# Patient Record
Sex: MALE | Race: WHITE | HISPANIC OR LATINO | Employment: FULL TIME | URBAN - METROPOLITAN AREA
[De-identification: names, ages, dates, MRNs, and addresses within clinical notes are randomized per-mention and may not be internally consistent; named-entity substitution may affect disease eponyms.]

---

## 2019-09-25 ENCOUNTER — TELEPHONE (OUTPATIENT)
Dept: OTHER | Facility: OTHER | Age: 39
End: 2019-09-25

## 2019-09-25 NOTE — TELEPHONE ENCOUNTER
Date/Time Called in:  09/25/19 @ 1837  Type of Consult: Routine  Facility: [facility]  Unit:   Room:   Phone: [phone]  Referring Physician: Mandy Ascencio: [facility]) Nephrology  Patient: Rita Sosa Record Number: [id number]  Admitting Diagnosis:   Reason for Consult: JULIENNE, Solitary Kidney, Received Gentamicin  Which Physician Notified:   Date/ Time Physician Notified:

## 2019-09-30 ENCOUNTER — HOSPITAL ENCOUNTER (INPATIENT)
Facility: HOSPITAL | Age: 39
LOS: 15 days | Discharge: HOME HEALTH | DRG: 162 | End: 2019-10-15
Attending: INTERNAL MEDICINE | Admitting: HOSPITALIST
Payer: COMMERCIAL

## 2019-09-30 DIAGNOSIS — Z95.2 S/P MVR (MITRAL VALVE REPLACEMENT): Primary | ICD-10-CM

## 2019-09-30 DIAGNOSIS — I38 ENDOCARDITIS: ICD-10-CM

## 2019-09-30 DIAGNOSIS — R78.81 BACTEREMIA: ICD-10-CM

## 2019-09-30 DIAGNOSIS — B19.20 HEPATITIS C VIRUS INFECTION WITHOUT HEPATIC COMA, UNSPECIFIED CHRONICITY: ICD-10-CM

## 2019-09-30 DIAGNOSIS — I34.0 NONRHEUMATIC MITRAL VALVE REGURGITATION: ICD-10-CM

## 2019-09-30 DIAGNOSIS — I76 SEPTIC EMBOLISM (HCC): ICD-10-CM

## 2019-10-01 ENCOUNTER — APPOINTMENT (INPATIENT)
Dept: RADIOLOGY | Facility: HOSPITAL | Age: 39
DRG: 162 | End: 2019-10-01
Payer: COMMERCIAL

## 2019-10-01 ENCOUNTER — APPOINTMENT (INPATIENT)
Dept: NON INVASIVE DIAGNOSTICS | Facility: HOSPITAL | Age: 39
DRG: 162 | End: 2019-10-01
Payer: COMMERCIAL

## 2019-10-01 PROBLEM — N17.9 ACUTE KIDNEY INJURY (HCC): Status: ACTIVE | Noted: 2019-10-01

## 2019-10-01 PROBLEM — G47.00 INSOMNIA: Status: ACTIVE | Noted: 2019-10-01

## 2019-10-01 PROBLEM — I38 ENDOCARDITIS: Status: ACTIVE | Noted: 2019-10-01

## 2019-10-01 PROBLEM — I34.0 NONRHEUMATIC MITRAL VALVE REGURGITATION: Status: ACTIVE | Noted: 2019-10-01

## 2019-10-01 PROBLEM — R78.81 BACTEREMIA: Status: ACTIVE | Noted: 2019-10-01

## 2019-10-01 PROBLEM — F19.11 HISTORY OF INTRAVENOUS DRUG ABUSE (HCC): Status: ACTIVE | Noted: 2019-10-01

## 2019-10-01 PROBLEM — R00.0 TACHYCARDIA: Status: ACTIVE | Noted: 2019-10-01

## 2019-10-01 PROBLEM — F41.9 ANXIETY: Status: ACTIVE | Noted: 2019-10-01

## 2019-10-01 PROBLEM — IMO0002 SOLITARY LEFT KIDNEY: Status: ACTIVE | Noted: 2019-10-01

## 2019-10-01 PROBLEM — Z87.898 HISTORY OF INTRAVENOUS DRUG ABUSE: Status: ACTIVE | Noted: 2019-10-01

## 2019-10-01 LAB
ALBUMIN SERPL BCP-MCNC: 2.8 G/DL (ref 3.5–5)
ALP SERPL-CCNC: 107 U/L (ref 46–116)
ALT SERPL W P-5'-P-CCNC: 22 U/L (ref 12–78)
ANION GAP SERPL CALCULATED.3IONS-SCNC: 11 MMOL/L (ref 4–13)
ANION GAP SERPL CALCULATED.3IONS-SCNC: 8 MMOL/L (ref 4–13)
AST SERPL W P-5'-P-CCNC: 11 U/L (ref 5–45)
BASOPHILS # BLD AUTO: 0.05 THOUSANDS/ΜL (ref 0–0.1)
BASOPHILS NFR BLD AUTO: 0 % (ref 0–1)
BILIRUB SERPL-MCNC: 0.72 MG/DL (ref 0.2–1)
BUN SERPL-MCNC: 37 MG/DL (ref 5–25)
BUN SERPL-MCNC: 39 MG/DL (ref 5–25)
CALCIUM SERPL-MCNC: 8.8 MG/DL (ref 8.3–10.1)
CALCIUM SERPL-MCNC: 9.1 MG/DL (ref 8.3–10.1)
CHLORIDE SERPL-SCNC: 105 MMOL/L (ref 100–108)
CHLORIDE SERPL-SCNC: 106 MMOL/L (ref 100–108)
CO2 SERPL-SCNC: 24 MMOL/L (ref 21–32)
CO2 SERPL-SCNC: 25 MMOL/L (ref 21–32)
CREAT SERPL-MCNC: 1.76 MG/DL (ref 0.6–1.3)
CREAT SERPL-MCNC: 1.86 MG/DL (ref 0.6–1.3)
EOSINOPHIL # BLD AUTO: 0.19 THOUSAND/ΜL (ref 0–0.61)
EOSINOPHIL NFR BLD AUTO: 1 % (ref 0–6)
ERYTHROCYTE [DISTWIDTH] IN BLOOD BY AUTOMATED COUNT: 17.3 % (ref 11.6–15.1)
GFR SERPL CREATININE-BSD FRML MDRD: 45 ML/MIN/1.73SQ M
GFR SERPL CREATININE-BSD FRML MDRD: 48 ML/MIN/1.73SQ M
GLUCOSE SERPL-MCNC: 107 MG/DL (ref 65–140)
GLUCOSE SERPL-MCNC: 108 MG/DL (ref 65–140)
HCT VFR BLD AUTO: 24.9 % (ref 36.5–49.3)
HGB BLD-MCNC: 8 G/DL (ref 12–17)
IMM GRANULOCYTES # BLD AUTO: 0.12 THOUSAND/UL (ref 0–0.2)
IMM GRANULOCYTES NFR BLD AUTO: 1 % (ref 0–2)
INR PPP: 1.2 (ref 0.84–1.19)
LYMPHOCYTES # BLD AUTO: 1.61 THOUSANDS/ΜL (ref 0.6–4.47)
LYMPHOCYTES NFR BLD AUTO: 11 % (ref 14–44)
MCH RBC QN AUTO: 27.4 PG (ref 26.8–34.3)
MCHC RBC AUTO-ENTMCNC: 32.1 G/DL (ref 31.4–37.4)
MCV RBC AUTO: 85 FL (ref 82–98)
MONOCYTES # BLD AUTO: 0.67 THOUSAND/ΜL (ref 0.17–1.22)
MONOCYTES NFR BLD AUTO: 4 % (ref 4–12)
NEUTROPHILS # BLD AUTO: 12.47 THOUSANDS/ΜL (ref 1.85–7.62)
NEUTS SEG NFR BLD AUTO: 83 % (ref 43–75)
NRBC BLD AUTO-RTO: 0 /100 WBCS
PLATELET # BLD AUTO: 312 THOUSANDS/UL (ref 149–390)
PMV BLD AUTO: 9.7 FL (ref 8.9–12.7)
POTASSIUM SERPL-SCNC: 3.8 MMOL/L (ref 3.5–5.3)
POTASSIUM SERPL-SCNC: 4.1 MMOL/L (ref 3.5–5.3)
PROT SERPL-MCNC: 7.5 G/DL (ref 6.4–8.2)
PROTHROMBIN TIME: 14.8 SECONDS (ref 11.6–14.5)
RBC # BLD AUTO: 2.92 MILLION/UL (ref 3.88–5.62)
SODIUM SERPL-SCNC: 138 MMOL/L (ref 136–145)
SODIUM SERPL-SCNC: 141 MMOL/L (ref 136–145)
WBC # BLD AUTO: 15.11 THOUSAND/UL (ref 4.31–10.16)

## 2019-10-01 PROCEDURE — 87040 BLOOD CULTURE FOR BACTERIA: CPT | Performed by: INTERNAL MEDICINE

## 2019-10-01 PROCEDURE — 86704 HEP B CORE ANTIBODY TOTAL: CPT | Performed by: STUDENT IN AN ORGANIZED HEALTH CARE EDUCATION/TRAINING PROGRAM

## 2019-10-01 PROCEDURE — 93880 EXTRACRANIAL BILAT STUDY: CPT | Performed by: SURGERY

## 2019-10-01 PROCEDURE — 99254 IP/OBS CNSLTJ NEW/EST MOD 60: CPT | Performed by: HOSPITALIST

## 2019-10-01 PROCEDURE — 99232 SBSQ HOSP IP/OBS MODERATE 35: CPT | Performed by: INTERNAL MEDICINE

## 2019-10-01 PROCEDURE — 99254 IP/OBS CNSLTJ NEW/EST MOD 60: CPT | Performed by: INTERNAL MEDICINE

## 2019-10-01 PROCEDURE — 93880 EXTRACRANIAL BILAT STUDY: CPT

## 2019-10-01 PROCEDURE — 70450 CT HEAD/BRAIN W/O DYE: CPT

## 2019-10-01 PROCEDURE — NC001 PR NO CHARGE: Performed by: INTERNAL MEDICINE

## 2019-10-01 PROCEDURE — 85610 PROTHROMBIN TIME: CPT | Performed by: STUDENT IN AN ORGANIZED HEALTH CARE EDUCATION/TRAINING PROGRAM

## 2019-10-01 PROCEDURE — NC001 PR NO CHARGE: Performed by: HOSPITALIST

## 2019-10-01 PROCEDURE — 71250 CT THORAX DX C-: CPT

## 2019-10-01 PROCEDURE — 80048 BASIC METABOLIC PNL TOTAL CA: CPT | Performed by: INTERNAL MEDICINE

## 2019-10-01 PROCEDURE — 71045 X-RAY EXAM CHEST 1 VIEW: CPT

## 2019-10-01 PROCEDURE — 80053 COMPREHEN METABOLIC PANEL: CPT | Performed by: STUDENT IN AN ORGANIZED HEALTH CARE EDUCATION/TRAINING PROGRAM

## 2019-10-01 PROCEDURE — 70355 PANORAMIC X-RAY OF JAWS: CPT

## 2019-10-01 PROCEDURE — 93005 ELECTROCARDIOGRAM TRACING: CPT

## 2019-10-01 PROCEDURE — 86705 HEP B CORE ANTIBODY IGM: CPT | Performed by: STUDENT IN AN ORGANIZED HEALTH CARE EDUCATION/TRAINING PROGRAM

## 2019-10-01 PROCEDURE — 85025 COMPLETE CBC W/AUTO DIFF WBC: CPT | Performed by: INTERNAL MEDICINE

## 2019-10-01 PROCEDURE — 86803 HEPATITIS C AB TEST: CPT | Performed by: STUDENT IN AN ORGANIZED HEALTH CARE EDUCATION/TRAINING PROGRAM

## 2019-10-01 PROCEDURE — 87340 HEPATITIS B SURFACE AG IA: CPT | Performed by: STUDENT IN AN ORGANIZED HEALTH CARE EDUCATION/TRAINING PROGRAM

## 2019-10-01 PROCEDURE — 99255 IP/OBS CONSLTJ NEW/EST HI 80: CPT | Performed by: THORACIC SURGERY (CARDIOTHORACIC VASCULAR SURGERY)

## 2019-10-01 PROCEDURE — 87389 HIV-1 AG W/HIV-1&-2 AB AG IA: CPT | Performed by: INTERNAL MEDICINE

## 2019-10-01 RX ORDER — ZOLPIDEM TARTRATE 5 MG/1
5 TABLET ORAL
Status: DISCONTINUED | OUTPATIENT
Start: 2019-10-01 | End: 2019-10-09 | Stop reason: HOSPADM

## 2019-10-01 RX ORDER — LORAZEPAM 0.5 MG/1
0.5 TABLET ORAL ONCE
Status: COMPLETED | OUTPATIENT
Start: 2019-10-01 | End: 2019-10-01

## 2019-10-01 RX ORDER — SODIUM CHLORIDE 9 MG/ML
40 INJECTION, SOLUTION INTRAVENOUS CONTINUOUS
Status: DISCONTINUED | OUTPATIENT
Start: 2019-10-02 | End: 2019-10-02

## 2019-10-01 RX ORDER — METOPROLOL TARTRATE 50 MG/1
50 TABLET, FILM COATED ORAL EVERY 12 HOURS SCHEDULED
Status: DISCONTINUED | OUTPATIENT
Start: 2019-10-01 | End: 2019-10-05

## 2019-10-01 RX ORDER — TRAZODONE HYDROCHLORIDE 50 MG/1
50 TABLET ORAL
Status: DISCONTINUED | OUTPATIENT
Start: 2019-10-01 | End: 2019-10-01

## 2019-10-01 RX ORDER — LANOLIN ALCOHOL/MO/W.PET/CERES
3 CREAM (GRAM) TOPICAL
Status: DISCONTINUED | OUTPATIENT
Start: 2019-10-01 | End: 2019-10-01

## 2019-10-01 RX ORDER — LEVOFLOXACIN 500 MG/1
500 TABLET, FILM COATED ORAL EVERY 24 HOURS
Status: DISCONTINUED | OUTPATIENT
Start: 2019-10-01 | End: 2019-10-01

## 2019-10-01 RX ORDER — ESCITALOPRAM OXALATE 10 MG/1
10 TABLET ORAL DAILY
Status: DISCONTINUED | OUTPATIENT
Start: 2019-10-01 | End: 2019-10-15 | Stop reason: HOSPADM

## 2019-10-01 RX ORDER — TRAZODONE HYDROCHLORIDE 50 MG/1
50 TABLET ORAL ONCE
Status: COMPLETED | OUTPATIENT
Start: 2019-10-01 | End: 2019-10-01

## 2019-10-01 RX ORDER — LANOLIN ALCOHOL/MO/W.PET/CERES
3 CREAM (GRAM) TOPICAL
Status: DISCONTINUED | OUTPATIENT
Start: 2019-10-01 | End: 2019-10-09 | Stop reason: HOSPADM

## 2019-10-01 RX ORDER — LEVOFLOXACIN 500 MG/1
500 TABLET, FILM COATED ORAL EVERY 24 HOURS
Status: DISCONTINUED | OUTPATIENT
Start: 2019-10-02 | End: 2019-10-03

## 2019-10-01 RX ORDER — HEPARIN SODIUM 5000 [USP'U]/ML
5000 INJECTION, SOLUTION INTRAVENOUS; SUBCUTANEOUS EVERY 8 HOURS SCHEDULED
Status: DISCONTINUED | OUTPATIENT
Start: 2019-10-01 | End: 2019-10-03

## 2019-10-01 RX ORDER — ONDANSETRON 2 MG/ML
4 INJECTION INTRAMUSCULAR; INTRAVENOUS EVERY 8 HOURS PRN
Status: DISCONTINUED | OUTPATIENT
Start: 2019-10-01 | End: 2019-10-09 | Stop reason: HOSPADM

## 2019-10-01 RX ORDER — TRAZODONE HYDROCHLORIDE 100 MG/1
100 TABLET ORAL
Status: DISCONTINUED | OUTPATIENT
Start: 2019-10-02 | End: 2019-10-09 | Stop reason: HOSPADM

## 2019-10-01 RX ORDER — LEVOFLOXACIN 750 MG/1
750 TABLET ORAL
Status: DISCONTINUED | OUTPATIENT
Start: 2019-10-01 | End: 2019-10-01

## 2019-10-01 RX ORDER — HYDROXYZINE HYDROCHLORIDE 25 MG/1
25 TABLET, FILM COATED ORAL EVERY 8 HOURS PRN
Status: DISCONTINUED | OUTPATIENT
Start: 2019-10-01 | End: 2019-10-15 | Stop reason: HOSPADM

## 2019-10-01 RX ADMIN — LORAZEPAM 0.5 MG: 0.5 TABLET ORAL at 01:36

## 2019-10-01 RX ADMIN — HYDROXYZINE HYDROCHLORIDE 25 MG: 25 TABLET ORAL at 10:23

## 2019-10-01 RX ADMIN — METOPROLOL TARTRATE 50 MG: 50 TABLET, FILM COATED ORAL at 08:19

## 2019-10-01 RX ADMIN — MELATONIN 3 MG: 3 TAB ORAL at 21:31

## 2019-10-01 RX ADMIN — CEFTAZIDIME 1000 MG: 1 INJECTION, POWDER, FOR SOLUTION INTRAMUSCULAR; INTRAVENOUS at 03:19

## 2019-10-01 RX ADMIN — ESCITALOPRAM OXALATE 10 MG: 10 TABLET ORAL at 08:19

## 2019-10-01 RX ADMIN — HYDROXYZINE HYDROCHLORIDE 25 MG: 25 TABLET ORAL at 18:34

## 2019-10-01 RX ADMIN — ONDANSETRON 4 MG: 2 INJECTION INTRAMUSCULAR; INTRAVENOUS at 18:28

## 2019-10-01 RX ADMIN — CEFTAZIDIME 2000 MG: 1 INJECTION, POWDER, FOR SOLUTION INTRAMUSCULAR; INTRAVENOUS at 16:25

## 2019-10-01 RX ADMIN — TRAZODONE HYDROCHLORIDE 50 MG: 50 TABLET ORAL at 21:53

## 2019-10-01 RX ADMIN — TRAZODONE HYDROCHLORIDE 50 MG: 50 TABLET ORAL at 21:31

## 2019-10-01 RX ADMIN — METOPROLOL TARTRATE 50 MG: 50 TABLET, FILM COATED ORAL at 21:31

## 2019-10-01 RX ADMIN — LEVOFLOXACIN 750 MG: 750 TABLET, FILM COATED ORAL at 03:19

## 2019-10-01 NOTE — H&P (VIEW-ONLY)
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  he was in his usual state of health until several weeks ago  In review, Treated at [facility] with IV Abx x 6 weeks  F/U Echo on 6/20 with MV posterior leaflet echodensity, severe MR and mild TR  Patient again using heroine  Presented to [facility] on 9/6 with several days of insomnia and malaise  + Bld Cx for Pseudomonas, started on double coverage ceftazidime/levofloxacin  Transferred to [facility] for surgical evaluation  Patient states he was admitted to [facility] several weeks ago  He states the he was told they were having trouble keeping "water off his lungs"  He admits to SOB, ANNE and orthopnea  He also has an essential tremor that is new since this admission  As I sit with the patient now, he relates this above story to me   he currently denies and syncope, presyncope, CP, palpitations      Past Medical History:  Past Medical History:   Diagnosis Date    Anxiety 10/1/2019    Bacteremia 10/1/2019    Endocarditis 10/1/2019    History of intravenous drug abuse ([facility] [address] ) 10/1/2019    Nonrheumatic mitral valve regurgitation 10/1/2019         Past Surgical History:   Past Surgical History:   Procedure Laterality Date    CHEST WALL TUMOR EXCISION  2013    Anterior chest wall cyst removed         Family History:  Family History   Problem Relation Age of Onset    Heart disease Maternal Grandmother          Social History:  Social History     Substance and Sexual Activity   Alcohol Use Never    Frequency: Never    Binge frequency: Never     Social History     Substance and Sexual Activity   Drug Use Yes     Social History     Tobacco Use   Smoking Status Never Smoker   Smokeless Tobacco Never Used     Marital Status: Single  Lives with mother  Works odd jobs, mostly construction  Has not seen dentist in some time  Home Medications:   Prior to Admission medications    Not on File       Inpatient Medications:  Scheduled Meds:     Current Facility-Administered Medications:  cefTAZidime 1,000 mg Intravenous Q12H Max Charlotte, DO Last Rate: 1,000 mg (10/01/19 0319)   escitalopram 10 mg Oral Daily Max Charlotte, DO    heparin (porcine) 5,000 Units Subcutaneous Q8H [address] Max Ancil [place], DO    hydrOXYzine HCL 25 mg Oral Q8H PRN Max Charlotte, DO    levofloxacin 750 mg Oral Q48H Max Charlotte, DO    melatonin 3 mg Oral HS Max Ancil [place], DO    metoprolol tartrate 50 mg Oral Q12H [address] Max Ancil [place], DO    traZODone 50 mg Oral HS Max Widanabellaki, DO    zolpidem 5 mg Oral HS PRN Max Charlotte, DO      Continuous Infusions:    PRN Meds:    hydrOXYzine HCL 25 mg Q8H PRN   zolpidem 5 mg HS PRN       Allergies:  No Known Allergies    Review of Systems:  Review of Systems   Constitutional: Positive for fatigue  Negative for activity change and fever  HENT: Negative for congestion, dental problem and sore throat  Respiratory: Positive for shortness of breath  Negative for choking, chest tightness and wheezing  Cardiovascular: Negative for chest pain, palpitations and leg swelling  Gastrointestinal: Positive for nausea and vomiting  Negative for abdominal distention, constipation and diarrhea  Genitourinary: Negative for difficulty urinating, dysuria, enuresis and hematuria  Musculoskeletal: Negative for arthralgias, gait problem, joint swelling and myalgias  Skin: Negative for color change, pallor, rash and wound  Neurological: Positive for tremors  Negative for dizziness, syncope and light-headedness  Psychiatric/Behavioral: Negative for agitation and behavioral problems  The patient is nervous/anxious  Vital Signs:     Vitals:    09/30/19 2329 10/01/19 0317 10/01/19 0816   BP: 113/66 110/72 120/65   BP Location: Left arm  Right arm   Pulse: (!) 120  (!) 125   Resp: 18  18   SpO2: 95%  97%   Weight: 72 9 kg (160 lb 12 8 oz)     Height: 5' 3" (1 6 m)       Invasive Devices     Peripheral Intravenous Line            Long-Dwell Peripheral IV (Midline) Left Brachial -- days                Physical Exam:  Physical Exam   Constitutional: He is oriented to person, place, and time  He appears well-developed and well-nourished  No distress  HENT:   Head: Normocephalic and atraumatic  Mouth/Throat: Oropharynx is clear and moist    Eyes: Pupils are equal, round, and reactive to light  Conjunctivae are normal  No scleral icterus  Neck: Normal range of motion  Neck supple  No JVD present  Cardiovascular: Regular rhythm  Tachycardia present  Murmur heard  Systolic murmur is present with a grade of 4/6  Pulmonary/Chest: Effort normal and breath sounds normal  No respiratory distress  He has no wheezes  He has no rales  Abdominal: Soft  Bowel sounds are normal  He exhibits no distension and no mass  Musculoskeletal: Normal range of motion  He exhibits no edema or tenderness  Neurological: He is alert and oriented to person, place, and time  No cranial nerve deficit  Skin: Skin is warm and dry  He is not diaphoretic  Psychiatric: He has a normal mood and affect  His behavior is normal  Judgment and thought content normal    Nursing note and vitals reviewed        Lab Results:     Results from last 7 days   Lab Units 10/01/19  1023   WBC Thousand/uL 15 11*   HEMOGLOBIN g/dL 8 0*   HEMATOCRIT % 24 9*   PLATELETS Thousands/uL 312     Results from last 7 days   Lab Units 10/01/19  1023   POTASSIUM mmol/L 4 1   CHLORIDE mmol/L 105   CO2 mmol/L 25   BUN mg/dL 39*   CREATININE mg/dL 1 76*   CALCIUM mg/dL 8 8         No results found for: HGBA1C  No results found for: CKTOTAL, CKMB, CKMBINDEX, TROPONINI    Imaging Studies:     Cardiac Catheterization: n/a    Echocardiogram: performed at [facility], disc/film/report currently unavailable  Per chart; Patient had BILLY done on 06/20/2019 which showed echodensity on posterior atrial aspect of mitral valve + severe mitral and mild tricuspid regurgitation        Assessment:  Principal Diagnosis:   Mitral Valve endocarditis; Ongoing MVR workup    Hospital Problems:  Present on Admission:   Endocarditis   History of intravenous drug abuse ([address] )   Nonrheumatic mitral valve regurgitation   Bacteremia      Plan:  Risks and benefits of mitral valve replacement were discussed in detail today with the patient  They understand and wish to proceed with further workup and ultimately surgical intervention  We have ordered routine preoperative laboratory and vascular studies  Given patients IVDA history and in-ability to stay clean despite similar diagnosis earlier this year, would favor delayed surgical replacement until able to demonstrate compliance  Will try to obtain imagining from [facility], will likely need to repeat at least TTE  Will need active case management involvement to arrange for care       SIGNATURE: Tia Matthews PA-C  DATE: October 1, 2019  TIME: 11:37 AM

## 2019-10-01 NOTE — CONSULTS
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  he was in his usual state of health until several weeks ago  In review, Treated at [facility] with IV Abx x 6 weeks  F/U Echo on 6/20 with MV posterior leaflet echodensity, severe MR and mild TR  Patient again using heroine  Presented to [facility] on 9/6 with several days of insomnia and malaise  + Bld Cx for Pseudomonas, started on double coverage ceftazidime/levofloxacin  Transferred to [facility] for surgical evaluation  Patient states he was admitted to [facility] several weeks ago  He states the he was told they were having trouble keeping "water off his lungs"  He admits to SOB, ANNE and orthopnea  He also has an essential tremor that is new since this admission  As I sit with the patient now, he relates this above story to me   he currently denies and syncope, presyncope, CP, palpitations      Past Medical History:  Past Medical History:   Diagnosis Date    Anxiety 10/1/2019    Bacteremia 10/1/2019    Endocarditis 10/1/2019    History of intravenous drug abuse ([facility] [address] ) 10/1/2019    Nonrheumatic mitral valve regurgitation 10/1/2019         Past Surgical History:   Past Surgical History:   Procedure Laterality Date    CHEST WALL TUMOR EXCISION  2013    Anterior chest wall cyst removed         Family History:  Family History   Problem Relation Age of Onset    Heart disease Maternal Grandmother          Social History:  Social History     Substance and Sexual Activity   Alcohol Use Never    Frequency: Never    Binge frequency: Never     Social History     Substance and Sexual Activity   Drug Use Yes     Social History     Tobacco Use   Smoking Status Never Smoker   Smokeless Tobacco Never Used     Marital Status: Single  Lives with mother  Works odd jobs, mostly construction  Has not seen dentist in some time  Home Medications:   Prior to Admission medications    Not on File       Inpatient Medications:  Scheduled Meds:     Current Facility-Administered Medications:  cefTAZidime 1,000 mg Intravenous Q12H Chase Porter DO Last Rate: 1,000 mg (10/01/19 0319)   escitalopram 10 mg Oral Daily Chase Porter DO    heparin (porcine) 5,000 Units Subcutaneous Q8H [facility] DO Robyn    hydrOXYzine HCL 25 mg Oral Q8H PRN Chase Porter DO    levofloxacin 750 mg Oral Q48H Chase Porter DO    melatonin 3 mg Oral HS Max Yessica DO Robyn    metoprolol tartrate 50 mg Oral Q12H [facility] DO Robyn    traZODone 50 mg Oral HS Chase Porter, DO    zolpidem 5 mg Oral HS PRN Chase Porter DO      Continuous Infusions:    PRN Meds:    hydrOXYzine HCL 25 mg Q8H PRN   zolpidem 5 mg HS PRN       Allergies:  No Known Allergies    Review of Systems:  Review of Systems   Constitutional: Positive for fatigue  Negative for activity change and fever  HENT: Negative for congestion, dental problem and sore throat  Respiratory: Positive for shortness of breath  Negative for choking, chest tightness and wheezing  Cardiovascular: Negative for chest pain, palpitations and leg swelling  Gastrointestinal: Positive for nausea and vomiting  Negative for abdominal distention, constipation and diarrhea  Genitourinary: Negative for difficulty urinating, dysuria, enuresis and hematuria  Musculoskeletal: Negative for arthralgias, gait problem, joint swelling and myalgias  Skin: Negative for color change, pallor, rash and wound  Neurological: Positive for tremors  Negative for dizziness, syncope and light-headedness  Psychiatric/Behavioral: Negative for agitation and behavioral problems  The patient is nervous/anxious  Vital Signs:     Vitals:    09/30/19 2329 10/01/19 0317 10/01/19 0816   BP: 113/66 110/72 120/65   BP Location: Left arm  Right arm   Pulse: (!) 120  (!) 125   Resp: 18  18   SpO2: 95%  97%   Weight: 72 9 kg (160 lb 12 8 oz)     Height: 5' 3" (1 6 m)       Invasive Devices     Peripheral Intravenous Line            Long-Dwell Peripheral IV (Midline) Left Brachial -- days                Physical Exam:  Physical Exam   Constitutional: He is oriented to person, place, and time  He appears well-developed and well-nourished  No distress  HENT:   Head: Normocephalic and atraumatic  Mouth/Throat: Oropharynx is clear and moist    Eyes: Pupils are equal, round, and reactive to light  Conjunctivae are normal  No scleral icterus  Neck: Normal range of motion  Neck supple  No JVD present  Cardiovascular: Regular rhythm  Tachycardia present  Murmur heard  Systolic murmur is present with a grade of 4/6  Pulmonary/Chest: Effort normal and breath sounds normal  No respiratory distress  He has no wheezes  He has no rales  Abdominal: Soft  Bowel sounds are normal  He exhibits no distension and no mass  Musculoskeletal: Normal range of motion  He exhibits no edema or tenderness  Neurological: He is alert and oriented to person, place, and time  No cranial nerve deficit  Skin: Skin is warm and dry  He is not diaphoretic  Psychiatric: He has a normal mood and affect  His behavior is normal  Judgment and thought content normal    Nursing note and vitals reviewed        Lab Results:     Results from last 7 days   Lab Units 10/01/19  1023   WBC Thousand/uL 15 11*   HEMOGLOBIN g/dL 8 0*   HEMATOCRIT % 24 9*   PLATELETS Thousands/uL 312     Results from last 7 days   Lab Units 10/01/19  1023   POTASSIUM mmol/L 4 1   CHLORIDE mmol/L 105   CO2 mmol/L 25   BUN mg/dL 39*   CREATININE mg/dL 1 76*   CALCIUM mg/dL 8 8         No results found for: HGBA1C  No results found for: CKTOTAL, CKMB, CKMBINDEX, TROPONINI    Imaging Studies:     Cardiac Catheterization: n/a    Echocardiogram: performed at [facility], disc/film/report currently unavailable  Per chart; Patient had BILLY done on 06/20/2019 which showed echodensity on posterior atrial aspect of mitral valve + severe mitral and mild tricuspid regurgitation        Assessment:  Principal Diagnosis:   Mitral Valve endocarditis; Ongoing MVR workup    Hospital Problems:  Present on Admission:   Endocarditis   History of intravenous drug abuse ([address] )   Nonrheumatic mitral valve regurgitation   Bacteremia      Plan:  Risks and benefits of mitral valve replacement were discussed in detail today with the patient  They understand and wish to proceed with further workup and ultimately surgical intervention  We have ordered routine preoperative laboratory and vascular studies  Given patients IVDA history and in-ability to stay clean despite similar diagnosis earlier this year, would favor delayed surgical replacement until able to demonstrate compliance  Will try to obtain imagining from [facility], will likely need to repeat at least TTE  Will need active case management involvement to arrange for care       SIGNATURE: Kate Sterling PA-C  DATE: October 1, 2019  TIME: 11:37 AM

## 2019-10-01 NOTE — PROGRESS NOTES
Pt still refusing to wear telemetry or Masimo monitoring system  Allowed us to obtain blood work this morning after being explained the importance by MD  Pt states the only thing he wants is a "ticket out of here"  1830: Pt now wearing telemetry after being coerced by family members

## 2019-10-01 NOTE — PROGRESS NOTES
SOD is unsure as to when patient's midline was placed at [facility], per family it has been in place for a few weeks     SOD-A will place order to discontinue midline and place peripheral IV site

## 2019-10-01 NOTE — PROGRESS NOTES
[facility] Senior Admission Note   Unit/Bed # @DBLINK (EBN,08642)@ Encounter: [id number]  SOD Team A    Conception Dorothy 44 y o  male [id number]      Patient seen and examined  Reviewed H&P per Dr Teri Tran  Agree with the assessment and plan except:     Assessment/Plan: Principal Problem:    Endocarditis  Active Problems:    History of intravenous drug abuse ([facility] [address] )    Acute kidney injury ([facility] [address] )    Insomnia    Tachycardia    Nonrheumatic mitral valve regurgitation    Bacteremia    Anxiety    Patient is a 43-year-old male with a past medical history of IV drug abuse with heroin (intermittent use since 13years of age) who presents as a transfer from [facility] for CT surgery evaluation for severe MR in the setting of endocarditis with pseudomonas bacteremia  Patient had actively been using heroin and several weeks ago developed gradual onset fevers with diaphoresis, malaise  States that he could not sleep and felt like shit  Patient presented to [facility] and was diagnosed with infective endocarditis with evidence of mitral valve vegetation seen on BILLY with accompanying Pseudomonas bacteremia  Patient currently on IV ceftazidime and levofloxacin for double anti-pseudomonal coverage per last ID note  Echocardiogram has revealed severe MR and patient is being transferred for CT surgery eval for possible mitral valve replacement  On arrival, patient afebrile  Tachycardia with heart rate 120 noted, blood pressure 113/66  Patient currently denies any chest pain or shortness of breath  Patient previously noted to have diarrhea which was thought to be secondary to cefepime, which is now improved  Continues to endorse some ongoing nausea        Please note that the following information is adapted from Infectious Disease, Cardiology, and Nephrology progress notes from [facility] present in the patient's chart written on 9/30/19:    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  -patient has a history of IV drug use and was previously treated for MSSA mitral valve endocarditis and bacteremia in March of 2019  Completed 6 week course of IV cefazolin on 3/21/19  -patient had 2D echo on September 6, 2019 which showed echodensity on the posterior mitral leaflet, possibly representing residual vegetation from prior treated endocarditis  Follow-up BILLY suggestive of increasing size of mitral valve vegetation compared to prior study in June of 2019 and also evidence of new vegetation on the anterior leaflet  -BILLY September 29, 2019 - bulbous echodensity on the anterior mitral leaflet measuring 8 x 13 mm  Filament to read echo density in the posterior mitral leaflet measuring 11 mm  Compared to echo done on September 23rd, appears to be no significant change    -Blood cultures on 09/06, 9/8, 9/10, 9/11 demonstrate Pseudomonas aeruginosa  -repeat cultures from 09/13 and 9/14 negative  -patient appears to have been on IV ceftazidime, levofloxacin 750 mg every 48 hours renally dose adjusted - for dual anti pseudomonal coverage  -patient apparently had previously been on cefepime, switched to ceftazidime due to side effects of vomiting diarrhea  -patient had previously been on gentamicin which was discontinued on 09/22/2019, noted to likely be contributing to acute kidney injury  -patient may need mitral valve replacement when infection is resolved  Will consult CT surgery  -ID consult    Severe mitral regurgitation  -most recent TTE from 09/30/2019 shows EF 70%, severe mitral regurgitation posteriorly directed, mitral valve vegetation as above  -CT surgery consult as above    JULIENNE on CKD  -baseline creatinine unknown  Admission creatinine 1 49, peaked at 2 20    Current creatinine of 1 7 on 09/30 per last nephrology note  -possibly secondary to ATN from aminoglycoside with prerenal component per last nephrology progress note  -CT at [facility] shows right kidney congenitally absent with left kidney showing compensatory hypertrophy and 1 small likely infarct in the lower cortex unchanged from previous study, not associated with perinephric fluid or abscess or pyelonephritis  -nephrology recommending monitoring off IV fluids for now  -continue to avoid nephrotoxins  -consider restarting Ace inhibitor once renal function stabilizes    Hypertension, sinus tachycardia  -"patient on metoprolol 50 mg b i d   With SBP between 100-110 on average"  -will continue metoprolol 50 mg twice daily    Anxiety, insomnia  -psychiatry progress note from 09/27 reviewed  -continue hydroxyzine 25 mg every 8 hours as needed for anxiety  -continue Lexapro, trazodone  -scheduled melatonin and Ambien p r n  nightly      Disposition:  INPATIENT     Expected LOS: >2 Midnights      Chase Wisdom DO

## 2019-10-01 NOTE — H&P
INTERNAL MEDICINE HISTORY AND PHYSICAL  - SOD Team B     NAME: Ronelle Mcardle  AGE: 44 y o  SEX: male  : 1980   MRN: [id number]  ENCOUNTER: [id number]    DATE: 10/1/2019  TIME: 2:01 AM    Primary Care Physician: No primary care provider on file  Admitting Provider: Marilee Caballero MD    Chief complaint:     History of Present illness  Ronelle Mcardle is a 44 y o  male with a past medical history significant for IV drug use mitral valve endocarditis methicillin sensitive Staph aureus septicemia, positive hep C antibody with undetectable viral load  Patient is here for CT surgery evaluation of aortic regurgitation  He was discharged on 2019 with a diagnosis of mitral valve endocarditis treated with cefazoline for 6 weeks  Patient had BILLY done on 2019 which showed echodensity on posterior atrial aspect of mitral valve + severe mitral and mild tricuspid regurgitation  Patient is following cardiologist   Patient was admitted on 2019 to [facility] requiring ICU care  he was actively using heroin when he developed n/v/malaise  Blood cultures on , , 9/10,  demonstrated Pseudomonas aeruginosa  repeat cultures from  and  were negative  he is currently on ceftazidime and levofloxacin for double anti pseudomonal coverage per id notes  He was seen and examined at bedside  His a admits to anxiety and difficulty sleeping was given 0,5 mg /day  He refused this tele  Patient was not in any acute distress  Patient denies headache, dizziness, chest pain, shortness of breath, nausea vomiting, numbness tingling sensation his extremities  BP in the ED was 113/66, RR 18,   Patient is asymptomatic    Review of Systems        Review of Systems   Constitutional: Negative  Eyes: Negative  Respiratory: Negative for chest tightness  Cardiovascular: Negative for chest pain, palpitations and leg swelling     Gastrointestinal: Negative for abdominal distention, abdominal pain and anal bleeding  Psychiatric/Behavioral: Negative for agitation and behavioral problems  Past Medical History     History reviewed  No pertinent past medical history  Past Surgical History     History reviewed  No pertinent surgical history  Social History     Social History     Substance and Sexual Activity   Alcohol Use Never    Frequency: Never    Binge frequency: Never     Social History     Substance and Sexual Activity   Drug Use Yes     Social History     Tobacco Use   Smoking Status Never Smoker   Smokeless Tobacco Never Used       Family History     History reviewed  No pertinent family history  Medications Prior to Admission     Prior to Admission medications    Not on File       Allergies     No Known Allergies    Objective     Vitals:    09/30/19 2329   BP: 113/66   BP Location: Left arm   Pulse: (!) 120   Resp: 18   SpO2: 95%   Weight: 72 9 kg (160 lb 12 8 oz)   Height: 5' 3" (1 6 m)     Body mass index is 28 48 kg/m²  No intake or output data in the 24 hours ending 10/01/19 0201  Invasive Devices     None                 Physical Exam  GENERAL: Appears well-developed and well-nourished  Appears in no acute distress   HEENT: Normocephalic and atraumatic  No scleral icterus  EOMI B/L  No oropharyngeal edema  MM moist    NECK: Neck supple with no lymphadenopathy  Trachea midline  No JVD  CARDIOVASCULAR: S1 and S2 are present  Regular rate and rhythm  No murmurs, rubs, or gallops  RESPIRATORY: CTA B/L, no rales, rhonci or wheezes  Normal respiratory expansion  ABDOMINAL: Abdomen soft, non tender, non distended   EXTREMITIES: ROM intact  No edema noted  MUSCULOSKELETAL: No joint tenderness, deformity or swelling, full range of motion without pain  NEUROLOGIC: Patient is alert and oriented to person, place, and time  No sensory or motor deficits  CN 2-12 intact  Speech fluent  SKIN: Skin is warm and dry  No rashes noted on exposed skin  PSYCHIATRIC: Normal mood and affect     Lab Results:       Invalid input(s):  EOSPCT       Invalid input(s): LABALBU              No results found for: PHART, SRK5SXP, PO2ART, DZW6MHQ, K0BROSDY, BEART, SOURCE  No components found for: HIV1X2  No results found for: HAV, HEPAIGM, HEPBIGM, HEPBCAB, HBEAG, HEPCAB  No results found for: SPEP, UPEP No results found for: HGBA1C  No results found for: CHOL No results found for: HDL No results found for: LDLCALC No results found for: TRIG  No results found for: [id number], T3FREE, T9FUCYV, FREET4    Imaging:   No orders to display       Microbiology:      Urinalysis:      No results found for: AMPHETUR, BDZUR, COCAINEUR, OPIATEUR, PCPUR, THCUR, ETOH, ACTMNPHEN, SALICYLATE    Urine Micro:        EKG, Pathology, and Other Studies: I have personally reviewed pertinent reports  Medications Given in Emergency Department     Medication Administration - last 24 hours from 09/30/2019 0201 to 10/01/2019 0201       Date/Time Order Dose Route Action Action by     10/01/2019 0136 LORazepam (ATIVAN) tablet 0 5 mg 0 5 mg Oral Given Teddy Parker RN          Assessment and Plan       1)  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 positive history of IV drug use was previously treated MSSA valve endocarditis and bacteremia echo on September 6, 2019 which showed echodensity on the posterior mitral leaflet, possibly representing residual vegetation from prior treated endocarditis  Follow-up BILLY suggestive of increasing size of mitral valve vegetation compared to prior study in June of 2019 and also evidence of new vegetation on the anterior leaflet  -BILLY September 29, 2019 - bulbous echodensity on the anterior mitral leaflet measuring 8 x 13 mm  Filament to read echo density in the posterior mitral leaflet measuring 11 mm    Compared to echo done on September 23rd, appears to be no significant change    -Blood cultures on 09/06, 9/8, 9/10, 9/11 demonstrate Pseudomonas aeruginosa  -repeat cultures from 09/13 and 9/14 negative  Patient is on IV Ceftazidime  levo QA 48 hours renally adjusted dose  Plan  1)patient may need mitral valve replacement when infection is resolved  Will consult CT surgery  -ID consult    2)  Hypertension, sinus tachycardia  -"patient on metoprolol 50 mg b i d  With SBP between 100-110 on average"  -will continue metoprolol 50 mg twice daily     3)  JULIENNE on CKD  -baseline creatinine unknown  Admission creatinine 1 49, peaked at 2 20  Current creatinine of 1 7 on 09/30 per last nephrology note  -possibly secondary to ATN from aminoglycoside with prerenal component per last nephrology progress note  CT shows right kidney congenitally absent with left kidney showing compensatory hypertrophy and 1 small likely infarct in the lower cortex unchanged from previous study, not associated with perinephric fluid or abscess or pyelonephritis  PLAN  -nephrologY ecommendS monitoring off IV fluids for now  -continue to avoid nephrotoxins  -consider restarting Ace inhibitor once renal function stabilizes     4)  ANXIETY    Continue IV 1 5 mg    PLEASE SEE DR Kaz Brooks NOTE       ICode Status: Level 1 - Full Code  VTE Pharmacologic Prophylaxis: Sequential compression device (Venodyne)    VTE Mechanical Prophylaxis: sequential compression device  Admission Status: INPATIENT   Admission Time  I spent 45 minutes admitting the patient  This involved direct patient contact where I performed a full history and physical, reviewing previous records, and reviewing laboratory and other diagnostic studies  PLEASE NOTE:  This encounter was completed utilizing the [employer] Direct Speech Voice Recognition Software  Grammatical errors, random word insertions, pronoun errors and incomplete sentences are occasional consequences of the system due to software limitations, ambient noise and hardware issues  These may be missed by proof reading prior to affixing electronic signature  Any questions or concerns about the content, text or information contained within the body of this dictation should be directly addressed to the physician for clarification  Please do not hesitate to call me directly if you have any any questions or concerns    Venessa Dhillon, DO

## 2019-10-01 NOTE — CONSULTS
Cardiology Consult H&P  Fabricio Chavira 44 y o  male MRN: [id number]  Unit/Bed#: -01 Encounter: [id number]      Physician Requesting Consult: Cintia Arreaga MD  Reason for Consult: endocarditis    HPI  Fabricio Chavira is a 44 y o  male with PMH of HTN, IVDA with heoine, solitary kidney, history of MSSA bacteremia (03/2019) with residual vegetation of posterior mitral valve leaflet who initially presented to [facility] found to be septic transferred to [facility] for evaluation by CT surgery  As per documentation was diagnosed with endocarditis due to Pseudomonas bacteremia and subsequently started on ceftazidime and levaquin per ID rec for double pseudomonas coverage  Patient has significant history of MSSA MV endocarditis and bacteremia in 03/2019 s/p 6 weeks appropriate cefazolin  Follow up TTE on 9/6/19 showed echodensity on posterior mitral valve leaflet representing residual veg  Follow up BILLY showing increased size of said veg of posterior mitral valve leaflet  Bcx from 9/13 and 9/14 NGTD  Previously on methadone clean for 5 months  Relapsed due to lack of insurance and cannot afford medication  Lives with mother and that is location for where he will live upon discharge  During last hospitalization was nauseous and cefepime was discontinued with nausea persisted  Reglan seemed to help at times  Plan for BILLY today  Prior Cardiac Imaging  - TTE (9/6/19, [facility]): EF 65%  Documentation as above    Physical exam  Gen:  Pleasant demeanor sitting comfortably, tachypnic  Psych: AO x3  Skin: intact  Cardiac: S1, S2,  regular rate, 3/6 holosystolic murmur loudest at midclavicular 5th intercostal space  no S3 or S4 appreciated  +1 radial, thready pulse  no peripheral edema No carotid bruits  No JVD  Resp:  Decreased BS BL  MSK: 5/5 strength throughout muscle groups  Neuro: CN grossly intact   Sensory to light touch, pain, proprioception intact BL LE, UE  LN: no cervical LAD  Rheum: no joint deformities in UE or LE    A&P  44 y o  male with PMH of HTN, IVDA with heoine, solitary kidney, history of MSSA bacteremia (03/2019) with residual vegetation of posterior mitral valve leaflet  Cardiology consulted for P  aeruginosa endocarditis    1  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  - c/w abx as per ID  - f/u BILLY  - would highly consider CM, SW, and psych consultation for expert evaluation for inpatient rehab  - cardiac meds: metop tartrate 50mg q12H  - would avoid sedating meds with zolpidem and trazodone  Would DC zolpidem  PDMP reviewed and I confirmed he has not received this medication outpatient  - tele  - strict I/O, fluid restrict, low sodium diet  - CXR looks wet but euvolemic on exam  Hold off on diuresis for now    2  Sinus tachycardia  - likely due to infection and regurgitant MV  F/u BILLY today  - metop tratrate 50mg q12H  Please await attending physician final attestation  Wendy Pierce MD  - PGY-4 Cardiology Fellow  - Tiger text enabled    ----              Review of Systems  ROS as noted above, otherwise 12 point review of systems was performed and is negative       Historical Information   Past Medical History:   Diagnosis Date    Anxiety 10/1/2019    Bacteremia 10/1/2019    Endocarditis 10/1/2019    History of intravenous drug abuse ([facility] [address] ) 10/1/2019    Nonrheumatic mitral valve regurgitation 10/1/2019     Past Surgical History:   Procedure Laterality Date    CHEST WALL TUMOR EXCISION  2013    Anterior chest wall cyst removed     Social History     Substance and Sexual Activity   Alcohol Use Never    Frequency: Never    Binge frequency: Never     Social History     Substance and Sexual Activity   Drug Use Yes     Social History     Tobacco Use   Smoking Status Never Smoker   Smokeless Tobacco Never Used     Family History:   Family History   Problem Relation Age of Onset    Heart disease Maternal Grandmother        Meds/Allergies   Hospital Medications:   Current Facility-Administered Medications   Medication Dose Route Frequency    cefTAZidime (FORTAZ) 2,000 mg in sodium chloride 0 9 % 50 mL IVPB  2,000 mg Intravenous Q12H    escitalopram (LEXAPRO) tablet 10 mg  10 mg Oral Daily    heparin (porcine) subcutaneous injection 5,000 Units  5,000 Units Subcutaneous Q8H [address]    hydrOXYzine HCL (ATARAX) tablet 25 mg  25 mg Oral Q8H PRN    levofloxacin (LEVAQUIN) tablet 500 mg  500 mg Oral Q24H    melatonin tablet 3 mg  3 mg Oral HS    metoprolol tartrate (LOPRESSOR) tablet 50 mg  50 mg Oral Q12H ZULEIKA    traZODone (DESYREL) tablet 50 mg  50 mg Oral HS    zolpidem (AMBIEN) tablet 5 mg  5 mg Oral HS PRN     Home Medications:   No medications prior to admission  No Known Allergies    Objective   Vitals: Blood pressure 120/65, pulse (!) 125, resp  rate 18, height 5' 3" (1 6 m), weight 72 9 kg (160 lb 12 8 oz), SpO2 97 %  Orthostatic Blood Pressures      Most Recent Value   Blood Pressure  120/65 filed at 10/01/2019 5757   Patient Position - Orthostatic VS  Sitting filed at 10/01/2019 0816            Intake/Output Summary (Last 24 hours) at 10/1/2019 1359  Last data filed at 10/1/2019 0646  Gross per 24 hour   Intake 0 ml   Output 0 ml   Net 0 ml       Invasive Devices     Peripheral Intravenous Line            Long-Dwell Peripheral IV (Midline) Left Brachial -- days                Physical Exam    Lab Results: I have personally reviewed pertinent lab results      Results from last 7 days   Lab Units 10/01/19  1023   WBC Thousand/uL 15 11*   HEMOGLOBIN g/dL 8 0*   HEMATOCRIT % 24 9*   PLATELETS Thousands/uL 312     Results from last 7 days   Lab Units 10/01/19  1023   POTASSIUM mmol/L 4 1   CHLORIDE mmol/L 105   CO2 mmol/L 25   BUN mg/dL 39*   CREATININE mg/dL 1 76*   CALCIUM mg/dL 8 8

## 2019-10-01 NOTE — QUICK NOTE
I was paged by Radiology at 5:30 a m  To discuss CT head findings, which showed septic embolus versus abscess  Dr Mare Griggs recommendations to get a head MRI w/wo contrast    CT chest showed pulmonary edema, patchy opacities in the left upper lobe  Represent edema versus pneumonia, moderate pleural effusions, splenic infarct

## 2019-10-01 NOTE — PROGRESS NOTES
NEPHROLOGY PROGRESS NOTE   Neeta Hameed 44 y o  male MRN: [id number]  Unit/Bed#: -01 Encounter: [id number]  Reason for Consult: JULIENNE    ASSESSMENT AND PLAN:  Patient is 66-year-old male with hypertension for 10 years as per patient, prior history of hep C, IV drug abuse with heroin, history of mitral valve endocarditis with MSSA bacteremia treated earlier this year, was admitted to [facility] when found to have residual mitral valve leaflet echodensity and Pseudomonas endocarditis of mitral valve with severe MR  Patient is transferred to [facility] for CT surgery well and for mitral valve repair/replacement  We are consulted for JULIENNE management    Reviewed all nephrology records from [facility]     JULIENNE, unknown prior baseline creatinine  -admission creatinine 1 4 at [facility] initially improved to 1 0 and then eventually peaked at 2 2 and then improved to 1 5 on 9/28/19 and since then has slightly increased to 1 7 and stable since yesterday  -JULIENNE suspected to be secondary to prerenal/ATN/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 all in the setting of solitary kidney  -also was found to have small renal infarct from prior endocarditis  -now remains off IV fluid  -had detailed discussion with patient regarding risk of worsening JULIENNE with contrast exposure and small chance of dialysis if renal failure continue to get worse   -recent workup includes:  -urinalysis showed 0 to 5 RBCs, no proteinuria, 0 to 5 WBCs this month  -CT scan showed congenitally absent right kidney with left kidney showing compensatory hypertrophy, one small likely infarct in the lower pole of left kidney unchanged from the previous study and not associated with any abscess for pyelonephritis  -complements normal, CK normal, February 2019: positive hep C antibody with hep C RNA PCR less than 15  -patient will need IV fluid preparation 12 hours prior to contrast exposure with cardiac catheterization and at least 6 hours post contrast exposure  Please notify Nephrology regarding timing for cardiac catheterization if planned   -avoid nephrotoxins or NSAIDs  -check urine eosinophils, no peripheral eosinophilia    Congenital solitary kidney    Hypertension  -blood pressure overall acceptable  -currently on metoprolol, continue with holding parameter    Anemia  -closely monitor hemoglobin, transfuse p r n  For hemoglobin less than seven  -low iron stores recently although avoiding IV Venofer due to active infection issues  -recent FOBT was negative at [address] mitral valve endocarditis/bacteremia  -antibiotic as per primary team and ID  -patient is active IV drug user and had prior history of MSSA bacteremia with endocarditis in March 2019  -BILLY in September 2019 showed echodensity on the anterior mitral leaflet  Pseudomonas bacteremia, blood culture from 9/28/19 showed Pseudomonas    Discussed above plan in detail with primary team and cardiology team     SUBJECTIVE:  Patient seen and examined at bedside  No chest pain, shortness of breath, nausea, vomiting, abdominal pain or diarrhea  No urinary complaints       OBJECTIVE:  Current Weight: Weight - Scale: 72 9 kg (160 lb 12 8 oz)  Vitals:    10/01/19 1519   BP: 110/72   Pulse:    Resp: 16   SpO2:        Intake/Output Summary (Last 24 hours) at 10/1/2019 1533  Last data filed at 10/1/2019 0646  Gross per 24 hour   Intake 0 ml   Output 0 ml   Net 0 ml     Wt Readings from Last 3 Encounters:   09/30/19 72 9 kg (160 lb 12 8 oz)     Temp Readings from Last 3 Encounters:   No data found for Temp     BP Readings from Last 3 Encounters:   10/01/19 110/72     Pulse Readings from Last 3 Encounters:   10/01/19 (!) 125        Physical Examination:  General:  Sitting in bed, no acute distress   Eyes:  Mild conjunctival pallor present  ENT:  External examination of ears and nose unremarkable  Neck:  Supple  Respiratory: Bilateral air entry present  CVS:  S1, S2 present  GI:  Soft, nontender, nondistended  CNS:  Active alert oriented x3  Extremities:  No significant edema in legs  Skin:  No new rash in legs    Medications:    Current Facility-Administered Medications:     cefTAZidime (FORTAZ) 2,000 mg in sodium chloride 0 9 % 50 mL IVPB, 2,000 mg, Intravenous, Q12H, Zayda Mason MD    escitalopram (LEXAPRO) tablet 10 mg, 10 mg, Oral, Daily, Max Talita Hilding, DO, 10 mg at 10/01/19 0819    heparin (porcine) subcutaneous injection 5,000 Units, 5,000 Units, Subcutaneous, Q8H [facility] **AND** [CANCELED] Platelet count, , , Once, Max Marianneki, DO    hydrOXYzine HCL (ATARAX) tablet 25 mg, 25 mg, Oral, Q8H PRN, Max Talita Hilding, DO, 25 mg at 10/01/19 1023    [START ON 10/2/2019] levofloxacin (LEVAQUIN) tablet 500 mg, 500 mg, Oral, Q24H, Zayda Mason MD    melatonin tablet 3 mg, 3 mg, Oral, HS, Max Widawski, DO    metoprolol tartrate (LOPRESSOR) tablet 50 mg, 50 mg, Oral, Q12H [facility], Max Talita Hilding, DO, 50 mg at 10/01/19 0819    traZODone (DESYREL) tablet 50 mg, 50 mg, Oral, HS, Max Widawski, DO    zolpidem (AMBIEN) tablet 5 mg, 5 mg, Oral, HS PRN, Max Talita Hilding, DO    Laboratory Results:  Results from last 7 days   Lab Units 10/01/19  1023   WBC Thousand/uL 15 11*   HEMOGLOBIN g/dL 8 0*   HEMATOCRIT % 24 9*   PLATELETS Thousands/uL 312   SODIUM mmol/L 138   POTASSIUM mmol/L 4 1   CHLORIDE mmol/L 105   CO2 mmol/L 25   BUN mg/dL 39*   CREATININE mg/dL 1 76*   CALCIUM mg/dL 8 8       XR chest portable   Final Result by Carrol Patel MD (10/01 1324)      New airspace opacity suggests pulmonary edema or diffuse bronchopneumonia  Immediate report was noted on the Epic system        Workstation performed: [id number]         CT chest wo contrast    (Results Pending)   CT head wo contrast    (Results Pending)   XR mandible panorex ([place] only)    (Results Pending)   VAS carotid complete study    (Results Pending)   CTA abdomen w wo contrast (Results Pending)       Portions of the record may have been created with voice recognition software  Occasional wrong word or "sound a like" substitutions may have occurred due to the inherent limitations of voice recognition software  Read the chart carefully and recognize, using context, where substitutions have occurred

## 2019-10-01 NOTE — PLAN OF CARE
Problem: PAIN - ADULT  Goal: Verbalizes/displays adequate comfort level or baseline comfort level  Description  Interventions:  - Encourage patient to monitor pain and request assistance  - Assess pain using appropriate pain scale  - Administer analgesics based on type and severity of pain and evaluate response  - Implement non-pharmacological measures as appropriate and evaluate response  - Consider cultural and social influences on pain and pain management  - Notify physician/advanced practitioner if interventions unsuccessful or patient reports new pain  Outcome: Progressing     Problem: INFECTION - ADULT  Goal: Absence or prevention of progression during hospitalization  Description  INTERVENTIONS:  - Assess and monitor for signs and symptoms of infection  - Monitor lab/diagnostic results  - Monitor all insertion sites, i e  indwelling lines, tubes, and drains  - Monitor endotracheal if appropriate and nasal secretions for changes in amount and color  - [place] appropriate cooling/warming therapies per order  - Administer medications as ordered  - Instruct and encourage patient and family to use good hand hygiene technique  - Identify and instruct in appropriate isolation precautions for identified infection/condition  Outcome: Progressing     Problem: SAFETY ADULT  Goal: Patient will remain free of falls  Description  INTERVENTIONS:  - Assess patient frequently for physical needs  -  Identify cognitive and physical deficits and behaviors that affect risk of falls    -  [place] fall precautions as indicated by assessment   - Educate patient/family on patient safety including physical limitations  - Instruct patient to call for assistance with activity based on assessment  - Modify environment to reduce risk of injury  - Consider OT/PT consult to assist with strengthening/mobility  Outcome: Progressing  Goal: Maintain or return to baseline ADL function  Description  INTERVENTIONS:  -  Assess patient's ability to carry out ADLs; assess patient's baseline for ADL function and identify physical deficits which impact ability to perform ADLs (bathing, care of mouth/teeth, toileting, grooming, dressing, etc )  - Assess/evaluate cause of self-care deficits   - Assess range of motion  - Assess patient's mobility; develop plan if impaired  - Assess patient's need for assistive devices and provide as appropriate  - Encourage maximum independence but intervene and supervise when necessary  - Involve family in performance of ADLs  - Assess for home care needs following discharge   - Consider OT consult to assist with ADL evaluation and planning for discharge  - Provide patient education as appropriate  Outcome: Progressing  Goal: Maintain or return mobility status to optimal level  Description  INTERVENTIONS:  - Assess patient's baseline mobility status (ambulation, transfers, stairs, etc )    - Identify cognitive and physical deficits and behaviors that affect mobility  - Identify mobility aids required to assist with transfers and/or ambulation (gait belt, sit-to-stand, lift, walker, cane, etc )  - [place] fall precautions as indicated by assessment  - Record patient progress and toleration of activity level on Mobility SBAR; progress patient to next Phase/Stage  - Instruct patient to call for assistance with activity based on assessment  - Consider rehabilitation consult to assist with strengthening/weightbearing, etc   Outcome: Progressing     Problem: DISCHARGE PLANNING  Goal: Discharge to home or other facility with appropriate resources  Description  INTERVENTIONS:  - Identify barriers to discharge w/patient and caregiver  - Arrange for needed discharge resources and transportation as appropriate  - Identify discharge learning needs (meds, wound care, etc )  - Arrange for interpretive services to assist at discharge as needed  - Refer to Case Management Department for coordinating discharge planning if the patient needs post-hospital services based on physician/advanced practitioner order or complex needs related to functional status, cognitive ability, or social support system  Outcome: Progressing     Problem: Knowledge Deficit  Goal: Patient/family/caregiver demonstrates understanding of disease process, treatment plan, medications, and discharge instructions  Description  Complete learning assessment and assess knowledge base    Interventions:  - Provide teaching at level of understanding  - Provide teaching via preferred learning methods  Outcome: Progressing

## 2019-10-01 NOTE — CONSULTS
This is continuation of care, please see detailed progress note from today    Patient was seen for initial consultation at [facility]

## 2019-10-01 NOTE — CONSULTS
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 severe mitral regurgitation and bacteremia due to Pseudomonas, currently on ceftazidime and levofloxacin     Bacteremia  · Pseudomonas bacteremia likely secondary to injection drug use   · Blood cultures were positive from 09/06- 09/11  · Repeat blood cultures from 09/13 and 09/14 were negative  · Blood cultures on 09/28 grew pseudomonas again   · Patient was previously on cefepime but had diarrhea  · Was also on gentamicin but given solitary kidney he developed JULIENNE and was stopped  · Currently on ceftazidime and levofloxacin  · Monitor temperature and WBC count     Infective endocarditis  · Pseudomonas endocarditis with severe mitral regurgitation  · Patient has h/o MSSA endocarditis that was treated with 6 weeks of iv cefazolin in March of 2019  · Patient had 2D echo on September 6, 2019 which showed echodensity on the posterior mitral leaflet, possibly representing residual vegetation from prior treated endocarditis  · BILLY showed increasing size of mitral valve vegetation compared to prior study in June 2019 and also new vegetation on anterior leaflet  · Currently on ceftazidime and levofloxacin for dual antipseudomonal coverage  · Transferred for CT surgery evaluation for valve replacement    Vomiting and diarrhea  · Secondary to cefepime  · C   Diff was negative at [facility]  · Cefepime stopped, diarrhea improved but patient is still nauseous     I v drug use  · Will need drug rehab after hospitalization to prevent recurrent endocarditis    JULIENNE   · Solitary kidney on the left  · Renal dosage of levofloxacin  · Avoid aminoglycosides and other nephrotoxic agents     Antibiotics  · On ceftazidime and levofloxacin  · Unsure what antibiotic day as patient transferred from [address] [place] [address]:  Reason for Consult: Infective endocarditis   HPI: Juan Watt is a 44y o  year old male with a PMH of ivdu, solitary kidney on the left presented as a transfer from [facility] for Infective endocarditis with pseudomonas bacteremia  He has a h/o endocarditis with MSSA bacteremia and completed a course of iv cefazolin in March, 2019, he subsequently started injecting again  This time, about 3 weeks ago patient started having fevers and feeling weak and went to the hospital and a BILLY showed the presence of mitral valve vegetation with accompanying pseudomonas bacteremia  Patient was at [facility] receiving iv antibiotics for about 3 weeks and was transferred for CT surgery evaluation  Currently patient does not complain of a fever but endorses shortness of breath and a productive cough  He says he feels nauseous and anxious all the time  Denies fevers, chills, palpitations, diarrhea, chest pain  REVIEW OF SYSTEMS:  A complete 12 point system-based review of systems is negative other than that noted in the HPI  PAST MEDICAL HISTORY:  History reviewed  No pertinent past medical history  History reviewed  No pertinent surgical history  FAMILY HISTORY:  Non-contributory    SOCIAL HISTORY:  Social History   Social History     Substance and Sexual Activity   Alcohol Use Never    Frequency: Never    Binge frequency: Never     Social History     Substance and Sexual Activity   Drug Use Yes     Social History     Tobacco Use   Smoking Status Never Smoker   Smokeless Tobacco Never Used       ALLERGIES:  No Known Allergies    MEDICATIONS:  All current active medications have been reviewed        PHYSICAL EXAM:  HR:  [120-125] 125  Resp:  [18] 18  BP: (110-120)/(65-72) 120/65  SpO2:  [95 %-97 %] 97 %  No data recorded  Current:      Intake/Output Summary (Last 24 hours) at 10/1/2019 0957  Last data filed at 10/1/2019 0646  Gross per 24 hour   Intake 0 ml   Output 0 ml   Net 0 ml       General Appearance:  Appearing well, nontoxic, and in no distress   Head:  Normocephalic, without obvious abnormality, atraumatic   Eyes:  Conjunctiva pink and sclera anicteric, both eyes   Nose: Nares normal, mucosa normal, no drainage   Throat: Oropharynx moist without lesions   Neck: Supple, symmetrical, no adenopathy, no tenderness/mass/nodules   Back:   Symmetric, no curvature, ROM normal, no CVA tenderness   Lungs:   Clear to auscultation bilaterally, respirations unlabored   Chest Wall:  No tenderness or deformity   Heart:  Tachycardic, holosystolic murmur heard at the apex and left sternal border radiating to the axilla    Abdomen:   Soft, non-tender, non-distended, positive bowel sounds    Extremities: No cyanosis, clubbing or edema   Skin: No rashes or lesions  No draining wounds noted  Lymph nodes: Cervical, supraclavicular nodes normal   Neurologic: Alert and oriented times 3, extremity strength 5/5 and symmetric       LABS, IMAGING, & OTHER STUDIES:  Lab Results:  I have personally reviewed pertinent labs  Invalid input(s): ALBUMIN            Imaging Studies:   I have personally reviewed pertinent imaging study reports and images in PACS  Other Studies:   I have personally reviewed pertinent reports

## 2019-10-01 NOTE — SOCIAL WORK
CM discussed pt at care coordination rounds  CM met w/ pt & mother Caroline Cedillo  Both  verbalized understanding of CM role  PTA Pt lives w/ mother n 2 story home w/ no steps to enter the residence Home has 1st floor bed & bath set up  Pt is independent w/ ADLS & Ambulation  Pt does not drive  Pt uses [facility] on [address] in [facility] [place]  for pharmacy needs  PCP Dr Vanessa Sam  No POA  Employed     Pt has never had DME, VNA, or had short term rehab, psych or D & A Admission history  CM reviewed d/c planning process including the following: identifying help at home, patient preference for d/c planning needs, Discharge Lounge, [facility] Meds to Bed program, availability of treatment team to discuss questions or concerns patient and/or family may have regarding understanding medications and recognizing signs and symptoms once discharged  CM also encouraged patient to follow up with all recommended appointments after discharge  Patient advised of importance for patient and family to participate in managing patients medical well being

## 2019-10-01 NOTE — UTILIZATION REVIEW
Initial Clinical Review  Inpatient Admission Once     Transfer Service: General Medicine       Question Answer Comment   Admitting Physician PRETTY DEE    Level of Care Med Surg telemetry   Estimated length of stay More than 2 Midnights    Certification I certify that inpatient services are medically necessary for this patient for a duration of greater than two midnights  See H&P and MD Progress Notes for additional information about the patient's course of treatment  10/01/19 0948     OBS 10-01-19 @ 0026 CONVERTED TO INPATIENT ADMISSION   10-01-19 @ 0294AZT ENDOCARDITIS AND CONTINUATION OF CARE     Admission: Date/Time/Statement:   10-01-19  Orders Placed This Encounter   Procedures    Place in Observation     Standing Status:   Standing     Number of Occurrences:   1     Order Specific Question:   Admitting Physician     Answer:   Tyler Reyes     Order Specific Question:   Level of Care     Answer:   Med Surg [16]          No chief complaint on file  ENDOCARDITIS    Assessment/Plan:  [address] [place] [address] AS OBSERVATION STATUS FOR CT SURGERY EVALUATION  WITH ENDOCARDITIS  PATIENT WAS ACTIVITY USING HEROIN  LAST ECHO   MR ? MITRAL VALVE REPLACEMENT  PLAN  IV ANTIBX CONSULT CT & ID  SUPPORTIVE CARE  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  -patient has a history of IV drug use and was previously treated for MSSA mitral valve endocarditis and bacteremia in March of 2019  Completed 6 week course of IV cefazolin on 3/21/19  -patient had 2D echo on September 6, 2019 which showed echodensity on the posterior mitral leaflet, possibly representing residual vegetation from prior treated endocarditis    Follow-up BILLY suggestive of increasing size of mitral valve vegetation compared to prior study in June of 2019 and also evidence of new vegetation on the anterior leaflet  -BILLY September 29, 2019 - bulbous echodensity on the anterior mitral leaflet measuring 8 x 13 mm  Filament to read echo density in the posterior mitral leaflet measuring 11 mm  Compared to echo done on September 23rd, appears to be no significant change    -Blood cultures on 09/06, 9/8, 9/10, 9/11 demonstrate Pseudomonas aeruginosa  -repeat cultures from 09/13 and 9/14 negative  -patient appears to have been on IV ceftazidime, levofloxacin 750 mg every 48 hours renally dose adjusted - for dual anti pseudomonal coverage  -patient apparently had previously been on cefepime, switched to ceftazidime due to side effects of vomiting diarrhea  -patient had previously been on gentamicin which was discontinued on 09/22/2019, noted to likely be contributing to acute kidney injury  -patient may need mitral valve replacement when infection is resolved  Will consult CT surgery  -ID consult   Vitals   Temp Pulse Respirations Blood Pressure SpO2   -- 09/30/19 2329 09/30/19 2329 09/30/19 2329 09/30/19 2329    (!) 120 18 113/66 95 %      Temp src Heart Rate Source Patient Position - Orthostatic VS BP Location FiO2 (%)   -- -- 10/01/19 0816 09/30/19 2329 --     Sitting Left arm       Pain Score       10/01/19 0015       No Pain        Wt Readings from Last 1 Encounters:   09/30/19 72 9 kg (160 lb 12 8 oz)     Additional Vital Signs:   Pertinent Labs/Diagnostic Test Results:     History reviewed  No pertinent past medical history    Present on Admission:  **None**      Admitting Diagnosis: Endocarditis [I38]  Age/Sex: 44 y o  male  Admission Orders:    Current Facility-Administered Medications:  cefTAZidime 1,000 mg Intravenous Q12H Last Rate: 1,000 mg (10/01/19 0319)   escitalopram 10 mg Oral Daily    heparin (porcine) 5,000 Units Subcutaneous Q8H [facility]    hydrOXYzine HCL 25 mg Oral Q8H PRN    levofloxacin 750 mg Oral Q48H    melatonin 3 mg Oral HS    metoprolol tartrate 50 mg Oral Q12H ZULEIKA    traZODone 50 mg Oral HS    zolpidem 5 mg Oral HS PRN      TELEMETRY  PATIENT REFUSES TO WEAR  SCD    IP CONSULT TO INFECTIOUS DISEASES  IP CONSULT TO CARDIOTHORACIC SURGERY  IP CONSULT TO CASE MANAGEMENT    Network Utilization Review Department  Phone: [phone]; Fax [phone]  [email or web address]  ATTENTION: Please call with any questions or concerns to [phone]  and carefully listen to the prompts so that you are directed to the right person  Send all requests for admission clinical reviews, approved or denied determinations and any other requests to fax [phone]   All voicemails are confidential

## 2019-10-01 NOTE — PROGRESS NOTES
Patient arrived on unit very agitated and anxious  He is refusing to wear the Masimo and telemetry  SOD is aware

## 2019-10-01 NOTE — PLAN OF CARE
Problem: PAIN - ADULT  Goal: Verbalizes/displays adequate comfort level or baseline comfort level  Description  Interventions:  - Encourage patient to monitor pain and request assistance  - Assess pain using appropriate pain scale  - Administer analgesics based on type and severity of pain and evaluate response  - Implement non-pharmacological measures as appropriate and evaluate response  - Consider cultural and social influences on pain and pain management  - Notify physician/advanced practitioner if interventions unsuccessful or patient reports new pain  Outcome: Progressing     Problem: INFECTION - ADULT  Goal: Absence or prevention of progression during hospitalization  Description  INTERVENTIONS:  - Assess and monitor for signs and symptoms of infection  - Monitor lab/diagnostic results  - Monitor all insertion sites, i e  indwelling lines, tubes, and drains  - Monitor endotracheal if appropriate and nasal secretions for changes in amount and color  - [place] appropriate cooling/warming therapies per order  - Administer medications as ordered  - Instruct and encourage patient and family to use good hand hygiene technique  - Identify and instruct in appropriate isolation precautions for identified infection/condition  Outcome: Progressing     Problem: SAFETY ADULT  Goal: Patient will remain free of falls  Description  INTERVENTIONS:  - Assess patient frequently for physical needs  -  Identify cognitive and physical deficits and behaviors that affect risk of falls    -  [place] fall precautions as indicated by assessment   - Educate patient/family on patient safety including physical limitations  - Instruct patient to call for assistance with activity based on assessment  - Modify environment to reduce risk of injury  - Consider OT/PT consult to assist with strengthening/mobility  Outcome: Progressing  Goal: Maintain or return to baseline ADL function  Description  INTERVENTIONS:  -  Assess patient's ability to carry out ADLs; assess patient's baseline for ADL function and identify physical deficits which impact ability to perform ADLs (bathing, care of mouth/teeth, toileting, grooming, dressing, etc )  - Assess/evaluate cause of self-care deficits   - Assess range of motion  - Assess patient's mobility; develop plan if impaired  - Assess patient's need for assistive devices and provide as appropriate  - Encourage maximum independence but intervene and supervise when necessary  - Involve family in performance of ADLs  - Assess for home care needs following discharge   - Consider OT consult to assist with ADL evaluation and planning for discharge  - Provide patient education as appropriate  Outcome: Progressing  Goal: Maintain or return mobility status to optimal level  Description  INTERVENTIONS:  - Assess patient's baseline mobility status (ambulation, transfers, stairs, etc )    - Identify cognitive and physical deficits and behaviors that affect mobility  - Identify mobility aids required to assist with transfers and/or ambulation (gait belt, sit-to-stand, lift, walker, cane, etc )  - [place] fall precautions as indicated by assessment  - Record patient progress and toleration of activity level on Mobility SBAR; progress patient to next Phase/Stage  - Instruct patient to call for assistance with activity based on assessment  - Consider rehabilitation consult to assist with strengthening/weightbearing, etc   Outcome: Progressing     Problem: DISCHARGE PLANNING  Goal: Discharge to home or other facility with appropriate resources  Description  INTERVENTIONS:  - Identify barriers to discharge w/patient and caregiver  - Arrange for needed discharge resources and transportation as appropriate  - Identify discharge learning needs (meds, wound care, etc )  - Arrange for interpretive services to assist at discharge as needed  - Refer to Case Management Department for coordinating discharge planning if the patient needs post-hospital services based on physician/advanced practitioner order or complex needs related to functional status, cognitive ability, or social support system  Outcome: Progressing     Problem: Knowledge Deficit  Goal: Patient/family/caregiver demonstrates understanding of disease process, treatment plan, medications, and discharge instructions  Description  Complete learning assessment and assess knowledge base  Interventions:  - Provide teaching at level of understanding  - Provide teaching via preferred learning methods  Outcome: Progressing     Problem: Potential for Falls  Goal: Patient will remain free of falls  Description  INTERVENTIONS:  - Assess patient frequently for physical needs  -  Identify cognitive and physical deficits and behaviors that affect risk of falls    -  [place] fall precautions as indicated by assessment   - Educate patient/family on patient safety including physical limitations  - Instruct patient to call for assistance with activity based on assessment  - Modify environment to reduce risk of injury  - Consider OT/PT consult to assist with strengthening/mobility  Outcome: Progressing

## 2019-10-01 NOTE — CONSULTS
Consultation -  [facility]  Neeta Hameed 44 y o  male MRN: [id number]  Unit/Bed#: -01 Encounter: [id number]              Inpatient consult to gastroenterology     Performed by  David Leung DO     Authorized by Yuli Gunter PA-C              Reason for Consult / Principal Problem: Endocarditis    ASSESSMENT: 43 y/o M with past med hx of HTN,  IV heroin drug abuse, hepatitis C, solitary kidney, MSSA bacteremia with residual vegetation of posterior mitral valve leaflet who is admitted for CT surgery evaluation for possible mitral valve replacement or repair  GI consulted for preoperative clearance and evaluation on the status of the his history of hepatitis  PLAN:    Hepatitis C status:  Patient denies ever being diagnosed with hepatitis C in the past   States that he tested negative for HIV multiple times in the past during rehabilitation for his drug abuse  Given patients extensive IV drug abuse, it is likely that he was exposed  It was documented in the H and P for this admission that patient had a positive Hep C Ab but undetectable viral load  Chronic hepatitis panel ordered  Right upper quadrant ultrasound ordered  Will evaluate patient based on the results of above  Cirrhosis? Patient denies ever being diagnosed with cirrhosis  Unable to find diagnosis in physical paper chart  His LFT on 9/8/19 were within normal limits  Alk phos remained elevated but unable to find any values for GGT  Platelets currently 261  CMP ordered  PT/INR ordered  Right upper quadrant ultrasound ordered  Will evaluate next steps based on results of above  HPI:   43 y/o M with past med hx of HTN,  IV heroin drug abuse, hepatitis C, solitary kidney, MSSA bacteremia with residual vegetation of posterior mitral valve leaflet who is admitted for CT surgery evaluation for possible mitral valve replacement or repair   GI consulted for preoperative clearance and evaluation on the status of the his history of hepatitis  Patient was originally admitted to [facility] for fever/chills for approximately 2 weeks  Also accompanied by some shortness of breath, but no chest pain  He denies any GI symptoms including N/V/D/C, hematemesis, coffee ground emesis, hematochezia, melena  Patient has been abusing IV heroin since he was 13years old, injects mainly in his bilateral upper extremities  Denies any other IV drug use, alcohol use, tobacco history  Patient denies ever having been diagnosed with hepatitis C or cirrhosis, though he has poor follow-up  He denies ever having an EGD or colonoscopy done  Endorses no family history of cancers, but has extensive CAD in his family  He does not follow regularly with the gastroenterologist and has never been treated for hepatitis-C  He has been to rehabilitation 7 times  Review of Systems   Constitutional: Positive for chills and fever  Negative for unexpected weight change  HENT: Negative for sore throat and trouble swallowing  Eyes: Negative for photophobia and visual disturbance  Respiratory: Positive for shortness of breath  Negative for apnea, cough, choking, wheezing and stridor  Cardiovascular: Negative for chest pain, palpitations and leg swelling  Gastrointestinal: Negative for abdominal distention, abdominal pain, anal bleeding, blood in stool, constipation, diarrhea, nausea and vomiting  Genitourinary: Negative for dysuria and hematuria  Musculoskeletal: Negative for arthralgias and back pain  Skin: Negative for rash  Neurological: Negative for light-headedness and numbness        All other systems reviewed and were negative    Historical Information   Past Medical History:   Diagnosis Date    Anxiety 10/1/2019    Bacteremia 10/1/2019    Endocarditis 10/1/2019    History of intravenous drug abuse ([facility] [address] ) 10/1/2019    Nonrheumatic mitral valve regurgitation 10/1/2019     Past Surgical History:   Procedure Laterality Date  CHEST WALL TUMOR EXCISION  2013    Anterior chest wall cyst removed     Social History   Social History     Substance and Sexual Activity   Alcohol Use Never    Frequency: Never    Binge frequency: Never     Social History     Substance and Sexual Activity   Drug Use Yes     Social History     Tobacco Use   Smoking Status Never Smoker   Smokeless Tobacco Never Used     Family History   Problem Relation Age of Onset    Heart disease Maternal Grandmother        Meds/Allergies     No medications prior to admission  Current Facility-Administered Medications   Medication Dose Route Frequency    cefTAZidime (FORTAZ) 2,000 mg in sodium chloride 0 9 % 50 mL IVPB  2,000 mg Intravenous Q12H    escitalopram (LEXAPRO) tablet 10 mg  10 mg Oral Daily    heparin (porcine) subcutaneous injection 5,000 Units  5,000 Units Subcutaneous Q8H [facility]    hydrOXYzine HCL (ATARAX) tablet 25 mg  25 mg Oral Q8H PRN    [START ON 10/2/2019] levofloxacin (LEVAQUIN) tablet 500 mg  500 mg Oral Q24H    melatonin tablet 3 mg  3 mg Oral HS    metoprolol tartrate (LOPRESSOR) tablet 50 mg  50 mg Oral Q12H [facility]    [START ON 10/2/2019] sodium chloride 0 9 % infusion  70 mL/hr Intravenous Continuous    traZODone (DESYREL) tablet 50 mg  50 mg Oral HS    zolpidem (AMBIEN) tablet 5 mg  5 mg Oral HS PRN       No Known Allergies    Objective     Blood pressure 110/72, pulse (!) 125, resp  rate 16, height 5' 3" (1 6 m), weight 72 9 kg (160 lb 12 8 oz), SpO2 97 %  Intake/Output Summary (Last 24 hours) at 10/1/2019 1714  Last data filed at 10/1/2019 0646  Gross per 24 hour   Intake 0 ml   Output 0 ml   Net 0 ml       PHYSICAL EXAM  Physical Exam:   GENERAL: NAD, appears stated age  [de-identified]:  NC/AT,  EOMI, MMM, no scleral icterus noted,  CARDIAC:  RRR, +S1/S2, Postive for systolic murmur in the mitral region  PULMONARY:  CTA B/L  ABDOMEN:  Soft, NT/ND, +BS, no rebound/guarding/rigidity  Extremities:  2+ Pulses in DP/PT   No edema, cyanosis, Clubbing of nails noted  NEUROLOGIC:  Alert/oriented x3  SKIN:  No rashes or erythema, no nailbed hemorrhages seen  Multiples areas of dark small rashes noted around elbow and wrist         Lab Results:   Admission on 09/30/2019   Component Date Value    Sodium 10/01/2019 138     Potassium 10/01/2019 4 1     Chloride 10/01/2019 105     CO2 10/01/2019 25     ANION GAP 10/01/2019 8     BUN 10/01/2019 39*    Creatinine 10/01/2019 1 76*    Glucose 10/01/2019 107     Calcium 10/01/2019 8 8     eGFR 10/01/2019 48     WBC 10/01/2019 15 11*    RBC 10/01/2019 2 92*    Hemoglobin 10/01/2019 8 0*    Hematocrit 10/01/2019 24 9*    MCV 10/01/2019 85     MCH 10/01/2019 27 4     MCHC 10/01/2019 32 1     RDW 10/01/2019 17 3*    MPV 10/01/2019 9 7     Platelets 72/85/7659 312     nRBC 10/01/2019 0     Neutrophils Relative 10/01/2019 83*    Immat GRANS % 10/01/2019 1     Lymphocytes Relative 10/01/2019 11*    Monocytes Relative 10/01/2019 4     Eosinophils Relative 10/01/2019 1     Basophils Relative 10/01/2019 0     Neutrophils Absolute 10/01/2019 12 47*    Immature Grans Absolute 10/01/2019 0 12     Lymphocytes Absolute 10/01/2019 1 61     Monocytes Absolute 10/01/2019 0 67     Eosinophils Absolute 10/01/2019 0 19     Basophils Absolute 10/01/2019 0 05      Imaging Studies: I have personally reviewed pertinent reports  Counseling / Coordination of Care  Total time spent today  30 minutes  Greater than 50% of total time was spent with the patient and / or family counseling and / or coordination of care

## 2019-10-02 ENCOUNTER — APPOINTMENT (INPATIENT)
Dept: RADIOLOGY | Facility: HOSPITAL | Age: 39
DRG: 162 | End: 2019-10-02
Payer: COMMERCIAL

## 2019-10-02 ENCOUNTER — ANESTHESIA (INPATIENT)
Dept: NON INVASIVE DIAGNOSTICS | Facility: HOSPITAL | Age: 39
DRG: 162 | End: 2019-10-02
Payer: COMMERCIAL

## 2019-10-02 LAB
ATRIAL RATE: 128 BPM
BACTERIA UR QL AUTO: ABNORMAL /HPF
BASOPHILS # BLD AUTO: 0.05 THOUSANDS/ΜL (ref 0–0.1)
BASOPHILS NFR BLD AUTO: 1 % (ref 0–1)
BILIRUB UR QL STRIP: NEGATIVE
CLARITY UR: CLEAR
COLOR UR: YELLOW
EOSINOPHIL # BLD AUTO: 0.17 THOUSAND/ΜL (ref 0–0.61)
EOSINOPHIL NFR BLD AUTO: 2 % (ref 0–6)
ERYTHROCYTE [DISTWIDTH] IN BLOOD BY AUTOMATED COUNT: 18.2 % (ref 11.6–15.1)
GLUCOSE UR STRIP-MCNC: NEGATIVE MG/DL
HBV CORE AB SER QL: ABNORMAL
HBV CORE IGM SER QL: ABNORMAL
HBV SURFACE AG SER QL: ABNORMAL
HCT VFR BLD AUTO: 25 % (ref 36.5–49.3)
HCV AB SER QL: ABNORMAL
HGB BLD-MCNC: 7.8 G/DL (ref 12–17)
HGB UR QL STRIP.AUTO: NEGATIVE
HIV 1+2 AB+HIV1 P24 AG SERPL QL IA: NORMAL
HYALINE CASTS #/AREA URNS LPF: ABNORMAL /LPF
IMM GRANULOCYTES # BLD AUTO: 0.07 THOUSAND/UL (ref 0–0.2)
IMM GRANULOCYTES NFR BLD AUTO: 1 % (ref 0–2)
KETONES UR STRIP-MCNC: NEGATIVE MG/DL
LEUKOCYTE ESTERASE UR QL STRIP: NEGATIVE
LYMPHOCYTES # BLD AUTO: 1.04 THOUSANDS/ΜL (ref 0.6–4.47)
LYMPHOCYTES NFR BLD AUTO: 11 % (ref 14–44)
LYMPHOCYTES NFR BLD AUTO: 5 %
LYMPHOCYTES NFR BLD AUTO: 9 %
MCH RBC QN AUTO: 27.4 PG (ref 26.8–34.3)
MCHC RBC AUTO-ENTMCNC: 31.2 G/DL (ref 31.4–37.4)
MCV RBC AUTO: 88 FL (ref 82–98)
MONO+MESO NFR FLD MANUAL: 28 %
MONO+MESO NFR FLD MANUAL: 30 %
MONOCYTES # BLD AUTO: 0.5 THOUSAND/ΜL (ref 0.17–1.22)
MONOCYTES NFR BLD AUTO: 10 %
MONOCYTES NFR BLD AUTO: 13 %
MONOCYTES NFR BLD AUTO: 5 % (ref 4–12)
NEUTROPHILS # BLD AUTO: 8.06 THOUSANDS/ΜL (ref 1.85–7.62)
NEUTS BAND NFR FLD MANUAL: 0 %
NEUTS SEG NFR BLD AUTO: 52 %
NEUTS SEG NFR BLD AUTO: 53 %
NEUTS SEG NFR BLD AUTO: 80 % (ref 43–75)
NITRITE UR QL STRIP: NEGATIVE
NON-SQ EPI CELLS URNS QL MICRO: ABNORMAL /HPF
NRBC BLD AUTO-RTO: 0 /100 WBCS
P AXIS: 69 DEGREES
PH UR STRIP.AUTO: 5 [PH]
PLATELET # BLD AUTO: 261 THOUSANDS/UL (ref 149–390)
PMV BLD AUTO: 10 FL (ref 8.9–12.7)
PR INTERVAL: 118 MS
PROT UR STRIP-MCNC: ABNORMAL MG/DL
QRS AXIS: 89 DEGREES
QRSD INTERVAL: 80 MS
QT INTERVAL: 314 MS
QTC INTERVAL: 458 MS
RBC # BLD AUTO: 2.85 MILLION/UL (ref 3.88–5.62)
RBC #/AREA URNS AUTO: ABNORMAL /HPF
SP GR UR STRIP.AUTO: 1.04 (ref 1–1.03)
T WAVE AXIS: 50 DEGREES
TOTAL CELLS COUNTED SPEC: 100
TOTAL CELLS COUNTED SPEC: 100
UROBILINOGEN UR QL STRIP.AUTO: 0.2 E.U./DL
VENTRICULAR RATE: 128 BPM
WBC # BLD AUTO: 9.89 THOUSAND/UL (ref 4.31–10.16)
WBC # FLD MANUAL: 155 /UL
WBC # FLD MANUAL: 252 /UL
WBC #/AREA URNS AUTO: ABNORMAL /HPF

## 2019-10-02 PROCEDURE — 99233 SBSQ HOSP IP/OBS HIGH 50: CPT | Performed by: THORACIC SURGERY (CARDIOTHORACIC VASCULAR SURGERY)

## 2019-10-02 PROCEDURE — 99232 SBSQ HOSP IP/OBS MODERATE 35: CPT | Performed by: INTERNAL MEDICINE

## 2019-10-02 PROCEDURE — 93320 DOPPLER ECHO COMPLETE: CPT | Performed by: INTERNAL MEDICINE

## 2019-10-02 PROCEDURE — C1769 GUIDE WIRE: HCPCS | Performed by: INTERNAL MEDICINE

## 2019-10-02 PROCEDURE — 32555 ASPIRATE PLEURA W/ IMAGING: CPT | Performed by: RADIOLOGY

## 2019-10-02 PROCEDURE — 93454 CORONARY ARTERY ANGIO S&I: CPT | Performed by: INTERNAL MEDICINE

## 2019-10-02 PROCEDURE — 81001 URINALYSIS AUTO W/SCOPE: CPT | Performed by: PHYSICIAN ASSISTANT

## 2019-10-02 PROCEDURE — 70547 MR ANGIOGRAPHY NECK W/O DYE: CPT

## 2019-10-02 PROCEDURE — 87522 HEPATITIS C REVRS TRNSCRPJ: CPT | Performed by: STUDENT IN AN ORGANIZED HEALTH CARE EDUCATION/TRAINING PROGRAM

## 2019-10-02 PROCEDURE — 0W9B3ZZ DRAINAGE OF LEFT PLEURAL CAVITY, PERCUTANEOUS APPROACH: ICD-10-PCS | Performed by: RADIOLOGY

## 2019-10-02 PROCEDURE — 99153 MOD SED SAME PHYS/QHP EA: CPT | Performed by: INTERNAL MEDICINE

## 2019-10-02 PROCEDURE — 32555 ASPIRATE PLEURA W/ IMAGING: CPT

## 2019-10-02 PROCEDURE — 76377 3D RENDER W/INTRP POSTPROCES: CPT

## 2019-10-02 PROCEDURE — 87205 SMEAR GRAM STAIN: CPT | Performed by: PHYSICIAN ASSISTANT

## 2019-10-02 PROCEDURE — 93325 DOPPLER ECHO COLOR FLOW MAPG: CPT | Performed by: INTERNAL MEDICINE

## 2019-10-02 PROCEDURE — 99233 SBSQ HOSP IP/OBS HIGH 50: CPT | Performed by: INTERNAL MEDICINE

## 2019-10-02 PROCEDURE — 93312 ECHO TRANSESOPHAGEAL: CPT | Performed by: INTERNAL MEDICINE

## 2019-10-02 PROCEDURE — 93312 ECHO TRANSESOPHAGEAL: CPT

## 2019-10-02 PROCEDURE — 0W993ZZ DRAINAGE OF RIGHT PLEURAL CAVITY, PERCUTANEOUS APPROACH: ICD-10-PCS | Performed by: RADIOLOGY

## 2019-10-02 PROCEDURE — 93010 ELECTROCARDIOGRAM REPORT: CPT | Performed by: INTERNAL MEDICINE

## 2019-10-02 PROCEDURE — B2111ZZ FLUOROSCOPY OF MULTIPLE CORONARY ARTERIES USING LOW OSMOLAR CONTRAST: ICD-10-PCS | Performed by: INTERNAL MEDICINE

## 2019-10-02 PROCEDURE — 99255 IP/OBS CONSLTJ NEW/EST HI 80: CPT | Performed by: PSYCHIATRY & NEUROLOGY

## 2019-10-02 PROCEDURE — 87205 SMEAR GRAM STAIN: CPT | Performed by: INTERNAL MEDICINE

## 2019-10-02 PROCEDURE — 99152 MOD SED SAME PHYS/QHP 5/>YRS: CPT | Performed by: INTERNAL MEDICINE

## 2019-10-02 PROCEDURE — 87081 CULTURE SCREEN ONLY: CPT | Performed by: PHYSICIAN ASSISTANT

## 2019-10-02 PROCEDURE — 99233 SBSQ HOSP IP/OBS HIGH 50: CPT | Performed by: HOSPITALIST

## 2019-10-02 PROCEDURE — 85025 COMPLETE CBC W/AUTO DIFF WBC: CPT | Performed by: INTERNAL MEDICINE

## 2019-10-02 PROCEDURE — 87070 CULTURE OTHR SPECIMN AEROBIC: CPT | Performed by: PHYSICIAN ASSISTANT

## 2019-10-02 PROCEDURE — C1894 INTRO/SHEATH, NON-LASER: HCPCS | Performed by: INTERNAL MEDICINE

## 2019-10-02 PROCEDURE — 89051 BODY FLUID CELL COUNT: CPT | Performed by: PHYSICIAN ASSISTANT

## 2019-10-02 PROCEDURE — 70551 MRI BRAIN STEM W/O DYE: CPT

## 2019-10-02 PROCEDURE — 70544 MR ANGIOGRAPHY HEAD W/O DYE: CPT

## 2019-10-02 RX ORDER — ONDANSETRON 2 MG/ML
INJECTION INTRAMUSCULAR; INTRAVENOUS AS NEEDED
Status: DISCONTINUED | OUTPATIENT
Start: 2019-10-02 | End: 2019-10-02 | Stop reason: SURG

## 2019-10-02 RX ORDER — CHLORHEXIDINE GLUCONATE 0.12 MG/ML
15 RINSE ORAL EVERY 12 HOURS SCHEDULED
Status: DISCONTINUED | OUTPATIENT
Start: 2019-10-02 | End: 2019-10-09 | Stop reason: HOSPADM

## 2019-10-02 RX ORDER — PROPOFOL 10 MG/ML
INJECTION, EMULSION INTRAVENOUS CONTINUOUS PRN
Status: DISCONTINUED | OUTPATIENT
Start: 2019-10-02 | End: 2019-10-02 | Stop reason: SURG

## 2019-10-02 RX ORDER — VERAPAMIL HYDROCHLORIDE 2.5 MG/ML
INJECTION, SOLUTION INTRAVENOUS CODE/TRAUMA/SEDATION MEDICATION
Status: COMPLETED | OUTPATIENT
Start: 2019-10-02 | End: 2019-10-02

## 2019-10-02 RX ORDER — ACETAMINOPHEN 325 MG/1
650 TABLET ORAL EVERY 6 HOURS PRN
Status: DISCONTINUED | OUTPATIENT
Start: 2019-10-02 | End: 2019-10-09 | Stop reason: HOSPADM

## 2019-10-02 RX ORDER — NITROGLYCERIN 20 MG/100ML
INJECTION INTRAVENOUS CODE/TRAUMA/SEDATION MEDICATION
Status: COMPLETED | OUTPATIENT
Start: 2019-10-02 | End: 2019-10-02

## 2019-10-02 RX ORDER — SODIUM CHLORIDE 9 MG/ML
75 INJECTION, SOLUTION INTRAVENOUS CONTINUOUS
Status: DISPENSED | OUTPATIENT
Start: 2019-10-02 | End: 2019-10-02

## 2019-10-02 RX ORDER — FENTANYL CITRATE 50 UG/ML
INJECTION, SOLUTION INTRAMUSCULAR; INTRAVENOUS CODE/TRAUMA/SEDATION MEDICATION
Status: COMPLETED | OUTPATIENT
Start: 2019-10-02 | End: 2019-10-02

## 2019-10-02 RX ORDER — MIDAZOLAM HYDROCHLORIDE 1 MG/ML
INJECTION INTRAMUSCULAR; INTRAVENOUS CODE/TRAUMA/SEDATION MEDICATION
Status: COMPLETED | OUTPATIENT
Start: 2019-10-02 | End: 2019-10-02

## 2019-10-02 RX ORDER — LIDOCAINE WITH 8.4% SOD BICARB 0.9%(10ML)
SYRINGE (ML) INJECTION CODE/TRAUMA/SEDATION MEDICATION
Status: COMPLETED | OUTPATIENT
Start: 2019-10-02 | End: 2019-10-02

## 2019-10-02 RX ORDER — HEPARIN SODIUM 1000 [USP'U]/ML
INJECTION, SOLUTION INTRAVENOUS; SUBCUTANEOUS CODE/TRAUMA/SEDATION MEDICATION
Status: COMPLETED | OUTPATIENT
Start: 2019-10-02 | End: 2019-10-02

## 2019-10-02 RX ORDER — LIDOCAINE HYDROCHLORIDE 10 MG/ML
INJECTION, SOLUTION INFILTRATION; PERINEURAL CODE/TRAUMA/SEDATION MEDICATION
Status: COMPLETED | OUTPATIENT
Start: 2019-10-02 | End: 2019-10-02

## 2019-10-02 RX ORDER — LIDOCAINE HYDROCHLORIDE 10 MG/ML
INJECTION, SOLUTION EPIDURAL; INFILTRATION; INTRACAUDAL; PERINEURAL AS NEEDED
Status: DISCONTINUED | OUTPATIENT
Start: 2019-10-02 | End: 2019-10-02 | Stop reason: SURG

## 2019-10-02 RX ORDER — SODIUM CHLORIDE 9 MG/ML
INJECTION, SOLUTION INTRAVENOUS CONTINUOUS PRN
Status: DISCONTINUED | OUTPATIENT
Start: 2019-10-02 | End: 2019-10-02 | Stop reason: SURG

## 2019-10-02 RX ORDER — PROPOFOL 10 MG/ML
INJECTION, EMULSION INTRAVENOUS AS NEEDED
Status: DISCONTINUED | OUTPATIENT
Start: 2019-10-02 | End: 2019-10-02 | Stop reason: SURG

## 2019-10-02 RX ADMIN — SODIUM CHLORIDE: 0.9 INJECTION, SOLUTION INTRAVENOUS at 10:41

## 2019-10-02 RX ADMIN — LIDOCAINE HYDROCHLORIDE 10 ML: 10 INJECTION, SOLUTION INFILTRATION; PERINEURAL at 09:28

## 2019-10-02 RX ADMIN — CEFTAZIDIME 2000 MG: 1 INJECTION, POWDER, FOR SOLUTION INTRAMUSCULAR; INTRAVENOUS at 14:23

## 2019-10-02 RX ADMIN — NITROGLYCERIN 100 MCG: 20 INJECTION INTRAVENOUS at 12:56

## 2019-10-02 RX ADMIN — FENTANYL CITRATE 50 MCG: 50 INJECTION, SOLUTION INTRAMUSCULAR; INTRAVENOUS at 12:36

## 2019-10-02 RX ADMIN — PHENYLEPHRINE HYDROCHLORIDE 100 MCG: 10 INJECTION INTRAVENOUS at 11:05

## 2019-10-02 RX ADMIN — FENTANYL CITRATE 25 MCG: 50 INJECTION, SOLUTION INTRAMUSCULAR; INTRAVENOUS at 12:38

## 2019-10-02 RX ADMIN — LIDOCAINE HYDROCHLORIDE 60 MG: 10 INJECTION, SOLUTION EPIDURAL; INFILTRATION; INTRACAUDAL; PERINEURAL at 10:59

## 2019-10-02 RX ADMIN — PROPOFOL 100 MCG/KG/MIN: 10 INJECTION, EMULSION INTRAVENOUS at 10:59

## 2019-10-02 RX ADMIN — TOPICAL ANESTHETIC 1 SPRAY: 200 SPRAY DENTAL; PERIODONTAL at 10:55

## 2019-10-02 RX ADMIN — HEPARIN SODIUM 4000 UNITS: 1000 INJECTION INTRAVENOUS; SUBCUTANEOUS at 12:43

## 2019-10-02 RX ADMIN — PROPOFOL 100 MG: 10 INJECTION, EMULSION INTRAVENOUS at 10:59

## 2019-10-02 RX ADMIN — PHENYLEPHRINE HYDROCHLORIDE 200 MCG: 10 INJECTION INTRAVENOUS at 11:08

## 2019-10-02 RX ADMIN — LIDOCAINE HYDROCHLORIDE 10 ML: 10 INJECTION, SOLUTION INFILTRATION; PERINEURAL at 09:25

## 2019-10-02 RX ADMIN — SODIUM CHLORIDE 75 ML/HR: 0.9 INJECTION, SOLUTION INTRAVENOUS at 13:27

## 2019-10-02 RX ADMIN — MIDAZOLAM 2 MG: 1 INJECTION INTRAMUSCULAR; INTRAVENOUS at 12:36

## 2019-10-02 RX ADMIN — PROPOFOL 20 MG: 10 INJECTION, EMULSION INTRAVENOUS at 10:57

## 2019-10-02 RX ADMIN — MIDAZOLAM 1 MG: 1 INJECTION INTRAMUSCULAR; INTRAVENOUS at 12:38

## 2019-10-02 RX ADMIN — PHENYLEPHRINE HYDROCHLORIDE 200 MCG: 10 INJECTION INTRAVENOUS at 11:16

## 2019-10-02 RX ADMIN — MELATONIN 3 MG: 3 TAB ORAL at 22:43

## 2019-10-02 RX ADMIN — LIDOCAINE HYDROCHLORIDE 1 ML: 10 INJECTION, SOLUTION INFILTRATION; PERINEURAL at 12:37

## 2019-10-02 RX ADMIN — ONDANSETRON 4 MG: 2 INJECTION INTRAMUSCULAR; INTRAVENOUS at 10:57

## 2019-10-02 RX ADMIN — LEVOFLOXACIN 500 MG: 500 TABLET, FILM COATED ORAL at 05:03

## 2019-10-02 RX ADMIN — Medication 1 APPLICATION: at 22:00

## 2019-10-02 RX ADMIN — CHLORHEXIDINE GLUCONATE 0.12% ORAL RINSE 15 ML: 1.2 LIQUID ORAL at 21:59

## 2019-10-02 RX ADMIN — SODIUM CHLORIDE 70 ML/HR: 0.9 INJECTION, SOLUTION INTRAVENOUS at 00:15

## 2019-10-02 RX ADMIN — NITROGLYCERIN 200 MCG: 20 INJECTION INTRAVENOUS at 12:42

## 2019-10-02 RX ADMIN — PHENYLEPHRINE HYDROCHLORIDE 200 MCG: 10 INJECTION INTRAVENOUS at 11:12

## 2019-10-02 RX ADMIN — VERAPAMIL HYDROCHLORIDE 2.5 MG: 2.5 INJECTION INTRAVENOUS at 12:43

## 2019-10-02 RX ADMIN — METOPROLOL TARTRATE 50 MG: 50 TABLET, FILM COATED ORAL at 22:25

## 2019-10-02 RX ADMIN — TRAZODONE HYDROCHLORIDE 100 MG: 100 TABLET ORAL at 22:43

## 2019-10-02 RX ADMIN — IOHEXOL 45 ML: 350 INJECTION, SOLUTION INTRAVENOUS at 12:55

## 2019-10-02 RX ADMIN — ESCITALOPRAM OXALATE 10 MG: 10 TABLET ORAL at 08:10

## 2019-10-02 RX ADMIN — CEFTAZIDIME 2000 MG: 1 INJECTION, POWDER, FOR SOLUTION INTRAMUSCULAR; INTRAVENOUS at 03:02

## 2019-10-02 RX ADMIN — ACETAMINOPHEN 650 MG: 325 TABLET ORAL at 19:23

## 2019-10-02 RX ADMIN — PHENYLEPHRINE HYDROCHLORIDE 100 MCG: 10 INJECTION INTRAVENOUS at 11:01

## 2019-10-02 RX ADMIN — METOPROLOL TARTRATE 50 MG: 50 TABLET, FILM COATED ORAL at 08:10

## 2019-10-02 NOTE — ANESTHESIA POSTPROCEDURE EVALUATION
Post-Op Assessment Note    CV Status:  Stable    Pain management: adequate     Mental Status:  Sleepy and lethargic   Hydration Status:  Euvolemic and stable   PONV Controlled:  None   Airway Patency:  Patent   Post Op Vitals Reviewed: Yes      Staff: CRNA           BP   90/55   Temp      Pulse 101   Resp      SpO2   99

## 2019-10-02 NOTE — SEDATION DOCUMENTATION
Bilateral thoracentesis done by Dr Briseno Blend  Drained 1000 mL vidya fluid from right side and 700 mL red tinged vidya fluid  VSS  Report given to RN and patient to return to room

## 2019-10-02 NOTE — PHYSICIAN ADVISOR
Current patient class: Inpatient  The patient is currently on Hospital Day: 3 at [address] [place] [address]      The patient was admitted to the hospital at 2326 on 9/30/19 for the following diagnosis:  Endocarditis [I38]       There is documentation in the medical record of an expected length of stay of at least 2 midnights  The patient is therefore expected to satisfy the 2 midnight benchmark and given the 2 midnight presumption is appropriate for INPATIENT ADMISSION  Given this expectation of a satisfying stay, CMS instructs us that the patient is most often appropriate for inpatient admission under part A provided medical necessity is documented in the chart  After review of the relevant documentation, labs, vital signs and test results, the patient is appropriate for INPATIENT ADMISSION  Admission to the hospital as an inpatient is a complex decision making process which requires the practitioner to consider the patients presenting complaint, history and physical examination and all relevant testing  With this in mind, in this case, the patient was deemed appropriate for INPATIENT ADMISSION  After review of the documentation and testing available at the time of the admission I concur with this clinical determination of medical necessity  Rationale is as follows: The patient is a 44 yrs old Male who presented to the ED at 9/30/2019 11:26 PM with a chief complaint of No chief complaint on file  Patient admitted with a report of recent 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  A recent BILLY revealed a bulbous echodensity on the anterior mitral leaflet  He was already on IV antibiotics  He was seen by ID and they recommended continueation of his antibiotics but to increase the doses  He was evaluated by Cardiac Surgery and they recommend him have repair/relacement of his mitral valve to which the patient agreed     A CT of the head revealed either a septic emboli or abscess  Further imaging is ordered for this patient  With the ongoing acute care work-up and the need for continued IV antibiotics, a two night admission class to the hospital would be considered appropriate for this patient          The patients vitals on arrival were ED Triage Vitals   Temperature Pulse Respirations Blood Pressure SpO2   10/01/19 2324 09/30/19 2329 09/30/19 2329 09/30/19 2329 09/30/19 2329   99 °F (37 2 °C) (!) 120 18 113/66 95 %      Temp src Heart Rate Source Patient Position - Orthostatic VS BP Location FiO2 (%)   -- -- 10/01/19 0816 09/30/19 2329 --     Sitting Left arm       Pain Score       10/01/19 0015       No Pain           Past Medical History:   Diagnosis Date    Anxiety 10/1/2019    Bacteremia 10/1/2019    Endocarditis 10/1/2019    History of intravenous drug abuse ([facility] [address] ) 10/1/2019    Nonrheumatic mitral valve regurgitation 10/1/2019     Past Surgical History:   Procedure Laterality Date    CHEST WALL TUMOR EXCISION  2013    Anterior chest wall cyst removed    IR THORACENTESIS  10/2/2019           Consults have been placed to:   IP CONSULT TO INFECTIOUS DISEASES  IP CONSULT TO CARDIOTHORACIC SURGERY  IP CONSULT TO CASE MANAGEMENT  IP CONSULT TO CARDIOLOGY  IP CONSULT TO GASTROENTEROLOGY  IP CONSULT TO NEPHROLOGY  IP CONSULT TO NEUROLOGY  IP CONSULT TO PLASTIC SURGERY    Vitals:    10/02/19 1500 10/02/19 1530 10/02/19 1600 10/02/19 1630   BP:       Pulse: (!) 119 (!) 120 (!) 123 (!) 121   Resp:       Temp:       SpO2:       Weight:       Height:           Most recent labs:    Recent Labs     10/01/19  1800 10/02/19  0629   WBC  --  9 89   HGB  --  7 8*   HCT  --  25 0*   PLT  --  261   K 3 8  --    CALCIUM 9 1  --    BUN 37*  --    CREATININE 1 86*  --    INR 1 20*  --    AST 11  --    ALT 22  --    ALKPHOS 107  --        Scheduled Meds:  Current Facility-Administered Medications:  acetaminophen 650 mg Oral Q6H PRN Alexx Cagle MD cefTAZidime 2,000 mg Intravenous Q12H Jona Nj MD Last Rate: 2,000 mg (10/02/19 1423)   chlorhexidine 15 mL Swish & Spit Q12H [address] Mario Weber PA-C    escitalopram 10 mg Oral Daily Max Charlotte, DO    heparin (porcine) 5,000 Units Subcutaneous Q8H [address] Max Duane Musca, DO    hydrOXYzine HCL 25 mg Oral Q8H PRN Max Charlotte, DO    levofloxacin 500 mg Oral Q24H Jona Nj MD    melatonin 3 mg Oral HS Max Charlotte, DO    metoprolol tartrate 50 mg Oral Q12H [address] Max Duane Musca, DO    mupirocin  Nasal Q12H [address] Mario Weber PA-C    ondansetron 4 mg Intravenous Q8H PRN Olivia Martínez MD    traZODone 100 mg Oral HS Peggs Zackary, DO    zolpidem 5 mg Oral HS PRN Chase Porter DO      Continuous Infusions:   PRN Meds:   acetaminophen    hydrOXYzine HCL    ondansetron    zolpidem    Surgical procedures (if appropriate):  Procedure(s):  Mitral valve repair/ replacement  TRANSESOPHAGEAL ECHOCARDIOGRAM (BILLY)

## 2019-10-02 NOTE — PROGRESS NOTES
Cardiology   Isaak Sepulveda 44 y o  male MRN: [id number]  Unit/Bed#: -01 Encounter: [id number]        Assessment/Plan:    Endocarditis 2/2 Pseudomonas from IVDU  10/2 BILLY: ED 60%, MV vegetations w/ anterior leaflet perforations x2, severe MR  10/2 cath: nl  10/2 thoracentesis: 1L removed from R, 700mL from left  10/2 EKG: sinus tach, otherwise nl  Remains euvolemic on exam, lungs CTA b/l, SOB resolved but mildly tachypneic  F/u MRI brain for septic emboli and CTAP for splenic infarct, BCx x2  Daily I/Os, weights, fluid restriction, cardiac diet  Continue metoprolol tartrate 50mg bid, tele    Sinus tachycardia  -120s  Likely 2/2 to above and severe MR  Continue metoprolol    Subjective:  No acute events overnight, slept well  Feels much better after today's thoracentesis   Denies chest pain, shortness of breath, palpitations, orthopnea, PND, pedal edema, syncope, presyncope, diaphoresis, nausea/vomiting     12-system ROS otherwise negative    Telemetry: n/a    Net fluid balance: -1 5L    Weight:   Weight (last 2 days)     Date/Time   Weight    09/30/19 2329   72 9 (160 8)            EKG: sinus tach    Labs: WBC 9 9 from 15, BCx pending    Historical Information   Past Medical History:   Diagnosis Date    Anxiety 10/1/2019    Bacteremia 10/1/2019    Endocarditis 10/1/2019    History of intravenous drug abuse ([facility] [address] ) 10/1/2019    Nonrheumatic mitral valve regurgitation 10/1/2019     Past Surgical History:   Procedure Laterality Date    CHEST WALL TUMOR EXCISION  2013    Anterior chest wall cyst removed     Social History   Social History     Substance and Sexual Activity   Alcohol Use Never    Frequency: Never    Binge frequency: Never     Social History     Substance and Sexual Activity   Drug Use Yes     Social History     Tobacco Use   Smoking Status Never Smoker   Smokeless Tobacco Never Used     Family History:   Family History   Problem Relation Age of Onset    Heart disease Maternal Grandmother      Scheduled Meds:  Current Facility-Administered Medications:  cefTAZidime 2,000 mg Intravenous Q12H Amish Huerta MD Last Rate: 2,000 mg (10/02/19 0302)   chlorhexidine 15 mL Swish & Spit Q12H [address] Virginia Wu PA-C    escitalopram 10 mg Oral Daily Max Marianneki, DO    heparin (porcine) 5,000 Units Subcutaneous Q8H [address] Max Ancil East, DO    hydrOXYzine HCL 25 mg Oral Q8H PRN Max Widanabellaki, DO    levofloxacin 500 mg Oral Q24H Amish Huerta MD    melatonin 3 mg Oral HS Max Widmoses, DO    metoprolol tartrate 50 mg Oral Q12H [address] Max Ancil [place], DO    mupirocin  Nasal Q12H [address] Virginia Wu PA-C    ondansetron 4 mg Intravenous Q8H PRN Asim Becerra MD    sodium chloride 75 mL/hr Intravenous Continuous Deirdre Sol, DO Last Rate: 75 mL/hr (10/02/19 1327)   traZODone 100 mg Oral HS [place] Zackary, DO    zolpidem 5 mg Oral HS PRN Max Ancil [place], DO      Continuous Infusions:  sodium chloride 75 mL/hr Last Rate: 75 mL/hr (10/02/19 1327)     PRN Meds: hydrOXYzine HCL    ondansetron    zolpidem  all current active meds have been reviewed    No Known Allergies    Objective   Vitals: Blood pressure 101/63, pulse (!) 112, temperature 98 6 °F (37 °C), resp  rate 16, height 5' 3" (1 6 m), weight 72 9 kg (160 lb 12 8 oz), SpO2 92 %  , Body mass index is 28 48 kg/m² , Orthostatic Blood Pressures      Most Recent Value   Blood Pressure  101/63 filed at 10/02/2019 1347   Patient Position - Orthostatic VS  Sitting filed at 10/01/2019 0816          Intake/Output Summary (Last 24 hours) at 10/2/2019 1405  Last data filed at 10/2/2019 1118  Gross per 24 hour   Intake 400 ml   Output 1925 ml   Net -1525 ml       Invasive Devices     Peripheral Intravenous Line            Peripheral IV 10/01/19 Right Forearm less than 1 day              Physical Exam:  General:  AO x3, very mild respiratory distress  Cardiac:  S1-S2 normal, no rubs or gallops, 3/6 holosystolic murmur best heard at apex  Lungs:  Clear to auscultation bilaterally, no wheezing or crackles    Abdomen:  Soft nontender nondistended, positive bowel sounds  Extremities:  Warm, well perfused, pulses palpable, no ulcers or rashes  Neuro: Grossly nonfocal  Psych:  Normal affect    Lab Results:   Recent Results (from the past 24 hour(s))   HIV 1/2 AG-AB combo    Collection Time: 10/01/19  6:00 PM   Result Value Ref Range    HIV-1/HIV-2 Ab Non-Reactive Non-Reactive   Chronic Hepatitis Panel    Collection Time: 10/01/19  6:00 PM   Result Value Ref Range    Hepatitis B Surface Ag Non-reactive Non-reactive, NonReactive - Confirmed    Hepatitis C Ab High Reactive (A) Non-reactive    Hep B C IgM Non-reactive Non-reactive    Hep B Core Total Ab Non-reactive Non-reactive   Comprehensive metabolic panel    Collection Time: 10/01/19  6:00 PM   Result Value Ref Range    Sodium 141 136 - 145 mmol/L    Potassium 3 8 3 5 - 5 3 mmol/L    Chloride 106 100 - 108 mmol/L    CO2 24 21 - 32 mmol/L    ANION GAP 11 4 - 13 mmol/L    BUN 37 (H) 5 - 25 mg/dL    Creatinine 1 86 (H) 0 60 - 1 30 mg/dL    Glucose 108 65 - 140 mg/dL    Calcium 9 1 8 3 - 10 1 mg/dL    AST 11 5 - 45 U/L    ALT 22 12 - 78 U/L    Alkaline Phosphatase 107 46 - 116 U/L    Total Protein 7 5 6 4 - 8 2 g/dL    Albumin 2 8 (L) 3 5 - 5 0 g/dL    Total Bilirubin 0 72 0 20 - 1 00 mg/dL    eGFR 45 ml/min/1 73sq m   Protime-INR    Collection Time: 10/01/19  6:00 PM   Result Value Ref Range    Protime 14 8 (H) 11 6 - 14 5 seconds    INR 1 20 (H) 0 84 - 1 19   ECG 12 lead    Collection Time: 10/01/19  9:49 PM   Result Value Ref Range    Ventricular Rate 128 BPM    Atrial Rate 128 BPM    IA Interval 118 ms    QRSD Interval 80 ms    QT Interval 314 ms    QTC Interval 458 ms    P [place] 69 degrees    QRS Axis 89 degrees    T Wave Axis 50 degrees   CBC and differential    Collection Time: 10/02/19  6:29 AM   Result Value Ref Range    WBC 9 89 4 31 - 10 16 Thousand/uL    RBC 2 85 (L) 3 88 - 5 62 Million/uL    Hemoglobin 7 8 (L) 12 0 - 17 0 g/dL Hematocrit 25 0 (L) 36 5 - 49 3 %    MCV 88 82 - 98 fL    MCH 27 4 26 8 - 34 3 pg    MCHC 31 2 (L) 31 4 - 37 4 g/dL    RDW 18 2 (H) 11 6 - 15 1 %    MPV 10 0 8 9 - 12 7 fL    Platelets 775 310 - 785 Thousands/uL    nRBC 0 /100 WBCs    Neutrophils Relative 80 (H) 43 - 75 %    Immat GRANS % 1 0 - 2 %    Lymphocytes Relative 11 (L) 14 - 44 %    Monocytes Relative 5 4 - 12 %    Eosinophils Relative 2 0 - 6 %    Basophils Relative 1 0 - 1 %    Neutrophils Absolute 8 06 (H) 1 85 - 7 62 Thousands/µL    Immature Grans Absolute 0 07 0 00 - 0 20 Thousand/uL    Lymphocytes Absolute 1 04 0 60 - 4 47 Thousands/µL    Monocytes Absolute 0 50 0 17 - 1 22 Thousand/µL    Eosinophils Absolute 0 17 0 00 - 0 61 Thousand/µL    Basophils Absolute 0 05 0 00 - 0 10 Thousands/µL   Body fluid white cell count with differential    Collection Time: 10/02/19 10:00 AM   Result Value Ref Range    Site      WBC, Fluid 155 /ul   Body fluid cell count with differential    Collection Time: 10/02/19 10:00 AM   Result Value Ref Range    Site      WBC, Fluid 252 /ul   Body Fluid Diff    Collection Time: 10/02/19 10:00 AM   Result Value Ref Range    Total Counted 100     Neutrophils % (Fluid) 52 %    Lymphs % (Fluid) 5 %    Bands % (Fluid) 0 %    Mesothelial % (Fluid) 30 %    Monocytes % (Fluid) 13 %   Body Fluid Diff    Collection Time: 10/02/19 10:00 AM   Result Value Ref Range    Total Counted 100     Neutrophils % (Fluid) 53 %    Lymphs % (Fluid) 9 %    Mesothelial % (Fluid) 28 %    Monocytes % (Fluid) 10 %     Imaging: I have personally reviewed pertinent reports     and I have personally reviewed pertinent films in PACS

## 2019-10-02 NOTE — PROGRESS NOTES
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 and bacteremia due to Pseudomonas, complicated by emboli to spleen and brain, currently on ceftazidime and levofloxacin      Bacteremia  · Pseudomonas bacteremia likely secondary to injection drug use   · Blood cultures were positive from 09/06- 09/11  · Repeat blood cultures from 09/13 and 09/14 were negative  · Blood cultures on 09/28 grew pseudomonas again   · Repeat blood cultures from 10/01 pending   · Patient was previously on cefepime but had diarrhea  · Was also on gentamicin but given solitary kidney he developed JULIENNE and was stopped  · Currently on ceftazidime and levofloxacin, will need total 6 weeks of iv, if operated will need 6 weeks iv post op and not a candidate for home iv due to h/o ivdu  · Monitor temperature and WBC count      Infective endocarditis  · Pseudomonas endocarditis with severe mitral regurgitation  · Patient has h/o MSSA endocarditis that was treated with 6 weeks of iv cefazolin in March of 2019  · Patient had 2D echo on September 6, 2019 which showed echodensity on the posterior mitral leaflet, possibly representing residual vegetation from prior treated endocarditis     · BILLY showed increasing size of mitral valve vegetation compared to prior study in June 2019 and also new vegetation on anterior leaflet  · Repeat BILLY 10/02  · Currently on ceftazidime and levofloxacin for dual antipseudomonal coverage  · CT shows splenic infarct and right parieto-occipital lesion representing septic embolism most likely  · Transferred for CT surgery evaluation for valve replacement, getting pre-op evaluation with GI and pending panorex    Vomiting and diarrhea  · Secondary to cefepime  · C  Diff was negative at [facility]  · Cefepime stopped, diarrhea improved but patient is still nauseous      I v drug use  · Will need drug rehab after hospitalization to prevent recurrent endocarditis     JULIENNE   · Solitary kidney on the left  · Renal dosage of levofloxacin  · Avoid aminoglycosides and other nephrotoxic agents      Antibiotics  · On ceftazidime and levofloxacin  · Unsure what antibiotic day as patient transferred from [facility]  · Abx sensitivity obtained from [facility] showed sensitive to:     TOYIN  -Zosyn   <4  -Tobramycin  <1  -Meropenem  <0 25  -Levofloxacin  <0 25  -Gentamicin  <1  -Ceftazidine  <4  -Cefepime  <2    Subjective:  Patient has no fever, chills, sweats; no nausea, vomiting, diarrhea; no cough, shortness of breath; no pain  No new symptoms  Objective:  Vitals:  Temp:  [99 °F (37 2 °C)] 99 °F (37 2 °C)  HR:  [] 91  Resp:  [16-32] 18  BP: (107-117)/(59-73) 107/59  SpO2:  [92 %-98 %] 98 %  Temp (24hrs), Av °F (37 2 °C), Min:99 °F (37 2 °C), Max:99 °F (37 2 °C)  Current: Temperature: 99 °F (37 2 °C)    Physical Exam:   General Appearance:  Alert, interactive, nontoxic, no acute distress  Throat: Oropharynx moist without lesions  Lungs:   Clear to auscultation bilaterally; no wheezes, rhonchi or rales; respirations unlabored   Heart:  Holosystolic murmur 4/6 radiating to axilla    Abdomen:   Soft, non-tender, non-distended, positive bowel sounds  Extremities: No clubbing, cyanosis or edema   Skin: No new rashes or lesions  No draining wounds noted         Labs, Imaging, & Other studies:   All pertinent labs and imaging studies were personally reviewed  Results from last 7 days   Lab Units 10/02/19  0629 10/01/19  1023   WBC Thousand/uL 9 89 15 11*   HEMOGLOBIN g/dL 7 8* 8 0*   PLATELETS Thousands/uL 261 312     Results from last 7 days   Lab Units 10/01/19  1800 10/01/19  1023   SODIUM mmol/L 141 138   POTASSIUM mmol/L 3 8 4 1   CHLORIDE mmol/L 106 105   CO2 mmol/L 24 25   BUN mg/dL 37* 39*   CREATININE mg/dL 1 86* 1 76*   EGFR ml/min/1 73sq m 45 48   CALCIUM mg/dL 9 1 8 8   AST U/L 11  --    ALT U/L 22  --    ALK PHOS U/L 107  --

## 2019-10-02 NOTE — PROGRESS NOTES
NEPHROLOGY PROGRESS NOTE   Neeta Hameed 44 y o  male MRN: [id number]  Unit/Bed#: -01 Encounter: [id number]  Reason for Consult: JULIENNE    ASSESSMENT AND PLAN:  Patient is 70-year-old male with hypertension for 10 years as per patient, prior history of hep C, IV drug abuse with heroin, history of mitral valve endocarditis with MSSA bacteremia treated earlier this year, was admitted to [facility] when found to have residual mitral valve leaflet echodensity and Pseudomonas endocarditis of mitral valve with severe MR  Patient is transferred to [facility] for CT surgery well and for mitral valve repair/replacement  We are consulted for JULIENNE management      JULIENNE, unknown prior baseline creatinine  -admission creatinine 1 4 at [facility] initially improved to 1 0 and then eventually peaked at 2 2 and then improved to 1 5 on 9/28/19 and since then has slightly increased to 1 8  -BMP results not available today  Check BMP today and again in a m   -JULIENNE suspected to be secondary to prerenal/ATN/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 all in the setting of solitary kidney  -also was found to have small renal infarct from prior endocarditis  -patient was started on IV normal saline last night in anticipation of cardiac catheterization today but will discontinue    May not be able to give further IV fluid given pulmonary edema, patient feeling subjectively short of breath with significant pleural effusion     -had detailed discussion with patient regarding risk of worsening JULIENNE with contrast exposure and small chance of dialysis if renal failure continue to get worse   -recent workup includes:  -urinalysis showed 0 to 5 RBCs, no proteinuria, 0 to 5 WBCs this month  -CT scan showed congenitally absent right kidney with left kidney showing compensatory hypertrophy, one small likely infarct in the lower pole of left kidney unchanged from the previous study and not associated with any abscess for pyelonephritis  - February 2019: positive hep C antibody with hep C RNA PCR less than 15  -complements normal, CK normal,  -avoid nephrotoxins or NSAIDs  -check urine eosinophils, no peripheral eosinophilia     Congenital solitary kidney     Hypertension  -blood pressure overall acceptable  -currently on metoprolol, continue with holding parameter     Anemia  -closely monitor hemoglobin, transfuse p r n  For hemoglobin less than seven  -low iron stores recently although avoiding IV Venofer due to active infection issues  -recent FOBT was negative at [address] mitral valve endocarditis/bacteremia  -antibiotic as per primary team and ID  -patient is active IV drug user and had prior history of MSSA bacteremia with endocarditis in March 2019  -BILLY in September 2019 showed echodensity on the anterior mitral leaflet      Pseudomonas bacteremia, blood culture from 9/28/19 showed Pseudomonas  Repeat culture results to follow    Bilateral pleural effusion, status post thoracentesis on 10/2/19  -currently remains on room air but has been feeling subjectively short of breath  CT scan suggestive of pulmonary congestion versus suspicion for pneumonia, pleural effusions, splenic infarct  Septic embolus/abscess on CT scan of brain     Discussed above plan in detail with primary team and Cardiology  SUBJECTIVE:  Patient seen and examined at bedside  Patient has been having off and on subjective shortness of breath  Currently remains on room air  Denies chest pain, nausea, vomiting      OBJECTIVE:  Current Weight: Weight - Scale: 72 9 kg (160 lb 12 8 oz)  Vitals:    10/02/19 0843   BP: 111/66   Pulse: (!) 123   Resp:    Temp:    SpO2: 92%       Intake/Output Summary (Last 24 hours) at 10/2/2019 0941  Last data filed at 10/2/2019 0505  Gross per 24 hour   Intake    Output 225 ml   Net -225 ml     Wt Readings from Last 3 Encounters:   09/30/19 72 9 kg (160 lb 12 8 oz) Temp Readings from Last 3 Encounters:   10/02/19 99 °F (37 2 °C)     BP Readings from Last 3 Encounters:   10/02/19 111/66     Pulse Readings from Last 3 Encounters:   10/02/19 (!) 123      Physical Examination:  General:  Lying in bed, no acute distress   Eyes:  Mild conjunctival pallor present  ENT:  External examination of ears and nose unremarkable  Neck:  Supple  Respiratory:  Bilateral decreased breath sound at base, occasional coarse breath sound present  CVS:  S1, S2 present  GI:  Soft, nontender, nondistended  CNS:  Active alert oriented x3  Extremities:  No significant edema in legs  Skin:  No new rash in legs    Medications:    Current Facility-Administered Medications:     cefTAZidime (FORTAZ) 2,000 mg in sodium chloride 0 9 % 50 mL IVPB, 2,000 mg, Intravenous, Q12H, Vianey Cali MD, Last Rate: 100 mL/hr at 10/02/19 0302, 2,000 mg at 10/02/19 0302    escitalopram (LEXAPRO) tablet 10 mg, 10 mg, Oral, Daily, Max Rocky Rotashleya, DO, 10 mg at 10/02/19 0810    heparin (porcine) subcutaneous injection 5,000 Units, 5,000 Units, Subcutaneous, Q8H [address] **AND** [CANCELED] Platelet count, , , Once, Max DO [place]    hydrOXYzine HCL (ATARAX) tablet 25 mg, 25 mg, Oral, Q8H PRN, Max Rocky Rotunda, DO, 25 mg at 10/01/19 1834    levofloxacin (LEVAQUIN) tablet 500 mg, 500 mg, Oral, Q24H, Vianey Cali MD, 500 mg at 10/02/19 0503    lidocaine 1% buffered, , , Code/Trauma/Sedation Med, Nuno Furlong, DO, 10 mL at 10/02/19 0928    melatonin tablet 3 mg, 3 mg, Oral, HS, Max DO [place], 3 mg at 10/01/19 2131    metoprolol tartrate (LOPRESSOR) tablet 50 mg, 50 mg, Oral, Q12H [address], Max DO [place], 50 mg at 10/02/19 0810    ondansetron (ZOFRAN) injection 4 mg, 4 mg, Intravenous, Q8H PRN, Venessa Siegel MD, 4 mg at 10/01/19 1828    sodium chloride 0 9 % infusion, 70 mL/hr, Intravenous, Continuous, Doron Pedraza MD, Last Rate: 70 mL/hr at 10/02/19 0015, 70 mL/hr at 10/02/19 0015    traZODone (DESYREL) tablet 100 mg, 100 mg, Oral, HS, [place] Zackary, DO    zolpidem (AMBIEN) tablet 5 mg, 5 mg, Oral, HS PRN, Chase Wiggins, DO    Laboratory Results:  Results from last 7 days   Lab Units 10/02/19  0629 10/01/19  1800 10/01/19  1023   WBC Thousand/uL 9 89  --  15 11*   HEMOGLOBIN g/dL 7 8*  --  8 0*   HEMATOCRIT % 25 0*  --  24 9*   PLATELETS Thousands/uL 261  --  312   SODIUM mmol/L  --  141 138   POTASSIUM mmol/L  --  3 8 4 1   CHLORIDE mmol/L  --  106 105   CO2 mmol/L  --  24 25   BUN mg/dL  --  37* 39*   CREATININE mg/dL  --  1 86* 1 76*   CALCIUM mg/dL  --  9 1 8 8       XR mandible panorex ([place] only)   Final Result by Tamir Steve MD (10/02 1492)      No periapical lucencies identified  Workstation performed: [id number]         CT chest wo contrast   Final Result by Lashanda Conway MD (10/01 1703)      1  Diffuse groundglass opacities consistent with pulmonary edema   2  More focal patchy opacities in left upper lobe, while this may  still represent edema, pneumonia would have a similar appearance   3  Moderate pleural effusions   4  Subcarinal lymphadenopathy   5  Wedge-shaped hypodensity within the spleen, likely infarct      The study was marked in EPIC for immediate notification  Workstation performed: [id number]         CT head wo contrast   Final Result by  (10/02 0941)   Addendum 1 of 1 by Lashanda Conway MD (10/01 1951)   ADDENDUM:      This addendum is for the purpose of clarification:   Abnormality described represents either a septic embolus, or abscess  MRI    is recommended for differentiation  I personally discussed this addendum with Ranjit Joseph on 10/1/2019 at    7:50 PM       Final      VAS carotid complete study   Final Result by Serina Sanchez MD (10/01 4941)      XR chest portable   Final Result by Bruno Santizo MD (10/01 1324)      New airspace opacity suggests pulmonary edema or diffuse bronchopneumonia  Immediate report was noted on the Epic system  Workstation performed: [id number]         US right upper quadrant with liver dopplers    (Results Pending)   IR thoracentesis    (Results Pending)       Portions of the record may have been created with voice recognition software  Occasional wrong word or "sound a like" substitutions may have occurred due to the inherent limitations of voice recognition software  Read the chart carefully and recognize, using context, where substitutions have occurred

## 2019-10-02 NOTE — CONSULTS
Consultation - Neurology   Kelly Sebastian 44 y o  male MRN: [id number]  Unit/Bed#: -01 Encounter: [id number]      Assessment/Plan   1)  Parietal occipital lesion, likely secondary to septic emboli, suspicious for acute infarction verses abscess in setting of patient's known Pseudomonas endocarditis as well as splenic thrombus   -MRI brain with without contrast pending   -MRA head and neck pending    -CT head without contrast demonstrates right parietal occipital hypodensity, as well as some areas of hyperdensity, suspicious for abscess    -BILLY completed today, EF is 60%, no wall motion abnormalities  The patient does have multiple large and highly mobile vegetations on the atrial aspect of both leaflets of the mitral valve  There are 2 large perforations at the base of the anterior leaflet in loss of coaptation of the leaflets at the middle section due to tissue destruction  There is vast mitral regurg  Additional tricuspid regurg    -mitral valve repair versus replacement tentatively scheduled for 10/09/2019 with Dr William Alex of cardiothoracic surgery   -discussed with cardiothoracic surgery that patient is at high risk of intracranial bleed with initiation of heparin  This was also discussed with the patient, who voiced understanding of the risks  Ultimately, the decision to heparinize will be based on risk/benefit analysis by cardiothoracic surgery in regards to the necessity of patient's valve repair versus replacement  -supportive care and medication management per primary team   -appreciate Infectious Disease, current antibiotic regimen of:    -Levaquin 500 mg daily    -Caeftazidime 2000mg IV BID    -will continue to follow, please monitor exam and notify with changes    History of Present Illness     Reason for Consult / Principal Problem: 1  Endocarditis 2   Septic embolism   Hx and PE limited by: None   HPI: Kelly Sebastian is a 44 y o   male  with IV drug abuse including heroin, solitary kidney and hep C who presented to [facility] as a transfer from [facility] for CT surgery evaluation for severe mitral regurg in setting of endocarditis with Pseudomonas bacteremia  Patient with evidence of mitral valve vegetation seen on BILLY  Of note, the patient was discharged on 03/21/2019 with a diagnosis of mitral valve endocarditis with MSSA, and treated with cefazolin for 6 weeks  Follow-up echo done on 06/20/2019 showed echodensity on posterior atrial aspect of mitral valve with severe mitral and tricuspid regurgitation  The patient was then admitted on 09/06/2019 to [facility] requiring ICU care  He was actively using heroin at that time  Blood cultures on 09/06, 9/8, 9/10, and 9/11/19 were negative  Repeat cultures from 09/28 redemonstrated Pseudomonas  2D echo on September 6 demonstrated possible old vegetation, repeat echo on 09/10/2019 demonstrated increasing size of mitral valve posterior leaf agitation and evidence of new vegetation on the anterior leaflet  Repeat BILLY done and 09/29/2019 completed  Neurology is asked to see this patient in consultation due to abnormal finding on CT head, suspicious for septic emboli versus abscess  On examination today, the patient is resting in bed in no acute distress  A 12 point review systems was completed and is negative  Patient demonstrates a nonfocal neurological exam, including no visual field defect, as detailed below      Inpatient consult to Neurology  Consult performed by: Aakash Wood PA-C  Consult ordered by: Violtea Cid PA-C          Review of Systems   See HPI     Historical Information   Past Medical History:   Diagnosis Date    Anxiety 10/1/2019    Bacteremia 10/1/2019    Endocarditis 10/1/2019    History of intravenous drug abuse ([facility] [address] ) 10/1/2019    Nonrheumatic mitral valve regurgitation 10/1/2019     Past Surgical History:   Procedure Laterality Date    CHEST WALL TUMOR EXCISION  2013 Anterior chest wall cyst removed     Social History   Social History     Substance and Sexual Activity   Alcohol Use Never    Frequency: Never    Binge frequency: Never     Social History     Substance and Sexual Activity   Drug Use Yes     Social History     Tobacco Use   Smoking Status Never Smoker   Smokeless Tobacco Never Used     Family History: non-contributory    Review of previous medical records was completed  Meds/Allergies   Scheduled Meds:  Current Facility-Administered Medications:  cefTAZidime 2,000 mg Intravenous Q12H Ashley Griffiths MD Last Rate: 2,000 mg (10/02/19 0302)   chlorhexidine 15 mL Swish & Spit Q12H [facility] Mitali Julien PA-C    escitalopram 10 mg Oral Daily Max DO Charlotte    heparin (porcine) 5,000 Units Subcutaneous Q8H [facility] Chase Carlson, DO    hydrOXYzine HCL 25 mg Oral Q8H PRN Max [place], DO    levofloxacin 500 mg Oral Q24H Ashley Griffiths MD    melatonin 3 mg Oral HS Max [place], DO    metoprolol tartrate 50 mg Oral Q12H [facility] Chase Carlson, DO    mupirocin  Nasal Q12H [facility] Mitali Julien PA-C    ondansetron 4 mg Intravenous Q8H PRN Everardo Ramon MD    sodium chloride 75 mL/hr Intravenous Continuous Ysabel Every, DO Last Rate: 75 mL/hr (10/02/19 1327)   traZODone 100 mg Oral HS Potrero Zackary, DO    zolpidem 5 mg Oral HS PRN Max DO [place]      Continuous Infusions:  sodium chloride 75 mL/hr Last Rate: 75 mL/hr (10/02/19 1327)     PRN Meds: hydrOXYzine HCL    ondansetron    zolpidem      No Known Allergies    Objective   Vitals:Blood pressure 111/66, pulse (!) 123, temperature 99 °F (37 2 °C), resp  rate 18, height 5' 3" (1 6 m), weight 72 9 kg (160 lb 12 8 oz), SpO2 92 %  ,Body mass index is 28 48 kg/m²  Intake/Output Summary (Last 24 hours) at 10/2/2019 0924  Last data filed at 10/2/2019 0505  Gross per 24 hour   Intake    Output 225 ml   Net -225 ml       Invasive Devices:    Invasive Devices     Peripheral Intravenous Line            Peripheral IV 10/01/19 Right Forearm less than 1 day                Physical Exam   Constitutional: He is oriented to person, place, and time  He appears well-developed and well-nourished  No distress  HENT:   Head: Normocephalic  Right Ear: External ear normal    Left Ear: External ear normal    Mouth/Throat: Oropharynx is clear and moist  No oropharyngeal exudate  Eyes: Conjunctivae are normal  Right eye exhibits no discharge  Left eye exhibits no discharge  Neck: Normal range of motion  Cardiovascular:   Murmur heard  Pulmonary/Chest: Effort normal  No respiratory distress  Musculoskeletal: Normal range of motion  He exhibits no edema, tenderness or deformity  Neurological: He is oriented to person, place, and time  He has normal strength  He has a normal Finger-Nose-Finger Test and a normal Heel to [employer]    Reflex Scores:       Tricep reflexes are 2+ on the right side and 2+ on the left side  Bicep reflexes are 2+ on the right side and 2+ on the left side  Brachioradialis reflexes are 2+ on the right side and 2+ on the left side  Patellar reflexes are 2+ on the right side and 2+ on the left side  Achilles reflexes are 2+ on the right side and 2+ on the left side  Skin: Skin is warm and dry  He is not diaphoretic  Jaundiced      Psychiatric: He has a normal mood and affect  His speech is normal and behavior is normal    Patient polite, cooperative and interactive   Nursing note and vitals reviewed  Neurologic Exam     Mental Status   Oriented to person, place, and time  Follows 3 step commands  Attention: normal  Concentration: normal    Speech: speech is normal   Level of consciousness: alert  Knowledge: good  Normal comprehension  Cranial Nerves   Cranial nerves II through XII intact  Motor Exam   Muscle bulk: normal  Overall muscle tone: normal    Strength   Strength 5/5 throughout       Sensory Exam   Light touch normal      Gait, Coordination, and Reflexes     Gait  Gait: (Deferred for safety)    Coordination   Finger to nose coordination: normal  Heel to shin coordination: normal    Tremor   Resting tremor: absent    Reflexes   Right brachioradialis: 2+  Left brachioradialis: 2+  Right biceps: 2+  Left biceps: 2+  Right triceps: 2+  Left triceps: 2+  Right patellar: 2+  Left patellar: 2+  Right achilles: 2+  Left achilles: 2+  Right plantar: normal  Left plantar: normal  Right ankle clonus: absent  Left ankle clonus: absent      Lab Results: I have personally reviewed pertinent reports       Recent Results (from the past 24 hour(s))   HIV 1/2 AG-AB combo    Collection Time: 10/01/19  6:00 PM   Result Value Ref Range    HIV-1/HIV-2 Ab Non-Reactive Non-Reactive   Chronic Hepatitis Panel    Collection Time: 10/01/19  6:00 PM   Result Value Ref Range    Hepatitis B Surface Ag Non-reactive Non-reactive, NonReactive - Confirmed    Hepatitis C Ab High Reactive (A) Non-reactive    Hep B C IgM Non-reactive Non-reactive    Hep B Core Total Ab Non-reactive Non-reactive   Comprehensive metabolic panel    Collection Time: 10/01/19  6:00 PM   Result Value Ref Range    Sodium 141 136 - 145 mmol/L    Potassium 3 8 3 5 - 5 3 mmol/L    Chloride 106 100 - 108 mmol/L    CO2 24 21 - 32 mmol/L    ANION GAP 11 4 - 13 mmol/L    BUN 37 (H) 5 - 25 mg/dL    Creatinine 1 86 (H) 0 60 - 1 30 mg/dL    Glucose 108 65 - 140 mg/dL    Calcium 9 1 8 3 - 10 1 mg/dL    AST 11 5 - 45 U/L    ALT 22 12 - 78 U/L    Alkaline Phosphatase 107 46 - 116 U/L    Total Protein 7 5 6 4 - 8 2 g/dL    Albumin 2 8 (L) 3 5 - 5 0 g/dL    Total Bilirubin 0 72 0 20 - 1 00 mg/dL    eGFR 45 ml/min/1 73sq m   Protime-INR    Collection Time: 10/01/19  6:00 PM   Result Value Ref Range    Protime 14 8 (H) 11 6 - 14 5 seconds    INR 1 20 (H) 0 84 - 1 19   ECG 12 lead    Collection Time: 10/01/19  9:49 PM   Result Value Ref Range    Ventricular Rate 128 BPM    Atrial Rate 128 BPM    NH Interval 118 ms    QRSD Interval 80 ms    QT Interval 314 ms QTC Interval 458 ms    P [place] 69 degrees    QRS Axis 89 degrees    T Wave Axis 50 degrees   CBC and differential    Collection Time: 10/02/19  6:29 AM   Result Value Ref Range    WBC 9 89 4 31 - 10 16 Thousand/uL    RBC 2 85 (L) 3 88 - 5 62 Million/uL    Hemoglobin 7 8 (L) 12 0 - 17 0 g/dL    Hematocrit 25 0 (L) 36 5 - 49 3 %    MCV 88 82 - 98 fL    MCH 27 4 26 8 - 34 3 pg    MCHC 31 2 (L) 31 4 - 37 4 g/dL    RDW 18 2 (H) 11 6 - 15 1 %    MPV 10 0 8 9 - 12 7 fL    Platelets 402 953 - 413 Thousands/uL    nRBC 0 /100 WBCs    Neutrophils Relative 80 (H) 43 - 75 %    Immat GRANS % 1 0 - 2 %    Lymphocytes Relative 11 (L) 14 - 44 %    Monocytes Relative 5 4 - 12 %    Eosinophils Relative 2 0 - 6 %    Basophils Relative 1 0 - 1 %    Neutrophils Absolute 8 06 (H) 1 85 - 7 62 Thousands/µL    Immature Grans Absolute 0 07 0 00 - 0 20 Thousand/uL    Lymphocytes Absolute 1 04 0 60 - 4 47 Thousands/µL    Monocytes Absolute 0 50 0 17 - 1 22 Thousand/µL    Eosinophils Absolute 0 17 0 00 - 0 61 Thousand/µL    Basophils Absolute 0 05 0 00 - 0 10 Thousands/µL   Body fluid white cell count with differential    Collection Time: 10/02/19 10:00 AM   Result Value Ref Range    Site      WBC, Fluid 155 /ul   Body fluid cell count with differential    Collection Time: 10/02/19 10:00 AM   Result Value Ref Range    Site      WBC, Fluid 252 /ul   Body Fluid Diff    Collection Time: 10/02/19 10:00 AM   Result Value Ref Range    Total Counted 100     Neutrophils % (Fluid) 52 %    Lymphs % (Fluid) 5 %    Bands % (Fluid) 0 %    Mesothelial % (Fluid) 30 %    Monocytes % (Fluid) 13 %   Body Fluid Diff    Collection Time: 10/02/19 10:00 AM   Result Value Ref Range    Total Counted 100     Neutrophils % (Fluid) 53 %    Lymphs % (Fluid) 9 %    Mesothelial % (Fluid) 28 %    Monocytes % (Fluid) 10 %   ]      Imaging Studies: I have personally reviewed pertinent reports     and I have personally reviewed pertinent films in PACS  EKG, Pathology, and Other Studies: I have personally reviewed pertinent reports      VTE Prophylaxis: Sequential compression device (Venodyne)  and Heparin SQ    Code Status: Level 1 - Full Code  Advance Directive and Living Will:      Power of :    POLST:

## 2019-10-02 NOTE — PROGRESS NOTES
Progress Note - Cardiothoracic Surgery   Unknown Michael 44 y o  male MRN: [id number]  Unit/Bed#: -01 Encounter: [id number]    24 Hour Events: Seen by GI, U/S ordered, likely not cirrhosis per prelim notes  NPO for BILLY/cardiac cath today  CTH w/ emboli vs sepsis  Afebrile QHS, blood cx pending  Panorex complete w/ report pending  No other events  Still agreeable to sx, all questions addressed during encounter      Blood cx x2 10/1: pending    Medications:   Scheduled Meds:  Current Facility-Administered Medications:  cefTAZidime 2,000 mg Intravenous Q12H Charanjit Forbes MD Last Rate: 2,000 mg (10/02/19 0302)   escitalopram 10 mg Oral Daily Chase Porter DO    heparin (porcine) 5,000 Units Subcutaneous Q8H [facility] Chase Jordan DO    hydrOXYzine HCL 25 mg Oral Q8H PRN Chase Porter DO    levofloxacin 500 mg Oral Q24H Charanjit Forbes MD    melatonin 3 mg Oral HS Max DO [place]    metoprolol tartrate 50 mg Oral Q12H [facility] Chase Jordan DO    ondansetron 4 mg Intravenous Q8H PRN Forrest Yao MD    sodium chloride 70 mL/hr Intravenous Continuous Vianca Bernal MD Last Rate: 70 mL/hr (10/02/19 0015)   traZODone 100 mg Oral HS Houston Zackary, DO    zolpidem 5 mg Oral HS PRN Chase Jordan DO      Continuous Infusions:  sodium chloride 70 mL/hr Last Rate: 70 mL/hr (10/02/19 0015)       Results:   Results from last 7 days   Lab Units 10/02/19  0629 10/01/19  1023   WBC Thousand/uL 9 89 15 11*   HEMOGLOBIN g/dL 7 8* 8 0*   HEMATOCRIT % 25 0* 24 9*   PLATELETS Thousands/uL 261 312     Results from last 7 days   Lab Units 10/01/19  1800 10/01/19  1023   POTASSIUM mmol/L 3 8 4 1   CHLORIDE mmol/L 106 105   CO2 mmol/L 24 25   BUN mg/dL 37* 39*   CREATININE mg/dL 1 86* 1 76*   CALCIUM mg/dL 9 1 8 8     Results from last 7 days   Lab Units 10/01/19  1800   INR  1 20*       Studies:     Echo: done at [facility], no images available in PACs    Carotid US 10/1: no ICA stenosis b/l, antegrade flow b/l, no subcalvain disease b/l    CXR 10/1: new airspace opacity suggesting pulm edema or bronchopneumonia    EKG 10/2: done     CT chest w/o 10/1: diffuse groundglass opacitites c/w pulm edema, more focal patchy opacities in KELL(edema vs PNA), mod pleural effusions, subcarinal LAD, wedge-shaped hypodensity w/i spleen (likely infarct)    CT head w/o 10/1: ight parieto-occipital lesion, likely emboli/abscess    Vitals:   Vitals:    10/01/19 2130 10/01/19 2324 10/02/19 0019 10/02/19 0609   BP: 117/73 108/67  110/66   BP Location:       Pulse:  (!) 114     Resp:   (!) 32 18   Temp:  99 °F (37 2 °C)     SpO2:  96%     Weight:       Height:           Physical Exam:    HEENT/NECK:  PERRLA  No jugular venous distention  Cardiac: Tachycardic and III/VI  diastolicmurmur at apex  Pulmonary:  Breath sounds clear bilaterally and No rales/rhonchi/wheezes  Abdomen:  Non-tender, Non-distended and Normal bowel sounds  Extremities: Extremities warm/dry, DP pulses 2 bilaterally and Trace edema B/L  Neuro: Alert and oriented X 3, Sensation is grossly intact and No focal deficits  Skin: Warm/Dry, without rashes or lesions  Assessment:  Patient Active Problem List   Diagnosis    Endocarditis    History of intravenous drug abuse ([address] )    Acute kidney injury ([address] )    Insomnia    Tachycardia    Nonrheumatic mitral valve regurgitation    Bacteremia    Anxiety    Solitary left kidney     Mitral Valve endocarditis;  Ongoing MVR/R workup    Plan:  Patient agreeable to proceed with surgery;  Pre-op work-up underway   BILLY/cardiac cath for today   Panorex pending, may need OMFS clearance   ID following w/ pending blood cx but no leukocytosis & afebrile   GI folliwng, RUQ U/S pending, needs clearance   CTH findings reviewed, stable neuro exam, will need neuro consult for full heparin clearance   Pre-op lab work ordered   Splenic infarct on CT chest -- d/w attending need for gen sx excision which is unlikely  Mupirocin 2% nasal ointment q 12 hrs & chlorhexidine mouth rise to start 5 days prior to sx  mitral valve repair vs replacement tenatively scheduled for 10/9 with KEVIN Gallegos Mike: Mario Weber PA-C  DATE: October 2, 2019  TIME: 7:00 AM

## 2019-10-02 NOTE — PROGRESS NOTES
Pt  Advice to remain bedridden due to the recent cath  Pt  Observed up and ambulating in the room despite nursing education

## 2019-10-02 NOTE — BRIEF OP NOTE (RAD/CATH)
Bilateral thoracentesis procedure note    PATIENT NAME: Gregorio Yee  : 1980  MRN: [id number]     Pre-op Diagnosis:   1  Endocarditis    2  Bacteremia    3  Nonrheumatic mitral valve regurgitation    4  Hepatitis C virus infection without hepatic coma, unspecified chronicity    5  Septic embolism (HCC)      Post-op Diagnosis:   1  Endocarditis    2  Bacteremia    3  Nonrheumatic mitral valve regurgitation    4  Hepatitis C virus infection without hepatic coma, unspecified chronicity    5  Septic embolism [facility]        Surgeon:   Lala Silverman DO  Assistants:     No qualified resident was available  Estimated Blood Loss:  None  Findings: Moderate right and small left pleural effusions  Specimens:  1 L removed from the right and 700 mL removed from the left  Complications:  None      Anesthesia: Local    Lala Silverman DO     Date: 10/2/2019  Time: 9:39 AM

## 2019-10-02 NOTE — ANESTHESIA PREPROCEDURE EVALUATION
Review of Systems/Medical History  Patient summary reviewed  Chart reviewed      Cardiovascular  Valvular heart disease (Mitral valve vegetation  ) , mitral regurgitation,   Comment: Mitral valve endocarditis, Pseudomonas bacteremia     ,  Pulmonary  Shortness of breath,        GI/Hepatic       Negative  ROS        Endo/Other  Negative endo/other ROS      GYN       Hematology  Anemia ,     Musculoskeletal  Negative musculoskeletal ROS        Neurology      Comment: IVDU    Psychology         · BILLY showed increasing size of mitral valve vegetation compared to prior study in June 2019 and also new vegetation on anterior leaflet  · CT shows splenic infarct and right parieto-occipital lesion representing septic embolism most likely         Anesthesia Plan  ASA Score- 4     Anesthesia Type- IV sedation with anesthesia with ASA Monitors  Additional Monitors:   Airway Plan:         Plan Factors-    Induction- intravenous  Postoperative Plan-     Informed Consent- Anesthetic plan and risks discussed with patient  I personally reviewed this patient with the CRNA  Discussed and agreed on the Anesthesia Plan with the CRNA  Reba Skinner

## 2019-10-03 ENCOUNTER — APPOINTMENT (INPATIENT)
Dept: RADIOLOGY | Facility: HOSPITAL | Age: 39
DRG: 162 | End: 2019-10-03
Payer: COMMERCIAL

## 2019-10-03 LAB
ANION GAP SERPL CALCULATED.3IONS-SCNC: 6 MMOL/L (ref 4–13)
APTT PPP: 27 SECONDS (ref 23–37)
BUN SERPL-MCNC: 32 MG/DL (ref 5–25)
CALCIUM SERPL-MCNC: 8.5 MG/DL (ref 8.3–10.1)
CHLORIDE SERPL-SCNC: 110 MMOL/L (ref 100–108)
CHOLEST SERPL-MCNC: 123 MG/DL (ref 50–200)
CO2 SERPL-SCNC: 25 MMOL/L (ref 21–32)
CREAT SERPL-MCNC: 1.57 MG/DL (ref 0.6–1.3)
EOSINOPHIL NFR URNS MANUAL: 0 %
ERYTHROCYTE [DISTWIDTH] IN BLOOD BY AUTOMATED COUNT: 19 % (ref 11.6–15.1)
GFR SERPL CREATININE-BSD FRML MDRD: 55 ML/MIN/1.73SQ M
GLUCOSE SERPL-MCNC: 101 MG/DL (ref 65–140)
HCT VFR BLD AUTO: 25 % (ref 36.5–49.3)
HDLC SERPL-MCNC: 27 MG/DL (ref 40–60)
HGB BLD-MCNC: 7.8 G/DL (ref 12–17)
LDLC SERPL CALC-MCNC: 76 MG/DL (ref 0–100)
MCH RBC QN AUTO: 27.8 PG (ref 26.8–34.3)
MCHC RBC AUTO-ENTMCNC: 31.2 G/DL (ref 31.4–37.4)
MCV RBC AUTO: 89 FL (ref 82–98)
MRSA NOSE QL CULT: NORMAL
NONHDLC SERPL-MCNC: 96 MG/DL
PLATELET # BLD AUTO: 267 THOUSANDS/UL (ref 149–390)
PMV BLD AUTO: 9.7 FL (ref 8.9–12.7)
POTASSIUM SERPL-SCNC: 4.2 MMOL/L (ref 3.5–5.3)
RBC # BLD AUTO: 2.81 MILLION/UL (ref 3.88–5.62)
SODIUM SERPL-SCNC: 141 MMOL/L (ref 136–145)
TRIGL SERPL-MCNC: 101 MG/DL
WBC # BLD AUTO: 11.52 THOUSAND/UL (ref 4.31–10.16)

## 2019-10-03 PROCEDURE — 80048 BASIC METABOLIC PNL TOTAL CA: CPT | Performed by: STUDENT IN AN ORGANIZED HEALTH CARE EDUCATION/TRAINING PROGRAM

## 2019-10-03 PROCEDURE — 99232 SBSQ HOSP IP/OBS MODERATE 35: CPT | Performed by: INTERNAL MEDICINE

## 2019-10-03 PROCEDURE — 85027 COMPLETE CBC AUTOMATED: CPT | Performed by: STUDENT IN AN ORGANIZED HEALTH CARE EDUCATION/TRAINING PROGRAM

## 2019-10-03 PROCEDURE — 80061 LIPID PANEL: CPT | Performed by: PHYSICIAN ASSISTANT

## 2019-10-03 PROCEDURE — 87040 BLOOD CULTURE FOR BACTERIA: CPT | Performed by: INTERNAL MEDICINE

## 2019-10-03 PROCEDURE — 99233 SBSQ HOSP IP/OBS HIGH 50: CPT | Performed by: THORACIC SURGERY (CARDIOTHORACIC VASCULAR SURGERY)

## 2019-10-03 PROCEDURE — 76705 ECHO EXAM OF ABDOMEN: CPT

## 2019-10-03 PROCEDURE — 99233 SBSQ HOSP IP/OBS HIGH 50: CPT | Performed by: INTERNAL MEDICINE

## 2019-10-03 PROCEDURE — 74176 CT ABD & PELVIS W/O CONTRAST: CPT

## 2019-10-03 PROCEDURE — 99233 SBSQ HOSP IP/OBS HIGH 50: CPT | Performed by: HOSPITALIST

## 2019-10-03 PROCEDURE — 85730 THROMBOPLASTIN TIME PARTIAL: CPT | Performed by: PHYSICIAN ASSISTANT

## 2019-10-03 PROCEDURE — 71045 X-RAY EXAM CHEST 1 VIEW: CPT

## 2019-10-03 RX ORDER — LEVOFLOXACIN 750 MG/1
750 TABLET ORAL EVERY 24 HOURS
Status: DISCONTINUED | OUTPATIENT
Start: 2019-10-04 | End: 2019-10-10

## 2019-10-03 RX ADMIN — ESCITALOPRAM OXALATE 10 MG: 10 TABLET ORAL at 09:06

## 2019-10-03 RX ADMIN — METOPROLOL TARTRATE 50 MG: 50 TABLET, FILM COATED ORAL at 23:27

## 2019-10-03 RX ADMIN — Medication 1 APPLICATION: at 09:06

## 2019-10-03 RX ADMIN — CEFTAZIDIME 2000 MG: 1 INJECTION, POWDER, FOR SOLUTION INTRAMUSCULAR; INTRAVENOUS at 03:19

## 2019-10-03 RX ADMIN — Medication 1 APPLICATION: at 23:28

## 2019-10-03 RX ADMIN — CHLORHEXIDINE GLUCONATE 0.12% ORAL RINSE 15 ML: 1.2 LIQUID ORAL at 23:27

## 2019-10-03 RX ADMIN — CEFTAZIDIME 2000 MG: 1 INJECTION, POWDER, FOR SOLUTION INTRAMUSCULAR; INTRAVENOUS at 11:15

## 2019-10-03 RX ADMIN — CEFTAZIDIME 2000 MG: 1 INJECTION, POWDER, FOR SOLUTION INTRAMUSCULAR; INTRAVENOUS at 20:05

## 2019-10-03 RX ADMIN — CHLORHEXIDINE GLUCONATE 0.12% ORAL RINSE 15 ML: 1.2 LIQUID ORAL at 09:06

## 2019-10-03 RX ADMIN — TRAZODONE HYDROCHLORIDE 100 MG: 100 TABLET ORAL at 23:27

## 2019-10-03 RX ADMIN — LEVOFLOXACIN 500 MG: 500 TABLET, FILM COATED ORAL at 05:06

## 2019-10-03 RX ADMIN — MELATONIN 3 MG: 3 TAB ORAL at 23:28

## 2019-10-03 NOTE — PROGRESS NOTES
INTERNAL MEDICINE RESIDENCY PROGRESS NOTE     PATIENT INFORMATION     Name: Elsa Santiago   Age & Sex: 44 y o  male   MRN: [id number]    Unit/Bed#: [facility]   Encounter: [id number]  Team: SOD Team A    ASSESSMENT/PLAN     Hospital Stay Days: 3    Principal Problem:    Endocarditis  Active Problems:    History of intravenous drug abuse ([facility] [address] )    Acute kidney injury (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  Pt presents as a transfer from [address] [place] [address] on 9/30 for evaluation by cardiothoracic surgery for mitral valve replacement  As per chart review patient Presented to [address] [place] [address] on 09/06 with complaints of fever, nausea and vomiting x1 week  Last reported IVD use 5 days before arrival to [address] [place] [address]  patient has a history of IV drug use and was previously treated for MSSA mitral valve endocarditis and bacteremia in 3/2019    Discharged from [address] [place] [address] on 03/21/19 after completed 6 week course of IV cefazolin   - BILLY on 6/2019 showed echodensity on posterior age for aspect of mitral valve measuring 18 mm to 20 mm with mitral valve regurgitation and mild tricuspid regurgitation  - 2D echo on 9/6/2019 showed echodensity on the posterior mitral leaflet, possibly representing residual vegetation from prior treated endocarditis   Follow-up BILLY suggestive of increasing size of mitral valve vegetation compared to prior study in June of 2019 and also evidence of new vegetation on the anterior leaflet  -BILLY 9/29/2019 - bulbous echodensity on the anterior mitral leaflet measuring 8 x 13 mm   Filament to read echo density in the posterior mitral leaflet measuring 11 mm   Compared to echo done on 9/23, appears to be no significant change    -Blood cultures on 09/06, 9/8, 9/10, 9/11 demonstrate Pseudomonas aeruginosa  -repeat cultures from 09/13 and 9/14 negative  - blood cultures 9/28 reported to be positive again for Pseudomonas  -patient appears to have been on IV ceftazidime, levofloxacin 750 mg every 48 hours renally dose adjusted - for dual anti pseudomonal coverage  -patient apparently had previously been on cefepime, switched to ceftazidime due to side effects of vomiting diarrhea  -patient had previously been on gentamicin which was discontinued on 09/22/2019, noted to likely be contributing to acute kidney injury  - culture repeated on 10/01 upon admission-  no growth at 24hrs Documented on 10/2  - CT surgery consulted on 10/1 for MV repair/replacement  Work up for surgery initiated and tentatively scheduled for next week  - ID consulted on 10/01  Recommended to continue dual anti pseudomonal coverage with IV ceftazidime 2g IV q12 and levofloxacin 500mg PO 24hrs renally dose for 6 weeks  - CT abdomen/Pelvis with PO contrast ordered to evaluate for possible abdominal abscess given recurrence of Pseudomonas bacteremia   - cardiac diet with strict fluids and low-sodium in place  - will continue to monitor on telemetry     Severe mitral regurgitation  -most recent TTE from 09/30/2019 shows EF 70%, severe mitral regurgitation posteriorly directed, mitral valve vegetation as above  -CT surgery consult as above  - CXR on 10/01 revealed pulmonary edema likely related to severe MV regurgitation  Will hold diuresis for now since he appears clinically euvolemic  - BILLY 10/2 reveal ejection fraction at 60% for no wall motion abnormalities, mildly to moderately reduced systolic function of the right ventricle  Left atrium mildly dilated  Mitral valve with multiple large and mobile vegetations and the trigger aspect of both leaflets with large perforations at the base of the anterior leaflets and wide-open mitral regurgitation    See report for full details        Bilateral pleural effusions  - likely secondary to severe mitral valve regurg  - patient presents with complaints of shortness of breath and mild tachypnea however states that is better since thoracentesis on 10/02  - thoracentesis on 10/02 by IR with 1000 mL removed from the right hemithorax and 700 mL from the left hemithorax  - chest x-ray order for reassessment  Pending results  JULIENNE on CKD and congenital solitary left kidney   -baseline creatinine unknown   Admission creatinine 1 49 at [facility], peaked at 2 20      -possibly secondary to ATN from aminoglycoside with prerenal component per last nephrology progress note at [facility] shows right kidney congenitally absent with left kidney showing compensatory hypertrophy and 1 small likely infarct in the lower cortex unchanged from previous study, not associated with perinephric fluid or abscess or pyelonephritis  - Cr continues to improve today 1 57 from 1 86 on 10/02  -continue to avoid nephrotoxins  - Nephrology consulted on on board  Recommendations appreciated       Right Parietal Occipital Lesion  Noted on CT head 10/1: 1 8 cm x 0 9 x 0 8 lesion in the right parieto-occipital region with surrounding edema  Likely septic embolus versus abscess given history of IVDU and pseudomonal MV endocarditis   - MRI on 10/02: "Subacute 1 8 cm hemorrhage in the right posterior mesial temporal occipital region with surrounding vasogenic edema, stable  Few punctate foci of chronic microhemorrhage in the right frontoparietal region and in the cerebellar hemispheres  Findings may   be secondary to subacute and chronic septic emboli and/or mycotic aneurysms  No evidence of abscess  - MRA on 10/02 reveal NO evidence of mycotic aneurysms  - Neurology consulted on 10/2: "discussed with cardiothoracic surgery that patient is at high risk of intracranial bleed with initiation of heparin  This was also discussed with the patient, who voiced understanding of the risks    Ultimately, the decision to heparinize will be based on risk/benefit analysis by cardiothoracic surgery in regards to the necessity of patient's valve repair versus replacement"  - DVT PPX with heparin discontinued on 10/03> SCD in place for DVT PPX         Hypertension, sinus tachycardia  - blood pressure within acceptable parameters in the last 24 hrs  - EKG on 10/02 reveals sinus tachycardia otherwise normal   Heart rate 100-120  Likely secondary to infection versus mitral regurg  -  will continue metoprolol 50 mg q 12h      Anxiety, insomnia  -psychiatry progress note from  reviewed  -continue hydroxyzine 25 mg every 8 hours as needed for anxiety  -continue Lexapro, trazodone  -scheduled melatonin and Ambien p r n  nightly       VTE Mechanical Prophylaxis: sequential compression device     Disposition:  Continues to require inpatient care  Patient has been evaluated by cardiac surgery for mitral valve replacement/repair  Currently on dual antibiotics for Pseudomonas MV endocarditis  SUBJECTIVE     Patient seen and examined this morning  No acute events overnight  Patient states that shortness of breath has improved since thoracentesis was performed yesterday  States that nausea is still present after he wakes up but improves throughout the day  Denies h/a, light-headness, diaphoresis, chills, CP, palpitations, abdominal pain, diarrhea, constipation  OBJECTIVE     Vitals:    10/03/19 0312 10/03/19 0314 10/03/19 0323 10/03/19 0743   BP: (!) 89/51 99/54  106/65   BP Location:  Right arm     Pulse:       Resp: 19   16   Temp:       SpO2:  (!) 88% 96%    Weight:       Height:          Temperature:   Temp (24hrs), Av 9 °F (37 2 °C), Min:98 6 °F (37 °C), Max:99 5 °F (37 5 °C)    Temperature: 99 °F (37 2 °C)  Intake & Output:  I/O       10/01 0701 - 10/02 0700 10/02 0701 - 10/03 0700 10/03 0701 - 10/04 07    P  O        I V  (mL/kg)  400 (5 5)     Total Intake(mL/kg)  400 (5 5)     Urine (mL/kg/hr) 225 (0 1) 275 (0 2)     Emesis/NG output  0     Other  1700     Total Output 225 1975     Net -225 -0158            Unmeasured Emesis Occurrence  1 x         Weights:   IBW: 56 9 kg    Body mass index is 28 48 kg/m²  Weight (last 2 days)     None        Physical Exam  GENERAL: Alert/oriented x3, NAD, Well developed, Well-nourished   HEENT:  NC/AT, PERRL, EOMI, no scleral icterus, MMM, Neck supple, No JVD  CARDIAC:  RRR,  normal Z4/Z1,  3/6 systolic murmur  No rubs appreciated  PULMONARY:  Mild tachypnea noted  Respiratory Wheezing appreciated in bilateral bases  No crackles  ABDOMEN:  Soft, NT/ND, +BS, no rebound/guarding/rigidity  Extremities:  2+ Pulses in DP/PT  No edema, cyanosis, or clubbing  NEUROLOGIC:  Alert/oriented x3  No motor or sensory deficits  SKIN:  No rashes or erythema    LABORATORY DATA     Labs: I have personally reviewed pertinent reports  Results from last 7 days   Lab Units 10/03/19  0634 10/02/19  0629 10/01/19  1023   WBC Thousand/uL 11 52* 9 89 15 11*   HEMOGLOBIN g/dL 7 8* 7 8* 8 0*   HEMATOCRIT % 25 0* 25 0* 24 9*   PLATELETS Thousands/uL 267 261 312   NEUTROS PCT %  --  80* 83*   MONOS PCT %  --  5 4      Results from last 7 days   Lab Units 10/03/19  0634 10/01/19  1800 10/01/19  1023   POTASSIUM mmol/L 4 2 3 8 4 1   CHLORIDE mmol/L 110* 106 105   CO2 mmol/L 25 24 25   BUN mg/dL 32* 37* 39*   CREATININE mg/dL 1 57* 1 86* 1 76*   CALCIUM mg/dL 8 5 9 1 8 8   ALK PHOS U/L  --  107  --    ALT U/L  --  22  --    AST U/L  --  11  --               Results from last 7 days   Lab Units 10/03/19  0634 10/01/19  1800   INR   --  1 20*   PTT seconds 27  --                IMAGING & DIAGNOSTIC TESTING     Radiology Results: I have personally reviewed pertinent reports  Xr Mandible Pan[employer]x ([place] Only)    Result Date: 10/2/2019  Impression: No periapical lucencies identified  Workstation performed: [id number]     Xr Chest Portable    Result Date: 10/1/2019  Impression: New airspace opacity suggests pulmonary edema or diffuse bronchopneumonia   Immediate report was noted on the Epic system  Workstation performed: [id number]     Ct Head Wo Contrast    Addendum Date: 10/1/2019    ADDENDUM: This addendum is for the purpose of clarification: Abnormality described represents either a septic embolus, or abscess  MRI is recommended for differentiation  I personally discussed this addendum with Jaron Doing on 10/1/2019 at 7:50 PM     Result Date: 10/1/2019  Impression: Right parieto-occipital lesion, given history this likely represents a septic embolus/abscess  I personally discussed this study  with Jaron Doing on 10/1/2019 at 5:24 PM   Workstation performed: [id number]     Ct Chest Wo Contrast    Result Date: 10/1/2019  Impression: 1  Diffuse groundglass opacities consistent with pulmonary edema 2  More focal patchy opacities in left upper lobe, while this may  still represent edema, pneumonia would have a similar appearance 3  Moderate pleural effusions 4  Subcarinal lymphadenopathy 5  Wedge-shaped hypodensity within the spleen, likely infarct The study was marked in EPIC for immediate notification  Workstation performed: [id number]     Mra Head Wo Contrast    Result Date: 10/2/2019  Impression: 1  No evidence of mycotic aneurysm; however, exam sensitivity is limited by resolution and incomplete imaging of the distal branches  Catheter angiography may be helpful if there is persistent concern for mycotic aneurysm  2   No stenosis or occlusion of the major vessels of the [place]  Workstation performed: [id number]     Mra Carotids Wo Contrast    Result Date: 10/3/2019  Impression: Unremarkable MR angiogram of the cervical vasculature  Workstation performed: [id number]     Mri Brain Wo Contrast    Result Date: 10/2/2019  Impression: Subacute 1 8 cm hemorrhage in the right posterior mesial temporal occipital region with surrounding vasogenic edema, stable    Few punctate foci of chronic microhemorrhage in the right frontoparietal region and in the cerebellar hemispheres  Findings may  be secondary to subacute and chronic septic emboli and/or mycotic aneurysms  No evidence of abscess  Workstation performed: [id number]     Ir Thoracentesis    Result Date: 10/2/2019  Impression: Successful bilateral thoracentesis  _________________________________________________________________ COMPARISON: None PROCEDURE DETAILS: Operators: Dr Elda Christie, [address] [place] [address] attending, performed the procedure  Anesthesia: 1% lidocaine was injected in the skin and subcutaneous tissues overlying the access site  Medications: 1% lidocaine Contrast: None Fluoroscopy time: None COMMENTS: Following the discussion of the risks, benefits and alternatives to the procedure, written informed consent was obtained from the patient  The patient was placed in a sitting position  A preprocedure timeout was performed per [facility] protocol  The right back was prepped and draped in the usual sterile fashion  After local anesthesia was administered, a 5-[employer] catheter was advanced under continuous ultrasound guidance into the right pleural space  1000 mL of vidya fluid was obtained  After the procedure, the catheter was removed, the skin was cleansed and sterile dressings were applied  Aforementioned procedure was then performed for left-sided thoracentesis  The patient tolerated the procedure well and there were no immediate postprocedure complications  Workstation performed: [id number]     Other Diagnostic Testing: I have personally reviewed pertinent reports        ACTIVE MEDICATIONS     Current Facility-Administered Medications   Medication Dose Route Frequency    acetaminophen (TYLENOL) tablet 650 mg  650 mg Oral Q6H PRN    cefTAZidime (FORTAZ) 2,000 mg in sodium chloride 0 9 % 50 mL IVPB  2,000 mg Intravenous Q12H    chlorhexidine (PERIDEX) 0 12 % oral rinse 15 mL  15 mL Swish & Spit Q12H [address]    escitalopram (LEXAPRO) tablet 10 mg  10 mg Oral Daily    heparin (porcine) subcutaneous injection 5,000 Units  5,000 Units Subcutaneous Q8H [facility]    hydrOXYzine HCL (ATARAX) tablet 25 mg  25 mg Oral Q8H PRN    levofloxacin (LEVAQUIN) tablet 500 mg  500 mg Oral Q24H    melatonin tablet 3 mg  3 mg Oral HS    metoprolol tartrate (LOPRESSOR) tablet 50 mg  50 mg Oral Q12H [facility]    mupirocin (BACTROBAN) 2 % nasal ointment   Nasal Q12H [facility]    ondansetron (ZOFRAN) injection 4 mg  4 mg Intravenous Q8H PRN    traZODone (DESYREL) tablet 100 mg  100 mg Oral HS    zolpidem (AMBIEN) tablet 5 mg  5 mg Oral HS PRN     ==  Luciana Brewster MD  Internal Medicine Residency, PGY-1  [address]

## 2019-10-03 NOTE — PROGRESS NOTES
Cardiology   Yessenia Orr 44 y o  male MRN: [id number]  Unit/Bed#: -01 Encounter: [id number]        Assessment/Plan:    Endocarditis due to Pseudomonas bacteremia  · Per CT surgery, MV replacement w/ a tissue valve  · Remains euvolemic on exam, lungs CTA b/l, SOB  · Daily I/Os, weights, fluid restriction, cardiac diet  · Continue metoprolol tartrate 50mg bid, tele  ·     SOB  · Likely 2/2 cardiogenic pulmonary edema from above  · 10/3 CXR pending final read, but shows vascular congestion, no effusions  · Continue to monitor    Subjective:   No acute events overnight   Feels well, although reports some SOB and orthopnea, otherwise denies chest pain, palpitations, PND, pedal edema, syncope, presyncope, diaphoresis, nausea/vomiting     Remainder of ROS done and negative    Telemetry: sinus tachy    Net fluid balance:  -1 7L    Weight: 72 9kg    EKG: sinus tachy    Labs: BCx [email or web address], Cr improving to 1 57, WBC up to 11 5    Historical Information   Past Medical History:   Diagnosis Date    Anxiety 10/1/2019    Bacteremia 10/1/2019    Endocarditis 10/1/2019    History of intravenous drug abuse ([facility] [address] ) 10/1/2019    Nonrheumatic mitral valve regurgitation 10/1/2019     Past Surgical History:   Procedure Laterality Date    CHEST WALL TUMOR EXCISION  2013    Anterior chest wall cyst removed    IR THORACENTESIS  10/2/2019     Social History   Social History     Substance and Sexual Activity   Alcohol Use Never    Frequency: Never    Binge frequency: Never     Social History     Substance and Sexual Activity   Drug Use Yes     Social History     Tobacco Use   Smoking Status Never Smoker   Smokeless Tobacco Never Used     Family History:   Family History   Problem Relation Age of Onset    Heart disease Maternal Grandmother            Scheduled Meds:  Current Facility-Administered Medications:  acetaminophen 650 mg Oral Q6H PRN Jason Rahman MD    cefTAZidime 2,000 mg Intravenous Johnnie Garcia MD Last Rate: Stopped (10/03/19 1145)   chlorhexidine 15 mL Swish & Spit Q12H [address] Mitali Julien PA-C    escitalopram 10 mg Oral Daily Max Widawski, DO    hydrOXYzine HCL 25 mg Oral Q8H PRN Max Cherylside, DO    [START ON 10/4/2019] levofloxacin 750 mg Oral Q24H Ashley Griffiths MD    melatonin 3 mg Oral HS Max Widawski, DO    metoprolol tartrate 50 mg Oral Q12H [address] Max Cherylside, DO    mupirocin  Nasal Q12H [address] Mitali Julien PA-C    ondansetron 4 mg Intravenous Q8H PRN Everardo Ramon MD    traZODone 100 mg Oral HS Tingley Zackary, DO    zolpidem 5 mg Oral HS PRN Max Marianneki, DO      Continuous Infusions:   PRN Meds:   acetaminophen    hydrOXYzine HCL    ondansetron    zolpidem  all current active meds have been reviewed    No Known Allergies    Objective   Vitals: Blood pressure 108/66, pulse (!) 118, temperature 98 9 °F (37 2 °C), resp  rate 18, height 5' 3" (1 6 m), weight 72 9 kg (160 lb 12 8 oz), SpO2 92 %  , Body mass index is 28 48 kg/m² , Orthostatic Blood Pressures      Most Recent Value   Blood Pressure  108/66 filed at 10/03/2019 1105   Patient Position - Orthostatic VS  Lying filed at 10/03/2019 0314            Intake/Output Summary (Last 24 hours) at 10/3/2019 1433  Last data filed at 10/3/2019 0743  Gross per 24 hour   Intake 100 ml   Output 275 ml   Net -175 ml       Invasive Devices     Peripheral Intravenous Line            Peripheral IV 10/01/19 Right Forearm 1 day                Physical Exam:  General:  AO x3, no acute distress  Cardiac:  S1-S2 normal  No murmurs, rubs or gallops  Tachycardic   Lungs:  Clear to auscultation bilaterally, no wheezing or crackles    Abdomen:  Soft nontender nondistended, positive bowel sounds  Extremities:  Warm, well perfused, pulses palpable, no ulcers or rashes  Neuro: Grossly nonfocal  Psych:  Normal affect      Lab Results:   Recent Results (from the past 24 hour(s))   UA (URINE) with reflex to Microscopic    Collection Time: 10/02/19  3:22 PM   Result Value Ref Range    Color, UA Yellow     Clarity, UA Clear     Specific Gravity, UA 1 037 (H) 1 003 - 1 030    pH, UA 5 0 4 5, 5 0, 5 5, 6 0, 6 5, 7 0, 7 5, 8 0    Leukocytes, UA Negative Negative    Nitrite, UA Negative Negative    Protein, UA 30 (1+) (A) Negative mg/dl    Glucose, UA Negative Negative mg/dl    Ketones, UA Negative Negative mg/dl    Urobilinogen, UA 0 2 0 2, 1 0 E U /dl E U /dl    Bilirubin, UA Negative Negative    Blood, UA Negative Negative   Urine Eosinophils    Collection Time: 10/02/19  3:22 PM   Result Value Ref Range    Eosinophil, Urine 0 %   Urine Microscopic    Collection Time: 10/02/19  3:22 PM   Result Value Ref Range    RBC, UA 4-10 (A) None Seen, 0-5 /hpf    WBC, UA 4-10 (A) None Seen, 0-5, 5-55, 5-65 /hpf    Epithelial Cells Occasional None Seen, Occasional /hpf    Bacteria, UA None Seen None Seen, Occasional /hpf    Hyaline Casts, UA 10-25 (A) None Seen /lpf   APTT    Collection Time: 10/03/19  6:34 AM   Result Value Ref Range    PTT 27 23 - 37 seconds   Lipid panel    Collection Time: 10/03/19  6:34 AM   Result Value Ref Range    Cholesterol 123 50 - 200 mg/dL    Triglycerides 101 <=150 mg/dL    HDL, Direct 27 (L) 40 - 60 mg/dL    LDL Calculated 76 0 - 100 mg/dL    Non-HDL-Chol (CHOL-HDL) 96 mg/dl   CBC    Collection Time: 10/03/19  6:34 AM   Result Value Ref Range    WBC 11 52 (H) 4 31 - 10 16 Thousand/uL    RBC 2 81 (L) 3 88 - 5 62 Million/uL    Hemoglobin 7 8 (L) 12 0 - 17 0 g/dL    Hematocrit 25 0 (L) 36 5 - 49 3 %    MCV 89 82 - 98 fL    MCH 27 8 26 8 - 34 3 pg    MCHC 31 2 (L) 31 4 - 37 4 g/dL    RDW 19 0 (H) 11 6 - 15 1 %    Platelets 042 788 - 459 Thousands/uL    MPV 9 7 8 9 - 12 7 fL   Basic metabolic panel    Collection Time: 10/03/19  6:34 AM   Result Value Ref Range    Sodium 141 136 - 145 mmol/L    Potassium 4 2 3 5 - 5 3 mmol/L    Chloride 110 (H) 100 - 108 mmol/L    CO2 25 21 - 32 mmol/L    ANION GAP 6 4 - 13 mmol/L    BUN 32 (H) 5 - 25 mg/dL    Creatinine 1 57 (H) 0 60 - 1 30 mg/dL    Glucose 101 65 - 140 mg/dL    Calcium 8 5 8 3 - 10 1 mg/dL    eGFR 55 ml/min/1 73sq m   ]    Imaging: I have personally reviewed pertinent reports     and I have personally reviewed pertinent films in PACS

## 2019-10-03 NOTE — PROGRESS NOTES
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   Patient is transferred to [facility] for CT surgery well and for mitral valve repair/replacement   We are consulted for JULIENNE management      JULIENNE, unknown prior baseline creatinine  -admission creatinine 1 4 at [facility] initially improved to 1 0 and then eventually peaked at 2 2, and seems to be somewhat fluctuating with creatinine improved to 1 5 today  -BMP in a m   -JULIENNE suspected to be secondary to prerenal/ATN/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 all in the setting of solitary kidney  -status post cardiac catheterization with contrast exposure on 10/2/19, closely monitor for HELEN  -also was found to have small renal infarct from prior endocarditis  -now remains off IV fluid  Currently remains on room air at the time my encounter and monitor off diuretics for now       -recent workup includes:  -urinalysis this admission shows 4 to 10 RBCs, 4 to 10 WBCs, 1+ proteinuria, very concentrated sample with 10 to 25 hyaline cast   -CT scan showed congenitally absent right kidney with left kidney showing compensatory hypertrophy, one small likely infarct in the lower pole of left kidney unchanged from the previous study and not associated with any abscess for pyelonephritis  - February 2019: positive hep C antibody with hep C RNA PCR less than 15  -complements normal, CK normal,  -avoid nephrotoxins or NSAIDs  -urine eosinophils 0%, no peripheral eosinophilia     Congenital solitary kidney     Hypertension  -blood pressure on lower side  -currently on metoprolol due to tachycardia, continue with holding parameter     Anemia  -closely monitor hemoglobin, transfuse p r n  For hemoglobin less than seven  -low iron stores recently although avoiding IV Venofer due to active infection issues  -recent FOBT was negative at [address] mitral valve endocarditis/bacteremia  -antibiotic as per primary team and ID  -patient is active IV drug user and had prior history of MSSA bacteremia with endocarditis in March 2019  -BILLY in September 2019 showed echodensity on the anterior mitral leaflet      Pseudomonas bacteremia, blood culture from 9/28/19 showed Pseudomonas  Repeat culture results to follow     Bilateral pleural effusion, status post thoracentesis on 10/2/19  -currently remains on room air, overall feels better      CT scan suggestive of pulmonary congestion versus suspicion for pneumonia, pleural effusions, splenic infarct      Septic embolus/abscess on CT scan of brain     Discussed above plan in detail with primary team    SUBJECTIVE:  Patient seen and examined at bedside  Patient feels slight short of breath lying down last night although seems to be feeling better at the time of my encounter  Denies nausea, vomiting, abdominal pain or diarrhea  No urinary complaints       OBJECTIVE:  Current Weight: Weight - Scale: 72 9 kg (160 lb 12 8 oz)  Vitals:    10/03/19 0743   BP: 106/65   Pulse:    Resp: 16   Temp:    SpO2:        Intake/Output Summary (Last 24 hours) at 10/3/2019 0900  Last data filed at 10/3/2019 0743  Gross per 24 hour   Intake 500 ml   Output 1975 ml   Net -1475 ml     Wt Readings from Last 3 Encounters:   09/30/19 72 9 kg (160 lb 12 8 oz)   10/02/19 72 6 kg (160 lb)     Temp Readings from Last 3 Encounters:   10/02/19 99 °F (37 2 °C)     BP Readings from Last 3 Encounters:   10/03/19 106/65     Pulse Readings from Last 3 Encounters:   10/02/19 (!) 121        Physical Examination:  General:  Sitting in chair, no acute distress   Eyes:  Mild conjunctival pallor present  ENT:  External examination of ears and nose unremarkable  Neck:  Supple  Respiratory:  Bilateral air entry present  CVS:  S1, S2 present  GI:  Soft, nontender, nondistended  CNS:  Active alert oriented x3  Extremities:  No significant edema in legs  Skin:  No new rash in legs    Medications:    Current Facility-Administered Medications:     acetaminophen (TYLENOL) tablet 650 mg, 650 mg, Oral, Q6H PRN, Neo Aguilar MD, 650 mg at 10/02/19 1923    cefTAZidime (FORTAZ) 2,000 mg in sodium chloride 0 9 % 50 mL IVPB, 2,000 mg, Intravenous, Q8H, Veronica Mclean MD    chlorhexidine (PERIDEX) 0 12 % oral rinse 15 mL, 15 mL, Swish & Spit, Q12H [address], Cedric Grey PA-C, 15 mL at 10/02/19 2159    escitalopram (LEXAPRO) tablet 10 mg, 10 mg, Oral, Daily, Max Vargas Sick, DO, 10 mg at 10/02/19 0810    heparin (porcine) subcutaneous injection 5,000 Units, 5,000 Units, Subcutaneous, Q8H [address] **AND** [CANCELED] Platelet count, , , Once, Max Kehindeawski, DO    hydrOXYzine HCL (ATARAX) tablet 25 mg, 25 mg, Oral, Q8H PRN, Max Vargas Sick, DO, 25 mg at 10/01/19 1834    [START ON 10/4/2019] levofloxacin (LEVAQUIN) tablet 750 mg, 750 mg, Oral, Q24H, Veronica Mclean MD    melatonin tablet 3 mg, 3 mg, Oral, HS, Max Vargas Sick, DO, 3 mg at 10/02/19 2243    metoprolol tartrate (LOPRESSOR) tablet 50 mg, 50 mg, Oral, Q12H [address], Max Vargas Sick, DO, 50 mg at 10/02/19 2225    mupirocin (BACTROBAN) 2 % nasal ointment, , Nasal, Q12H [address], Cedric Grey PA-C, 1 application at 55/71/94 2200    ondansetron (ZOFRAN) injection 4 mg, 4 mg, Intravenous, Q8H PRN, Jag Garcia MD, 4 mg at 10/01/19 1828    traZODone (DESYREL) tablet 100 mg, 100 mg, Oral, HS, [place] Zackary, , 100 mg at 10/02/19 2243    zolpidem (AMBIEN) tablet 5 mg, 5 mg, Oral, HS PRN, Chase Vargas Sick, DO    Laboratory Results:  Results from last 7 days   Lab Units 10/03/19  0634 10/02/19  0629 10/01/19  1800 10/01/19  1023   WBC Thousand/uL 11 52* 9 89  --  15 11*   HEMOGLOBIN g/dL 7 8* 7 8*  --  8 0*   HEMATOCRIT % 25 0* 25 0*  --  24 9*   PLATELETS Thousands/uL 267 261  --  312   SODIUM mmol/L 141  --  141 138   POTASSIUM mmol/L 4 2  --  3 8 4 1   CHLORIDE mmol/L 110*  --  106 105   CO2 mmol/L 25  --  24 25   BUN mg/dL 32*  --  37* 39*   CREATININE mg/dL 1 57*  --  1 86* 1 76*   CALCIUM mg/dL 8 5  --  9 1 8 8       MRA carotids wo contrast   Final Result by Verónica De La Garza DO (10/03 0176)      Unremarkable MR angiogram of the cervical vasculature  Workstation performed: [id number]         MRA head wo contrast   Final Result by Leslee Parikh MD (10/02 2138)         1  No evidence of mycotic aneurysm; however, exam sensitivity is limited by resolution and incomplete imaging of the distal branches  Catheter angiography may be helpful if there is persistent concern for mycotic aneurysm  2   No stenosis or occlusion of the major vessels of the [place]  Workstation performed: [id number]         MRI brain wo contrast   Final Result by Leslee Parikh MD (10/02 2133)      Subacute 1 8 cm hemorrhage in the right posterior mesial temporal occipital region with surrounding vasogenic edema, stable  Few punctate foci of chronic microhemorrhage in the right frontoparietal region and in the cerebellar hemispheres  Findings may    be secondary to subacute and chronic septic emboli and/or mycotic aneurysms  No evidence of abscess  Workstation performed: [id number]         IR thoracentesis   Final Result by Miky Kohli DO (10/02 1547)   Successful bilateral thoracentesis  _________________________________________________________________   COMPARISON: None      PROCEDURE DETAILS:    Operators: Dr Marva Slaughter, [address] [facility] attending, performed the procedure     Anesthesia: 1% lidocaine was injected in the skin and subcutaneous tissues overlying the access site  Medications: 1% lidocaine   Contrast: None   Fluoroscopy time: None      COMMENTS:   Following the discussion of the risks, benefits and alternatives to the procedure, written informed consent was obtained from the patient  The patient was placed in a sitting position  A preprocedure timeout was performed per [facility] protocol  The    right back was prepped and draped in the usual sterile fashion  After local anesthesia was administered, a [employer] catheter was advanced under continuous ultrasound guidance into the right pleural space  1000 mL of vidya fluid was obtained  After the procedure, the catheter was removed, the skin was cleansed    and sterile dressings were applied  Aforementioned procedure was then performed for left-sided thoracentesis  The patient tolerated the procedure well and there were no immediate postprocedure complications  Workstation performed: [id number]         XR mandible [email or web address] [facility])   Final Result by Lio Pozo MD (10/02 6012)      No periapical lucencies identified  Workstation performed: [id number]         CT chest wo contrast   Final Result by Casey Vigil MD (10/01 1703)      1  Diffuse groundglass opacities consistent with pulmonary edema   2  More focal patchy opacities in left upper lobe, while this may  still represent edema, pneumonia would have a similar appearance   3  Moderate pleural effusions   4  Subcarinal lymphadenopathy   5  Wedge-shaped hypodensity within the spleen, likely infarct      The study was marked in EPIC for immediate notification  Workstation performed: [id number]         CT head wo contrast   Final Result by  (10/03 0900)   Addendum 1 of 1 by Casey Vigil MD (10/01 1951)   ADDENDUM:      This addendum is for the purpose of clarification:   Abnormality described represents either a septic embolus, or abscess  MRI    is recommended for differentiation  I personally discussed this addendum with Kelly Allen on 10/1/2019 at    7:50 PM       Final      VAS carotid complete study   Final Result by Gulshan Fuentes MD (10/01 5211)      XR chest portable   Final Result by Ariana Kaur MD (10/01 0164)      New airspace opacity suggests pulmonary edema or diffuse bronchopneumonia  Immediate report was noted on the Epic system  Workstation performed: [id number]         US right upper quadrant with liver dopplers    (Results Pending)   CT abdomen pelvis wo contrast    (Results Pending)       Portions of the record may have been created with voice recognition software  Occasional wrong word or "sound a like" substitutions may have occurred due to the inherent limitations of voice recognition software  Read the chart carefully and recognize, using context, where substitutions have occurred

## 2019-10-03 NOTE — PROGRESS NOTES
Progress Note - Infectious Disease   Conception Dorothy 44 y o  male MRN: [id number]  Unit/Bed#: -01 Encounter: [id number]      Impression/Plan:  44year old male with a PMH of ivdu, congential solitary kidney and MSSA endocarditis presents as a transfer from [facility] with Infective endocarditis of the mitral valve with severe mitral regurgitation and bacteremia due to Pseudomonas, complicated by emboli to spleen and brain, currently on ceftazidime and levofloxacin      Bacteremia  · Pseudomonas bacteremia likely secondary to injection drug use   · Blood cultures were positive from 09/06- 09/11  · Repeat blood cultures from 09/13 and 09/14 were negative  · Blood cultures on 09/28 grew pseudomonas again   · Repeat blood cultures from 10/01 negative preliminary  · Repeat Blood cultures today  · Patient was previously on cefepime but had diarrhea  · Was also on gentamicin but given solitary kidney he developed JULIENNE and was stopped  · Currently on ceftazidime and levofloxacin, will need total 6 weeks of iv, if operated will need 6 weeks iv post op and not a candidate for home iv due to h/o ivdu  · Monitor temperature and WBC count      Infective endocarditis  · Pseudomonas endocarditis with severe mitral regurgitation  · Patient has h/o MSSA endocarditis that was treated with 6 weeks of iv cefazolin in March of 2019  · Patient had 2D echo on September 6, 2019 which showed echodensity on the posterior mitral leaflet, possibly representing residual vegetation from prior treated endocarditis  · BILLY showed increasing size of mitral valve vegetation compared to prior study in June 2019 and also new vegetation on anterior leaflet  · Repeat BILLY 10/02 showed multiple large and highly mobile vegetations were present at the atrial aspect of both leaflets   There were at least two large perforations at the base of the anterior leaflet, and severe mitral regurgitation  · Currently on ceftazidime and levofloxacin for dual antipseudomonal coverage  · CT shows splenic infarct and right parieto-occipital lesion representing septic embolism; MRI confirms  · Transferred for CT surgery evaluation for valve replacement, getting pre-op evaluation with GI and panorex was normal  · Will be scheduled for mitral valve repair/ replacement next week      Hep C antibody positive  · 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  · Hep C RNA levels pending   · Getting RUQ USG to r/o cirrhosis     Vomiting and diarrhea  · Secondary to cefepime, now improved  · C  Diff was negative at [facility]     I v drug use  · Will need drug rehab after hospitalization to prevent recurrent endocarditis     JULIENNE   · Solitary kidney on the left  · Renal dosage of levofloxacin  · Avoid aminoglycosides and other nephrotoxic agents     Antibiotics:  · On ceftazidime and levofloxacin  · Unsure what antibiotic day as patient transferred from [facility]  · Abx sensitivity obtained from [facility] showed sensitive to:                                      TOYIN  -Zosyn                         <4  -Tobramycin                <1  -Meropenem                <0 25  -Levofloxacin               <0 25  -Gentamicin                 <1  -Ceftazidine                 <4  -Cefepime                   <2    Subjective:  Patient has no fever, chills, sweats; no nausea, vomiting, diarrhea; no cough, shortness of breath currently; no pain  No new symptoms  Patient was feeling short of breath last night     Objective:  Vitals:  Temp:  [98 6 °F (37 °C)-99 5 °F (37 5 °C)] 99 °F (37 2 °C)  HR:  [] 121  Resp:  [16-30] 16  BP: ()/(49-72) 106/65  SpO2:  [82 %-100 %] 96 %  Temp (24hrs), Av 9 °F (37 2 °C), Min:98 6 °F (37 °C), Max:99 5 °F (37 5 °C)  Current: Temperature: 99 °F (37 2 °C)    Physical Exam:   General Appearance:  Alert, interactive, nontoxic, no acute distress  Throat: Oropharynx moist without lesions      Lungs:   Clear to auscultation bilaterally; no wheezes, rhonchi or rales; respirations unlabored   Heart:  Tachycardic, 3/6 holosystolic murmur heard    Abdomen:   Soft, non-tender, non-distended, positive bowel sounds  Extremities: No clubbing, cyanosis or edema   Skin: No new rashes or lesions  No draining wounds noted  Labs, Imaging, & Other studies:   All pertinent labs and imaging studies were personally reviewed  Results from last 7 days   Lab Units 10/03/19  0634 10/02/19  0629 10/01/19  1023   WBC Thousand/uL 11 52* 9 89 15 11*   HEMOGLOBIN g/dL 7 8* 7 8* 8 0*   PLATELETS Thousands/uL 267 261 312     Results from last 7 days   Lab Units 10/03/19  0634 10/01/19  1800 10/01/19  1023   SODIUM mmol/L 141 141 138   POTASSIUM mmol/L 4 2 3 8 4 1   CHLORIDE mmol/L 110* 106 105   CO2 mmol/L 25 24 25   BUN mg/dL 32* 37* 39*   CREATININE mg/dL 1 57* 1 86* 1 76*   EGFR ml/min/1 73sq m 55 45 48   CALCIUM mg/dL 8 5 9 1 8 8   AST U/L  --  11  --    ALT U/L  --  22  --    ALK PHOS U/L  --  107  --      Results from last 7 days   Lab Units 10/02/19  0955 10/02/19  0951 10/01/19  1800   BLOOD CULTURE   --   --  No Growth at 24 hrs  No Growth at 24 hrs     GRAM STAIN RESULT  1+ Polys  No bacteria seen No Polys or Bacteria seen  --

## 2019-10-03 NOTE — PROGRESS NOTES
Progress Note - Cardiothoracic Surgery   Vernon Welsh 44 y o  male MRN: [id number]  Unit/Bed#: -01 Encounter: [id number]    24 Hour Events: No events overnight  No complaints this morning  BILLY yesterday and cardiac catheterization with normal coronaries     Left thoracenteis 700 ml removed - vidya colored   Right thoracentesis 1000 ml removed - vidya colored     Medications:   Scheduled Meds:  Current Facility-Administered Medications:  acetaminophen 650 mg Oral Q6H PRN Cleopatra Raphael MD   cefTAZidime 2,000 mg Intravenous Q8H Stacey Griffiths MD   chlorhexidine 15 mL Swish & Spit Q12H [address] Violeta Cid PA-C   escitalopram 10 mg Oral Daily Max Kinnie Blazer, DO   heparin (porcine) 5,000 Units Subcutaneous Q8H [address] Max Kinnie Blazer, DO   hydrOXYzine HCL 25 mg Oral Q8H PRN Max Kinnie Blazer, DO   [START ON 10/4/2019] levofloxacin 750 mg Oral Q24H Stacey Griffiths MD   melatonin 3 mg Oral HS Max Kinnie Blazer, DO   metoprolol tartrate 50 mg Oral Q12H [address] Max Kinnie Blazer, DO   mupirocin  Nasal Q12H [address] Violeta Cid PA-C   ondansetron 4 mg Intravenous Q8H PRN Mary Renteria MD   traZODone 100 mg Oral HS [place] Zackary, DO   zolpidem 5 mg Oral HS PRN Max Edisonniizzy Wellington, DO     Continuous Infusions:     Results:   Results from last 7 days   Lab Units 10/03/19  0634 10/02/19  0629 10/01/19  1023   WBC Thousand/uL 11 52* 9 89 15 11*   HEMOGLOBIN g/dL 7 8* 7 8* 8 0*   HEMATOCRIT % 25 0* 25 0* 24 9*   PLATELETS Thousands/uL 267 261 312     Results from last 7 days   Lab Units 10/03/19  0634 10/01/19  1800 10/01/19  1023   POTASSIUM mmol/L 4 2 3 8 4 1   CHLORIDE mmol/L 110* 106 105   CO2 mmol/L 25 24 25   BUN mg/dL 32* 37* 39*   CREATININE mg/dL 1 57* 1 86* 1 76*   CALCIUM mg/dL 8 5 9 1 8 8     Results from last 7 days   Lab Units 10/03/19  0634 10/01/19  1800   INR   --  1 20*   PTT seconds 27  --        Studies:   Cardiac Catheterization:   CORONARY CIRCULATION:  Left main: Normal   LAD: Normal   Circumflex: Normal   RCA: Normal     Echocardiogram:   SUMMARY     LEFT VENTRICLE:  The ventricle was mildly dilated  Systolic function was normal  Ejection fraction was estimated to be 60 %  There were no regional wall motion abnormalities  The changes were consistent with eccentric hypertrophy      RIGHT VENTRICLE:  The size was at the upper limits of normal   Systolic function was mildly to moderately reduced  Wall thickness was increased      LEFT ATRIUM:  The atrium was mildly to moderately dilated      ATRIAL SEPTUM:  No defect or patent foramen ovale was identified      MITRAL VALVE:  Multiple large and highly mobile vegetations were present at the atrial aspect of both leaflets  There were at least two large perforations at the base of the anterior leaflet and there was loss of coaptation of the leaflets at its middle  section due to tissue destruction  There was wide open mitral regurgitation      TRICUSPID VALVE:  There was mild to moderate regurgitation  Carotid artery duplex:  CONCLUSION:  Impression  RIGHT:  There is no evidence of disease throughout the extracranial carotid arteries  Vertebral artery flow is antegrade  There is no significant subclavian artery disease  LEFT:  There is no evidence of disease throughout the extracranial carotid arteries  Vertebral artery flow is antegrade  There is no significant subclavian artery disease  Vitals:   Vitals:    10/03/19 0314 10/03/19 0323 10/03/19 0743 10/03/19 0910   BP: 99/54  106/65    BP Location: Right arm      Pulse:       Resp:   16    Temp:       SpO2: (!) 88% 96%  96%   Weight:       Height:           Physical Exam:    HEENT/NECK:  PERRLA  No jugular venous distention      Cardiac: tachycardia regular rate of 221, II/VI diastolic murmur   Pulmonary:  Breath sounds clear bilaterally  Abdomen:  Non-tender and Non-distended  Extremities: Extremities warm/dry  Neuro: Alert and oriented X 3  Skin: sternum: prior large cyst removal with keloid and minimal subq tissue present     Assessment:  Patient Active Problem List   Diagnosis    Endocarditis    History of intravenous drug abuse ([facility] [address] )    Acute kidney injury ([facility] [address] )    Insomnia    Tachycardia    Nonrheumatic mitral valve regurgitation    Bacteremia    Anxiety    Solitary left kidney   Mitral Valve Regurgitation and endocarditis    Plan:  Patient agreeable to proceed with surgery    Neurology to evaluate for risk of heparinization with imaging evidence of subacute hemorrhage  GI following RUQ imaging and CT A/P pending  ID states clearance of bacteremia without intervention is low   Awaiting plastic surgery evaluation for closure following surgery  Scheduled for MVR tentatively for next week pending above     SIGNATURE: Clearance RALPH Louise  DATE: October 3, 2019  TIME: 9:54 AM

## 2019-10-03 NOTE — PROGRESS NOTES
Pt refusing to wear masimo  Pulse ox spot check was ranging from 85-88%  Pt reluctant to wear oxygen nasal cannula  With nurse education, pt agreeable  Placed pt on 2lpm via nasal cannula  Will continue to monitor

## 2019-10-03 NOTE — SOCIAL WORK
CM met with pt  Pt stated that he will have surgery and then IV abx  CM explained that it would likely be 6 weeks of IV abx  CM discussed benefits of [facility] and [place] program for IV abx therapy  Pt adamantly declined [facility] rehab  Pt stated that he last used heroin 2 months ago  Pt reported that he's been doing this since he's been 13years old and he's finally not on parole and not going to any rehab  Pt stated that he's going to stay right here to receive his IV abx  Pt reported that he lives with his mother and can call his sisters or his mother if he has the urge to use  Pt would like to stay healthy as he's been to in rehab at least 7 times prior  Pt stated that he already knows all of the information from a [facility] rehab

## 2019-10-04 LAB
ALBUMIN SERPL BCP-MCNC: 2 G/DL (ref 3.5–5)
ALP SERPL-CCNC: 89 U/L (ref 46–116)
ALT SERPL W P-5'-P-CCNC: 12 U/L (ref 12–78)
ANION GAP SERPL CALCULATED.3IONS-SCNC: 8 MMOL/L (ref 4–13)
AST SERPL W P-5'-P-CCNC: 8 U/L (ref 5–45)
BASOPHILS # BLD AUTO: 0.03 THOUSANDS/ΜL (ref 0–0.1)
BASOPHILS NFR BLD AUTO: 0 % (ref 0–1)
BILIRUB SERPL-MCNC: 0.51 MG/DL (ref 0.2–1)
BUN SERPL-MCNC: 29 MG/DL (ref 5–25)
CALCIUM SERPL-MCNC: 8.4 MG/DL (ref 8.3–10.1)
CHLORIDE SERPL-SCNC: 108 MMOL/L (ref 100–108)
CO2 SERPL-SCNC: 25 MMOL/L (ref 21–32)
CREAT SERPL-MCNC: 1.51 MG/DL (ref 0.6–1.3)
EOSINOPHIL # BLD AUTO: 0.12 THOUSAND/ΜL (ref 0–0.61)
EOSINOPHIL NFR BLD AUTO: 1 % (ref 0–6)
ERYTHROCYTE [DISTWIDTH] IN BLOOD BY AUTOMATED COUNT: 19.2 % (ref 11.6–15.1)
GFR SERPL CREATININE-BSD FRML MDRD: 57 ML/MIN/1.73SQ M
GLUCOSE SERPL-MCNC: 108 MG/DL (ref 65–140)
HCT VFR BLD AUTO: 22.8 % (ref 36.5–49.3)
HCV RNA SERPL NAA+PROBE-ACNC: NORMAL IU/ML
HGB BLD-MCNC: 7.1 G/DL (ref 12–17)
IMM GRANULOCYTES # BLD AUTO: 0.08 THOUSAND/UL (ref 0–0.2)
IMM GRANULOCYTES NFR BLD AUTO: 1 % (ref 0–2)
LYMPHOCYTES # BLD AUTO: 0.9 THOUSANDS/ΜL (ref 0.6–4.47)
LYMPHOCYTES NFR BLD AUTO: 9 % (ref 14–44)
MCH RBC QN AUTO: 28.1 PG (ref 26.8–34.3)
MCHC RBC AUTO-ENTMCNC: 31.1 G/DL (ref 31.4–37.4)
MCV RBC AUTO: 90 FL (ref 82–98)
MONOCYTES # BLD AUTO: 0.41 THOUSAND/ΜL (ref 0.17–1.22)
MONOCYTES NFR BLD AUTO: 4 % (ref 4–12)
NEUTROPHILS # BLD AUTO: 8.34 THOUSANDS/ΜL (ref 1.85–7.62)
NEUTS SEG NFR BLD AUTO: 85 % (ref 43–75)
NRBC BLD AUTO-RTO: 0 /100 WBCS
PLATELET # BLD AUTO: 226 THOUSANDS/UL (ref 149–390)
PMV BLD AUTO: 9.8 FL (ref 8.9–12.7)
POTASSIUM SERPL-SCNC: 3.6 MMOL/L (ref 3.5–5.3)
PROT SERPL-MCNC: 5.8 G/DL (ref 6.4–8.2)
RBC # BLD AUTO: 2.53 MILLION/UL (ref 3.88–5.62)
SODIUM SERPL-SCNC: 141 MMOL/L (ref 136–145)
TEST INFORMATION: NORMAL
WBC # BLD AUTO: 9.88 THOUSAND/UL (ref 4.31–10.16)

## 2019-10-04 PROCEDURE — 80053 COMPREHEN METABOLIC PANEL: CPT | Performed by: INTERNAL MEDICINE

## 2019-10-04 PROCEDURE — 85025 COMPLETE CBC W/AUTO DIFF WBC: CPT | Performed by: INTERNAL MEDICINE

## 2019-10-04 PROCEDURE — 99232 SBSQ HOSP IP/OBS MODERATE 35: CPT | Performed by: INTERNAL MEDICINE

## 2019-10-04 PROCEDURE — 99233 SBSQ HOSP IP/OBS HIGH 50: CPT | Performed by: INTERNAL MEDICINE

## 2019-10-04 PROCEDURE — 99233 SBSQ HOSP IP/OBS HIGH 50: CPT | Performed by: HOSPITALIST

## 2019-10-04 PROCEDURE — 99232 SBSQ HOSP IP/OBS MODERATE 35: CPT | Performed by: THORACIC SURGERY (CARDIOTHORACIC VASCULAR SURGERY)

## 2019-10-04 PROCEDURE — 99252 IP/OBS CONSLTJ NEW/EST SF 35: CPT | Performed by: PLASTIC SURGERY

## 2019-10-04 RX ORDER — FUROSEMIDE 10 MG/ML
40 INJECTION INTRAMUSCULAR; INTRAVENOUS ONCE
Status: COMPLETED | OUTPATIENT
Start: 2019-10-04 | End: 2019-10-04

## 2019-10-04 RX ORDER — ALBUMIN (HUMAN) 12.5 G/50ML
12.5 SOLUTION INTRAVENOUS ONCE
Status: COMPLETED | OUTPATIENT
Start: 2019-10-04 | End: 2019-10-04

## 2019-10-04 RX ADMIN — ESCITALOPRAM OXALATE 10 MG: 10 TABLET ORAL at 08:17

## 2019-10-04 RX ADMIN — Medication: at 08:21

## 2019-10-04 RX ADMIN — ALBUMIN (HUMAN) 12.5 G: 12.5 SOLUTION INTRAVENOUS at 12:32

## 2019-10-04 RX ADMIN — CHLORHEXIDINE GLUCONATE 0.12% ORAL RINSE 15 ML: 1.2 LIQUID ORAL at 08:17

## 2019-10-04 RX ADMIN — ONDANSETRON 4 MG: 2 INJECTION INTRAMUSCULAR; INTRAVENOUS at 17:34

## 2019-10-04 RX ADMIN — CEFTAZIDIME 2000 MG: 1 INJECTION, POWDER, FOR SOLUTION INTRAMUSCULAR; INTRAVENOUS at 11:11

## 2019-10-04 RX ADMIN — CHLORHEXIDINE GLUCONATE 0.12% ORAL RINSE 15 ML: 1.2 LIQUID ORAL at 21:16

## 2019-10-04 RX ADMIN — METOPROLOL TARTRATE 50 MG: 50 TABLET, FILM COATED ORAL at 21:17

## 2019-10-04 RX ADMIN — TRAZODONE HYDROCHLORIDE 100 MG: 100 TABLET ORAL at 21:16

## 2019-10-04 RX ADMIN — MELATONIN 3 MG: 3 TAB ORAL at 21:16

## 2019-10-04 RX ADMIN — ACETAMINOPHEN 650 MG: 325 TABLET ORAL at 08:25

## 2019-10-04 RX ADMIN — LEVOFLOXACIN 750 MG: 750 TABLET, FILM COATED ORAL at 06:57

## 2019-10-04 RX ADMIN — ACETAMINOPHEN 650 MG: 325 TABLET ORAL at 17:37

## 2019-10-04 RX ADMIN — CEFTAZIDIME 2000 MG: 1 INJECTION, POWDER, FOR SOLUTION INTRAMUSCULAR; INTRAVENOUS at 05:09

## 2019-10-04 RX ADMIN — CEFTAZIDIME 2000 MG: 1 INJECTION, POWDER, FOR SOLUTION INTRAMUSCULAR; INTRAVENOUS at 20:00

## 2019-10-04 RX ADMIN — Medication 1 APPLICATION: at 21:17

## 2019-10-04 RX ADMIN — FUROSEMIDE 40 MG: 10 INJECTION, SOLUTION INTRAMUSCULAR; INTRAVENOUS at 12:45

## 2019-10-04 NOTE — SOCIAL WORK
Patient is a potential [facility] patient, however he has declined all substance abuse treatment options per conversation with SHANELL Keith on 10/3/19  Patient was transferred to , [address] [facility] sent an email update to [facility]'s Christelle Ceron and BLAYNE Moran as well as Mario Hunter CM Manager

## 2019-10-04 NOTE — CONSULTS
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  Plan:    I discussed with Mr Summer Ku my role as a plastic surgeon during his care  I explained that given his prior chest wall surgery anticipated secondary defect will be larger, with possible dead space creation and will require  Adjacent tissue rearrangement vs flap coverage     I discussed risks including but not limited to: bleeding, infection, hematoma, seroma, wound breakdown, scar, need for further surgery, deformity, pain, numbness, weakness, asymmetry, injury to structures, and medical complications  I discussed that the scar involved would be larger than the maximal dimension of the lesion itself  The patient manifested understanding on this and will discuss further once a surgery date is coordinated  A total of 40 minutes of face-to-face time was spent in this encounter, of which >50% was spent in counseling  Dania Andrade MD   [facility]construct[facility] Surgery   [address]   [address] [place] [address]   Office: [phone]            History of Present Illness   Physician Requesting Consult: Caroline Lemus MD  Reason for Consult / Principal Problem: endocarditis  Hx and PE limited by:   HPI: Ramsey Lopez is a 44y o  year old male who presents with IV drug induces endocarditis and mitral valve regurgitation in need for mitral valve replacement   I was asked to assist with closure on anticipated secondary defect given the patients history of prior surgery on his chest  As per the patient he manifest that in 2013 he was treated for what he describes started as a "perez" that burst and require surgery debridement and some bone removal at [facility]  He explained that had reinfection and had repeat surgery and delayed healing treated with VAC therapy  He recovered well from this  Consults    Review of Systems   Constitutional: Negative  HENT: Negative  Eyes: Negative  Respiratory: Negative  Endocrine: Negative  Genitourinary: Negative  Musculoskeletal: Negative  Skin: Negative  Neurological: Negative  Hematological: Negative  Psychiatric/Behavioral: Negative  Historical Information   Past Medical History:   Diagnosis Date    Anxiety 10/1/2019    Bacteremia 10/1/2019    Endocarditis 10/1/2019    History of intravenous drug abuse ([facility] [address] ) 10/1/2019    Nonrheumatic mitral valve regurgitation 10/1/2019     Past Surgical History:   Procedure Laterality Date    CHEST WALL TUMOR EXCISION  2013    Anterior chest wall cyst removed    IR THORACENTESIS  10/2/2019     Social History   Social History     Substance and Sexual Activity   Alcohol Use Never    Frequency: Never    Binge frequency: Never     Social History     Substance and Sexual Activity   Drug Use Yes     Social History     Tobacco Use   Smoking Status Never Smoker   Smokeless Tobacco Never Used     Family History: non-contributory    Meds/Allergies   all current active meds have been reviewed    No Known Allergies    Objective     Intake/Output Summary (Last 24 hours) at 10/4/2019 0901  Last data filed at 10/3/2019 2327  Gross per 24 hour   Intake 300 ml   Output 150 ml   Net 150 ml       Invasive Devices     Peripheral Intravenous Line            Peripheral IV 10/01/19 Right Forearm 2 days                Physical Exam   Constitutional: He is oriented to person, place, and time  He appears well-developed and well-nourished  HENT:   Head: Normocephalic  Cardiovascular: Normal rate  Pulmonary/Chest: Effort normal    Abdominal: Soft  Musculoskeletal: Normal range of motion  Neurological: He is alert and oriented to person, place, and time     Skin: Skin is warm             Lab Results:   Lab Results   Component Value Date    WBC 9 88 10/04/2019    HGB 7 1 (L) 10/04/2019    HCT 22 8 (L) 10/04/2019    MCV 90 10/04/2019     10/04/2019      No results found for: TISSUECULT, WOUNDCULT  Imaging Studies:     CT CHEST:      Diffuse groundglass opacities consistent with pulmonary edema  2  More focal patchy opacities in left upper lobe, while this may  still represent edema, pneumonia would have a similar appearance  3  Moderate pleural effusions  4  Subcarinal lymphadenopathy  5  Wedge-shaped hypodensity within the spleen, likely infarct     EKG, Pathology, and Other Studies:   No results found for: FINALDX  VTE Prophylaxis: Sequential compression device (Venodyne)     Code Status: Level 1 - Full Code  Advance Directive and Living Will:      Power of :    POLST:      Counseling / Coordination of Care  Total floor / unit time spent today 40 minutes  Greater than 50% of total time was spent with the patient and / or family counseling and / or coordination of care  A description of the counseling / coordination of care: Surgery coordination

## 2019-10-04 NOTE — PROGRESS NOTES
Progress Note - Cardiothoracic Surgery   Latanya Thao 44 y o  male MRN: [id number]  Unit/Bed#: [facility] 421-01 Encounter: [id number]      24 Hour Events: No events overnight  Denies CP/SOB  RUQ U/S completed and acceptable       Medications:   Scheduled Meds:  Current Facility-Administered Medications:  acetaminophen 650 mg Oral Q6H PRN Charles Rincon MD    barium sulfate 450 mL Oral 90 min pre-procedure Cadence Cornelius MD    cefTAZidime 2,000 mg Intravenous Q8H Reid Hernandez MD Last Rate: 2,000 mg (10/04/19 0509)   chlorhexidine 15 mL Swish & Spit Q12H [facility] RALPH Gonzáles    escitalopram 10 mg Oral Daily Max Daniel Wiggins, DO    hydrOXYzine HCL 25 mg Oral Q8H PRN Max Daniel Wiggins, DO    levofloxacin 750 mg Oral Q24H Reid Hernandez MD    melatonin 3 mg Oral HS Max [facility], DO    metoprolol tartrate 50 mg Oral Q12H [facility]    mupirocin  Nasal Q12H [facility] Qi Gonzáles PA-C    ondansetron 4 mg Intravenous Q8H PRN Raquel Jimenez MD    traZODone 100 mg Oral HS [place] Boo Solorio, DO    zolpidem 5 mg Oral HS PRN Chase Wiggins, DO      Continuous Infusions:     Results:   Results from last 7 days   Lab Units 10/04/19  0507 10/03/19  0634 10/02/19  0629   WBC Thousand/uL 9 88 11 52* 9 89   HEMOGLOBIN g/dL 7 1* 7 8* 7 8*   HEMATOCRIT % 22 8* 25 0* 25 0*   PLATELETS Thousands/uL 226 267 261     Results from last 7 days   Lab Units 10/04/19  0507 10/03/19  0634 10/01/19  1800   POTASSIUM mmol/L 3 6 4 2 3 8   CHLORIDE mmol/L 108 110* 106   CO2 mmol/L 25 25 24   BUN mg/dL 29* 32* 37*   CREATININE mg/dL 1 51* 1 57* 1 86*   CALCIUM mg/dL 8 4 8 5 9 1     Results from last 7 days   Lab Units 10/03/19  0634 10/01/19  1800   INR   --  1 20*   PTT seconds 27  --        Studies:   Cardiac Catheterization: No CAD    Echocardiogram: EF 60%, no RWMA, eccentric LVH, RV size ULN, LA mild to mod dilated, large/highly mobile vegetations at atrial aspect of both MV leaflets w/ at least 2 perforations at base of anterior leaflet & loss of coaptation of leaflets at middle section due to tissue destruction, wide-open MR, mild to mod TR    Carotid artery duplex: no ICA stenosis b/l, antegrade flow b/l, no subcalvain disease b/l     RUQ U/S: Normal sonographic appearance of the liver  Absent right kidney  Mild splenomegaly  Vitals:   Vitals:    10/03/19 2327 10/04/19 0300 10/04/19 0748 10/04/19 0817   BP: 111/72 91/53  101/62   BP Location:       Pulse: (!) 127 (!) 110 (!) 117 (!) 114   Resp: 18 20 18    Temp: 98 6 °F (37 °C) 98 °F (36 7 °C) 98 4 °F (36 9 °C)    TempSrc: Oral Oral Oral    SpO2: 96% 96%     Weight:       Height:         Pleural fluid gram stain and culture negative  Physical Exam:    HEENT/NECK:  PERRLA  No jugular venous distention  Cardiac: Regular rate and rhythm and II/VI  systolicmurmur  Pulmonary:  Breath sounds diminished in the bases bilaterally   Abdomen:  Non-tender, Non-distended and Normal bowel sounds  Extremities: Extremities warm/dry and Radial pulses 2+ bilaterally  Neuro: Alert and oriented X 3 and Sensation is grossly intact  Skin: Warm/Dry, without rashes or lesions  Assessment:  Patient Active Problem List   Diagnosis    Endocarditis    [facility] of [facility] abuse ([facility] [address] )    Acute kidney injury ([address] )    Insomnia    Tachycardia    Nonrheumatic mitral valve regurgitation    Bacteremia    Anxiety    Solitary left kidney     Mitral Valve endocarditis; Ongoing MVR workup    Plan:  Patient agreeable to proceed with surgery;  Informed consent signed  RUQ U/S completed and acceptable  Plastic surgery consult completed, and noted  Blood type and cross match ordered  Continue Mupirocin 2% nasal ointment q 12 hrs  Continue topical chlorhexidine bath and mouth rise  Preoperative oxygen, beta blocker, insulin, and antibiotics ordered mitral valve repair vs replacement scheduled for wednesday with KEVIN Rosado Crystal: Maru Chang PA-C  DATE: October 4, 2019  TIME: 10:50 AM

## 2019-10-04 NOTE — PROGRESS NOTES
Cardiology Progress Note - Juan Watt 44 y o  male MRN: [id number]    Unit/Bed#: [facility] 421-01 Encounter: [id number]    Assessment  42yo man with PMH of IVDA with heroine, solitary kidney, HTN, history of MSSA bactermia with residual veg who is being evaluated for pre-op MV repair/replacement due to active endocarditis    Plan  1  P  Aeruginosa endocarditis  - plan for MV replacement with bioprosthetic valve  - c/w metop tartrate 50mg BID  - K >4, Mg >2  - Hb 7 1 today  Transfuse <7    Please await final attending attestation    Heidi Jesus MD  - PGY-4 Cardiology Fellow  - Tiger text enabled    ---    Prior Cardiac Studies  - [facility] (10/2/19): no CAD  - [facility] (10/2/19): EF 60%, no RWMA, eccentric LVH, RV size ULN, LA mild to mod dilated, large/highly mobile vegetations at atrial aspect of both MV leaflets w/ at least 2 perforations at base of anterior leaflet & loss of coaptation of leaflets at middle section due to tissue destruction, wide-open MR, mild to mod TR  - carotid artery duplex: no ICA stenosis b/l, antegrade flow b/l, no subcalvain disease BL    Subjective: Today symptoms of SOB stable  Can't lay flat    Objective  GEN: NAD, alert and oriented x 3, pleasant and cooperative   Cardiac: S1, S2, appreciated  No S3 or S4  Regular rhythm  No JVD  +2 radial pulses, +2 DP, PT  murmurs  Pulm: crackles inferior lung fields      ----            VS: Blood pressure 103/55, pulse (!) 114, temperature 98 4 °F (36 9 °C), temperature source Oral, resp  rate 17, height 5' 3" (1 6 m), weight 72 1 kg (158 lb 15 2 oz), SpO2 94 %  , Body mass index is 28 16 kg/m² ,   Orthostatic Blood Pressures      Most Recent Value   Blood Pressure  103/55 filed at 10/04/2019 1151   Patient Position - Orthostatic VS  Sitting filed at 10/04/2019 1151          Active Meds    Current Facility-Administered Medications:     acetaminophen (TYLENOL) tablet 650 mg, 650 mg, Oral, Q6H PRN, Beronica Clinton MD, 650 mg at 10/04/19 0825    barium sulfate 2 1 % suspension 450 mL, 450 mL, Oral, 90 min pre-procedure, Fabby Griggs MD, 450 mL at 10/03/19 1530    cefTAZidime (FORTAZ) 2,000 mg in sodium chloride 0 9 % 50 mL IVPB, 2,000 mg, Intravenous, Q8H, Raza Velazquez MD, Last Rate: 100 mL/hr at 10/04/19 1111, 2,000 mg at 10/04/19 1111    chlorhexidine (PERIDEX) 0 12 % oral rinse 15 mL, 15 mL, Swish & Spit, Q12H [address], LawandaELVER CroweC, 15 mL at 10/04/19 0817    escitalopram (LEXAPRO) tablet 10 mg, 10 mg, Oral, Daily, Max Tina Zaidi DO, 10 mg at 10/04/19 0817    hydrOXYzine HCL (ATARAX) tablet 25 mg, 25 mg, Oral, Q8H PRN, Max Tina Zaidi DO, 25 mg at 10/01/19 1834    levofloxacin (LEVAQUIN) tablet 750 mg, 750 mg, Oral, Q24H, Raza Velazquez MD, 750 mg at 10/04/19 0657    melatonin tablet 3 mg, 3 mg, Oral, HS, Max Widanabellaki DO, 3 mg at 10/03/19 2328    metoprolol tartrate (LOPRESSOR) tablet 50 mg, 50 mg, Oral, Q12H [address], Max Widawski, DO, 50 mg at 10/03/19 2327    mupirocin (BACTROBAN) 2 % nasal ointment, , Nasal, Q12H [address], REY Ruiz-C    ondansetron (ZOFRAN) injection 4 mg, 4 mg, Intravenous, Q8H PRN, Magdalena Newton MD, 4 mg at 10/01/19 1828    traZODone (DESYREL) tablet 100 mg, 100 mg, Oral, HS, [place] Zackary, DO, 100 mg at 10/03/19 2327    zolpidem (AMBIEN) tablet 5 mg, 5 mg, Oral, HS PRN, Max Tina Zaidi DO    Labs & Results  No results found for: CKTOTAL, CKMB, CKMBINDEX, TROPONINI  Lab Results   Component Value Date    CALCIUM 8 4 10/04/2019    K 3 6 10/04/2019    CO2 25 10/04/2019     10/04/2019    BUN 29 (H) 10/04/2019    CREATININE 1 51 (H) 10/04/2019     Lab Results   Component Value Date    WBC 9 88 10/04/2019    HGB 7 1 (L) 10/04/2019    HCT 22 8 (L) 10/04/2019    MCV 90 10/04/2019     10/04/2019     Results from last 7 days   Lab Units 10/01/19  1800   INR  1 20*     No results found for: CHOL  Lab Results   Component Value Date    HDL 27 (L) 10/03/2019     Lab Results   Component Value Date    LDLCALC 76 10/03/2019 Lab Results   Component Value Date    TRIG 101 10/03/2019     Lab Results   Component Value Date    ALT 12 10/04/2019    AST 8 10/04/2019

## 2019-10-04 NOTE — PROGRESS NOTES
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   Patient is transferred to [facility] for CT surgery well and for mitral valve repair/replacement   We are consulted for JULIENNE management      JULIENNE, unknown prior baseline creatinine  -admission creatinine 1 4 at [facility] initially improved to 1 0 and then peaked at 2 2, and now improved to 1 5 today and plateaued  -BMP in a m   -JULIENNE suspected to be secondary to prerenal/ATN/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 all in the setting of solitary kidney  -status post cardiac catheterization with contrast exposure on 10/2/19, closely monitor for HELEN    -also was found to have small renal infarct from prior endocarditis  -recent workup includes:  -urinalysis this admission shows 4 to 10 RBCs, 4 to 10 WBCs, 1+ proteinuria, very concentrated sample with 10 to 25 hyaline cast   -CT scan showed congenitally absent right kidney with left kidney showing compensatory hypertrophy, one small likely infarct in the lower pole of left kidney unchanged from the previous study and not associated with any abscess for pyelonephritis  - February 2019: positive hep C antibody with hep C RNA PCR less than 15  -complements normal, CK normal,  -avoid nephrotoxins or NSAIDs  -urine eosinophils 0%, no peripheral eosinophilia     Congenital solitary kidney     Hypertension  -blood pressure on lower side  -currently on metoprolol due to tachycardia, continue with holding parameter     Anemia  -closely monitor hemoglobin, transfuse p r n  For hemoglobin less than seven  -low iron stores recently although avoiding IV Venofer due to active infection issues  -recent FOBT was negative at [address] mitral valve endocarditis/bacteremia  -antibiotic as per primary team and ID  -patient is active IV drug user and had prior history of MSSA bacteremia with endocarditis in March 2019  -BILLY in September 2019 showed echodensity on the anterior mitral leaflet      Pseudomonas bacteremia, blood culture from 9/28/19 showed Pseudomonas  Repeat culture negative so far     Bilateral pleural effusion, status post thoracentesis on 10/2/19  -currently remains on room air     CT scan suggestive of pulmonary congestion versus suspicion for pneumonia, pleural effusions, splenic infarct  Septic embolus/abscess on CT scan of brain     Discussed above plan in detail with primary team    SUBJECTIVE:  Patient seen and examined at bedside  He does feel subjectively off and on short of breath  Saturating well on room air  No chest pain, nausea, vomiting, abdominal pain or diarrhea  No urinary complaints       OBJECTIVE:  Current Weight: Weight - Scale: 72 9 kg (160 lb 12 8 oz)  Vitals:    10/04/19 0817   BP: 101/62   Pulse: (!) 114   Resp:    Temp:    SpO2:        Intake/Output Summary (Last 24 hours) at 10/4/2019 1127  Last data filed at 10/4/2019 0900  Gross per 24 hour   Intake 420 ml   Output 375 ml   Net 45 ml     Wt Readings from Last 3 Encounters:   09/30/19 72 9 kg (160 lb 12 8 oz)   10/02/19 72 6 kg (160 lb)     Temp Readings from Last 3 Encounters:   10/04/19 98 4 °F (36 9 °C) (Oral)     BP Readings from Last 3 Encounters:   10/04/19 101/62     Pulse Readings from Last 3 Encounters:   10/04/19 (!) 114        Physical Examination:  General:  Sitting in bed, no acute distress   Eyes:  Mild conjunctival pallor present  ENT:  External examination of ears and nose unremarkable  Neck: Supple  Respiratory:  Bilateral air entry present, occasional coarse breath sound  CVS:  S1, S2 present  GI:  Soft, nontender, nondistended  CNS:  Active alert oriented x3  Extremities:  No significant edema in legs  Skin:  No new rash in legs    Medications:    Current Facility-Administered Medications:     acetaminophen (TYLENOL) tablet 650 mg, 650 mg, Oral, Q6H PRN, Prbianca De Santiago MD, 650 mg at 10/04/19 0825    barium sulfate 2 1 % suspension 450 mL, 450 mL, Oral, 90 min pre-procedure, Mayda Payton MD, 450 mL at 10/03/19 1530    cefTAZidime (FORTAZ) 2,000 mg in sodium chloride 0 9 % 50 mL IVPB, 2,000 mg, Intravenous, Q8H, Lise Arango MD, Last Rate: 100 mL/hr at 10/04/19 1111, 2,000 mg at 10/04/19 1111    chlorhexidine (PERIDEX) 0 12 % oral rinse 15 mL, 15 mL, Swish & Spit, Q12H [facility], Angie Oconnor PA-C, 15 mL at 10/04/19 0817    escitalopram (LEXAPRO) tablet 10 mg, 10 mg, Oral, Daily, Max Rexanita Gonsalez, DO, 10 mg at 10/04/19 0817    hydrOXYzine HCL (ATARAX) tablet 25 mg, 25 mg, Oral, Q8H PRN, Max Rexdhiraje Gonsalez, DO, 25 mg at 10/01/19 1834    levofloxacin (LEVAQUIN) tablet 750 mg, 750 mg, Oral, Q24H, Lise Arango MD, 750 mg at 10/04/19 0657    melatonin tablet 3 mg, 3 mg, Oral, HS, Max Marianneki, DO, 3 mg at 10/03/19 2328    metoprolol tartrate (LOPRESSOR) tablet 50 mg, 50 mg, Oral, Q12H [facility], Chase Porter DO, 50 mg at 10/03/19 2327    mupirocin (BACTROBAN) 2 % nasal ointment, , Nasal, Q12H [facility], Angie Oconnor PA-C    ondansetron (ZOFRAN) injection 4 mg, 4 mg, Intravenous, Q8H PRN, Lake Patel MD, 4 mg at 10/01/19 1828    traZODone (DESYREL) tablet 100 mg, 100 mg, Oral, HS, [place] DO Zackary, 100 mg at 10/03/19 2327    zolpidem (AMBIEN) tablet 5 mg, 5 mg, Oral, HS PRN, Max Janae Gonsalez DO    Laboratory Results:  Results from last 7 days   Lab Units 10/04/19  0507 10/03/19  0634 10/02/19  0629 10/01/19  1800 10/01/19  1023   WBC Thousand/uL 9 88 11 52* 9 89  --  15 11*   HEMOGLOBIN g/dL 7 1* 7 8* 7 8* --  8 0*   HEMATOCRIT % 22 8* 25 0* 25 0*  --  24 9*   PLATELETS Thousands/uL 226 267 261  --  312   SODIUM mmol/L 141 141  --  141 138   POTASSIUM mmol/L 3 6 4 2  --  3 8 4 1   CHLORIDE mmol/L 108 110*  --  106 105   CO2 mmol/L 25 25  --  24 25   BUN mg/dL 29* 32*  --  37* 39*   CREATININE mg/dL 1 51* 1 57*  --  1 86* 1 76*   CALCIUM mg/dL 8 4 8 5  --  9 1 8 8       CT abdomen pelvis wo contrast   Final Result by Dariel Almanza MD (10/03 1935)      Mild to moderate pleural effusions are seen at the bases  There is a unilateral left kidney without hydronephrosis  No ascites or obvious intra-abdominal collection      Immediate finding was noted on the Epic system  Workstation performed: [id number]         XR chest portable   Final Result by Figueroa Espinoza DO (10/04 0756)      Stable chest with moderate pulmonary vascular congestion  Workstation performed: [id number]         US right upper quadrant with liver dopplers   Final Result by Ezio Platt DO (10/04 9699)   1  Normal sonographic appearance of the liver  2   Absent right kidney  3   Mild splenomegaly  4   Incompletely visualized bilateral pleural effusions  Workstation performed: [id number]         MRA carotids wo contrast   Final Result by Figueroa Espinoza DO (10/03 2811)      Unremarkable MR angiogram of the cervical vasculature  Workstation performed: [id number]         MRA head wo contrast   Final Result by Elvira Wright MD (10/02 2138)         1  No evidence of mycotic aneurysm; however, exam sensitivity is limited by resolution and incomplete imaging of the distal branches  Catheter angiography may be helpful if there is persistent concern for mycotic aneurysm  2   No stenosis or occlusion of the major vessels of the [place]                 Workstation performed: [id number]         MRI brain wo contrast   Final Result by Elvira Wright MD (10/02 2133) Subacute 1 8 cm hemorrhage in the right posterior mesial temporal occipital region with surrounding vasogenic edema, stable  Few punctate foci of chronic microhemorrhage in the right frontoparietal region and in the cerebellar hemispheres  Findings may    be secondary to subacute and chronic septic emboli and/or mycotic aneurysms  No evidence of abscess  Workstation performed: [id number]         IR thoracentesis   Final Result by Mallory Velasco DO (10/02 1547)   Successful bilateral thoracentesis  _________________________________________________________________   COMPARISON: None      PROCEDURE DETAILS:    Operators: Dr Elise Patrick, [address] [place] [address] attending, performed the procedure  Anesthesia: 1% lidocaine was injected in the skin and subcutaneous tissues overlying the access site  Medications: 1% lidocaine   Contrast: None   Fluoroscopy time: None      COMMENTS:   Following the discussion of the risks, benefits and alternatives to the procedure, written informed consent was obtained from the patient  The patient was placed in a sitting position  A preprocedure timeout was performed per [facility] protocol  The    right back was prepped and draped in the usual sterile fashion  After local anesthesia was administered, a 5-Japanese eVendor Checkeh catheter was advanced under continuous ultrasound guidance into the right pleural space  1000 mL of vidya fluid was obtained  After the procedure, the catheter was removed, the skin was cleansed    and sterile dressings were applied  Aforementioned procedure was then performed for left-sided thoracentesis  The patient tolerated the procedure well and there were no immediate postprocedure complications  Workstation performed: [id number]         XR mandible panorex [facility] only)   Final Result by Elise Bowers MD (10/02 6195)      No periapical lucencies identified           Workstation performed: [id number]         CT chest wo contrast Final Result by Aashish Rhodes MD (10/01 1703)      1  Diffuse groundglass opacities consistent with pulmonary edema   2  More focal patchy opacities in left upper lobe, while this may  still represent edema, pneumonia would have a similar appearance   3  Moderate pleural effusions   4  Subcarinal lymphadenopathy   5  Wedge-shaped hypodensity within the spleen, likely infarct      The study was marked in EPIC for immediate notification  Workstation performed: [id number]         CT head wo contrast   Final Result by  (10/04 1127)   Addendum 1 of 1 by Aashish Rhodes MD (10/01 1951)   ADDENDUM:      This addendum is for the purpose of clarification:   Abnormality described represents either a septic embolus, or abscess  MRI    is recommended for differentiation  I personally discussed this addendum with Dariela Potmio on 10/1/2019 at    7:50 PM       Final      VAS carotid complete study   Final Result by Grecia Grove MD (10/01 1801)      XR chest portable   Final Result by Alfredo Soriano MD (10/01 1324)      New airspace opacity suggests pulmonary edema or diffuse bronchopneumonia  Immediate report was noted on the Epic system  Workstation performed: [id number]             Portions of the record may have been created with voice recognition software  Occasional wrong word or "sound a like" substitutions may have occurred due to the inherent limitations of voice recognition software  Read the chart carefully and recognize, using context, where substitutions have occurred

## 2019-10-04 NOTE — PROGRESS NOTES
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  Pt presents as a transfer from [facility] on 9/30 for evaluation by cardiothoracic surgery for mitral valve replacement   As per chart review patient Presented to [facility] on 09/06 with complaints of fever, nausea and vomiting x1 week   Last reported IVD use 5 days before arrival to [facility]  patient has a history of IV drug use and was previously treated for MSSA mitral valve endocarditis and bacteremia in 3/2019   Discharged from [facility] on 03/21/19 after completed 6 week course of IV cefazolin   - BILLY on 6/2019 showed echodensity on posterior age for aspect of mitral valve measuring 18 mm to 20 mm with mitral valve regurgitation and mild tricuspid regurgitation  - 2D echo on 9/6/2019 showed echodensity on the posterior mitral leaflet, possibly representing residual vegetation from prior treated endocarditis   Follow-up BILLY suggestive of increasing size of mitral valve vegetation compared to prior study in June of 2019 and also evidence of new vegetation on the anterior leaflet  -BILLY 9/29/2019 - bulbous echodensity on the anterior mitral leaflet measuring 8 x 13 mm   Filament to read echo density in the posterior mitral leaflet measuring 11 mm   Compared to echo done on 9/23, appears to be no significant change    -Blood cultures on 09/06, 9/8, 9/10, 9/11 demonstrate Pseudomonas aeruginosa  -repeat cultures from 09/13 and 9/14 negative  - blood cultures 9/28 reported to be positive again for Pseudomonas  -patient appears to have been on IV ceftazidime, levofloxacin 750 mg every 48 hours renally dose adjusted - for dual anti pseudomonal coverage  -patient apparently had previously been on cefepime, switched to ceftazidime due to side effects of vomiting diarrhea  -patient had previously been on gentamicin which was discontinued on 09/22/2019, noted to likely be contributing to acute kidney injury  - culture repeated on 10/01 upon admission-  no growth at 48 hours documented on 10/03  - culture repeated on 10/03 - no growth at 12:00 p m  Documented on 10/04  - CT surgery consulted on 10/1 for MV repair/replacement  Work up for surgery initiated and tentatively scheduled for next week    - ID consulted on 10/01  Recommended to continue dual anti pseudomonal coverage with IV ceftazidime 2g IV q12 and levofloxacin 500mg PO 24hrs renally dose for 6 weeks  - CT abdomen/Pelvis on 10/3 did not reveal evidence of abscess  - right upper quadrant ultrasound on 10/03 revealed normal appearance of the liver with mild splenomegaly  - cardiac diet with strict fluids and low-sodium in place  - will continue to monitor on telemetry     Severe mitral regurgitation  -most recent TTE from 09/30/2019 shows EF 70%, severe mitral regurgitation posteriorly directed, mitral valve vegetation as above  - CXR on 10/01 revealed pulmonary edema likely related to severe MV regurgitation  Will hold diuresis for now since he appears clinically euvolemic  - BILLY 10/2 reveal ejection fraction at 60% for no wall motion abnormalities, mildly to moderately reduced systolic function of the right ventricle  Left atrium mildly dilated  Mitral valve with multiple large and mobile vegetations and the trigger aspect of both leaflets with large perforations at the base of the anterior leaflets and wide-open mitral regurgitation  See report for full details    - CT surgery will perform mitral valve replacement tentatively scheduled for next week        Bilateral pleural effusions  Resolved after thoracentesis on 10/02  However appear to be recurring likely secondary to severe mitral valve regurg  - patient presents with complaints of shortness of breath and mild tachypnea however states that is better since thoracentesis on 10/02  - thoracentesis on 10/02 by IR with 1000 mL removed from the right hemithorax and 700 mL from the left hemithorax  - patient noted to be to tachypnic today exam  - chest x-ray on 10/3 demonstrated pulmonary congestion   - will diurese with IV Lasix is 40 ones will reassess volume status later in the afternoon        JULIENNE on CKD and congenital solitary left kidney   Improving  Likely secondary to ATN from aminoglycoside with prerenal component per last nephrology progress note at [facility]  -baseline creatinine unknown  [address] creatinine 1 49 at [facility], peaked at 2 20      -CT at [facility] shows right kidney congenitally absent with left kidney showing compensatory hypertrophy and 1 small likely infarct in the lower cortex unchanged from previous study, not associated with perinephric fluid or abscess or pyelonephritis  -Cr continues to improve today 1 51   - continue to avoid nephrotoxins  - Nephrology consulted on on board       Right Parietal Occipital Lesion  Stable   Likely septic embolus versus abscess given history of IVDU and pseudomonal MV endocarditis   Noted on CT head 10/1: 1 8 cm x 0 9 x 0 8 lesion in the right parieto-occipital region with surrounding edema  - MRI on 10/02: "Subacute 1 8 cm hemorrhage in the right posterior mesial temporal occipital region with surrounding vasogenic edema, stable   Few punctate foci of chronic microhemorrhage in the right frontoparietal region and in the cerebellar hemispheres   Findings may   be secondary to subacute and chronic septic emboli and/or mycotic aneurysms   No evidence of abscess  - MRA on 10/02 reveal NO evidence of mycotic aneurysms  - Neurology consulted on 10/2: "discussed with cardiothoracic surgery that patient is at high risk of intracranial bleed with initiation of heparin   This was also discussed with the patient, who voiced understanding of the risks   Ultimately, the decision to heparinize will be based on risk/benefit analysis by cardiothoracic surgery in regards to the necessity of patient's valve repair versus replacement"  - DVT PPX with heparin discontinued on 10/03  SCD in place for DVT PPX         Hypertension, sinus tachycardia  Stable condition  - blood pressure within acceptable parameters however in the last 24 hrs  - EKG on 10/02 reveals sinus tachycardia otherwise normal   Heart rate 100-120  Likely secondary to infection versus mitral regurg  -  will continue metoprolol 50 mg q 12h      Anxiety, insomnia  Stable condition  -psychiatry progress note from 09/27 reviewed  -continue hydroxyzine 25 mg every 8 hours as needed for anxiety  -continue Lexapro, trazodone  -scheduled melatonin and Ambien p r n  nightly        VTE Mechanical Prophylaxis: sequential compression device     Disposition:  Continues to require inpatient care   Evaluation by Cardiac surgery continues  Mitral valve replacement scheduled tentatively for next week  SUBJECTIVE     Patient seen and examined  No acute events overnight  Patient reports increased shortness of breath from yesterday however states that it is the same as before thoracentesis was performed  He reports having increased was sputum in the morning after 2 weeks of however it does not have her rest today    Denies h/a, light-headness, diaphoresis, chills, SOB, chest tightness, cough, CP, palpitations, abdominal pain, diarrhea, constipation    OBJECTIVE     Vitals:    10/03/19 2039 10/03/19 2327 10/04/19 0300 10/04/19 0748   BP: 117/68 111/72 91/53    BP Location: Right arm      Pulse: (!) 124 (!) 127 (!) 110 (!) 117 Resp: 18 18 20 18   Temp: 98 4 °F (36 9 °C) 98 6 °F (37 °C) 98 °F (36 7 °C) 98 4 °F (36 9 °C)   TempSrc: Oral Oral Oral Oral   SpO2: 94% 96% 96%    Weight:       Height:          Temperature:   Temp (24hrs), Av 6 °F (37 °C), Min:98 °F (36 7 °C), Max:99 5 °F (37 5 °C)    Temperature: 98 4 °F (36 9 °C)  Intake & Output:  I/O       10/02 0701 - 10/03 0700 10/03 0701 - 10/04 0700 10/04 0701 - 10/05 0700    P  O   400     I V  (mL/kg) 400 (5 5)      Total Intake(mL/kg) 400 (5 5) 400 (5 5)     Urine (mL/kg/hr) 275 (0 2) 150 (0 1)     Emesis/NG output 0      Other 1700      Total Output 1975 150     Net -1575 +250            Unmeasured Urine Occurrence  1 x     Unmeasured Emesis Occurrence 1 x          Weights:   IBW: 56 9 kg    Body mass index is 28 48 kg/m²  Weight (last 2 days)     None        Physical Exam  GENERAL: Alert/oriented x3, NAD, Well developed, Well-nourished   HEENT:  NC/AT, PERRL, EOMI, no scleral icterus, MMM, Neck supple, No JVD  CARDIAC:  RRR,  normal S1/S2,  no m/r/g appreciated  PULMONARY:  non-labored breathing, good air movement all fields B/L, no wheezing/rales/rhonci  ABDOMEN:  Soft, NT/ND, +BS, no rebound/guarding/rigidity  Extremities:  2+ Pulses in DP/PT  No edema, cyanosis, or clubbing  NEUROLOGIC:  Alert/oriented x3  No motor or sensory deficits  SKIN:  No rashes or erythema    LABORATORY DATA     Labs: I have personally reviewed pertinent reports      Results from last 7 days   Lab Units 10/04/19  0507 10/03/19  0634 10/02/19  0629 10/01/19  1023   WBC Thousand/uL 9 88 11 52* 9 89 15 11*   HEMOGLOBIN g/dL 7 1* 7 8* 7 8* 8 0*   HEMATOCRIT % 22 8* 25 0* 25 0* 24 9*   PLATELETS Thousands/uL 226 267 261 312   NEUTROS PCT % 85*  --  80* 83*   MONOS PCT % 4  --  5 4      Results from last 7 days   Lab Units 10/04/19  0507 10/03/19  0634 10/01/19  1800   POTASSIUM mmol/L 3 6 4 2 3 8   CHLORIDE mmol/L 108 110* 106   CO2 mmol/L 25 25 24   BUN mg/dL 29* 32* 37*   CREATININE mg/dL 1 51* 1 57* 1 86*   CALCIUM mg/dL 8 4 8 5 9 1   ALK PHOS U/L 89  --  107   ALT U/L 12  --  22   AST U/L 8  --  11              Results from last 7 days   Lab Units 10/03/19  0634 10/01/19  1800   INR   --  1 20*   PTT seconds 27  --                IMAGING & DIAGNOSTIC TESTING     Radiology Results: I have personally reviewed pertinent reports  Ct Abdomen Pelvis Wo Contrast    Result Date: 10/3/2019  Impression: Mild to moderate pleural effusions are seen at the bases  There is a unilateral left kidney without hydronephrosis  No ascites or obvious intra-abdominal collection Immediate finding was noted on the Epic system  Workstation performed: [id number]     Xr Mandible Panorex ([place] Only)    Result Date: 10/2/2019  Impression: No periapical lucencies identified  Workstation performed: [id number]     Xr Chest Portable    Result Date: 10/4/2019  Impression: Stable chest with moderate pulmonary vascular congestion  Workstation performed: [id number]     Xr Chest Portable    Result Date: 10/1/2019  Impression: New airspace opacity suggests pulmonary edema or diffuse bronchopneumonia  Immediate report was noted on the Epic system  Workstation performed: [id number]     Ct Head Wo Contrast    Addendum Date: 10/1/2019    ADDENDUM: This addendum is for the purpose of clarification: Abnormality described represents either a septic embolus, or abscess  MRI is recommended for differentiation  I personally discussed this addendum with Napoleon Carmona on 10/1/2019 at 7:50 PM     Result Date: 10/1/2019  Impression: Right parieto-occipital lesion, given history this likely represents a septic embolus/abscess  I personally discussed this study  with Napoleon Carmona on 10/1/2019 at 5:24 PM   Workstation performed: [id number]     Ct Chest Wo Contrast    Result Date: 10/1/2019  Impression: 1  Diffuse groundglass opacities consistent with pulmonary edema 2    More focal patchy opacities in left upper lobe, while this may  still represent edema, pneumonia would have a similar appearance 3  Moderate pleural effusions 4  Subcarinal lymphadenopathy 5  Wedge-shaped hypodensity within the spleen, likely infarct The study was marked in EPIC for immediate notification  Workstation performed: [id number]     Mra Head Wo Contrast    Result Date: 10/2/2019  Impression: 1  No evidence of mycotic aneurysm; however, exam sensitivity is limited by resolution and incomplete imaging of the distal branches  Catheter angiography may be helpful if there is persistent concern for mycotic aneurysm  2   No stenosis or occlusion of the major vessels of the [place]  Workstation performed: [id number]     Mra Carotids Wo Contrast    Result Date: 10/3/2019  Impression: Unremarkable MR angiogram of the cervical vasculature  Workstation performed: [id number]     Mri Brain Wo Contrast    Result Date: 10/2/2019  Impression: Subacute 1 8 cm hemorrhage in the right posterior mesial temporal occipital region with surrounding vasogenic edema, stable  Few punctate foci of chronic microhemorrhage in the right frontoparietal region and in the cerebellar hemispheres  Findings may  be secondary to subacute and chronic septic emboli and/or mycotic aneurysms  No evidence of abscess  Workstation performed: [id number]     Ir Thoracentesis    Result Date: 10/2/2019  Impression: Successful bilateral thoracentesis  _________________________________________________________________ COMPARISON: None PROCEDURE DETAILS: Operators: Dr Buzz Mondragon, [address] [place] [address] attending, performed the procedure  Anesthesia: 1% lidocaine was injected in the skin and subcutaneous tissues overlying the access site  Medications: 1% lidocaine Contrast: None Fluoroscopy time: None COMMENTS: Following the discussion of the risks, benefits and alternatives to the procedure, written informed consent was obtained from the patient  The patient was placed in a sitting position    A preprocedure timeout was performed per [facility] protocol  The right back was prepped and draped in the usual sterile fashion  After local anesthesia was administered, a 5-[place] catheter was advanced under continuous ultrasound guidance into the right pleural space  1000 mL of vidya fluid was obtained  After the procedure, the catheter was removed, the skin was cleansed and sterile dressings were applied  Aforementioned procedure was then performed for left-sided thoracentesis  The patient tolerated the procedure well and there were no immediate postprocedure complications  Workstation performed: [id number]     Us Right Upper Quadrant With Liver Dopplers    Result Date: 10/4/2019  Impression: 1  Normal sonographic appearance of the liver  2   Absent right kidney  3   Mild splenomegaly  4   Incompletely visualized bilateral pleural effusions  Workstation performed: [id number]     Other Diagnostic Testing: I have personally reviewed pertinent reports        ACTIVE MEDICATIONS     Current Facility-Administered Medications   Medication Dose Route Frequency    acetaminophen (TYLENOL) tablet 650 mg  650 mg Oral Q6H PRN    barium sulfate 2 1 % suspension 450 mL  450 mL Oral 90 min pre-procedure    cefTAZidime (FORTAZ) 2,000 mg in sodium chloride 0 9 % 50 mL IVPB  2,000 mg Intravenous Q8H    chlorhexidine (PERIDEX) 0 12 % oral rinse 15 mL  15 mL Swish & Spit Q12H [facility]    escitalopram (LEXAPRO) tablet 10 mg  10 mg Oral Daily    hydrOXYzine HCL (ATARAX) tablet 25 mg  25 mg Oral Q8H PRN    levofloxacin (LEVAQUIN) tablet 750 mg  750 mg Oral Q24H    melatonin tablet 3 mg  3 mg Oral HS    metoprolol tartrate (LOPRESSOR) tablet 50 mg  50 mg Oral Q12H [facility]    mupirocin (BACTROBAN) 2 % nasal ointment   Nasal Q12H [facility]    ondansetron (ZOFRAN) injection 4 mg  4 mg Intravenous Q8H PRN    traZODone (DESYREL) tablet 100 mg  100 mg Oral HS    zolpidem (AMBIEN) tablet 5 mg  5 mg Oral HS PRN     ==  Edison Hi MD  Internal Medicine Residency, PGY-1  [facility]

## 2019-10-04 NOTE — PROGRESS NOTES
Progress Note - Infectious Disease   Cresenciano Jaron 44 y o  male MRN: [id number]  Unit/Bed#: [facility] 421-01 Encounter: [id number]      Impression/Plan:  44year old male with a PMH of ivdu, congential solitary kidney and MSSA endocarditis presents as a transfer from [address] [place] [address] with Infective endocarditis of the mitral valve with severe mitral regurgitation and bacteremia due to Pseudomonas, complicated by emboli to spleen and brain, currently on ceftazidime and levofloxacin      Bacteremia  · Pseudomonas bacteremia likely secondary to injection drug use   · Blood cultures were positive from 09/06- 09/11  · Repeat blood cultures from 09/13 and 09/14 were negative  · Blood cultures on 09/28 grew pseudomonas again   · Repeat blood cultures from 10/01 negative preliminary  · Repeat Blood cultures today 10/03 negative preliminary   · Patient was previously on cefepime but had diarrhea  · Was also on gentamicin but given solitary kidney he developed JULIENNE and was stopped  · Currently on ceftazidime and levofloxacin, will need total 6 weeks of iv post-op and not a candidate for home iv due to h/o ivdu  · Monitor temperature and WBC count      Infective endocarditis  · Pseudomonas endocarditis with severe mitral regurgitation  · Patient has h/o MSSA endocarditis that was treated with 6 weeks of iv cefazolin in March of 2019  · Patient had 2D echo on September 6, 2019 which showed echodensity on the posterior mitral leaflet, possibly representing residual vegetation from prior treated endocarditis  · BILLY showed increasing size of mitral valve vegetation compared to prior study in June 2019 and also new vegetation on anterior leaflet  · Repeat BILLY 10/02 showed multiple large and highly mobile vegetations were present at the atrial aspect of both leaflets   There were at least two large perforations at the base of the anterior leaflet, and severe mitral regurgitation  · Currently on ceftazidime and levofloxacin for dual antipseudomonal coverage  · CT shows splenic infarct and right parieto-occipital lesion representing septic embolism; MRI confirms  · Transferred for CT surgery evaluation for valve replacement, getting pre-op evaluation with GI and panorex was normal  · Will be scheduled for mitral valve replacement next week      Hep C antibody positive  · 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  · Hep C RNA levels pending   · RUQ USG normal, no cirrhosis, cleared by GI for surgery   · HIV negative     Vomiting and diarrhea  · Secondary to cefepime, now improved  · C  Diff was negative at [facility]     I v drug use  · Will need drug rehab after hospitalization to prevent recurrent endocarditis     JULIENNE   · Solitary kidney on the left  · Renal dosage of levofloxacin  · Avoid aminoglycosides and other nephrotoxic agents   · Creatine improving      Antibiotics:  · On ceftazidime and levofloxacin  · Unsure what antibiotic day as patient transferred from [facility]  · Abx sensitivity obtained from [facility] showed sensitive to:                                      TOYIN  -Zosyn                         <4  -Tobramycin                <1  -Meropenem                <0 25  -Levofloxacin               <0 25  -Gentamicin                 <1  -Ceftazidine                 <4  -Cefepime                   <2    Subjective:  Patient has no fever, chills, sweats; no nausea, vomiting, diarrhea; no cough, shortness of breath; no pain  No new symptoms  Objective:  Vitals:  Temp:  [98 °F (36 7 °C)-99 5 °F (37 5 °C)] 98 4 °F (36 9 °C)  HR:  [110-129] 114  Resp:  [18-20] 18  BP: ()/(53-72) 101/62  SpO2:  [92 %-96 %] 96 %  Temp (24hrs), Av 6 °F (37 °C), Min:98 °F (36 7 °C), Max:99 5 °F (37 5 °C)  Current: Temperature: 98 4 °F (36 9 °C)    Physical Exam:   General Appearance:  Alert, interactive, nontoxic, no acute distress  Throat: Oropharynx moist without lesions      Lungs:   Clear to auscultation bilaterally; no wheezes, rhonchi or rales; respirations unlabored   Heart:  4/6 holosystolic murmur at the apex radiating to axilla    Abdomen:   Soft, non-tender, non-distended, positive bowel sounds  Extremities: No clubbing, cyanosis or edema   Skin: No new rashes or lesions  No draining wounds noted  Labs, Imaging, & Other studies:   All pertinent labs and imaging studies were personally reviewed  Results from last 7 days   Lab Units 10/04/19  0507 10/03/19  0634 10/02/19  0629   WBC Thousand/uL 9 88 11 52* 9 89   HEMOGLOBIN g/dL 7 1* 7 8* 7 8*   PLATELETS Thousands/uL 226 267 261     Results from last 7 days   Lab Units 10/04/19  0507 10/03/19  0634 10/01/19  1800   SODIUM mmol/L 141 141 141   POTASSIUM mmol/L 3 6 4 2 3 8   CHLORIDE mmol/L 108 110* 106   CO2 mmol/L 25 25 24   BUN mg/dL 29* 32* 37*   CREATININE mg/dL 1 51* 1 57* 1 86*   EGFR ml/min/1 73sq m 57 55 45   CALCIUM mg/dL 8 4 8 5 9 1   AST U/L 8  --  11   ALT U/L 12  --  22   ALK PHOS U/L 89  --  107     Results from last 7 days   Lab Units 10/03/19  0634 10/02/19  1345 10/02/19  0955 10/02/19  0951 10/01/19  1800   BLOOD CULTURE  No Growth at 24 hrs  No Growth at 24 hrs   --   --   --  No Growth at 48 hrs  No Growth at 48 hrs     GRAM STAIN RESULT   --   --  1+ Polys  No bacteria seen No Polys or Bacteria seen  --    BODY FLUID CULTURE, STERILE   --   --  No growth No growth  --    MRSA CULTURE ONLY   --  No Methicillin Resistant Staphlyococcus aureus (MRSA) isolated  --   --   --

## 2019-10-04 NOTE — PROGRESS NOTES
Gastroenterology Progress Note   Unit/Bed#: [facility] 421-01 Encounter: [id number]        Savanna Sanderson 44 y o  male [id number]    Hospital Stay Days: 4      Assessment/Plan:    Principal Problem:    Endocarditis  Active Problems:    History of intravenous drug abuse ([facility] [address] )    Acute kidney injury ([facility] [address] )    Insomnia    Tachycardia    Nonrheumatic mitral valve regurgitation    Bacteremia    Anxiety    Solitary left kidney    ASSESSMENT: 45 y/o M with past med hx of HTN,  IV heroin drug abuse, hepatitis C, solitary kidney, MSSA bacteremia with residual vegetation of posterior mitral valve leaflet who is admitted for CT surgery evaluation for possible mitral valve replacement or repair  GI consulted for preoperative clearance and evaluation on the status of the his history of hepatitis      Hepatitis C status:  Hepatitis-C antibody highly reactive, HCV RNA viral load still pending  Given patients extensive IV drug abuse, it is likely that he was exposed  It was documented in the H and P for this admission that patient had a positive Hep C Ab but undetectable viral load  LFTs have been within normal limits, acute hepatitis at this point is unlikely  If patient's viral load comes back positive, he can be treated after surgery for his endocarditis  He is stable from a gastroenterology perspective  He can follow up as an outpatient with GI    Questionable Cirrhosis:  Patient denies ever being diagnosed with cirrhosis  Unable to find diagnosis in physical paper chart  LFTs have been within normal limits, normal alkaline phosphatase, normal T bili, INR 1 2, platelets normal  Hypoalbuminemia may be a result of his endocarditis  Right upper quadrant ultrasound showed normal-appearing liver, absent right kidney, mild splenomegaly  Portal vein is patent with normal flow  In light of the above findings and the patient's FIB-4 index is 0 29  patient unlikely has clinically significant cirrhosis at this point    He is stable from a gastroenterology perspective        Subjective:   Patient seen examined sitting in chair in no acute distress  No acute overnight events  Patient has surgery scheduled for 10/9 for possible mitral repair versus replacement  His only symptoms currently are some mild shortness of breath and a productive cough  He denies any signs of overt GI bleeding including hematemesis, coffee-ground emesis, hematochezia, melena  He denies any other acute complaints including chest pain, headache, nausea, vomiting, diarrhea, constipation, dysuria, hematuria, lower extremity pain  Vitals: Temp (24hrs), Av 6 °F (37 °C), Min:98 °F (36 7 °C), Max:99 5 °F (37 5 °C)  Current: Temperature: 98 4 °F (36 9 °C)  Vitals:    10/03/19 2039 10/03/19 2327 10/04/19 0300 10/04/19 0748   BP: 117/68 111/72 91/53    BP Location: Right arm      Pulse: (!) 124 (!) 127 (!) 110 (!) 117   Resp: 18 18 20 18   Temp: 98 4 °F (36 9 °C) 98 6 °F (37 °C) 98 °F (36 7 °C) 98 4 °F (36 9 °C)   TempSrc: Oral Oral Oral Oral   SpO2: 94% 96% 96%    Weight:       Height:        Body mass index is 28 48 kg/m²  I/O last 24 hours: In: 400 [P O :400]  Out: 150 [Urine:150]      Physical Exam:   Physical Exam   Constitutional: He is oriented to person, place, and time  He appears well-developed and well-nourished  No distress  HENT:   Head: Normocephalic and atraumatic  Eyes: Conjunctivae are normal  No scleral icterus  Neck: Neck supple  Cardiovascular: Regular rhythm, S1 normal and S2 normal  Tachycardia present  Exam reveals no S3 and no S4  Murmur heard  Systolic murmur is present with a grade of 3/6  Pulmonary/Chest: Effort normal and breath sounds normal  He has no wheezes  He has no rales  Abdominal: Soft  Bowel sounds are normal  He exhibits no distension and no mass  There is no tenderness  There is no rebound and no guarding  No hernia  Musculoskeletal: He exhibits no edema or deformity     Neurological: He is alert and oriented to person, place, and time  Skin: Skin is warm and dry  Surgical scar noted on the chest wall, multiple areas of ecchymosis noted on bilateral forearms,    Psychiatric: He has a normal mood and affect           Invasive Devices     Peripheral Intravenous Line            Peripheral IV 10/01/19 Right Forearm 2 days                          Labs:   Recent Results (from the past 24 hour(s))   CBC and differential    Collection Time: 10/04/19  5:07 AM   Result Value Ref Range    WBC 9 88 4 31 - 10 16 Thousand/uL    RBC 2 53 (L) 3 88 - 5 62 Million/uL    Hemoglobin 7 1 (L) 12 0 - 17 0 g/dL    Hematocrit 22 8 (L) 36 5 - 49 3 %    MCV 90 82 - 98 fL    MCH 28 1 26 8 - 34 3 pg    MCHC 31 1 (L) 31 4 - 37 4 g/dL    RDW 19 2 (H) 11 6 - 15 1 %    MPV 9 8 8 9 - 12 7 fL    Platelets 540 140 - 895 Thousands/uL    nRBC 0 /100 WBCs    Neutrophils Relative 85 (H) 43 - 75 %    Immat GRANS % 1 0 - 2 %    Lymphocytes Relative 9 (L) 14 - 44 %    Monocytes Relative 4 4 - 12 %    Eosinophils Relative 1 0 - 6 %    Basophils Relative 0 0 - 1 %    Neutrophils Absolute 8 34 (H) 1 85 - 7 62 Thousands/µL    Immature Grans Absolute 0 08 0 00 - 0 20 Thousand/uL    Lymphocytes Absolute 0 90 0 60 - 4 47 Thousands/µL    Monocytes Absolute 0 41 0 17 - 1 22 Thousand/µL    Eosinophils Absolute 0 12 0 00 - 0 61 Thousand/µL    Basophils Absolute 0 03 0 00 - 0 10 Thousands/µL   Comprehensive metabolic panel    Collection Time: 10/04/19  5:07 AM   Result Value Ref Range    Sodium 141 136 - 145 mmol/L    Potassium 3 6 3 5 - 5 3 mmol/L    Chloride 108 100 - 108 mmol/L    CO2 25 21 - 32 mmol/L    ANION GAP 8 4 - 13 mmol/L    BUN 29 (H) 5 - 25 mg/dL    Creatinine 1 51 (H) 0 60 - 1 30 mg/dL    Glucose 108 65 - 140 mg/dL    Calcium 8 4 8 3 - 10 1 mg/dL    AST 8 5 - 45 U/L    ALT 12 12 - 78 U/L    Alkaline Phosphatase 89 46 - 116 U/L    Total Protein 5 8 (L) 6 4 - 8 2 g/dL    Albumin 2 0 (L) 3 5 - 5 0 g/dL    Total Bilirubin 0 51 0 20 - 1 00 mg/dL eGFR 57 ml/min/1 73sq m       Radiology Results: I have personally reviewed pertinent reports  Other Diagnostic Testing:   I have personally reviewed pertinent reports          Active Meds:   Current Facility-Administered Medications   Medication Dose Route Frequency    acetaminophen (TYLENOL) tablet 650 mg  650 mg Oral Q6H PRN    barium sulfate 2 1 % suspension 450 mL  450 mL Oral 90 min pre-procedure    cefTAZidime (FORTAZ) 2,000 mg in sodium chloride 0 9 % 50 mL IVPB  2,000 mg Intravenous Q8H    chlorhexidine (PERIDEX) 0 12 % oral rinse 15 mL  15 mL Swish & Spit Q12H [address]    escitalopram (LEXAPRO) tablet 10 mg  10 mg Oral Daily    hydrOXYzine HCL (ATARAX) tablet 25 mg  25 mg Oral Q8H PRN    levofloxacin (LEVAQUIN) tablet 750 mg  750 mg Oral Q24H    melatonin tablet 3 mg  3 mg Oral HS    metoprolol tartrate (LOPRESSOR) tablet 50 mg  50 mg Oral Q12H [address]    mupirocin (BACTROBAN) 2 % nasal ointment   Nasal Q12H [address]    ondansetron (ZOFRAN) injection 4 mg  4 mg Intravenous Q8H PRN    traZODone (DESYREL) tablet 100 mg  100 mg Oral HS    zolpidem (AMBIEN) tablet 5 mg  5 mg Oral HS PRN         VTE Pharmacologic Prophylaxis: Enoxaparin (Lovenox)  VTE Mechanical Prophylaxis: sequential compression device    Clinton Kowalski DO

## 2019-10-05 LAB
ANION GAP SERPL CALCULATED.3IONS-SCNC: 9 MMOL/L (ref 4–13)
BACTERIA SPEC BFLD CULT: NO GROWTH
BACTERIA SPEC BFLD CULT: NO GROWTH
BUN SERPL-MCNC: 31 MG/DL (ref 5–25)
CALCIUM SERPL-MCNC: 8.6 MG/DL (ref 8.3–10.1)
CHLORIDE SERPL-SCNC: 105 MMOL/L (ref 100–108)
CO2 SERPL-SCNC: 27 MMOL/L (ref 21–32)
CREAT SERPL-MCNC: 1.71 MG/DL (ref 0.6–1.3)
ERYTHROCYTE [DISTWIDTH] IN BLOOD BY AUTOMATED COUNT: 20 % (ref 11.6–15.1)
GFR SERPL CREATININE-BSD FRML MDRD: 49 ML/MIN/1.73SQ M
GLUCOSE SERPL-MCNC: 180 MG/DL (ref 65–140)
GRAM STN SPEC: NORMAL
HCT VFR BLD AUTO: 25.5 % (ref 36.5–49.3)
HGB BLD-MCNC: 7.9 G/DL (ref 12–17)
MCH RBC QN AUTO: 28.3 PG (ref 26.8–34.3)
MCHC RBC AUTO-ENTMCNC: 31 G/DL (ref 31.4–37.4)
MCV RBC AUTO: 91 FL (ref 82–98)
PLATELET # BLD AUTO: 225 THOUSANDS/UL (ref 149–390)
PMV BLD AUTO: 9.9 FL (ref 8.9–12.7)
POTASSIUM SERPL-SCNC: 3.8 MMOL/L (ref 3.5–5.3)
RBC # BLD AUTO: 2.79 MILLION/UL (ref 3.88–5.62)
SODIUM SERPL-SCNC: 141 MMOL/L (ref 136–145)
WBC # BLD AUTO: 8.97 THOUSAND/UL (ref 4.31–10.16)

## 2019-10-05 PROCEDURE — 99233 SBSQ HOSP IP/OBS HIGH 50: CPT | Performed by: HOSPITALIST

## 2019-10-05 PROCEDURE — 80048 BASIC METABOLIC PNL TOTAL CA: CPT | Performed by: INTERNAL MEDICINE

## 2019-10-05 PROCEDURE — 99232 SBSQ HOSP IP/OBS MODERATE 35: CPT | Performed by: INTERNAL MEDICINE

## 2019-10-05 PROCEDURE — 85027 COMPLETE CBC AUTOMATED: CPT | Performed by: STUDENT IN AN ORGANIZED HEALTH CARE EDUCATION/TRAINING PROGRAM

## 2019-10-05 RX ADMIN — ACETAMINOPHEN 650 MG: 325 TABLET ORAL at 16:05

## 2019-10-05 RX ADMIN — CEFTAZIDIME 2000 MG: 1 INJECTION, POWDER, FOR SOLUTION INTRAMUSCULAR; INTRAVENOUS at 02:33

## 2019-10-05 RX ADMIN — ACETAMINOPHEN 650 MG: 325 TABLET ORAL at 21:52

## 2019-10-05 RX ADMIN — CHLORHEXIDINE GLUCONATE 0.12% ORAL RINSE 15 ML: 1.2 LIQUID ORAL at 21:46

## 2019-10-05 RX ADMIN — Medication 1 APPLICATION: at 08:12

## 2019-10-05 RX ADMIN — ACETAMINOPHEN 650 MG: 325 TABLET ORAL at 08:12

## 2019-10-05 RX ADMIN — ZOLPIDEM TARTRATE 5 MG: 5 TABLET, FILM COATED ORAL at 21:53

## 2019-10-05 RX ADMIN — ESCITALOPRAM OXALATE 10 MG: 10 TABLET ORAL at 08:12

## 2019-10-05 RX ADMIN — Medication 1 APPLICATION: at 21:46

## 2019-10-05 RX ADMIN — MELATONIN 3 MG: 3 TAB ORAL at 21:47

## 2019-10-05 RX ADMIN — TRAZODONE HYDROCHLORIDE 100 MG: 100 TABLET ORAL at 21:47

## 2019-10-05 RX ADMIN — LEVOFLOXACIN 750 MG: 750 TABLET, FILM COATED ORAL at 05:13

## 2019-10-05 RX ADMIN — CEFTAZIDIME 2000 MG: 1 INJECTION, POWDER, FOR SOLUTION INTRAMUSCULAR; INTRAVENOUS at 19:33

## 2019-10-05 RX ADMIN — CHLORHEXIDINE GLUCONATE 0.12% ORAL RINSE 15 ML: 1.2 LIQUID ORAL at 08:12

## 2019-10-05 RX ADMIN — CEFTAZIDIME 2000 MG: 1 INJECTION, POWDER, FOR SOLUTION INTRAMUSCULAR; INTRAVENOUS at 11:08

## 2019-10-05 NOTE — PROGRESS NOTES
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   Patient is transferred to [facility] for CT surgery well and for mitral valve repair/replacement   We are consulted for JULIENNE management      JULIENNE, unknown prior baseline creatinine  -admission creatinine 1 4 at [facility] initially improved to 1 0 and then peaked at 2 2    Creatinine seems to be fluctuating at 1 5 to 1 7 with slight increased to 1 7 today after diuresis yesterday + status post contrast exposure with cardiac catheterization on 10/2/19   -BMP in a m   -JULIENNE suspected to be secondary to prerenal/ATN/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 all in the setting of solitary kidney  -also was found to have small renal infarct from prior endocarditis  -recent workup includes:  -urinalysis this admission shows 4 to 10 RBCs, 4 to 10 WBCs, 1+ proteinuria, very concentrated sample with 10 to 25 hyaline cast   -CT scan showed congenitally absent right kidney with left kidney showing compensatory hypertrophy, one small likely infarct in the lower pole of left kidney unchanged from the previous study and not associated with any abscess for pyelonephritis  - February 2019: positive hep C antibody with hep C RNA PCR less than 15  -complements normal, CK normal,  -avoid nephrotoxins or NSAIDs  -urine eosinophils 0%, no peripheral eosinophilia     Congenital solitary kidney     Hypertension  -blood pressure on lower side  -currently on metoprolol due to tachycardia, continue with holding parameter     Anemia  -closely monitor hemoglobin, transfuse p r n  For hemoglobin less than seven  -low iron stores recently although avoiding IV Venofer due to active infection issues  -recent FOBT was negative at [address] mitral valve endocarditis/bacteremia  -antibiotic as per primary team and ID  -patient is active IV drug user and had prior history of MSSA bacteremia with endocarditis in March 2019  -BILLY in September 2019 showed echodensity on the anterior mitral leaflet      Pseudomonas bacteremia, blood culture from 9/28/19 showed Pseudomonas  Repeat culture negative so far     Bilateral pleural effusion, status post thoracentesis on 10/2/19  -currently remains on room air  -status post IV Lasix 40 mg yesterday  Can give p r n  If has worsening respiratory status      CT scan suggestive of pulmonary congestion versus suspicion for pneumonia, pleural effusions, splenic infarct  Septic embolus/abscess on CT scan of brain     Discussed above plan in detail with primary team    SUBJECTIVE:  Patient seen and examined at bedside  Shortness of breath is better, no chest pain, nausea, vomiting, abdominal pain or diarrhea  No urinary complaints       OBJECTIVE:  Current Weight: Weight - Scale: 72 5 kg (159 lb 13 3 oz)  Vitals:    10/05/19 0745   BP: 99/58   Pulse: (!) 119   Resp: 18   Temp: 98 6 °F (37 °C)   SpO2: 95%       Intake/Output Summary (Last 24 hours) at 10/5/2019 0929  Last data filed at 10/5/2019 0857  Gross per 24 hour   Intake 680 ml   Output 1175 ml   Net -495 ml     Wt Readings from Last 3 Encounters:   10/05/19 72 5 kg (159 lb 13 3 oz)   10/02/19 72 6 kg (160 lb)     Temp Readings from Last 3 Encounters:   10/05/19 98 6 °F (37 °C) (Oral)     BP Readings from Last 3 Encounters:   10/05/19 99/58     Pulse Readings from Last 3 Encounters:   10/05/19 (!) 119        Physical Examination:  General:  Sitting in chair, no acute distress   Eyes:  Mild conjunctival pallor present  ENT:  External examination of ears and nose unremarkable  Neck:  Supple  Respiratory:  Bilateral air entry present, very occasional inspiratory basilar crackles present  CVS:  S1, S2 present  GI:  Soft, nontender, nondistended  CNS:  Active alert oriented x3  Extremities:  No significant edema in legs  Skin:  No new rash in legs    Medications:    Current Facility-Administered Medications:     acetaminophen (TYLENOL) tablet 650 mg, 650 mg, Oral, Q6H PRN, Abhilash Tenorio MD, 650 mg at 10/05/19 0768    barium sulfate 2 1 % suspension 450 mL, 450 mL, Oral, 90 min pre-procedure, Linda Ricks MD, 450 mL at 10/03/19 1530    cefTAZidime (FORTAZ) 2,000 mg in sodium chloride 0 9 % 50 mL IVPB, 2,000 mg, Intravenous, Q8H, Kathy Eden MD, Last Rate: 100 mL/hr at 10/05/19 0233, 2,000 mg at 10/05/19 0233    chlorhexidine (PERIDEX) 0 12 % oral rinse 15 mL, 15 mL, Swish & Spit, Q12H [facility], Estelita Travis PA-C, 15 mL at 10/05/19 0812    escitalopram (LEXAPRO) tablet 10 mg, 10 mg, Oral, Daily, Max Monika Lambert DO, 10 mg at 10/05/19 0812    hydrOXYzine HCL (ATARAX) tablet 25 mg, 25 mg, Oral, Q8H PRN, Chase Lambert DO, 25 mg at 10/01/19 1834    levofloxacin (LEVAQUIN) tablet 750 mg, 750 mg, Oral, Q24H, Kathy Eden MD, 750 mg at 10/05/19 0513    melatonin tablet 3 mg, 3 mg, Oral, HS, Max DO [place], 3 mg at 10/04/19 2116    metoprolol tartrate (LOPRESSOR) tablet 50 mg, 50 mg, Oral, Q12H [facility], Chase Porter DO, 50 mg at 10/04/19 2117    mupirocin (BACTROBAN) 2 % nasal ointment, , Nasal, Q12H [facility], Estelita Travis PA-C, 1 application at 48/85/09 0812    ondansetron (ZOFRAN) injection 4 mg, 4 mg, Intravenous, Q8H PRN, Erlinda Evans MD, 4 mg at 10/04/19 1734    traZODone (DESYREL) tablet 100 mg, 100 mg, Oral, HS, Nadine Raymundo DO, 100 mg at 10/04/19 2116    zolpidem (AMBIEN) tablet 5 mg, 5 mg, Oral, HS PRN, Chase Lambert DO    Laboratory Results:  Results from last 7 days   Lab Units 10/05/19  0513 10/04/19  0507 10/03/19  0634 10/02/19  0629 10/01/19  1800 10/01/19  1023   WBC Thousand/uL 8 97 9 88 11 52* 9 89  --  15 11*   HEMOGLOBIN g/dL 7 9* 7 1* 7 8* 7 8*  --  8 0*   HEMATOCRIT % 25 5* 22 8* 25 0* 25 0*  --  24 9*   PLATELETS Thousands/uL 225 226 267 261  --  312   SODIUM mmol/L 141 141 141  --  141 138   POTASSIUM mmol/L 3 8 3 6 4 2  --  3 8 4 1   CHLORIDE mmol/L 105 108 110*  --  106 105   CO2 mmol/L 27 25 25  --  24 25   BUN mg/dL 31* 29* 32*  --  37* 39*   CREATININE mg/dL 1 71* 1 51* 1 57*  --  1 86* 1 76*   CALCIUM mg/dL 8 6 8 4 8 5  --  9 1 8 8       CT abdomen pelvis wo contrast   Final Result by Citlali Lyon MD (10/03 1935)      Mild to moderate pleural effusions are seen at the bases  There is a unilateral left kidney without hydronephrosis  No ascites or obvious intra-abdominal collection      Immediate finding was noted on the Epic system  Workstation performed: [id number]         XR chest portable   Final Result by Fernando De La Garza DO (10/04 0926)      Stable chest with moderate pulmonary vascular congestion  Workstation performed: [id number]         US right upper quadrant with liver dopplers   Final Result by Rachael Collins DO (10/04 9482)   1  Normal sonographic appearance of the liver  2   Absent right kidney  3   Mild splenomegaly  4   Incompletely visualized bilateral pleural effusions  Workstation performed: [id number]         MRA carotids wo contrast   Final Result by eFrnando De La Garza DO (10/03 0968)      Unremarkable MR angiogram of the cervical vasculature  Workstation performed: [id number]         MRA head wo contrast   Final Result by Guilherme Gardiner MD (10/02 2138)         1  No evidence of mycotic aneurysm; however, exam sensitivity is limited by resolution and incomplete imaging of the distal branches    Catheter angiography may be helpful if there is persistent concern for mycotic aneurysm  2   No stenosis or occlusion of the major vessels of the [place]  Workstation performed: [id number]         MRI brain wo contrast   Final Result by Raymundo Dillon MD (10/02 2133)      Subacute 1 8 cm hemorrhage in the right posterior mesial temporal occipital region with surrounding vasogenic edema, stable  Few punctate foci of chronic microhemorrhage in the right frontoparietal region and in the cerebellar hemispheres  Findings may    be secondary to subacute and chronic septic emboli and/or mycotic aneurysms  No evidence of abscess  Workstation performed: [id number]         IR thoracentesis   Final Result by Armando Rg DO (10/02 1547)   Successful bilateral thoracentesis  _________________________________________________________________   COMPARISON: None      PROCEDURE DETAILS:    Operators: Dr Helena Fisher, [address] [place] [address] attending, performed the procedure  Anesthesia: 1% lidocaine was injected in the skin and subcutaneous tissues overlying the access site  Medications: 1% lidocaine   Contrast: None   Fluoroscopy time: None      COMMENTS:   Following the discussion of the risks, benefits and alternatives to the procedure, written informed consent was obtained from the patient  The patient was placed in a sitting position  A preprocedure timeout was performed per [facility] protocol  The    right back was prepped and draped in the usual sterile fashion  After local anesthesia was administered, a 5-[employer] catheter was advanced under continuous ultrasound guidance into the right pleural space  1000 mL of vidya fluid was obtained  After the procedure, the catheter was removed, the skin was cleansed    and sterile dressings were applied  Aforementioned procedure was then performed for left-sided thoracentesis  The patient tolerated the procedure well and there were no immediate postprocedure complications  Workstation performed: [id number]         XR mandible panorex [facility]'S only)   Final Result by Augusta Cornelius MD (10/02 3276)      No periapical lucencies identified  Workstation performed: [id number]         CT chest wo contrast   Final Result by Franklin Santiago MD (10/01 1703)      1  Diffuse groundglass opacities consistent with pulmonary edema   2  More focal patchy opacities in left upper lobe, while this may  still represent edema, pneumonia would have a similar appearance   3  Moderate pleural effusions   4  Subcarinal lymphadenopathy   5  Wedge-shaped hypodensity within the spleen, likely infarct      The study was marked in EPIC for immediate notification  Workstation performed: [id number]         CT head wo contrast   Final Result by  (10/05 1296)   Addendum 1 of 1 by Franklin Santiago MD (10/01 1951)   ADDENDUM:      This addendum is for the purpose of clarification:   Abnormality described represents either a septic embolus, or abscess  MRI    is recommended for differentiation  I personally discussed this addendum with Doug Chambers on 10/1/2019 at    7:50 PM       Final      VAS carotid complete study   Final Result by Roldan Bynum MD (10/01 1801)      XR chest portable   Final Result by Jose Caraballo MD (10/01 1324)      New airspace opacity suggests pulmonary edema or diffuse bronchopneumonia  Immediate report was noted on the Epic system  Workstation performed: [id number]             Portions of the record may have been created with voice recognition software  Occasional wrong word or "sound a like" substitutions may have occurred due to the inherent limitations of voice recognition software  Read the chart carefully and recognize, using context, where substitutions have occurred

## 2019-10-05 NOTE — PROGRESS NOTES
Cardiology Progress Note - Sugey Bergman 44 y o  male MRN: [id number]    Unit/Bed#: [facility] 421-01 Encounter: [id number]      Assessment:  Principal Problem:    Endocarditis  Active Problems:    History of intravenous drug abuse ([facility] [address] )    Acute kidney injury ([facility] [address] )    Insomnia    Tachycardia    Nonrheumatic mitral valve regurgitation    Bacteremia    Anxiety    Solitary left kidney      Plan:  Patient is comfortable this morning  He has no chest pain or significant dyspnea  He is in sinus rhythm on telemetry  He awaits cardiac surgery  Plastic surgery note appreciated  BMP today with potassium of 3 8 and creatinine of 1 7  Hemoglobin is 7 9  Nephrology notes are appreciated  Subjective:   Patient seen and examined  No significant events overnight   negative  Objective:     Vitals: Blood pressure 99/58, pulse (!) 119, temperature 98 6 °F (37 °C), temperature source Oral, resp  rate 18, height 5' 3" (1 6 m), weight 72 5 kg (159 lb 13 3 oz), SpO2 95 %  , Body mass index is 28 31 kg/m² ,   Orthostatic Blood Pressures      Most Recent Value   Blood Pressure  99/58 filed at 10/05/2019 0745   Patient Position - Orthostatic VS  Sitting filed at 10/05/2019 0745      ,      Intake/Output Summary (Last 24 hours) at 10/5/2019 0905  Last data filed at 10/5/2019 0857  Gross per 24 hour   Intake 680 ml   Output 1175 ml   Net -495 ml       No significant arrhythmias seen on telemetry review    Sinus tachycardia      Physical Exam:    GEN: Sugey Bergman appears well, alert and oriented x 3, pleasant and cooperative   NECK: supple, no carotid bruits, no JVD or HJR  HEART: normal rate, regular rhythm, normal S1 and S2, systolic murmur  LUNGS: clear to auscultation bilaterally; no wheezes, rales, or rhonchi   ABDOMEN: normal bowel sounds, soft, no tenderness, no distention  EXTREMITIES: peripheral pulses normal; no clubbing, cyanosis, or edema    Labs & Results:    No results displayed because visit has over 200 results  Ct Abdomen Pelvis Wo Contrast    Result Date: 10/3/2019  Narrative: CT ABDOMEN AND PELVIS WITHOUT IV CONTRAST INDICATION:   r/o abscess  Pseudomonas bacteremia COMPARISON:  Ultrasound from today TECHNIQUE:  CT examination of the abdomen and pelvis was performed without intravenous contrast   Axial, sagittal, and coronal 2D reformatted images were created from the source data and submitted for interpretation  Some respiratory artifacts limit the  examination  Patient declined oral contrast  Radiation dose length product (DLP) for this visit:  710 mGy-cm   This examination, like all CT scans performed in the [facility], was performed utilizing techniques to minimize radiation dose exposure, including the use of iterative reconstruction and automated exposure control  Limited enteric contrast was administered  FINDINGS: ABDOMEN LOWER CHEST:  Mild to moderate effusions are seen at the bases with minimal adjacent atelectasis  LIVER/BILIARY TREE:  Unremarkable  GALLBLADDER:  No calcified gallstones  No pericholecystic inflammatory change  SPLEEN:  Unremarkable  PANCREAS:  Unremarkable  ADRENAL GLANDS:  Unremarkable  KIDNEYS/URETERS:  The right kidney appears absent  No surgical sutures are seen  The left kidney is prominent probably related to compensatory hypertrophy  No hydronephrosis  STOMACH AND BOWEL:  Slight increased density is seen with debris within the gastric lumen which is distended and may be related to a small amount of oral contrast   Some oral contrast appears to be within a few loops of small bowel in the pelvis as well as the cecum  No small bowel dilatation is seen  A few sigmoid diverticula are noted  APPENDIX:  No findings to suggest appendicitis  ABDOMINOPELVIC CAVITY:  No ascites or free intraperitoneal air  No lymphadenopathy  No obvious intra-abdominal collection can be seen on this unenhanced examination   VESSELS:  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 which merges with a right-sided IVC which is continuous with the right iliac  PELVIS REPRODUCTIVE ORGANS:  Unremarkable for patient's age  URINARY BLADDER:  Unremarkable  ABDOMINAL WALL/INGUINAL REGIONS:  Unremarkable  OSSEOUS STRUCTURES:  No acute fracture or destructive osseous lesion  The vertebral endplates appear unremarkable  Impression: Mild to moderate pleural effusions are seen at the bases  There is a unilateral left kidney without hydronephrosis  No ascites or obvious intra-abdominal collection Immediate finding was noted on the Epic system  Workstation performed: [id number]     Xr Mandible Panorex ([place] Only)    Result Date: 10/2/2019  Narrative: MANDIBLE - PANOREX INDICATION:   pre op MV replacement  COMPARISON:  None VIEWS:  XR MANDIBLE PANOREX ([place] ONLY) FINDINGS: Tooth 16 is impacted  Tooth 29 is missing  No periapical lucencies identified  There is no fracture or pathologic bone lesion  The temporomandibular joints appear grossly intact  Impression: No periapical lucencies identified  Workstation performed: [id number]     Xr Chest Portable    Result Date: 10/4/2019  Narrative: CHEST INDICATION:   SOB  COMPARISON:  October 1, 2019 EXAM PERFORMED/VIEWS:  XR CHEST PORTABLE FINDINGS: Cardiomediastinal silhouette appears unremarkable  Moderate pulmonary vascular congestion  Osseous structures appear within normal limits for patient age  Impression: Stable chest with moderate pulmonary vascular congestion  Workstation performed: [id number]     Xr Chest Portable    Result Date: 10/1/2019  Narrative: CHEST INDICATION:   Shortness of breath  COMPARISON:  6/25/2013 EXAM PERFORMED/VIEWS:  XR CHEST PORTABLE  11:23 AM FINDINGS: Cardiomediastinal silhouette appears unremarkable  The lungs are clear  No pneumothorax or pleural effusion  There is no pneumothorax  The costophrenic angles are clear    There is hypoventilation present  Osseous structures appear within normal limits for patient age  Impression: New airspace opacity suggests pulmonary edema or diffuse bronchopneumonia  Immediate report was noted on the Epic system  Workstation performed: [id number]     Ct Head Wo Contrast    Addendum Date: 10/1/2019 Addendum:   ADDENDUM: This addendum is for the purpose of clarification: Abnormality described represents either a septic embolus, or abscess  MRI is recommended for differentiation  I personally discussed this addendum with Lilia Chris on 10/1/2019 at 7:50 PM     Result Date: 10/1/2019  Narrative: CT BRAIN - WITHOUT CONTRAST INDICATION:   mv endocarditis  COMPARISON:  None  TECHNIQUE:  CT examination of the brain was performed  In addition to axial images, coronal 2D reformatted images were created and submitted for interpretation  Radiation dose length product (DLP) for this visit:  1028 mGy-cm   This examination, like all CT scans performed in the [facility], was performed utilizing techniques to minimize radiation dose exposure, including the use of iterative reconstruction and automated exposure control  IMAGE QUALITY:  Diagnostic  FINDINGS: PARENCHYMA:  There is a 1 8 cm x 0 9 x 0 8 lesion in the right parieto-occipital region with surrounding edema as seen on series 2/18 Otherwise gray-white differentiation is maintained VENTRICLES AND EXTRA-AXIAL SPACES:  Posterior fossa midline cystic structure VISUALIZED ORBITS AND PARANASAL SINUSES:  Unremarkable  CALVARIUM AND EXTRACRANIAL SOFT TISSUES:  Normal      Impression: Right parieto-occipital lesion, given history this likely represents a septic embolus/abscess  I personally discussed this study  with Lilia Chris on 10/1/2019 at 5:24 PM   Workstation performed: [id number]     Ct Chest Wo Contrast    Result Date: 10/1/2019  Narrative: CT CHEST WITHOUT IV CONTRAST INDICATION:   pre op cardiac surgery    Mitral valve endocarditis COMPARISON:  X-ray from today TECHNIQUE: CT examination of the chest was performed without intravenous contrast   Axial, sagittal, and coronal 2D reformatted images were created from the source data and submitted for interpretation  Radiation dose length product (DLP) for this visit:  343 mGy-cm   This examination, like all CT scans performed in the [facility], was performed utilizing techniques to minimize radiation dose exposure, including the use of iterative reconstruction and automated exposure control  FINDINGS: LUNGS:  There are diffuse groundglass opacities seen throughout, however predominantly involving the perihilar regions  There are more dense and confluent opacities seen in the left upper lobe as seen on series 3/37 PLEURA:  Moderate bilateral pleural effusions HEART/GREAT VESSELS:  Density difference between the intracardiac blood and intraventricular septum  MEDIASTINUM AND MARCOS:  1 3 cm subcarinal lymph node CHEST WALL AND LOWER NECK:   Unremarkable  VISUALIZED STRUCTURES IN THE UPPER ABDOMEN:  Wedge-shaped ill-defined hypodensity noted in the spleen on series 2/56 OSSEOUS STRUCTURES:  No acute fracture or destructive osseous lesion  Impression: 1  Diffuse groundglass opacities consistent with pulmonary edema 2  More focal patchy opacities in left upper lobe, while this may  still represent edema, pneumonia would have a similar appearance 3  Moderate pleural effusions 4  Subcarinal lymphadenopathy 5  Wedge-shaped hypodensity within the spleen, likely infarct The study was marked in EPIC for immediate notification  Workstation performed: [id number]     Mra Head Wo Contrast    Result Date: 10/2/2019  Narrative: MRA BRAIN WITHOUT CONTRAST INDICATION:  eval for mycotic aneurysm 's COMPARISON:  None  TECHNIQUE:  Axial 3-D time-of-flight imaging with 3-D reconstructions performed without contrast    IV Contrast:  Not administered  FINDINGS: IMAGE QUALITY:  Diagnostic   ANATOMY INTERNAL CAROTID ARTERIES:  Normal flow related enhancement of the distal cervical, petrous and cavernous segments of the internal carotid arteries  Normal ICA terminus  ANTERIOR CIRCULATION:  Right A1 segment is likely severely hypoplastic or absent  Anterior communicating artery identified  No stenosis in the proximal intracerebral arteries  MIDDLE CEREBRAL ARTERY CIRCULATION:  The M1 segment and middle cerebral artery branches demonstrate normal flow-related enhancement  DISTAL VERTEBRAL ARTERIES:  No stenosis in the intradural segments of the vertebral arteries  Limited visualization of the posterior inferior cerebral arteries  BASILAR ARTERY:  Normal  POSTERIOR CEREBRAL ARTERIES: No stenosis in the posterior cerebral arteries  Impression: 1  No evidence of mycotic aneurysm; however, exam sensitivity is limited by resolution and incomplete imaging of the distal branches  Catheter angiography may be helpful if there is persistent concern for mycotic aneurysm  2   No stenosis or occlusion of the major vessels of the [place]  Workstation performed: [id number]     Mra Carotids Wo Contrast    Result Date: 10/3/2019  Narrative: MRA NECK WITHOUT CONTRAST INDICATION: eval for mycotic aneurysm COMPARISON:  None  TECHNIQUE:  2D and 3D Time of Flight angiography with 3D reconstructions  IMAGE QUALITY:  Diagnostic  FINDINGS: AORTIC ARCH:  Normal  VISUALIZED SUBCLAVIAN ARTERIES:  The subclavian arteries demonstrate normal flow-related enhancement with no stenosis  VERTEBRAL ARTERIES:  Normal vertebral artery origins  The vertebral arteries are bilaterally symmetric with normal enhancement and no evidence of stenosis  RIGHT CAROTID ARTERY:  Normal common carotid artery, cervical internal carotid artery and external carotid artery  No stenosis at the bifurcation  LEFT CAROTID ARTERY:  Normal common carotid artery, cervical internal carotid artery and external carotid artery  No stenosis at the bifurcation   VISUALIZED INTRACRANIAL VASCULATURE:  Limited visualization of the intracranial vasculature is grossly unremarkable  NASCET criteria was used to determine the degree of internal carotid artery diameter stenosis  Impression: Unremarkable MR angiogram of the cervical vasculature  Workstation performed: [id number]     Mri Brain Wo Contrast    Result Date: 10/2/2019  Narrative: MRI BRAIN WITHOUT CONTRAST INDICATION: Right posterior temporal occipital hemorrhage  Mitral valve endocarditis and sepsis  COMPARISON:   10/1/2019  TECHNIQUE:  Sagittal T1, axial T2, axial FLAIR, axial T1, axial [place] and axial diffusion imaging  IMAGE QUALITY:  Diagnostic  FINDINGS: BRAIN PARENCHYMA: Susceptibility artifact consistent with hemorrhage in the right posterior mesial temporal occipital region measures 1 8 x 1 6 x 0 7 cm  Minimal T1 hyperintensity along the margins of the hemorrhage suggesting a subacute blood products  Mild surrounding vasogenic edema  Findings are stable when compared to the prior CT  Punctate foci of susceptibility artifact in the right frontoparietal subcortical white matter without other signal abnormality, consistent with chronic microhemorrhage  Similar lesions in the cerebellar hemispheres  Punctate focus of mildly restricted diffusion in the right posterior frontoparietal region  VENTRICLES:  No hydrocephalus or extra-axial collection  SELLA AND PITUITARY GLAND:  Normal  ORBITS:  No retro-orbital mass or inflammation  PARANASAL SINUSES:  Normal  VASCULATURE:  Evaluation of the major intracranial vasculature demonstrates appropriate flow voids  CALVARIUM AND SKULL BASE:  No osseous lesion  EXTRACRANIAL SOFT TISSUES:  No soft tissue mass  Impression: Subacute 1 8 cm hemorrhage in the right posterior mesial temporal occipital region with surrounding vasogenic edema, stable  Few punctate foci of chronic microhemorrhage in the right frontoparietal region and in the cerebellar hemispheres    Findings may  be secondary to subacute and chronic septic emboli and/or mycotic aneurysms  No evidence of abscess  Workstation performed: [id number]     Ir Thoracentesis    Result Date: 10/2/2019  Narrative: INDICATION: Bilateral pleural effusions, shortness of breath  PROCEDURE: 1  Ultrasound-guided bilateral thoracentesis FINDINGS: 1   1000 mL fluid removed from the right pleural space  2   700 mL fluid removed from the left pleural space  Impression: Successful bilateral thoracentesis  _________________________________________________________________ COMPARISON: None PROCEDURE DETAILS: Operators: Dr Didier Velasco, [address] [place] [address] attending, performed the procedure  Anesthesia: 1% lidocaine was injected in the skin and subcutaneous tissues overlying the access site  Medications: 1% lidocaine Contrast: None Fluoroscopy time: None COMMENTS: Following the discussion of the risks, benefits and alternatives to the procedure, written informed consent was obtained from the patient  The patient was placed in a sitting position  A preprocedure timeout was performed per [facility] protocol  The right back was prepped and draped in the usual sterile fashion  After local anesthesia was administered, a 5-[place] [employer] catheter was advanced under continuous ultrasound guidance into the right pleural space  1000 mL of vidya fluid was obtained  After the procedure, the catheter was removed, the skin was cleansed and sterile dressings were applied  Aforementioned procedure was then performed for left-sided thoracentesis  The patient tolerated the procedure well and there were no immediate postprocedure complications  Workstation performed: [id number]     Vas Carotid Complete Study    Result Date: 10/1/2019  Narrative:  [facility] REPORT CLINICAL: Indications: Patient presents for a general health evaluation secondary to future open heart surgery  Patient is asymptomatic at this time   Operative History: 2013-01-01 Chest Wall Tumor excision Risk Factors The patient has history of HTN and Non-rheumatic Mitral valve regurgitation  Clinical Right Pressure:  120/70 mm Hg  FINDINGS:  Right        Impression  PSV  EDV (cm/s)  Direction of Flow  Ratio  Dist  ICA                 55          26                      0 51  Mid  ICA                  62          19                      0 56  Prox  ICA    Normal       69          23                      0 63  Dist CCA                  91          26                            Mid CCA                  109          27                      1 20  Prox CCA                  91          14                            Ext Carotid               57           9                      0 52  Prox Vert                 53          26  Antegrade                 Subclavian               126           0                             Left         Impression  PSV  EDV (cm/s)  Direction of Flow  Ratio  Dist  ICA                 71          39                      0 76  Mid  ICA                  59          41                      0 63  Prox  ICA    Normal       63          34                      0 68  Dist CCA                  86          24                            Mid CCA                   93          33                      0 91  Prox CCA                 103          37                            Ext Carotid               61          18                      0 66  Prox Vert                 72          28  Antegrade                 Subclavian               161          16                               CONCLUSION: Impression RIGHT: There is no evidence of disease throughout the extracranial carotid arteries  Vertebral artery flow is antegrade  There is no significant subclavian artery disease  LEFT: There is no evidence of disease throughout the extracranial carotid arteries  Vertebral artery flow is antegrade  There is no significant subclavian artery disease    SIGNATURE: Electronically Signed by: Reji Gallegos on 2019-10-01 06:01:19 PM    Us Right Upper Quadrant With Liver Dopplers    Result Date: 10/4/2019  Narrative: RIGHT UPPER QUADRANT ULTRASOUND WITH LIVER DOPPLER INDICATION:     Evaluation of Liver Function  COMPARISON:  None  TECHNIQUE:   Real-time ultrasound of the right upper quadrant was performed with a curvilinear transducer with both volumetric sweeps and still imaging techniques  FINDINGS: PANCREAS:  Visualized portions of the pancreas are within normal limits  AORTA AND IVC:  Visualized portions are normal for patient age  LIVER: Size:  Within normal range  The liver measures 14 2 cm in the midclavicular line  Contour:  Surface contour is smooth  Parenchyma:  Echogenicity and echotexture are within normal limits  No evidence of suspicious mass  LIVER DOPPLER: The main portal vein and primary branch segments are patent and hepatopetal with normal spectral waveform  Hepatic veins are patent  Spectral waveforms within normal limits  Main hepatic artery appears normal size, patent with normal spectral waveform  BILIARY: The gallbladder is normal in caliber  No wall thickening or pericholecystic fluid  No stones or sludge identified  No sonographic Will's sign  No intrahepatic biliary dilatation  CBD measures 3 mm  No choledocholithiasis  KIDNEYS: RIGHT KIDNEY: Absent  LEFT KIDNEY: 13 8 x 6 8 cm  Within normal limits  SPLEEN: 13 5 x 13 9 x 5 7 cm Mildly enlarged  ASCITES:   None  Incompletely visualized bilateral pleural effusions  Impression: 1  Normal sonographic appearance of the liver  2   Absent right kidney  3   Mild splenomegaly  4   Incompletely visualized bilateral pleural effusions  Workstation performed: [id number]       EKG personally reviewed by Kimberley Kruger MD      Counseling / Coordination of Care  Total floor / unit time spent today 30 minutes  Greater than 50% of total time was spent with the patient and / or family counseling and / or coordination of care

## 2019-10-05 NOTE — PROGRESS NOTES
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  Pt presents as a transfer from [facility] on 9/30 for evaluation by cardiothoracic surgery for mitral valve replacement   As per chart review patient Presented to [facility] on 09/06 with complaints of fever, nausea and vomiting x1 week   Last reported IVD use 5 days before arrival to [facility]  patient has a history of IV drug use and was previously treated for MSSA mitral valve endocarditis and bacteremia in 3/2019   Discharged from [facility] on 03/21/19 after completed 6 week course of IV cefazolin   - BILLY on 6/2019 showed echodensity on posterior age for aspect of mitral valve measuring 18 mm to 20 mm with mitral valve regurgitation and mild tricuspid regurgitation  - 2D echo on 9/6/2019 showed echodensity on the posterior mitral leaflet, possibly representing residual vegetation from prior treated endocarditis   Follow-up BILLY suggestive of increasing size of mitral valve vegetation compared to prior study in June of 2019 and also evidence of new vegetation on the anterior leaflet  -BILLY 9/29/2019 - bulbous echodensity on the anterior mitral leaflet measuring 8 x 13 mm   Filament to read echo density in the posterior mitral leaflet measuring 11 mm   Compared to echo done on 9/23, appears to be no significant change    -Blood cultures on 09/06, 9/8, 9/10, 9/11 demonstrate Pseudomonas aeruginosa  -repeat cultures from 09/13 and 9/14 negative  - blood cultures 9/28 reported to be positive again for Pseudomonas  -patient appears to have been on IV ceftazidime, levofloxacin 750 mg every 48 hours renally dose adjusted - for dual anti pseudomonal coverage  -patient apparently had previously been on cefepime, switched to ceftazidime due to side effects of vomiting diarrhea  -patient had previously been on gentamicin which was discontinued on 09/22/2019, noted to likely be contributing to acute kidney injury  - culture repeated on 10/01 upon admission-  no growth at 48 hours documented on 10/03  - CT surgery consulted on 10/1 for MV repair/replacement  Work up for surgery initiated and tentatively scheduled for next week    - ID consulted on 10/01  Recommended to continue dual anti pseudomonal coverage with IV ceftazidime 2g IV q12 and levofloxacin 500mg PO 24hrs renally dose for 6 weeks    - CT abdomen/Pelvis on 10/3 did not reveal evidence of abscess  - right upper quadrant ultrasound on 10/03 revealed normal appearance of the liver with mild splenomegaly  - cardiac diet with strict fluids and low-sodium in place  - will continue to monitor on telemetry     Severe mitral regurgitation  -most recent TTE from 09/30/2019 shows EF 70%, severe mitral regurgitation posteriorly directed, mitral valve vegetation as above  - CXR on 10/01 revealed pulmonary edema likely related to severe MV regurgitation  Will hold diuresis for now since he appears clinically euvolemic  Diuresed yesterday with improvement of SOB    - BILLY 10/2 reveal ejection fraction at 60% for no wall motion abnormalities, mildly to moderately reduced systolic function of the right ventricle   Left atrium mildly dilated   Mitral valve with multiple large and mobile vegetations and the trigger aspect of both leaflets with large perforations at the base of the anterior leaflets and wide-open mitral regurgitation   See report for full details    - CT surgery will perform mitral valve replacement tentatively scheduled for next week        Bilateral pleural effusions  Resolved after thoracentesis on 10/02  However appear to be recurring likely secondary to severe mitral valve regurg  - patient presents with complaints of shortness of breath and mild tachypnea however states that is better since thoracentesis on 10/02  - thoracentesis on 10/02 by IR with 1000 mL removed from the right hemithorax and 700 mL from the left hemithorax  - patient noted to be to tachypnic today exam  - chest x-ray on 10/3 demonstrated pulmonary congestion   - status post IV diuresis yesterday  Patient reports improvement of shortness of breath since then         JULIENNE on CKD and congenital solitary left kidney   Improving  Likely secondary to ATN from aminoglycoside with prerenal component per last nephrology progress note at [facility]  -baseline creatinine unknown  [address] creatinine 1 49 at [facility], peaked at 2 20      -CT at [place] shows right kidney congenitally absent with left kidney showing compensatory hypertrophy and 1 small likely infarct in the lower cortex unchanged from previous study, not associated with perinephric fluid or abscess or pyelonephritis  -Cr improved overall with mild bump to 1 7 after Lasix  - continue to avoid nephrotoxins  - Nephrology consulted on on board       Right Parietal Occipital Lesion  Stable  Likely septic embolus versus abscess given history of IVDU and pseudomonal MV endocarditis   Noted on CT head 10/1: 1 8 cm x 0 9 x 0 8 lesion in the right parieto-occipital region with surrounding edema  - MRI on 10/02: "Subacute 1 8 cm hemorrhage in the right posterior mesial temporal occipital region with surrounding vasogenic edema, stable   Few punctate foci of chronic microhemorrhage in the right frontoparietal region and in the cerebellar hemispheres   Findings may   be secondary to subacute and chronic septic emboli and/or mycotic aneurysms   No evidence of abscess  - MRA on 10/02 reveal NO evidence of mycotic aneurysms    - Neurology consulted on 10/2: "discussed with cardiothoracic surgery that patient is at high risk of intracranial bleed with initiation of heparin   This was also discussed with the patient, who voiced understanding of the risks   Ultimately, the decision to heparinize will be based on risk/benefit analysis by cardiothoracic surgery in regards to the necessity of patient's valve repair versus replacement"  - DVT PPX with heparin discontinued on 10/03  SCD in place for DVT PPX         Hypertension, sinus tachycardia  Stable condition  - blood pressure within acceptable parameters however in the last 24 hrs  - EKG on 10/02 reveals sinus tachycardia otherwise normal   Heart rate 100-120  Likely secondary to infection versus mitral regurg  -  will discontinue metoprolol and monitor heart rate        Anxiety, insomnia  Stable condition  -psychiatry progress note from  reviewed  -continue hydroxyzine 25 mg every 8 hours as needed for anxiety  -continue Lexapro, trazodone  -scheduled melatonin and Ambien p r n  nightly       Disposition:  CT surgery eval continues with mitral valve replacement scheduled tentatively for next week       Subjective:   No acute events overnight per patient or per nursing patient reports improved shortness of breath after his Lasix  No other complaints at this time  Vitals: Temp (24hrs), Av 6 °F (37 °C), Min:98 °F (36 7 °C), Max:99 3 °F (37 4 °C)  Current: Temperature: 98 3 °F (36 8 °C)  Vitals:    10/05/19 0359 10/05/19 0600 10/05/19 0745 10/05/19 1037   BP: 103/54  99/58 116/65   BP Location: Right arm  Right arm Right arm   Pulse: (!) 114  (!) 119 (!) 118   Resp: 16  18 18   Temp: 99 3 °F (37 4 °C)  98 6 °F (37 °C) 98 3 °F (36 8 °C)   TempSrc: Oral  Oral Oral   SpO2: 94%  95% 93%   Weight:  72 5 kg (159 lb 13 3 oz)     Height:        Body mass index is 28 31 kg/m²  I/O last 24 hours:   In: 800 [P O :800]  Out: 1400 [Urine:1400]      Physical Exam:   Physical Exam  GENERAL: Alert/oriented x3, NAD, Well developed, Well-nourished   HEENT:  NC/AT, PERRL, EOMI, no scleral icterus, MMM, Neck supple, No JVD  CARDIAC:  RRR,  normal S1/S2,  no m/r/g appreciated  PULMONARY:  non-labored breathing, good air movement all fields B/L, no wheezing/rales/rhonci  ABDOMEN:  Soft, NT/ND, +BS, no rebound/guarding/rigidity  Extremities:  2+ Pulses in DP/PT  No edema, cyanosis, or clubbing  NEUROLOGIC:  Alert/oriented x3  No motor or sensory deficits  SKIN:  No rashes or erythema    Invasive Devices     Peripheral Intravenous Line            Peripheral IV 10/04/19 Left less than 1 day                          Labs:   Recent Results (from the past 24 hour(s))   Basic metabolic panel    Collection Time: 10/05/19  5:13 AM   Result Value Ref Range    Sodium 141 136 - 145 mmol/L    Potassium 3 8 3 5 - 5 3 mmol/L    Chloride 105 100 - 108 mmol/L    CO2 27 21 - 32 mmol/L    ANION GAP 9 4 - 13 mmol/L    BUN 31 (H) 5 - 25 mg/dL    Creatinine 1 71 (H) 0 60 - 1 30 mg/dL    Glucose 180 (H) 65 - 140 mg/dL    Calcium 8 6 8 3 - 10 1 mg/dL    eGFR 49 ml/min/1 73sq m   CBC    Collection Time: 10/05/19  5:13 AM   Result Value Ref Range    WBC 8 97 4 31 - 10 16 Thousand/uL    RBC 2 79 (L) 3 88 - 5 62 Million/uL    Hemoglobin 7 9 (L) 12 0 - 17 0 g/dL    Hematocrit 25 5 (L) 36 5 - 49 3 %    MCV 91 82 - 98 fL    MCH 28 3 26 8 - 34 3 pg    MCHC 31 0 (L) 31 4 - 37 4 g/dL    RDW 20 0 (H) 11 6 - 15 1 %    Platelets 630 473 - 764 Thousands/uL    MPV 9 9 8 9 - 12 7 fL       Radiology Results: I have personally reviewed pertinent reports  Other Diagnostic Testing:   I have personally reviewed pertinent reports          Active Meds:   Current Facility-Administered Medications   Medication Dose Route Frequency    acetaminophen (TYLENOL) tablet 650 mg  650 mg Oral Q6H PRN    barium sulfate 2 1 % suspension 450 mL  450 mL Oral 90 min pre-procedure    cefTAZidime (FORTAZ) 2,000 mg in sodium chloride 0 9 % 50 mL IVPB  2,000 mg Intravenous Q8H    chlorhexidine (PERIDEX) 0 12 % oral rinse 15 mL  15 mL Swish [employer] Spit Q12H [address]    escitalopram (LEXAPRO) tablet 10 mg  10 mg Oral Daily    hydrOXYzine HCL (ATARAX) tablet 25 mg  25 mg Oral Q8H PRN    levofloxacin (LEVAQUIN) tablet 750 mg  750 mg Oral Q24H    melatonin tablet 3 mg  3 mg Oral HS    mupirocin (BACTROBAN) 2 % nasal ointment   Nasal Q12H [address]    ondansetron (ZOFRAN) injection 4 mg  4 mg Intravenous Q8H PRN    traZODone (DESYREL) tablet 100 mg  100 mg Oral HS    zolpidem (AMBIEN) tablet 5 mg  5 mg Oral HS PRN         VTE Pharmacologic Prophylaxis: Sequential compression device (Venodyne)   VTE Mechanical Prophylaxis: sequential compression device    Felix Vaz MD

## 2019-10-06 LAB
ANION GAP SERPL CALCULATED.3IONS-SCNC: 7 MMOL/L (ref 4–13)
BACTERIA BLD CULT: NORMAL
BACTERIA BLD CULT: NORMAL
BUN SERPL-MCNC: 29 MG/DL (ref 5–25)
CALCIUM SERPL-MCNC: 8.8 MG/DL (ref 8.3–10.1)
CHLORIDE SERPL-SCNC: 108 MMOL/L (ref 100–108)
CO2 SERPL-SCNC: 24 MMOL/L (ref 21–32)
CREAT SERPL-MCNC: 1.47 MG/DL (ref 0.6–1.3)
GFR SERPL CREATININE-BSD FRML MDRD: 59 ML/MIN/1.73SQ M
GLUCOSE SERPL-MCNC: 103 MG/DL (ref 65–140)
POTASSIUM SERPL-SCNC: 3.6 MMOL/L (ref 3.5–5.3)
SODIUM SERPL-SCNC: 139 MMOL/L (ref 136–145)

## 2019-10-06 PROCEDURE — 99232 SBSQ HOSP IP/OBS MODERATE 35: CPT | Performed by: INTERNAL MEDICINE

## 2019-10-06 PROCEDURE — 80048 BASIC METABOLIC PNL TOTAL CA: CPT | Performed by: INTERNAL MEDICINE

## 2019-10-06 PROCEDURE — 87040 BLOOD CULTURE FOR BACTERIA: CPT | Performed by: INTERNAL MEDICINE

## 2019-10-06 PROCEDURE — 99233 SBSQ HOSP IP/OBS HIGH 50: CPT | Performed by: HOSPITALIST

## 2019-10-06 RX ORDER — LORAZEPAM 0.5 MG/1
0.5 TABLET ORAL ONCE
Status: DISCONTINUED | OUTPATIENT
Start: 2019-10-06 | End: 2019-10-06

## 2019-10-06 RX ORDER — LORAZEPAM 1 MG/1
1 TABLET ORAL ONCE
Status: COMPLETED | OUTPATIENT
Start: 2019-10-06 | End: 2019-10-06

## 2019-10-06 RX ORDER — LORAZEPAM 0.5 MG/1
0.5 TABLET ORAL ONCE AS NEEDED
Status: COMPLETED | OUTPATIENT
Start: 2019-10-06 | End: 2019-10-06

## 2019-10-06 RX ADMIN — ACETAMINOPHEN 650 MG: 325 TABLET ORAL at 08:19

## 2019-10-06 RX ADMIN — ZOLPIDEM TARTRATE 5 MG: 5 TABLET, FILM COATED ORAL at 21:24

## 2019-10-06 RX ADMIN — CEFTAZIDIME 2000 MG: 1 INJECTION, POWDER, FOR SOLUTION INTRAMUSCULAR; INTRAVENOUS at 20:07

## 2019-10-06 RX ADMIN — METOPROLOL TARTRATE 25 MG: 25 TABLET, FILM COATED ORAL at 10:37

## 2019-10-06 RX ADMIN — LEVOFLOXACIN 750 MG: 750 TABLET, FILM COATED ORAL at 05:54

## 2019-10-06 RX ADMIN — Medication 1 APPLICATION: at 08:19

## 2019-10-06 RX ADMIN — ACETAMINOPHEN 650 MG: 325 TABLET ORAL at 16:22

## 2019-10-06 RX ADMIN — TRAZODONE HYDROCHLORIDE 100 MG: 100 TABLET ORAL at 21:21

## 2019-10-06 RX ADMIN — HYDROXYZINE HYDROCHLORIDE 25 MG: 25 TABLET ORAL at 13:32

## 2019-10-06 RX ADMIN — MELATONIN 3 MG: 3 TAB ORAL at 21:21

## 2019-10-06 RX ADMIN — CHLORHEXIDINE GLUCONATE 0.12% ORAL RINSE 15 ML: 1.2 LIQUID ORAL at 08:19

## 2019-10-06 RX ADMIN — Medication 1 APPLICATION: at 20:07

## 2019-10-06 RX ADMIN — METOPROLOL TARTRATE 25 MG: 25 TABLET, FILM COATED ORAL at 20:07

## 2019-10-06 RX ADMIN — LORAZEPAM 1 MG: 1 TABLET ORAL at 17:10

## 2019-10-06 RX ADMIN — CEFTAZIDIME 2000 MG: 1 INJECTION, POWDER, FOR SOLUTION INTRAMUSCULAR; INTRAVENOUS at 03:09

## 2019-10-06 RX ADMIN — LORAZEPAM 0.5 MG: 0.5 TABLET ORAL at 15:07

## 2019-10-06 RX ADMIN — ESCITALOPRAM OXALATE 10 MG: 10 TABLET ORAL at 08:19

## 2019-10-06 RX ADMIN — CHLORHEXIDINE GLUCONATE 0.12% ORAL RINSE 15 ML: 1.2 LIQUID ORAL at 20:07

## 2019-10-06 RX ADMIN — CEFTAZIDIME 2000 MG: 1 INJECTION, POWDER, FOR SOLUTION INTRAMUSCULAR; INTRAVENOUS at 13:31

## 2019-10-06 NOTE — PROGRESS NOTES
INTERNAL MEDICINE RESIDENCY PROGRESS NOTE     PATIENT INFORMATION     Name: Nadir Amos   Age & Sex: 44 y o  male   MRN: [id number]    Unit/Bed#: 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  Pt presents as a transfer from [facility] on 9/30 for evaluation by cardiothoracic surgery for mitral valve replacement   As per chart review patient Presented to [facility] on 09/06 with complaints of fever, nausea and vomiting x1 week   Last reported IVD use 5 days before arrival to [facility]  patient has a history of IV drug use and was previously treated for MSSA mitral valve endocarditis and bacteremia in 3/2019   Discharged from [facility] on 03/21/19 after completed 6 week course of IV cefazolin   - BILLY on 6/2019 showed echodensity on posterior age for aspect of mitral valve measuring 18 mm to 20 mm with mitral valve regurgitation and mild tricuspid regurgitation  - 2D echo on 9/6/2019 showed echodensity on the posterior mitral leaflet, possibly representing residual vegetation from prior treated endocarditis   Follow-up BILLY suggestive of increasing size of mitral valve vegetation compared to prior study in June of 2019 and also evidence of new vegetation on the anterior leaflet  -BILLY 9/29/2019 - bulbous echodensity on the anterior mitral leaflet measuring 8 x 13 mm   Filament to read echo density in the posterior mitral leaflet measuring 11 mm   Compared to echo done on 9/23, appears to be no significant change    -Blood cultures on 09/06, 9/8, 9/10, 9/11 demonstrate Pseudomonas aeruginosa  -repeat cultures from 09/13 and 9/14 negative  - blood cultures 9/28 reported to be positive again for Pseudomonas  -patient appears to have been on IV ceftazidime, levofloxacin 750 mg every 48 hours renally dose adjusted - for dual anti pseudomonal coverage  -patient apparently had previously been on cefepime, switched to ceftazidime due to side effects of vomiting diarrhea  -patient had previously been on gentamicin which was discontinued on 09/22/2019, noted to likely be contributing to acute kidney injury  - culture repeated on 10/01 upon admission-  no growth at 48 hours documented on 10/03  - CT surgery consulted on 10/1 for MV repair/replacement  Work up for surgery initiated and tentatively scheduled for next week    - ID consulted on 10/01  Recommended to continue dual anti pseudomonal coverage with IV ceftazidime 2g IV q12 and levofloxacin 500mg PO 24hrs renally dose for 6 weeks    - CT abdomen/Pelvis on 10/3 did not reveal evidence of abscess  - right upper quadrant ultrasound on 10/03 revealed normal appearance of the liver with mild splenomegaly  - cardiac diet with strict fluids and low-sodium in place  - will continue to monitor on telemetry     Severe mitral regurgitation  -most recent TTE from 09/30/2019 shows EF 70%, severe mitral regurgitation posteriorly directed, mitral valve vegetation as above  - CXR on 10/01 revealed pulmonary edema likely related to severe MV regurgitation  Will hold diuresis for now since he appears clinically euvolemic  Diuresed yesterday with improvement of SOB    - BILLY 10/2 reveal ejection fraction at 60% for no wall motion abnormalities, mildly to moderately reduced systolic function of the right ventricle   Left atrium mildly dilated   Mitral valve with multiple large and mobile vegetations and the trigger aspect of both leaflets with large perforations at the base of the anterior leaflets and wide-open mitral regurgitation   See report for full details    - CT surgery will perform mitral valve replacement tentatively scheduled for next week        Bilateral pleural effusions  Resolved after thoracentesis on 10/02  However appear to be recurring likely secondary to severe mitral valve regurg  - patient presents with complaints of shortness of breath and mild tachypnea however states that is better since thoracentesis on 10/02  - thoracentesis on 10/02 by IR with 1000 mL removed from the right hemithorax and 700 mL from the left hemithorax  - patient noted to be to tachypnic today exam  - chest x-ray on 10/3 demonstrated pulmonary congestion   - status post IV diuresis yesterday  Patient reports improvement of shortness of breath since then         JULIENNE on CKD and congenital solitary left kidney   Improving  Likely secondary to ATN from aminoglycoside with prerenal component per last nephrology progress note at [facility]  -baseline creatinine unknown  [address] creatinine 1 49 at [facility], peaked at 2 20      -CT at [facility] shows right kidney congenitally absent with left kidney showing compensatory hypertrophy and 1 small likely infarct in the lower cortex unchanged from previous study, not associated with perinephric fluid or abscess or pyelonephritis  -Cr improved overall with mild bump to 1 7 after Lasix  Today at 1 47    - continue to avoid nephrotoxins  - Nephrology consulted on on board       Right Parietal Occipital Lesion  Stable   Likely septic embolus versus abscess given history of IVDU and pseudomonal MV endocarditis   Noted on CT head 10/1: 1 8 cm x 0 9 x 0 8 lesion in the right parieto-occipital region with surrounding edema  - MRI on 10/02: "Subacute 1 8 cm hemorrhage in the right posterior mesial temporal occipital region with surrounding vasogenic edema, stable   Few punctate foci of chronic microhemorrhage in the right frontoparietal region and in the cerebellar hemispheres   Findings may   be secondary to subacute and chronic septic emboli and/or mycotic aneurysms   No evidence of abscess  - MRA on 10/02 reveal NO evidence of mycotic aneurysms  - Neurology consulted on 10/2: "discussed with cardiothoracic surgery that patient is at high risk of intracranial bleed with initiation of heparin   This was also discussed with the patient, who voiced understanding of the risks   Ultimately, the decision to heparinize will be based on risk/benefit analysis by cardiothoracic surgery in regards to the necessity of patient's valve repair versus replacement"  - DVT PPX with heparin discontinued on 10/03  SCD in place for DVT PPX         Hypertension, sinus tachycardia  Stable condition  - blood pressure within acceptable parameters however in the last 24 hrs  - EKG on 10/02 reveals sinus tachycardia otherwise normal   Heart rate 100-120  Likely secondary to infection versus mitral regurg  -  will discontinue metoprolol and monitor heart rate        Anxiety, insomnia  Stable condition  -psychiatry progress note from 09/27 reviewed  -continue hydroxyzine 25 mg every 8 hours as needed for anxiety  -continue Lexapro, trazodone  -scheduled melatonin and Ambien p r n  nightly       VTE Mechanical Prophylaxis: sequential compression device    Disposition:  Continues to require inpatient care secondary to planned mitral valve replacement by cardiac surgery next week    SUBJECTIVE     Patient seen and examined this morning while sitting on bedside chair  No acute events overnight  Patient reported having an episode of emesis early in the morning after he woke up does different from the was sputum that he usually has at the wakes up  Continues to present complaints of unchanged shortness of breath  Denies h/a, light-headness, diaphoresis, chills, chest tightness, cough, CP, palpitations, abdominal pain, N/V, diarrhea, constipation, lower urinary symptoms     OBJECTIVE     Vitals:    10/06/19 0307 10/06/19 0704 10/06/19 1035 10/06/19 1040   BP: 119/64 122/71 107/68    BP Location: Left arm Right arm Right arm    Pulse: (!) 116 (!) 127 (!) 123    Resp: 18 18 18    Temp: 98 9 °F (37 2 °C) 98 1 °F (36 7 °C) 98 3 °F (36 8 °C)    TempSrc: Oral Oral Oral    SpO2: 100% 95%  95%   Weight: 72 6 kg (160 lb 0 9 oz)      Height:          Temperature:   Temp (24hrs), Av 6 °F (37 °C), Min:98 1 °F (36 7 °C), Max:99 1 °F (37 3 °C)    Temperature: 98 3 °F (36 8 °C)  Intake & Output:  I/O       10/04 0701 - 10/05 0700 10/05 0701 - 10/06 0700 10/06 0701 - 10/07 07    P  O  560 1440     IV Piggyback  50     Total Intake(mL/kg) 560 (7 7) 1490 (20 5)     Urine (mL/kg/hr) 1400 (0 8) 0 (0)     Total Output 1400 0     Net -840 +1490                Weights:   IBW: 56 9 kg    Body mass index is 28 35 kg/m²  Weight (last 2 days)     Date/Time   Weight    10/06/19 0307   72 6 (160 05)    10/05/19 0600   72 5 (159 83)    10/04/19 1428   72 1 (158 95)            Physical Exam  GENERAL: Alert/oriented x3, NAD, Well developed, Well-nourished   HEENT:  NC/AT, PERRL, EOMI, no scleral icterus, MMM, Neck supple, No JVD  CARDIAC:  RRR,  normal S1/S2,  no m/r/g appreciated  PULMONARY:  non-labored breathing, good air movement all fields B/L, mild wheezing noted on bilateral lower fields  ABDOMEN:  Soft, NT/ND, +BS, no rebound/guarding/rigidity  Extremities:  2+ Pulses in DP/PT  No edema, cyanosis, or clubbing  NEUROLOGIC:  Alert/oriented x3  No motor or sensory deficits  SKIN:  No rashes or erythema  LABORATORY DATA     Labs: I have personally reviewed pertinent reports      Results from last 7 days   Lab Units 10/05/19  0513 10/04/19  0507 10/03/19  0634 10/02/19  0629 10/01/19  1023   WBC Thousand/uL 8 97 9 88 11 52* 9 89 15 11*   HEMOGLOBIN g/dL 7 9* 7 1* 7 8* 7 8* 8 0*   HEMATOCRIT % 25 5* 22 8* 25 0* 25 0* 24 9*   PLATELETS Thousands/uL 225 226 267 261 312   NEUTROS PCT %  --  85*  --  80* 83*   MONOS PCT %  --  4  --  5 4      Results from last 7 days   Lab Units 10/06/19  0613 10/05/19  0513 10/04/19  0507  10/01/19  1800 POTASSIUM mmol/L 3 6 3 8 3 6   < > 3 8   CHLORIDE mmol/L 108 105 108   < > 106   CO2 mmol/L 24 27 25   < > 24   BUN mg/dL 29* 31* 29*   < > 37*   CREATININE mg/dL 1 47* 1 71* 1 51*   < > 1 86*   CALCIUM mg/dL 8 8 8 6 8 4   < > 9 1   ALK PHOS U/L  --   --  89  --  107   ALT U/L  --   --  12  --  22   AST U/L  --   --  8  --  11    < > = values in this interval not displayed  Results from last 7 days   Lab Units 10/03/19  0634 10/01/19  1800   INR   --  1 20*   PTT seconds 27  --                IMAGING & DIAGNOSTIC TESTING     Radiology Results: I have personally reviewed pertinent reports  Ct Abdomen Pelvis Wo Contrast    Result Date: 10/3/2019  Impression: Mild to moderate pleural effusions are seen at the bases  There is a unilateral left kidney without hydronephrosis  No ascites or obvious intra-abdominal collection Immediate finding was noted on the Epic system  Workstation performed: [id number]     Xr Mandible Panorex ([place] Only)    Result Date: 10/2/2019  Impression: No periapical lucencies identified  Workstation performed: [id number]     Xr Chest Portable    Result Date: 10/4/2019  Impression: Stable chest with moderate pulmonary vascular congestion  Workstation performed: [id number]     Xr Chest Portable    Result Date: 10/1/2019  Impression: New airspace opacity suggests pulmonary edema or diffuse bronchopneumonia  Immediate report was noted on the Epic system  Workstation performed: [id number]     Ct Head Wo Contrast    Addendum Date: 10/1/2019    ADDENDUM: This addendum is for the purpose of clarification: Abnormality described represents either a septic embolus, or abscess  MRI is recommended for differentiation  I personally discussed this addendum with Napoleon Carmona on 10/1/2019 at 7:50 PM     Result Date: 10/1/2019  Impression: Right parieto-occipital lesion, given history this likely represents a septic embolus/abscess   I personally discussed this study  with Mary Pendleton Vero Jimenezyareli on 10/1/2019 at 5:24 PM   Workstation performed: [id number]     Ct Chest Wo Contrast    Result Date: 10/1/2019  Impression: 1  Diffuse groundglass opacities consistent with pulmonary edema 2  More focal patchy opacities in left upper lobe, while this may  still represent edema, pneumonia would have a similar appearance 3  Moderate pleural effusions 4  Subcarinal lymphadenopathy 5  Wedge-shaped hypodensity within the spleen, likely infarct The study was marked in EPIC for immediate notification  Workstation performed: [id number]     Mra Head Wo Contrast    Result Date: 10/2/2019  Impression: 1  No evidence of mycotic aneurysm; however, exam sensitivity is limited by resolution and incomplete imaging of the distal branches  Catheter angiography may be helpful if there is persistent concern for mycotic aneurysm  2   No stenosis or occlusion of the major vessels of the [place]  Workstation performed: [id number]     Mra Carotids Wo Contrast    Result Date: 10/3/2019  Impression: Unremarkable MR angiogram of the cervical vasculature  Workstation performed: [id number]     Mri Brain Wo Contrast    Result Date: 10/2/2019  Impression: Subacute 1 8 cm hemorrhage in the right posterior mesial temporal occipital region with surrounding vasogenic edema, stable  Few punctate foci of chronic microhemorrhage in the right frontoparietal region and in the cerebellar hemispheres  Findings may  be secondary to subacute and chronic septic emboli and/or mycotic aneurysms  No evidence of abscess  Workstation performed: [id number]     Ir Thoracentesis    Result Date: 10/2/2019  Impression: Successful bilateral thoracentesis  _________________________________________________________________ COMPARISON: None PROCEDURE DETAILS: Operators: Dr Taye Harden, [address] [place] [address] attending, performed the procedure  Anesthesia: 1% lidocaine was injected in the skin and subcutaneous tissues overlying the access site   Medications: 1% lidocaine Contrast: None Fluoroscopy time: None COMMENTS: Following the discussion of the risks, benefits and alternatives to the procedure, written informed consent was obtained from the patient  The patient was placed in a sitting position  A preprocedure timeout was performed per [facility] protocol  The right back was prepped and draped in the usual sterile fashion  After local anesthesia was administered, a 5-[employer] catheter was advanced under continuous ultrasound guidance into the right pleural space  1000 mL of vidya fluid was obtained  After the procedure, the catheter was removed, the skin was cleansed and sterile dressings were applied  Aforementioned procedure was then performed for left-sided thoracentesis  The patient tolerated the procedure well and there were no immediate postprocedure complications  Workstation performed: [id number]     Us Right Upper Quadrant With Liver Dopplers    Result Date: 10/4/2019  Impression: 1  Normal sonographic appearance of the liver  2   Absent right kidney  3   Mild splenomegaly  4   Incompletely visualized bilateral pleural effusions  Workstation performed: [id number]     Other Diagnostic Testing: I have personally reviewed pertinent reports        ACTIVE MEDICATIONS     Current Facility-Administered Medications   Medication Dose Route Frequency    acetaminophen (TYLENOL) tablet 650 mg  650 mg Oral Q6H PRN    barium sulfate 2 1 % suspension 450 mL  450 mL Oral 90 min pre-procedure    cefTAZidime (FORTAZ) 2,000 mg in sodium chloride 0 9 % 50 mL IVPB  2,000 mg Intravenous Q8H    chlorhexidine (PERIDEX) 0 12 % oral rinse 15 mL  15 mL Swish & Spit Q12H [address]    escitalopram (LEXAPRO) tablet 10 mg  10 mg Oral Daily    hydrOXYzine HCL (ATARAX) tablet 25 mg  25 mg Oral Q8H PRN    levofloxacin (LEVAQUIN) tablet 750 mg  750 mg Oral Q24H    LORazepam (ATIVAN) tablet 0 5 mg  0 5 mg Oral Once PRN    melatonin tablet 3 mg  3 mg Oral HS    metoprolol tartrate (LOPRESSOR) tablet 25 mg  25 mg Oral Q12H [facility]    mupirocin (BACTROBAN) 2 % nasal ointment   Nasal Q12H [facility]    ondansetron (ZOFRAN) injection 4 mg  4 mg Intravenous Q8H PRN    traZODone (DESYREL) tablet 100 mg  100 mg Oral HS    zolpidem (AMBIEN) tablet 5 mg  5 mg Oral HS PRN     ==  Aiden Inman MD  Internal Medicine Residency, PGY-1  [address] [facility]

## 2019-10-06 NOTE — PROGRESS NOTES
NEPHROLOGY PROGRESS NOTE   Unknown Michael 44 y o  male MRN: [id number]  Unit/Bed#: [facility] 421-01 Encounter: [id number]  Reason for Consult:  JULIENNE    ASSESSMENT AND PLAN:  Patient is 59-year-old male with hypertension for 10 years as per patient, prior history of hep C, IV drug abuse with heroin, history of mitral valve endocarditis with MSSA bacteremia treated earlier this year, was admitted to 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, peaked at 2 2    -now seems to be fluctuating in mid one range, creatinine 1 4 today improved from 1 7 yesterday    -status post contrast exposure with cardiac catheterization on 10/2/19   -BMP in a m   -JULIENNE suspected to be secondary to prerenal/ATN/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 all in the setting of solitary kidney  -also was found to have small renal infarct from prior endocarditis  -recent workup includes:  -urinalysis this admission shows 4 to 10 RBCs, 4 to 10 WBCs, 1+ proteinuria, very concentrated sample with 10 to 25 hyaline cast   -CT scan showed congenitally absent right kidney with left kidney showing compensatory hypertrophy, one small likely infarct in the lower pole of left kidney unchanged from the previous study and not associated with any abscess for pyelonephritis  - February 2019: positive hep C antibody with hep C RNA PCR less than 15  -complements normal, CK normal,  -avoid nephrotoxins or NSAIDs  -urine eosinophils 0%, no peripheral eosinophilia     Congenital solitary kidney     Hypertension  -blood pressure acceptable with occasional lower readings      Anemia  -closely monitor hemoglobin, transfuse p r n  For hemoglobin less than seven  -low iron stores recently although avoiding IV Venofer due to active infection issues  -recent FOBT was negative at [address] mitral valve endocarditis/bacteremia  -antibiotic as per primary team and ID  -patient is active IV drug user and had prior history of MSSA bacteremia with endocarditis in March 2019  -BILLY in September 2019 showed echodensity on the anterior mitral leaflet      Pseudomonas bacteremia, blood culture from 9/28/19 showed Pseudomonas  Repeat culture negative so far     Bilateral pleural effusion, status post thoracentesis on 10/2/19  -currently remains on room air  -avoiding further diuretics  Can give p r n  If has worsening respiratory status      CT scan suggestive of pulmonary congestion versus suspicion for pneumonia, pleural effusions, splenic infarct  Septic embolus/abscess on CT scan of brain     Discussed above plan in detail with primary team    SUBJECTIVE:  Patient seen and examined at bedside  Denies any worsening shortness of breath  He had episode of vomiting today morning  No chest pain, abdominal pain or diarrhea  No urinary complaints       OBJECTIVE:  Current Weight: Weight - Scale: 72 6 kg (160 lb 0 9 oz)  Vitals:    10/06/19 0704   BP: 122/71   Pulse: (!) 127   Resp: 18   Temp: 98 1 °F (36 7 °C)   SpO2: 95%       Intake/Output Summary (Last 24 hours) at 10/6/2019 0806  Last data filed at 10/6/2019 0505  Gross per 24 hour   Intake 1490 ml   Output 0 ml   Net 1490 ml     Wt Readings from Last 3 Encounters:   10/06/19 72 6 kg (160 lb 0 9 oz)   10/02/19 72 6 kg (160 lb)     Temp Readings from Last 3 Encounters:   10/06/19 98 1 °F (36 7 °C) (Oral)     BP Readings from Last 3 Encounters:   10/06/19 122/71     Pulse Readings from Last 3 Encounters:   10/06/19 (!) 127        Physical Examination:  General:  Sitting in chair, no acute distress   Eyes:  Mild conjunctival pallor present  ENT:  External examination of ears and nose unremarkable  Neck:  Supple  Respiratory:  Bilateral air entry present  CVS:  S1, S2 present  GI:  Soft, nontender, nondistended  CNS:  Active alert oriented x3  Extremities:  No Significant edema in legs  Skin:  No  New rash in legs    Medications:    Current Facility-Administered Medications:     acetaminophen (TYLENOL) tablet 650 mg, 650 mg, Oral, Q6H PRN, Jason Rahman MD, 650 mg at 10/05/19 2152    barium sulfate 2 1 % suspension 450 mL, 450 mL, Oral, 90 min pre-procedure, Abby Guajardo MD, 450 mL at 10/03/19 1530    cefTAZidime (FORTAZ) 2,000 mg in sodium chloride 0 9 % 50 mL IVPB, 2,000 mg, Intravenous, Q8H, Laila Greenwood MD, Last Rate: 100 mL/hr at 10/06/19 0309, 2,000 mg at 10/06/19 0309    chlorhexidine (PERIDEX) 0 12 % oral rinse 15 mL, 15 mL, Swish & Spit, Q12H [address], Estela Velazco PA-C, 15 mL at 10/05/19 2146    escitalopram (LEXAPRO) tablet 10 mg, 10 mg, Oral, Daily, Max Lonnie [place], DO, 10 mg at 10/05/19 0812    hydrOXYzine HCL (ATARAX) tablet 25 mg, 25 mg, Oral, Q8H PRN, Max Lonnie [place], DO, 25 mg at 10/01/19 1834    levofloxacin (LEVAQUIN) tablet 750 mg, 750 mg, Oral, Q24H, Laila Greenwood MD, 750 mg at 10/06/19 0554    melatonin tablet 3 mg, 3 mg, Oral, HS, Max Widawski, DO, 3 mg at 10/05/19 2147    mupirocin (BACTROBAN) 2 % nasal ointment, , Nasal, Q12H [address], Estela Velazco PA-C, 1 application at 93/75/09 2146    ondansetron (ZOFRAN) injection 4 mg, 4 mg, Intravenous, Q8H PRN, Gabe Dietz MD, 4 mg at 10/04/19 1734    traZODone (DESYREL) tablet 100 mg, 100 mg, Oral, HS, Nesbit Zackary, DO, 100 mg at 10/05/19 2147    zolpidem (AMBIEN) tablet 5 mg, 5 mg, Oral, HS PRN, Max Lonnie [place], DO, 5 mg at 10/05/19 2153    Laboratory Results:  Results from last 7 days   Lab Units 10/06/19  0613 10/05/19  0513 10/04/19  0507 10/03/19  0634 10/02/19  0629 10/01/19  1800 10/01/19  1023   WBC Thousand/uL  --  8 97 9 88 11 52* 9 89  --  15 11*   HEMOGLOBIN g/dL  --  7 9* 7 1* 7 8* 7 8* --  8 0*   HEMATOCRIT %  --  25 5* 22 8* 25 0* 25 0*  --  24 9*   PLATELETS Thousands/uL  --  225 226 267 261  --  312   SODIUM mmol/L 139 141 141 141  --  141 138   POTASSIUM mmol/L 3 6 3 8 3 6 4 2  --  3 8 4 1   CHLORIDE mmol/L 108 105 108 110*  --  106 105   CO2 mmol/L 24 27 25 25  --  24 25   BUN mg/dL 29* 31* 29* 32*  --  37* 39*   CREATININE mg/dL 1 47* 1 71* 1 51* 1 57*  --  1 86* 1 76*   CALCIUM mg/dL 8 8 8 6 8 4 8 5  --  9 1 8 8       CT abdomen pelvis wo contrast   Final Result by Bruno Santizo MD (10/03 1935)      Mild to moderate pleural effusions are seen at the bases  There is a unilateral left kidney without hydronephrosis  No ascites or obvious intra-abdominal collection      Immediate finding was noted on the Epic system  Workstation performed: [id number]         XR chest portable   Final Result by Fernando De La Garza DO (10/04 0756)      Stable chest with moderate pulmonary vascular congestion  Workstation performed: [id number]         US right upper quadrant with liver dopplers   Final Result by Mandi Short DO (10/04 0177)   1  Normal sonographic appearance of the liver  2   Absent right kidney  3   Mild splenomegaly  4   Incompletely visualized bilateral pleural effusions  Workstation performed: [id number]         MRA carotids wo contrast   Final Result by Fernando De La Garza DO (10/03 0737)      Unremarkable MR angiogram of the cervical vasculature  Workstation performed: [id number]         MRA head wo contrast   Final Result by Sandee Boston MD (10/02 2138)         1  No evidence of mycotic aneurysm; however, exam sensitivity is limited by resolution and incomplete imaging of the distal branches  Catheter angiography may be helpful if there is persistent concern for mycotic aneurysm  2   No stenosis or occlusion of the major vessels of the [place]                 Workstation performed: [id number]         MRI brain wo contrast   Final Result by Marah Stephens MD (10/02 2133)      Subacute 1 8 cm hemorrhage in the right posterior mesial temporal occipital region with surrounding vasogenic edema, stable  Few punctate foci of chronic microhemorrhage in the right frontoparietal region and in the cerebellar hemispheres  Findings may    be secondary to subacute and chronic septic emboli and/or mycotic aneurysms  No evidence of abscess  Workstation performed: [id number]         IR thoracentesis   Final Result by Cari Rodriguez DO (10/02 1547)   Successful bilateral thoracentesis  _________________________________________________________________   COMPARISON: None      PROCEDURE DETAILS:    Operators: Dr Elda Christie, [address] [place] [address] attending, performed the procedure  Anesthesia: 1% lidocaine was injected in the skin and subcutaneous tissues overlying the access site  Medications: 1% lidocaine   Contrast: None   Fluoroscopy time: None      COMMENTS:   Following the discussion of the risks, benefits and alternatives to the procedure, written informed consent was obtained from the patient  The patient was placed in a sitting position  A preprocedure timeout was performed per [facility] protocol  The    right back was prepped and draped in the usual sterile fashion  After local anesthesia was administered, a 5-Sinhala Yueh catheter was advanced under continuous ultrasound guidance into the right pleural space  1000 mL of vidya fluid was obtained  After the procedure, the catheter was removed, the skin was cleansed    and sterile dressings were applied  Aforementioned procedure was then performed for left-sided thoracentesis  The patient tolerated the procedure well and there were no immediate postprocedure complications  Workstation performed: [id number]         XR mandible panorex [facility] only)   Final Result by Yamilet Ruth MD (10/02 1452)      No periapical lucencies identified  Workstation performed: [id number]         CT chest wo contrast   Final Result by Sherryle Rote, MD (10/01 1703)      1  Diffuse groundglass opacities consistent with pulmonary edema   2  More focal patchy opacities in left upper lobe, while this may  still represent edema, pneumonia would have a similar appearance   3  Moderate pleural effusions   4  Subcarinal lymphadenopathy   5  Wedge-shaped hypodensity within the spleen, likely infarct      The study was marked in EPIC for immediate notification  Workstation performed: [id number]         CT head wo contrast   Final Result by  (10/06 2068)   Addendum 1 of 1 by Sherryle Rote, MD (10/01 1951)   ADDENDUM:      This addendum is for the purpose of clarification:   Abnormality described represents either a septic embolus, or abscess  MRI    is recommended for differentiation  I personally discussed this addendum with Bridgette Lamont on 10/1/2019 at    7:50 PM       Final      VAS carotid complete study   Final Result by Traci Patel MD (10/01 1801)      XR chest portable   Final Result by Mohan Hu MD (10/01 1324)      New airspace opacity suggests pulmonary edema or diffuse bronchopneumonia  Immediate report was noted on the Epic system  Workstation performed: [id number]             Portions of the record may have been created with voice recognition software  Occasional wrong word or "sound a like" substitutions may have occurred due to the inherent limitations of voice recognition software  Read the chart carefully and recognize, using context, where substitutions have occurred

## 2019-10-06 NOTE — PROGRESS NOTES
Cardiology Progress Note - Miley Bartholomew 44 y o  male MRN: [id number]    Unit/Bed#: [facility] 421-01 Encounter: [id number]      Assessment:  Principal Problem:    Endocarditis  Active Problems:    History of intravenous drug abuse ([facility] [address] )    Acute kidney injury ([facility] [address] )    Insomnia    Tachycardia    Nonrheumatic mitral valve regurgitation    Bacteremia    Anxiety    Solitary left kidney      Plan:  Patient is comfortable this morning  He has no chest pain or significant dyspnea  He is in sinus tachycardia on telemetry  Will add low-dose metoprolol  He awaits cardiac surgery  Plastic surgery note appreciated  BMP today with potassium of 3 6 and creatinine of 1 47  Nephrology notes are appreciated  Subjective:   Patient seen and examined  No significant events overnight   negative  Objective:     Vitals: Blood pressure 122/71, pulse (!) 127, temperature 98 1 °F (36 7 °C), temperature source Oral, resp  rate 18, height 5' 3" (1 6 m), weight 72 6 kg (160 lb 0 9 oz), SpO2 95 %  , Body mass index is 28 35 kg/m² ,   Orthostatic Blood Pressures      Most Recent Value   Blood Pressure  122/71 [Map 90] filed at 10/06/2019 0704   Patient Position - Orthostatic VS  Sitting filed at 10/06/2019 0704      ,      Intake/Output Summary (Last 24 hours) at 10/6/2019 0911  Last data filed at 10/6/2019 0505  Gross per 24 hour   Intake 1250 ml   Output 0 ml   Net 1250 ml       No significant arrhythmias seen on telemetry review    Sinus tachycardia      Physical Exam:    GEN: Miley Bartholomew appears well, alert and oriented x 3, pleasant and cooperative   NECK: supple, no carotid bruits, no JVD or HJR  HEART: normal rate, regular rhythm, normal S1 and S2, systolic murmur  LUNGS: clear to auscultation bilaterally; no wheezes, rales, or rhonchi   ABDOMEN: normal bowel sounds, soft, no tenderness, no distention  EXTREMITIES: peripheral pulses normal; no clubbing, cyanosis, or edema  SKIN: warm and well perfused, no suspicious lesions on exposed skin    Labs & Results:    No results displayed because visit has over 200 results  Ct Abdomen Pelvis Wo Contrast    Result Date: 10/3/2019  Narrative: CT ABDOMEN AND PELVIS WITHOUT IV CONTRAST INDICATION:   r/o abscess  Pseudomonas bacteremia COMPARISON:  Ultrasound from today TECHNIQUE:  CT examination of the abdomen and pelvis was performed without intravenous contrast   Axial, sagittal, and coronal 2D reformatted images were created from the source data and submitted for interpretation  Some respiratory artifacts limit the  examination  Patient declined oral contrast  Radiation dose length product (DLP) for this visit:  710 mGy-cm   This examination, like all CT scans performed in the [facility], was performed utilizing techniques to minimize radiation dose exposure, including the use of iterative reconstruction and automated exposure control  Limited enteric contrast was administered  FINDINGS: ABDOMEN LOWER CHEST:  Mild to moderate effusions are seen at the bases with minimal adjacent atelectasis  LIVER/BILIARY TREE:  Unremarkable  GALLBLADDER:  No calcified gallstones  No pericholecystic inflammatory change  SPLEEN:  Unremarkable  PANCREAS:  Unremarkable  ADRENAL GLANDS:  Unremarkable  KIDNEYS/URETERS:  The right kidney appears absent  No surgical sutures are seen  The left kidney is prominent probably related to compensatory hypertrophy  No hydronephrosis  STOMACH AND BOWEL:  Slight increased density is seen with debris within the gastric lumen which is distended and may be related to a small amount of oral contrast   Some oral contrast appears to be within a few loops of small bowel in the pelvis as well as the cecum  No small bowel dilatation is seen  A few sigmoid diverticula are noted  APPENDIX:  No findings to suggest appendicitis  ABDOMINOPELVIC CAVITY:  No ascites or free intraperitoneal air  No lymphadenopathy    No obvious intra-abdominal collection can be seen on this unenhanced examination  VESSELS:  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 which merges with a right-sided IVC which is continuous with the right iliac  PELVIS REPRODUCTIVE ORGANS:  Unremarkable for patient's age  URINARY BLADDER:  Unremarkable  ABDOMINAL WALL/INGUINAL REGIONS:  Unremarkable  OSSEOUS STRUCTURES:  No acute fracture or destructive osseous lesion  The vertebral endplates appear unremarkable  Impression: Mild to moderate pleural effusions are seen at the bases  There is a unilateral left kidney without hydronephrosis  No ascites or obvious intra-abdominal collection Immediate finding was noted on the Epic system  Workstation performed: [id number]     Xr Mandible Panorex ([place] Only)    Result Date: 10/2/2019  Narrative: MANDIBLE - PANOREX INDICATION:   pre op MV replacement  COMPARISON:  None VIEWS:  XR MANDIBLE PANOREX ([place] ONLY) FINDINGS: Tooth 16 is impacted  Tooth 29 is missing  No periapical lucencies identified  There is no fracture or pathologic bone lesion  The temporomandibular joints appear grossly intact  Impression: No periapical lucencies identified  Workstation performed: [id number]     Xr Chest Portable    Result Date: 10/4/2019  Narrative: CHEST INDICATION:   SOB  COMPARISON:  October 1, 2019 EXAM PERFORMED/VIEWS:  XR CHEST PORTABLE FINDINGS: Cardiomediastinal silhouette appears unremarkable  Moderate pulmonary vascular congestion  Osseous structures appear within normal limits for patient age  Impression: Stable chest with moderate pulmonary vascular congestion  Workstation performed: [id number]     Xr Chest Portable    Result Date: 10/1/2019  Narrative: CHEST INDICATION:   Shortness of breath  COMPARISON:  6/25/2013 EXAM PERFORMED/VIEWS:  XR CHEST PORTABLE  11:23 AM FINDINGS: Cardiomediastinal silhouette appears unremarkable  The lungs are clear    No pneumothorax or pleural effusion  There is no pneumothorax  The costophrenic angles are clear  There is hypoventilation present  Osseous structures appear within normal limits for patient age  Impression: New airspace opacity suggests pulmonary edema or diffuse bronchopneumonia  Immediate report was noted on the Epic system  Workstation performed: [id number]     Ct Head Wo Contrast    Addendum Date: 10/1/2019 Addendum:   ADDENDUM: This addendum is for the purpose of clarification: Abnormality described represents either a septic embolus, or abscess  MRI is recommended for differentiation  I personally discussed this addendum with Hemal Vazquez on 10/1/2019 at 7:50 PM     Result Date: 10/1/2019  Narrative: CT BRAIN - WITHOUT CONTRAST INDICATION:   mv endocarditis  COMPARISON:  None  TECHNIQUE:  CT examination of the brain was performed  In addition to axial images, coronal 2D reformatted images were created and submitted for interpretation  Radiation dose length product (DLP) for this visit:  1028 mGy-cm   This examination, like all CT scans performed in the [facility], was performed utilizing techniques to minimize radiation dose exposure, including the use of iterative reconstruction and automated exposure control  IMAGE QUALITY:  Diagnostic  FINDINGS: PARENCHYMA:  There is a 1 8 cm x 0 9 x 0 8 lesion in the right parieto-occipital region with surrounding edema as seen on series 2/18 Otherwise gray-white differentiation is maintained VENTRICLES AND EXTRA-AXIAL SPACES:  Posterior fossa midline cystic structure VISUALIZED ORBITS AND PARANASAL SINUSES:  Unremarkable  CALVARIUM AND EXTRACRANIAL SOFT TISSUES:  Normal      Impression: Right parieto-occipital lesion, given history this likely represents a septic embolus/abscess   I personally discussed this study  with Hemal Vazquez on 10/1/2019 at 5:24 PM   Workstation performed: [id number]     Ct Chest Wo Contrast    Result Date: 10/1/2019  Narrative: CT CHEST WITHOUT IV CONTRAST INDICATION:   pre op cardiac surgery  Mitral valve endocarditis COMPARISON:  X-ray from today TECHNIQUE: CT examination of the chest was performed without intravenous contrast   Axial, sagittal, and coronal 2D reformatted images were created from the source data and submitted for interpretation  Radiation dose length product (DLP) for this visit:  343 mGy-cm   This examination, like all CT scans performed in the [facility], was performed utilizing techniques to minimize radiation dose exposure, including the use of iterative reconstruction and automated exposure control  FINDINGS: LUNGS:  There are diffuse groundglass opacities seen throughout, however predominantly involving the perihilar regions  There are more dense and confluent opacities seen in the left upper lobe as seen on series 3/37 PLEURA:  Moderate bilateral pleural effusions HEART/GREAT VESSELS:  Density difference between the intracardiac blood and intraventricular septum  MEDIASTINUM AND MARCOS:  1 3 cm subcarinal lymph node CHEST WALL AND LOWER NECK:   Unremarkable  VISUALIZED STRUCTURES IN THE UPPER ABDOMEN:  Wedge-shaped ill-defined hypodensity noted in the spleen on series 2/56 OSSEOUS STRUCTURES:  No acute fracture or destructive osseous lesion  Impression: 1  Diffuse groundglass opacities consistent with pulmonary edema 2  More focal patchy opacities in left upper lobe, while this may  still represent edema, pneumonia would have a similar appearance 3  Moderate pleural effusions 4  Subcarinal lymphadenopathy 5  Wedge-shaped hypodensity within the spleen, likely infarct The study was marked in EPIC for immediate notification  Workstation performed: [id number]     Mra Head Wo Contrast    Result Date: 10/2/2019  Narrative: MRA BRAIN WITHOUT CONTRAST INDICATION:  eval for mycotic aneurysm 's COMPARISON:  None   TECHNIQUE:  Axial 3-D time-of-flight imaging with 3-D reconstructions performed without contrast    IV Contrast:  Not administered  FINDINGS: IMAGE QUALITY:  Diagnostic  ANATOMY INTERNAL CAROTID ARTERIES:  Normal flow related enhancement of the distal cervical, petrous and cavernous segments of the internal carotid arteries  Normal ICA terminus  ANTERIOR CIRCULATION:  Right A1 segment is likely severely hypoplastic or absent  Anterior communicating artery identified  No stenosis in the proximal intracerebral arteries  MIDDLE CEREBRAL ARTERY CIRCULATION:  The M1 segment and middle cerebral artery branches demonstrate normal flow-related enhancement  DISTAL VERTEBRAL ARTERIES:  No stenosis in the intradural segments of the vertebral arteries  Limited visualization of the posterior inferior cerebral arteries  BASILAR ARTERY:  Normal  POSTERIOR CEREBRAL ARTERIES: No stenosis in the posterior cerebral arteries  Impression: 1  No evidence of mycotic aneurysm; however, exam sensitivity is limited by resolution and incomplete imaging of the distal branches  Catheter angiography may be helpful if there is persistent concern for mycotic aneurysm  2   No stenosis or occlusion of the major vessels of the [place]  Workstation performed: [id number]     Mra Carotids Wo Contrast    Result Date: 10/3/2019  Narrative: MRA NECK WITHOUT CONTRAST INDICATION: eval for mycotic aneurysm COMPARISON:  None  TECHNIQUE:  2D and 3D Time of Flight angiography with 3D reconstructions  IMAGE QUALITY:  Diagnostic  FINDINGS: AORTIC ARCH:  Normal  VISUALIZED SUBCLAVIAN ARTERIES:  The subclavian arteries demonstrate normal flow-related enhancement with no stenosis  VERTEBRAL ARTERIES:  Normal vertebral artery origins  The vertebral arteries are bilaterally symmetric with normal enhancement and no evidence of stenosis  RIGHT CAROTID ARTERY:  Normal common carotid artery, cervical internal carotid artery and external carotid artery  No stenosis at the bifurcation   LEFT CAROTID ARTERY:  Normal common carotid artery, cervical internal carotid artery and external carotid artery  No stenosis at the bifurcation  VISUALIZED INTRACRANIAL VASCULATURE:  Limited visualization of the intracranial vasculature is grossly unremarkable  NASCET criteria was used to determine the degree of internal carotid artery diameter stenosis  Impression: Unremarkable MR angiogram of the cervical vasculature  Workstation performed: [id number]     Mri Brain Wo Contrast    Result Date: 10/2/2019  Narrative: MRI BRAIN WITHOUT CONTRAST INDICATION: Right posterior temporal occipital hemorrhage  Mitral valve endocarditis and sepsis  COMPARISON:   10/1/2019  TECHNIQUE:  Sagittal T1, axial T2, axial FLAIR, axial T1, axial [place] and axial diffusion imaging  IMAGE QUALITY:  Diagnostic  FINDINGS: BRAIN PARENCHYMA: Susceptibility artifact consistent with hemorrhage in the right posterior mesial temporal occipital region measures 1 8 x 1 6 x 0 7 cm  Minimal T1 hyperintensity along the margins of the hemorrhage suggesting a subacute blood products  Mild surrounding vasogenic edema  Findings are stable when compared to the prior CT  Punctate foci of susceptibility artifact in the right frontoparietal subcortical white matter without other signal abnormality, consistent with chronic microhemorrhage  Similar lesions in the cerebellar hemispheres  Punctate focus of mildly restricted diffusion in the right posterior frontoparietal region  VENTRICLES:  No hydrocephalus or extra-axial collection  SELLA AND PITUITARY GLAND:  Normal  ORBITS:  No retro-orbital mass or inflammation  PARANASAL SINUSES:  Normal  VASCULATURE:  Evaluation of the major intracranial vasculature demonstrates appropriate flow voids  CALVARIUM AND SKULL BASE:  No osseous lesion  EXTRACRANIAL SOFT TISSUES:  No soft tissue mass  Impression: Subacute 1 8 cm hemorrhage in the right posterior mesial temporal occipital region with surrounding vasogenic edema, stable    Few punctate foci of chronic microhemorrhage in the right frontoparietal region and in the cerebellar hemispheres  Findings may  be secondary to subacute and chronic septic emboli and/or mycotic aneurysms  No evidence of abscess  Workstation performed: [id number]     Ir Thoracentesis    Result Date: 10/2/2019  Narrative: INDICATION: Bilateral pleural effusions, shortness of breath  PROCEDURE: 1  Ultrasound-guided bilateral thoracentesis FINDINGS: 1   1000 mL fluid removed from the right pleural space  2   700 mL fluid removed from the left pleural space  Impression: Successful bilateral thoracentesis  _________________________________________________________________ COMPARISON: None PROCEDURE DETAILS: Operators: Dr Annmarie Horta, [address] [place] [id number] attending, performed the procedure  Anesthesia: 1% lidocaine was injected in the skin and subcutaneous tissues overlying the access site  Medications: 1% lidocaine Contrast: None Fluoroscopy time: None COMMENTS: Following the discussion of the risks, benefits and alternatives to the procedure, written informed consent was obtained from the patient  The patient was placed in a sitting position  A preprocedure timeout was performed per [facility] protocol  The right back was prepped and draped in the usual sterile fashion  After local anesthesia was administered, a 5-[employer] catheter was advanced under continuous ultrasound guidance into the right pleural space  1000 mL of vidya fluid was obtained  After the procedure, the catheter was removed, the skin was cleansed and sterile dressings were applied  Aforementioned procedure was then performed for left-sided thoracentesis  The patient tolerated the procedure well and there were no immediate postprocedure complications  Workstation performed: [id number]     Vas Carotid Complete Study    Result Date: 10/1/2019  Narrative:  [facility] REPORT CLINICAL: Indications: Patient presents for a general health evaluation secondary to future open heart surgery  Patient is asymptomatic at this time  Operative History: 2013-01-01 Chest Wall Tumor excision Risk Factors The patient has history of HTN and Non-rheumatic Mitral valve regurgitation  Clinical Right Pressure:  120/70 mm Hg  FINDINGS:  Right        Impression  PSV  EDV (cm/s)  Direction of Flow  Ratio  Dist  ICA                 55          26                      0 51  Mid  ICA                  62          19                      0 56  Prox  ICA    Normal       69          23                      0 63  Dist CCA                  91          26                            Mid CCA                  109          27                      1 20  Prox CCA                  91          14                            Ext Carotid               57           9                      0 52  Prox Vert                 53          26  Antegrade                 Subclavian               126           0                             Left         Impression  PSV  EDV (cm/s)  Direction of Flow  Ratio  Dist  ICA                 71          39                      0 76  Mid  ICA                  59          41                      0 63  Prox  ICA    Normal       63          34                      0 68  Dist CCA                  86          24                            Mid CCA                   93          33                      0 91  Prox CCA                 103          37                            Ext Carotid               61          18                      0 66  Prox Vert                 72          28  Antegrade                 Subclavian               161          16                               CONCLUSION: Impression RIGHT: There is no evidence of disease throughout the extracranial carotid arteries  Vertebral artery flow is antegrade  There is no significant subclavian artery disease  LEFT: There is no evidence of disease throughout the extracranial carotid arteries  Vertebral artery flow is antegrade   There is no significant subclavian artery disease  SIGNATURE: Electronically Signed by: Bentley Braga on 2019-10-01 06:01:19 PM    Us Right Upper Quadrant With Liver Dopplers    Result Date: 10/4/2019  Narrative: RIGHT UPPER QUADRANT ULTRASOUND WITH LIVER DOPPLER INDICATION:     Evaluation of Liver Function  COMPARISON:  None  TECHNIQUE:   Real-time ultrasound of the right upper quadrant was performed with a curvilinear transducer with both volumetric sweeps and still imaging techniques  FINDINGS: PANCREAS:  Visualized portions of the pancreas are within normal limits  AORTA AND IVC:  Visualized portions are normal for patient age  LIVER: Size:  Within normal range  The liver measures 14 2 cm in the midclavicular line  Contour:  Surface contour is smooth  Parenchyma:  Echogenicity and echotexture are within normal limits  No evidence of suspicious mass  LIVER DOPPLER: The main portal vein and primary branch segments are patent and hepatopetal with normal spectral waveform  Hepatic veins are patent  Spectral waveforms within normal limits  Main hepatic artery appears normal size, patent with normal spectral waveform  BILIARY: The gallbladder is normal in caliber  No wall thickening or pericholecystic fluid  No stones or sludge identified  No sonographic Will's sign  No intrahepatic biliary dilatation  CBD measures 3 mm  No choledocholithiasis  KIDNEYS: RIGHT KIDNEY: Absent  LEFT KIDNEY: 13 8 x 6 8 cm  Within normal limits  SPLEEN: 13 5 x 13 9 x 5 7 cm Mildly enlarged  ASCITES:   None  Incompletely visualized bilateral pleural effusions  Impression: 1  Normal sonographic appearance of the liver  2   Absent right kidney  3   Mild splenomegaly  4   Incompletely visualized bilateral pleural effusions  Workstation performed: [id number]       EKG personally reviewed by Barrington Duarte MD      Counseling / Coordination of Care  Total floor / unit time spent today 30 minutes    Greater than 50% of total time was spent with the patient and / or family counseling and / or coordination of care

## 2019-10-07 PROBLEM — J90 BILATERAL PLEURAL EFFUSION: Status: ACTIVE | Noted: 2019-10-07

## 2019-10-07 PROBLEM — D64.9 ANEMIA: Status: ACTIVE | Noted: 2019-10-07

## 2019-10-07 PROBLEM — G93.9: Status: ACTIVE | Noted: 2019-10-07

## 2019-10-07 PROCEDURE — NC001 PR NO CHARGE: Performed by: PHYSICIAN ASSISTANT

## 2019-10-07 PROCEDURE — 99232 SBSQ HOSP IP/OBS MODERATE 35: CPT | Performed by: INTERNAL MEDICINE

## 2019-10-07 PROCEDURE — 99231 SBSQ HOSP IP/OBS SF/LOW 25: CPT | Performed by: PLASTIC SURGERY

## 2019-10-07 PROCEDURE — 99233 SBSQ HOSP IP/OBS HIGH 50: CPT | Performed by: INTERNAL MEDICINE

## 2019-10-07 RX ADMIN — CEFTAZIDIME 2000 MG: 1 INJECTION, POWDER, FOR SOLUTION INTRAMUSCULAR; INTRAVENOUS at 12:43

## 2019-10-07 RX ADMIN — LEVOFLOXACIN 750 MG: 750 TABLET, FILM COATED ORAL at 04:08

## 2019-10-07 RX ADMIN — CEFTAZIDIME 2000 MG: 1 INJECTION, POWDER, FOR SOLUTION INTRAMUSCULAR; INTRAVENOUS at 04:08

## 2019-10-07 RX ADMIN — ACETAMINOPHEN 650 MG: 325 TABLET ORAL at 16:26

## 2019-10-07 RX ADMIN — TRAZODONE HYDROCHLORIDE 100 MG: 100 TABLET ORAL at 21:26

## 2019-10-07 RX ADMIN — METOPROLOL TARTRATE 25 MG: 25 TABLET, FILM COATED ORAL at 09:37

## 2019-10-07 RX ADMIN — MELATONIN 3 MG: 3 TAB ORAL at 21:26

## 2019-10-07 RX ADMIN — ZOLPIDEM TARTRATE 5 MG: 5 TABLET, FILM COATED ORAL at 21:26

## 2019-10-07 RX ADMIN — HYDROXYZINE HYDROCHLORIDE 25 MG: 25 TABLET ORAL at 04:15

## 2019-10-07 RX ADMIN — CEFTAZIDIME 2000 MG: 1 INJECTION, POWDER, FOR SOLUTION INTRAMUSCULAR; INTRAVENOUS at 20:25

## 2019-10-07 RX ADMIN — CHLORHEXIDINE GLUCONATE 0.12% ORAL RINSE 15 ML: 1.2 LIQUID ORAL at 09:16

## 2019-10-07 RX ADMIN — CHLORHEXIDINE GLUCONATE 0.12% ORAL RINSE 15 ML: 1.2 LIQUID ORAL at 20:28

## 2019-10-07 RX ADMIN — ESCITALOPRAM OXALATE 10 MG: 10 TABLET ORAL at 09:16

## 2019-10-07 NOTE — PROGRESS NOTES
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 IVDU who is here for pre-op eval for MV replacement due to Pseudomonas endocarditis    Pseudomonas bacteremia  · BCx #1 & #2 NGTD, #3 pending, ceftazidime 2g tid + levaquin 750mg daily  · Plan for MV replacement 10/9  · Sinus tachy on telemetry, severe MR remains unchanged  · Continue lopressor 25mg bid, telemetry, cardiac diet, fluid restriction    Shortness of breath  · Likely 2/2 to above   · Lungs CTA b/l, euvolemic on exam  · Continue to monitor, no diuretics at this time (Cr downtrending, currently 1 47)    Subjective:  No acute events overnight  Pt appears fatigued  He said his sleep was poor due to orthopnea  Tolerating PO diet  No problems with voiding  Denies chest pain, palpitations, PND, pedal edema, syncope, presyncope, diaphoresis, nausea/vomiting  Remainder of 12-point ROS negative  Telemetry: sinus tachy, with episodes of NSVT yesterday around 11am    Net fluid balance: +200mL    Weight:  Weight (last 2 days)     Date/Time   Weight    10/07/19 0600   73 8 (162 7)    10/06/19 0307   72 6 (160 05)    10/05/19 0600   72 5 (159 83)            EKG: 10/3: sinus tach    Labs: am labs pending, will f/u   BCx 1 & 2 NGTD    Family History: non-contributory  Historical Information   Past Medical History:   Diagnosis Date    Anxiety 10/1/2019    Bacteremia 10/1/2019    Endocarditis 10/1/2019    History of intravenous drug abuse ([address] ) 10/1/2019    Nonrheumatic mitral valve regurgitation 10/1/2019     Past Surgical History:   Procedure Laterality Date    CHEST WALL TUMOR EXCISION  2013    Anterior chest wall cyst removed    IR THORACENTESIS  10/2/2019     Social History   Social History Substance and Sexual Activity   Alcohol Use Never    Frequency: Never    Binge frequency: Never     Social History     Substance and Sexual Activity   Drug Use Yes     Social History     Tobacco Use   Smoking Status Never Smoker   Smokeless Tobacco Never Used     Family History:   Family History   Problem Relation Age of Onset    Heart disease Maternal Grandmother        Scheduled Meds:  Current Facility-Administered Medications:  acetaminophen 650 mg Oral Q6H PRN Fan Baugh MD    barium sulfate 450 mL Oral 90 min pre-procedure Kimi Melendez MD    cefTAZidime 2,000 mg Intravenous Q8H Vianey Cali MD Last Rate: Stopped (10/07/19 0438)   chlorhexidine 15 mL Swish & Spit Q12H [address] Magi Clark PA-C    escitalopram 10 mg Oral Daily Max Rocky Rotunda, DO    hydrOXYzine HCL 25 mg Oral Q8H PRN Max Rocky Rotunda, DO    levofloxacin 750 mg Oral Q24H Vianey Cali MD    melatonin 3 mg Oral HS Max Rocky Rotunda, DO    metoprolol tartrate 25 mg Oral Q12H [address] Ileana Felix MD    mupirocin  Nasal Q12H [address] Magi Clark PA-C    ondansetron 4 mg Intravenous Q8H PRN Venessa Siegel MD    traZODone 100 mg Oral HS [place] Teri Stare, DO    zolpidem 5 mg Oral HS PRN Max Rocky Rotunda, DO      Continuous Infusions:   PRN Meds:   acetaminophen    hydrOXYzine HCL    ondansetron    zolpidem  all current active meds have been reviewed    No Known Allergies    Objective:  Blood pressure 105/67, pulse (!) 124, temperature 98 6 °F (37 °C), temperature source Oral, resp  rate (!) 24, height 5' 3" (1 6 m), weight 73 8 kg (162 lb 11 2 oz), SpO2 (!) 85 %  Body mass index is 28 82 kg/m²   Orthostatic Blood Pressures      Most Recent Value   Blood Pressure  105/67 [Map 81] filed at 10/07/2019 4191   Patient Position - Orthostatic VS  Sitting filed at 10/07/2019 1676          Intake/Output Summary (Last 24 hours) at 10/7/2019 0833  Last data filed at 10/7/2019 0601  Gross per 24 hour   Intake 1100 ml   Output    Net 1100 ml     Invasive Devices Peripheral Intravenous Line            Peripheral IV 10/04/19 Left 2 days                  Intake/Output Summary (Last 24 hours) at 10/7/2019 8923  Last data filed at 10/7/2019 0601  Gross per 24 hour   Intake 1100 ml   Output    Net 1100 ml       Invasive Devices     Peripheral Intravenous Line            Peripheral IV 10/04/19 Left 2 days                Physical Exam:  Physical Exam   Constitutional: He is oriented to person, place, and time  He appears well-developed and well-nourished  HENT:   Head: Normocephalic  Eyes: Pupils are equal, round, and reactive to light  Neck: Neck supple  Cardiovascular: Regular rhythm and intact distal pulses  Exam reveals no gallop and no friction rub  Murmur heard  Tachycardic  3/6 MR   Pulmonary/Chest: Effort normal and breath sounds normal  No respiratory distress  He has no wheezes  He has no rales  Abdominal: Soft  Bowel sounds are normal    Musculoskeletal: Normal range of motion  He exhibits no edema  Neurological: He is alert and oriented to person, place, and time  Skin: Skin is warm and dry  Capillary refill takes less than 2 seconds  General:  AO x3, no acute distress  Cardiac:  S1-S2 normal  No murmurs, rubs or gallops, JVP:  Lungs:  Clear to auscultation bilaterally, no wheezing or crackles    Abdomen:  Soft nontender nondistended, positive bowel sounds  Extremities:  Warm, well perfused, pulses palpable, no ulcers or rashes  Neuro: Grossly nonfocal  Psych:  Normal affect    Labs and Imaging:   Basic Laboratory Review:   Results from last 7 days   Lab Units 10/06/19  0613 10/05/19  0513 10/04/19  0507  10/01/19  1800   SODIUM mmol/L 139 141 141   < > 141   POTASSIUM mmol/L 3 6 3 8 3 6   < > 3 8   CHLORIDE mmol/L 108 105 108   < > 106   CO2 mmol/L 24 27 25   < > 24   BUN mg/dL 29* 31* 29*   < > 37*   CREATININE mg/dL 1 47* 1 71* 1 51*   < > 1 86*   GLUCOSE RANDOM mg/dL 103 180* 108   < > 108   CALCIUM mg/dL 8 8 8 6 8 4   < > 9 1   ALBUMIN g/dL --   --  2 0*  --  2 8*   ALK PHOS U/L  --   --  89  --  107   ALT U/L  --   --  12  --  22   AST U/L  --   --  8  --  11   EGFR ml/min/1 73sq m 59 49 57   < > 45    < > = values in this interval not displayed  Results from last 7 days   Lab Units 10/05/19  0513 10/04/19  0507 10/03/19  0634 10/02/19  0629 10/01/19  1023   WBC Thousand/uL 8 97 9 88 11 52* 9 89 15 11*   HEMOGLOBIN g/dL 7 9* 7 1* 7 8* 7 8* 8 0*   HEMATOCRIT % 25 5* 22 8* 25 0* 25 0* 24 9*   PLATELETS Thousands/uL 225 226 267 261 312   NEUTROS PCT %  --  85*  --  80* 83*   LYMPHS PCT %  --  9*  --  11* 11*   MONOS PCT %  --  4  --  5 4   EOS PCT %  --  1  --  2 1   BASOS PCT %  --  0  --  1 0   NEUTROS ABS Thousands/µL  --  8 34*  --  8 06* 12 47*   LYMPHS ABS Thousands/µL  --  0 90  --  1 04 1 61   MONOS ABS Thousand/µL  --  0 41  --  0 50 0 67   EOS ABS Thousand/µL  --  0 12  --  0 17 0 19   BASOS ABS Thousands/µL  --  0 03  --  0 05 0 05     Results from last 7 days   Lab Units 10/03/19  0634 10/01/19  1800   PROTIME seconds  --  14 8*   INR   --  1 20*   PTT seconds 27  --            Additional Labs:           Results from last 7 days   Lab Units 10/03/19  0634   TRIGLYCERIDES mg/dL 101   HDL mg/dL 27*   LDL CALC mg/dL 76       Recent Cultures (last 7 days):   Results from last 7 days   Lab Units 10/03/19  0634 10/02/19  0955 10/02/19  0951 10/01/19  1800   BLOOD CULTURE  No Growth at 72 hrs  No Growth at 72 hrs   --   --  No Growth After 5 Days  No Growth After 5 Days  GRAM STAIN RESULT   --  1+ Polys  No bacteria seen No Polys or Bacteria seen  --    BODY FLUID CULTURE, STERILE   --  No growth No growth  --      No results displayed because visit has over 200 results  * I Have Reviewed All Lab Data Listed Above  * Additional Pertinent Lab Tests Reviewed:  All [place] Admission Reviewed    Imaging:  I have personally reviewed pertinent imaging studies and reports    Last 24 Hours Medication List:     Current Facility-Administered Medications:  acetaminophen 650 mg Oral Q6H PRN Cindy Ascencio MD    barium sulfate 450 mL Oral 90 min pre-procedure Janine Padgett MD    cefTAZidime 2,000 mg Intravenous Q8H Kia Hansen MD Last Rate: Stopped (10/07/19 0438)   chlorhexidine 15 mL Swish & Spit Q12H [facility] Merna Reyna PA-C    escitalopram 10 mg Oral Daily Max Jennifer Bob DO    hydrOXYzine HCL 25 mg Oral Q8H PRN Max Jennifer Bob DO    levofloxacin 750 mg Oral Q24H Kia Hansen MD    melatonin 3 mg Oral HS Max Jennifer Bob DO    metoprolol tartrate 25 mg Oral Q12H [facility] Rohith Haque MD    mupirocin  Nasal Q12H [facility] Moises Orf Jennifer RALPH Greene    ondansetron 4 mg Intravenous Q8H PRN Monik Solomon MD    traZODone 100 mg Oral HS [place] Zackary,     zolpidem 5 mg Oral HS PRN Max Jennifer Bob DO      Today, Patient Was Seen By: Sandy Luevano, MS4

## 2019-10-07 NOTE — ASSESSMENT & PLAN NOTE
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  CT surgery and ID following  Plan:  - plan for mitral valve replacement this week  - continue antibiotics:  IV ceftazidime 2 g q 8 hours and levofloxacin 750 mg p o   Daily

## 2019-10-07 NOTE — PROGRESS NOTES
Plastic Surgery Attending     Pt was seen and examined  I had a extensive discussion with Mr  Chris Tran and his sister with regards to my role as a plastic surgeon during his care  Patient in need of a mitral valve repair vs replacement with prior history of left SCJ removal and infection  I was asked to provide coverage of anticipated secondary defect  Reconstructive options discussed included adjacent tissue rearrangement vs muscle/myocutaneous pectoralis flap  All risks, benefits, and options, including but not limited to, pain, scarring, bleeding, infection, asymmetry, contour deformity, damage to adjacent nerves, vessels, and organs, hematoma, seroma, paresthesias, anesthesia (temporary versus permanent), skin necrosis, fat necrosis, flap necrosis, wound dehiscence with need for healing by secondary intention and postoperative dressing changes,need for prolonged mechanical ventilation and ICU stay, hypertrophic and/or keloid scar formation, deep venous thrombosis, pulmonary embolism, death, recurrence, and need for revision and/or reoperation were fully explained to the patient  Informed consent was obtained     Surgery is schedule for Wed 10/09/2019    [facility] [facility]   [address]   [address]   [address]   Office: [phone]

## 2019-10-07 NOTE — PROGRESS NOTES
INTERNAL MEDICINE RESIDENCY PROGRESS NOTE     Name: Ronelle Mcardle   Age & Sex: 44 y o  male   MRN: [id number]  Unit/Bed#: [facility] 421-01   Encounter: [id number]  Team: SOD Team A    PATIENT INFORMATION     Name: Ronelle Mcardle   Age & Sex: 44 y o  male   MRN: [id number]  Hospital Stay Days: 7    ASSESSMENT/PLAN     Principal Problem:    Endocarditis  Active Problems:    History of intravenous drug abuse ([facility] [address] )    Acute kidney injury ([facility] [address] )    Insomnia    Tachycardia    Nonrheumatic mitral valve regurgitation    Bacteremia    Anxiety    Solitary left kidney    Right parietal lobe lesion    Bilateral pleural effusion      Bilateral pleural effusion  Assessment & Plan  S/p thoracentesis on 10/02 by IR with 1000 mL removed from the right hemithorax and 700 mL from the left hemithorax    Plan:  - SOB has improved  Currently not on any diuretics  - will continue to monitor     Right parietal lobe lesion  Assessment & Plan  Subacute 1 8 cm hemorrhage in the right posterior mesial temporal occipital region with surrounding vasogenic edema, stable   Few punctate foci of chronic microhemorrhage in the right frontoparietal region and in the cerebellar hemispheres  MRA on 10/02 reveal NO evidence of mycotic aneurysms  - Neurology consulted on 10/2: "discussed with cardiothoracic surgery that patient is at high risk of intracranial bleed with initiation of heparin   This was also discussed with the patient, who voiced understanding of the risks   Ultimately, the decision to heparinize will be based on risk/benefit analysis by cardiothoracic surgery in regards to the necessity of patient's valve repair versus replacement"    Plan:  - Neurology is following  - - DVT PPX with heparin discontinued on 10/03       Solitary left kidney  Assessment & Plan  JULIENNE on CKD and congenital solitary left kidney secondary to prerenal/ATN/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 all in the setting of solitary kidney  CT at [facility] shows right kidney congenitally absent with left kidney showing compensatory hypertrophy and 1 small likely infarct in the lower cortex unchanged from previous study, not associated with perinephric fluid or abscess or pyelonephritis  Plan:  - continue to monitor renal function  - nephro is following    Anxiety  Assessment & Plan  Plan:  - continue hydroxyzine 25 mg every 8 hours as needed for anxiety  -continue Lexapro, trazodone  -scheduled melatonin and Ambien p r n  nightly       Nonrheumatic mitral valve regurgitation  Assessment & Plan  BILLY 10/2 reveal ejection fraction at 60% for no wall motion abnormalities, mildly to moderately reduced systolic function of the right ventricle   Left atrium mildly dilated   Mitral valve with multiple large and mobile vegetations and the trigger aspect of both leaflets with large perforations at the base of the anterior leaflets and wide-open mitral regurgitation  Plan:  - plan for mitral VR replacement with CT surgery this week  * Endocarditis  Assessment & Plan  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  CT surgery and ID following  Plan:  - plan for mitral valve replacement this week  - continue antibiotics:  IV ceftazidime 2 g q 8 hours and levofloxacin 750 mg p o  Daily      Disposition:  Continue inpatient care     SUBJECTIVE     Patient seen and examined  No acute events overnight       OBJECTIVE     Vitals:    10/06/19 2002 10/06/19 2005 10/06/19 2246 10/07/19 0337   BP: 111/62 111/62 105/62 98/60   BP Location: Left arm  Left arm Left arm   Pulse: (!) 123 (!) 123 (!) 117 (!) 120   Resp: 18  18 18   Temp: 98 2 °F (36 8 °C)  98 6 °F (37 °C) 98 4 °F (36 9 °C)   TempSrc: Oral  Oral Oral   SpO2: 95%  94% 92%   Weight:       Height:          Temperature:   Temp (24hrs), Av 3 °F (36 8 °C), Min:98 1 °F (36 7 °C), Max:98 6 °F (37 °C)    Temperature: 98 4 °F (36 9 °C)  Intake & Output:  I/O       10/05 0701 - 10/06 0700 10/06 0701 - 10/07 0700    P  O  1440 960    I V  (mL/kg)  40 (0 6)    IV Piggyback 50 100    Total Intake(mL/kg) 1490 (20 5) 1100 (15 2)    Urine (mL/kg/hr) 0 (0)     Total Output 0     Net +1490 +1100          Unmeasured Urine Occurrence  2 x        Weights:   IBW: 56 9 kg    Body mass index is 28 35 kg/m²  Weight (last 2 days)     Date/Time   Weight    10/06/19 0307   72 6 (160 05)    10/05/19 0600   72 5 (159 83)            Physical Exam   Constitutional: He is oriented to person, place, and time  He appears well-developed and well-nourished  No distress  HENT:   Head: Normocephalic and atraumatic  Nose: Nose normal    Mouth/Throat: Oropharynx is clear and moist  No oropharyngeal exudate  Eyes: Right eye exhibits no discharge  Left eye exhibits no discharge  No scleral icterus  Neck: Normal range of motion  Neck supple  Cardiovascular: Normal rate, regular rhythm and intact distal pulses  Exam reveals no gallop and no friction rub  Murmur heard  Holosystolic murmur at left 5th intercostal space  Pulmonary/Chest: Effort normal  No stridor  No respiratory distress  He has no wheezes  He has no rales  Abdominal: Soft  Bowel sounds are normal  He exhibits no distension  There is no tenderness  There is no guarding  Musculoskeletal: Normal range of motion  He exhibits no edema  Neurological: He is alert and oriented to person, place, and time  Skin: Skin is warm  He is not diaphoretic  No erythema  Psychiatric: He has a normal mood and affect  LABORATORY DATA     Labs: I have personally reviewed pertinent reports    Results from last 7 days   Lab Units 10/05/19  0513 10/04/19  0507 10/03/19  0634 10/02/19  0629 10/01/19  1023   WBC Thousand/uL 8 97 9 88 11 52* 9 89 15 11*   HEMOGLOBIN g/dL 7 9* 7 1* 7 8* 7 8* 8 0*   HEMATOCRIT % 25 5* 22 8* 25 0* 25 0* 24 9*   PLATELETS Thousands/uL 225 226 267 261 312   NEUTROS PCT %  --  85*  --  80* 83*   MONOS PCT %  --  4  --  5 4 Results from last 7 days   Lab Units 10/06/19  0613 10/05/19  0513 10/04/19  0507  10/01/19  1800   POTASSIUM mmol/L 3 6 3 8 3 6   < > 3 8   CHLORIDE mmol/L 108 105 108   < > 106   CO2 mmol/L 24 27 25   < > 24   BUN mg/dL 29* 31* 29*   < > 37*   CREATININE mg/dL 1 47* 1 71* 1 51*   < > 1 86*   CALCIUM mg/dL 8 8 8 6 8 4   < > 9 1   ALK PHOS U/L  --   --  89  --  107   ALT U/L  --   --  12  --  22   AST U/L  --   --  8  --  11    < > = values in this interval not displayed  Results from last 7 days   Lab Units 10/03/19  0634 10/01/19  1800   INR   --  1 20*   PTT seconds 27  --                IMAGING & DIAGNOSTIC TESTING     Radiology Results: I have personally reviewed pertinent reports  Ct Abdomen Pelvis Wo Contrast    Result Date: 10/3/2019  Impression: Mild to moderate pleural effusions are seen at the bases  There is a unilateral left kidney without hydronephrosis  No ascites or obvious intra-abdominal collection Immediate finding was noted on the Epic system  Workstation performed: [id number]     Xr Mandible Panorex ([place] Only)    Result Date: 10/2/2019  Impression: No periapical lucencies identified  Workstation performed: [id number]     Xr Chest Portable    Result Date: 10/4/2019  Impression: Stable chest with moderate pulmonary vascular congestion  Workstation performed: [id number]     Xr Chest Portable    Result Date: 10/1/2019  Impression: New airspace opacity suggests pulmonary edema or diffuse bronchopneumonia  Immediate report was noted on the Epic system  Workstation performed: [id number]     Ct Head Wo Contrast    Addendum Date: 10/1/2019    ADDENDUM: This addendum is for the purpose of clarification: Abnormality described represents either a septic embolus, or abscess  MRI is recommended for differentiation   I personally discussed this addendum with Linus Snellen on 10/1/2019 at 7:50 PM     Result Date: 10/1/2019  Impression: Right parieto-occipital lesion, given history this likely represents a septic embolus/abscess  I personally discussed this study  with Christian Daly on 10/1/2019 at 5:24 PM   Workstation performed: [id number]     Ct Chest Wo Contrast    Result Date: 10/1/2019  Impression: 1  Diffuse groundglass opacities consistent with pulmonary edema 2  More focal patchy opacities in left upper lobe, while this may  still represent edema, pneumonia would have a similar appearance 3  Moderate pleural effusions 4  Subcarinal lymphadenopathy 5  Wedge-shaped hypodensity within the spleen, likely infarct The study was marked in EPIC for immediate notification  Workstation performed: [id number]     Mra Head Wo Contrast    Result Date: 10/2/2019  Impression: 1  No evidence of mycotic aneurysm; however, exam sensitivity is limited by resolution and incomplete imaging of the distal branches  Catheter angiography may be helpful if there is persistent concern for mycotic aneurysm  2   No stenosis or occlusion of the major vessels of the [place]  Workstation performed: [id number]     Mra Carotids Wo Contrast    Result Date: 10/3/2019  Impression: Unremarkable MR angiogram of the cervical vasculature  Workstation performed: [id number]     Mri Brain Wo Contrast    Result Date: 10/2/2019  Impression: Subacute 1 8 cm hemorrhage in the right posterior mesial temporal occipital region with surrounding vasogenic edema, stable  Few punctate foci of chronic microhemorrhage in the right frontoparietal region and in the cerebellar hemispheres  Findings may  be secondary to subacute and chronic septic emboli and/or mycotic aneurysms  No evidence of abscess  Workstation performed: [id number]     Ir Thoracentesis    Result Date: 10/2/2019  Impression: Successful bilateral thoracentesis  _________________________________________________________________ COMPARISON: None PROCEDURE DETAILS: Operators: Dr Darryle Mcmurray, [address] [place] [address] attending, performed the procedure   Anesthesia: 1% lidocaine was injected in the skin and subcutaneous tissues overlying the access site  Medications: 1% lidocaine Contrast: None Fluoroscopy time: None COMMENTS: Following the discussion of the risks, benefits and alternatives to the procedure, written informed consent was obtained from the patient  The patient was placed in a sitting position  A preprocedure timeout was performed per [facility] protocol  The right back was prepped and draped in the usual sterile fashion  After local anesthesia was administered, a 5-[place] P[employer]'n Call catheter was advanced under continuous ultrasound guidance into the right pleural space  1000 mL of vidya fluid was obtained  After the procedure, the catheter was removed, the skin was cleansed and sterile dressings were applied  Aforementioned procedure was then performed for left-sided thoracentesis  The patient tolerated the procedure well and there were no immediate postprocedure complications  Workstation performed: [id number]     Us Right Upper Quadrant With Liver Dopplers    Result Date: 10/4/2019  Impression: 1  Normal sonographic appearance of the liver  2   Absent right kidney  3   Mild splenomegaly  4   Incompletely visualized bilateral pleural effusions  Workstation performed: [id number]     Other Diagnostic Testing: I have personally reviewed pertinent reports      ACTIVE MEDICATIONS     Current Facility-Administered Medications   Medication Dose Route Frequency    acetaminophen (TYLENOL) tablet 650 mg  650 mg Oral Q6H PRN    barium sulfate 2 1 % suspension 450 mL  450 mL Oral 90 min pre-procedure    cefTAZidime (FORTAZ) 2,000 mg in sodium chloride 0 9 % 50 mL IVPB  2,000 mg Intravenous Q8H    chlorhexidine (PERIDEX) 0 12 % oral rinse 15 mL  15 mL Swish & Spit Q12H [address]    escitalopram (LEXAPRO) tablet 10 mg  10 mg Oral Daily    hydrOXYzine HCL (ATARAX) tablet 25 mg  25 mg Oral Q8H PRN    levofloxacin (LEVAQUIN) tablet 750 mg  750 mg Oral Q24H    melatonin tablet 3 mg  3 mg Oral HS    metoprolol tartrate (LOPRESSOR) tablet 25 mg  25 mg Oral Q12H [facility]    mupirocin (BACTROBAN) 2 % nasal ointment   Nasal Q12H [facility]    ondansetron (ZOFRAN) injection 4 mg  4 mg Intravenous Q8H PRN    traZODone (DESYREL) tablet 100 mg  100 mg Oral HS    zolpidem (AMBIEN) tablet 5 mg  5 mg Oral HS PRN       VTE Pharmacologic Prophylaxis: Sequential compression device (Venodyne)   VTE Mechanical Prophylaxis: sequential compression device    Portions of the record may have been created with voice recognition software  Occasional wrong word or "sound a like" substitutions may have occurred due to the inherent limitations of voice recognition software    Read the chart carefully and recognize, using context, where substitutions have occurred   ==  Paula Be, [facility]  Internal Medicine Residency PGY-1

## 2019-10-07 NOTE — PROGRESS NOTES
Progress Note - Infectious Disease   Ronelle Mcardle 44 y o  male MRN: [id number]  Unit/Bed#: [facility] 421-01 Encounter: [id number]      Impression/Plan:  44year old male with a PMH of ivdu, congential solitary kidney and MSSA endocarditis presents as a transfer from [address] [place] [address] with Infective endocarditis of the mitral valve with severe mitral regurgitation and bacteremia due to Pseudomonas, complicated by emboli to spleen and brain, currently on ceftazidime and levofloxacin      Bacteremia  · Pseudomonas bacteremia likely secondary to injection drug use   · Blood cultures were positive from 09/06- 09/11  · Repeat blood cultures from 09/13 and 09/14 were negative  · Blood cultures on 09/28 grew pseudomonas again   · Repeat blood cultures from 10/01, 10/03, 10/06 negative preliminary  · Patient was previously on cefepime but had diarrhea  · Was also on gentamicin but given solitary kidney he developed JULIENNE and was stopped  · Currently on ceftazidime and levofloxacin, will need total 6 weeks of iv post-op and not a candidate for home iv due to h/o ivdu  · Monitor temperature and WBC count      Infective endocarditis  · Pseudomonas endocarditis with severe mitral regurgitation  · Patient has h/o MSSA endocarditis that was treated with 6 weeks of iv cefazolin in March of 2019  · Patient had 2D echo on September 6, 2019 which showed echodensity on the posterior mitral leaflet, possibly representing residual vegetation from prior treated endocarditis  · BILLY showed increasing size of mitral valve vegetation compared to prior study in June 2019 and also new vegetation on anterior leaflet  · Repeat BILLY 10/02 showed multiple large and highly mobile vegetations were present at the atrial aspect of both leaflets   There were at least two large perforations at the base of the anterior leaflet, and severe mitral regurgitation  · Currently on ceftazidime and levofloxacin for dual antipseudomonal coverage  · CT shows splenic infarct and right parieto-occipital lesion representing septic embolism; MRI confirms  · Transferred for CT surgery evaluation for valve replacement, getting pre-op evaluation with GI and panorex was normal  · Scheduled for mitral valve replacement on Wednesday      Hep C antibody positive  · 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  · Hep C RNA levels undetectable   · RUQ USG normal, no cirrhosis, cleared by GI for surgery   · HIV negative     Vomiting and diarrhea  · Secondary to cefepime, now improved  · C  Diff was negative at [facility]     I v drug use  · Will need drug rehab after hospitalization to prevent recurrent endocarditis     JULIENNE   · Solitary kidney on the left  · Renal dosage of levofloxacin  · Avoid aminoglycosides and other nephrotoxic agents   · Creatine istable      Antibiotics:  · On ceftazidime and levofloxacin  · Unsure what antibiotic day as patient transferred from [facility]  · Abx sensitivity obtained from [facility] showed sensitive to:                                      TOYIN  -Zosyn                         <4  -Tobramycin                <1  -Meropenem                <0 25  -Levofloxacin               <0 25  -Gentamicin                 <1  -Ceftazidine                 <4  -Cefepime                   <2       Subjective:  Patient has no fever, chills, sweats; no nausea, vomiting, diarrhea; no cough, shortness of breath; no pain  No new symptoms  Objective:  Vitals:  Temp:  [98 1 °F (36 7 °C)-98 6 °F (37 °C)] 98 6 °F (37 °C)  HR:  [] 87  Resp:  [18-27] 20  BP: ()/(54-67) 113/55  SpO2:  [85 %-95 %] 93 %  Temp (24hrs), Av 4 °F (36 9 °C), Min:98 1 °F (36 7 °C), Max:98 6 °F (37 °C)  Current: Temperature: 98 6 °F (37 °C)    Physical Exam:   General Appearance:  Alert, interactive, nontoxic, no acute distress  Throat: Oropharynx moist without lesions      Lungs:   Clear to auscultation bilaterally; no wheezes, rhonchi or rales; respirations unlabored   Heart:  Holosystolic murmur at the apex radiating to axilla    Abdomen:   Soft, non-tender, non-distended, positive bowel sounds  Extremities: No clubbing, cyanosis or edema   Skin: No new rashes or lesions  No draining wounds noted  Labs, Imaging, & Other studies:   All pertinent labs and imaging studies were personally reviewed  Results from last 7 days   Lab Units 10/05/19  0513 10/04/19  0507 10/03/19  0634   WBC Thousand/uL 8 97 9 88 11 52*   HEMOGLOBIN g/dL 7 9* 7 1* 7 8*   PLATELETS Thousands/uL 225 226 267     Results from last 7 days   Lab Units 10/06/19  0613 10/05/19  0513 10/04/19  0507  10/01/19  1800   SODIUM mmol/L 139 141 141   < > 141   POTASSIUM mmol/L 3 6 3 8 3 6   < > 3 8   CHLORIDE mmol/L 108 105 108   < > 106   CO2 mmol/L 24 27 25   < > 24   BUN mg/dL 29* 31* 29*   < > 37*   CREATININE mg/dL 1 47* 1 71* 1 51*   < > 1 86*   EGFR ml/min/1 73sq m 59 49 57   < > 45   CALCIUM mg/dL 8 8 8 6 8 4   < > 9 1   AST U/L  --   --  8  --  11   ALT U/L  --   --  12  --  22   ALK PHOS U/L  --   --  89  --  107    < > = values in this interval not displayed  Results from last 7 days   Lab Units 10/06/19  0613 10/03/19  0634 10/02/19  1345 10/02/19  0955 10/02/19  0951 10/01/19  1800   BLOOD CULTURE  No Growth at 24 hrs  No Growth at 24 hrs  No Growth After 4 Days  No Growth After 4 Days  --   --   --  No Growth After 5 Days  No Growth After 5 Days     GRAM STAIN RESULT   --   --   --  1+ Polys  No bacteria seen No Polys or Bacteria seen  --    BODY FLUID CULTURE, STERILE   --   --   --  No growth No growth  --    MRSA CULTURE ONLY   --   --  No Methicillin Resistant Staphlyococcus aureus (MRSA) isolated  --   --   --

## 2019-10-07 NOTE — UTILIZATION REVIEW
Continued Stay Review    Date:  10/05/2019                        Current Patient Class: Inpatient  Current Level of Care: Med/Surg Telm    HPI:39 y o  male initially admitted on 10/01/2019    Assessment/Plan: 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 / Severe mitral regurgitation:  CT surgery eval continues with mitral valve replacement   Cont IV Abx  [facility]   [address] restricrtions    Pertinent Labs/Diagnostic Results:   Results from last 7 days   Lab Units 10/05/19  0513 10/04/19  0507 10/03/19  0634 10/02/19  0629 10/01/19  1023   WBC Thousand/uL 8 97 9 88 11 52* 9 89 15 11*   HEMOGLOBIN g/dL 7 9* 7 1* 7 8* 7 8* 8 0*   HEMATOCRIT % 25 5* 22 8* 25 0* 25 0* 24 9*   PLATELETS Thousands/uL 225 226 267 261 312   NEUTROS ABS Thousands/µL  --  8 34*  --  8 06* 12 47*     Results from last 7 days   Lab Units 10/06/19  0613 10/05/19  0513 10/04/19  0507 10/03/19  0634 10/01/19  1800   SODIUM mmol/L 139 141 141 141 141   POTASSIUM mmol/L 3 6 3 8 3 6 4 2 3 8   CHLORIDE mmol/L 108 105 108 110* 106   CO2 mmol/L 24 27 25 25 24   ANION GAP mmol/L 7 9 8 6 11   BUN mg/dL 29* 31* 29* 32* 37*   CREATININE mg/dL 1 47* 1 71* 1 51* 1 57* 1 86*   EGFR ml/min/1 73sq m 59 49 57 55 45   CALCIUM mg/dL 8 8 8 6 8 4 8 5 9 1     Results from last 7 days   Lab Units 10/04/19  0507 10/01/19  1800   AST U/L 8 11   ALT U/L 12 22   ALK PHOS U/L 89 107   TOTAL PROTEIN g/dL 5 8* 7 5   ALBUMIN g/dL 2 0* 2 8*   TOTAL BILIRUBIN mg/dL 0 51 0 72     Results from last 7 days   Lab Units 10/06/19  0613 10/05/19  0513 10/04/19  0507 10/03/19  0634 10/01/19  1800 10/01/19  1023   GLUCOSE RANDOM mg/dL 103 180* 108 101 108 107     Results from last 7 days   Lab Units 10/03/19  0634 10/01/19  1800   PROTIME seconds  --  14 8*   INR   --  1 20*   PTT seconds 27  --      Results from last 7 days   Lab Units 10/01/19  1800   HEP B S AG  Non-reactive   HEP C AB  High Reactive*   HEP B C IGM  Non-reactive   HEP B C TOTAL AB  Non-reactive     Results from last 7 days   Lab Units 10/02/19  1522   CLARITY UA  Clear   COLOR UA  Yellow   SPEC GRAV UA  1 037*   PH UA  5 0   GLUCOSE UA mg/dl Negative   KETONES UA mg/dl Negative   BLOOD UA  Negative   PROTEIN UA mg/dl 30 (1+)*   NITRITE UA  Negative   BILIRUBIN UA  Negative   UROBILINOGEN UA E U /dl 0 2   LEUKOCYTES UA  Negative   WBC UA /hpf 4-10*   RBC UA /hpf 4-10*   BACTERIA UA /hpf None Seen   EPITHELIAL CELLS WET PREP /hpf Occasional     Results from last 7 days   Lab Units 10/03/19  0634 10/02/19  0955 10/02/19  0951 10/01/19  1800   BLOOD CULTURE  No Growth at 72 hrs  No Growth at 72 hrs   --   --  No Growth After 5 Days  No Growth After 5 Days     GRAM STAIN RESULT   --  1+ Polys  No bacteria seen No Polys or Bacteria seen  --    BODY FLUID CULTURE, STERILE   --  No growth No growth  --      Results from last 7 days   Lab Units 10/02/19  1000   TOTAL COUNTED  100  100   WBC FLUID /ul 252  155     Vital Signs:   Date/Time  Temp  Pulse  Resp  BP  MAP (mmHg)  SpO2  O2 Device  Patient Position - Orthostatic VS   10/05/19 2305  98 5 °F (36 9 °C)  123Abnormal   20  119/68  87  96 %  None (Room air)  Lying   10/05/19 1944            95 %  None (Room air)     10/05/19 1844  98 5 °F (36 9 °C)  125Abnormal   20  120/67  86  95 %  None (Room air)  Sitting   10/05/19 1500  99 1 °F (37 3 °C)  130Abnormal   20  125/56  80  94 %  None (Room air)  Sitting   10/05/19 1037  98 3 °F (36 8 °C)  118Abnormal   18  116/65     93 %  None (Room air)  Sitting   BP: Ma9 82 at 10/05/19 1037   10/05/19 0745  98 6 °F (37 °C)  119Abnormal   18  99/58  75  95 %  None (Room air)  Sitting   10/05/19 0359  99 3 °F (37 4 °C)  114Abnormal   16  103/54  73  94 %  None (Room air)  Lying         Medications:   Medication Dose Route Frequency    acetaminophen (TYLENOL) tablet 650 mg  650 mg Oral Q6H PRN    barium sulfate 2 1 % suspension 450 mL  450 mL Oral 90 min pre-procedure    cefTAZidime (FORTAZ) 2,000 mg in sodium chloride 0 9 % 50 mL IVPB  2,000 mg Intravenous Q8H    chlorhexidine (PERIDEX) 0 12 % oral rinse 15 mL  15 mL Swish & Spit Q12H [address]    escitalopram (LEXAPRO) tablet 10 mg  10 mg Oral Daily    hydrOXYzine HCL (ATARAX) tablet 25 mg  25 mg Oral Q8H PRN    levofloxacin (LEVAQUIN) tablet 750 mg  750 mg Oral Q24H    melatonin tablet 3 mg  3 mg Oral HS    mupirocin (BACTROBAN) 2 % nasal ointment   Nasal Q12H [address]    ondansetron (ZOFRAN) injection 4 mg  4 mg Intravenous Q8H PRN    traZODone (DESYREL) tablet 100 mg  100 mg Oral HS    zolpidem (AMBIEN) tablet 5 mg  5 mg Oral HS PRN         Discharge Plan: TBD    Network Utilization Review Department  Phone: [phone]; Fax [phone]  [email or web address]  ATTENTION: Please call with any questions or concerns to [phone]  and carefully listen to the prompts so that you are directed to the right person  Send all requests for admission clinical reviews, approved or denied determinations and any other requests to fax [phone]   All voicemails are confidential

## 2019-10-07 NOTE — PROGRESS NOTES
Progress Note - Nephrology   Luciano Castaneda 44 y o  male MRN: [id number]  Unit/Bed#: [facility] 421-01 Encounter: [id number]      Assessment / Plan:  1  JULIENNE, unknown prior baseline creatinine  -JULIENNE suspected to be secondary to prerenal/ATN/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 all in the setting of congenital solitary kidney  -also was found to have small renal infarct from prior endocarditis  -status post contrast exposure with cardiac catheterization on 10/2/19   -admission creatinine 1 4 at [facility], peaked at 2 2    -now seems to be fluctuating in mid one range, last creatinine 1 47, unable to obtain BMP today  -f/u am BMP    -recent workup includes:  -urinalysis this admission shows 4 to 10 RBCs, 4 to 10 WBCs, 1+ proteinuria, very concentrated sample with 10 to 25 hyaline cast   -CT scan showed congenitally absent right kidney with left kidney showing compensatory hypertrophy, one small likely infarct in the lower pole of left kidney unchanged from the previous study and not associated with any abscess for pyelonephritis  - February 2019: positive hep C antibody with hep C RNA PCR less than 15  -complements normal, CK normal  -avoid nephrotoxins or NSAIDs  -urine eosinophils 0%, no peripheral eosinophilia     2  Blood pressure acceptable on metoprolol 25mg po q12 hr with hold parameters     3  Anemia  -closely monitor hemoglobin, transfuse p r n  For Hgb < 7  -low iron stores recently although avoiding IV Venofer due to active infection issues  -recent FOBT was negative at [facility]      4   Pseudomonas mitral valve endocarditis/bacteremia  -antibiotic as per primary team and ID  -patient is active IV drug user and had prior history of MSSA bacteremia with endocarditis in March 2019  -BILLY in September 2019 showed echodensity on the anterior mitral leaflet      5  Pseudomonas bacteremia, blood culture from 9/28/19 showed Pseudomonas  -Repeat blood cultures negative so far     6  Bilateral pleural effusion, status post thoracentesis on 10/2/19  -currently remains on room air  -holding on further diuretics as appears euvolemic on exam     Other issues:  CT scan suggestive of pulmonary congestion versus suspicion for pneumonia, pleural effusions, splenic infarct  Septic embolus/abscess on CT scan of brain        Subjective:   He denies chest pain but does complain of shortness of breath  No other complaints  Objective:     Vitals: Blood pressure 126/54, pulse (!) 124, temperature 98 6 °F (37 °C), temperature source Oral, resp  rate (!) 24, height 5' 3" (1 6 m), weight 73 8 kg (162 lb 11 2 oz), SpO2 (!) 85 %  ,Body mass index is 28 82 kg/m²  Temp (24hrs), Av 4 °F (36 9 °C), Min:98 1 °F (36 7 °C), Max:98 6 °F (37 °C)      Weight (last 2 days)     Date/Time   Weight    10/07/19 0600   73 8 (162 7)    10/06/19 0307   72 6 (160 05)    10/05/19 0600   72 5 (159 83)                Intake/Output Summary (Last 24 hours) at 10/7/2019 1115  Last data filed at 10/7/2019 0601  Gross per 24 hour   Intake 1100 ml   Output    Net 1100 ml     I/O last 24 hours: In: 1100 [P O :960; I V :40; IV Piggyback:100]  Out: -         Physical Exam:   Physical Exam   Constitutional: He appears well-developed and well-nourished  No distress  HENT:   Head: Normocephalic and atraumatic  Mouth/Throat: No oropharyngeal exudate  Eyes: Right eye exhibits no discharge  Left eye exhibits no discharge  No scleral icterus  Neck: Neck supple  Cardiovascular: Regular rhythm and normal heart sounds  tachycardic   Pulmonary/Chest: Effort normal  He has wheezes (mild expiratory in right lower base)  He has no rales  Abdominal: Soft  Bowel sounds are normal  He exhibits no distension  There is no tenderness  Musculoskeletal: Normal range of motion  He exhibits no edema  Neurological: He is alert  awake   Skin: Skin is warm and dry  No rash noted  He is not diaphoretic  Psychiatric:   Flat affect   Vitals reviewed        Invasive Devices     Peripheral Intravenous Line            Peripheral IV 10/04/19 Left 2 days                Medications:    Scheduled Meds:  Current Facility-Administered Medications:  acetaminophen 650 mg Oral Q6H PRN Edna Simmonds, MD    barium sulfate 450 mL Oral 90 min pre-procedure Marsha Cantu MD    cefTAZidime 2,000 mg Intravenous Q8H Chica Lemus MD Last Rate: Stopped (10/07/19 0438)   chlorhexidine 15 mL Swish & Spit Q12H [address] Niharika Carney PA-C    escitalopram 10 mg Oral Daily Max Laroy Cons, DO    hydrOXYzine HCL 25 mg Oral Q8H PRN Max Laroy Cons, DO    levofloxacin 750 mg Oral Q24H Chica Lemus MD    melatonin 3 mg Oral HS Max Laroy Cons, DO    metoprolol tartrate 25 mg Oral Q12H [address] Kimberley Kruger MD    ondansetron 4 mg Intravenous Q8H PRN Marni Marte MD    traZODone 100 mg Oral HS Enterprise Zackary, DO    zolpidem 5 mg Oral HS PRN Max Laroy Cons, DO        PRN Meds:   acetaminophen    hydrOXYzine HCL    ondansetron    zolpidem    Continuous Infusions:         LAB RESULTS:      Results from last 7 days   Lab Units 10/06/19  0613 10/05/19  0513 10/04/19  0507 10/03/19  0634 10/02/19  0629 10/01/19  1800 10/01/19  1023   WBC Thousand/uL  --  8 97 9 88 11 52* 9 89  --  15 11*   HEMOGLOBIN g/dL  --  7 9* 7 1* 7 8* 7 8*  --  8 0*   HEMATOCRIT %  --  25 5* 22 8* 25 0* 25 0*  --  24 9*   PLATELETS Thousands/uL  --  225 226 267 261  --  312   NEUTROS PCT %  --   --  85*  --  80*  --  83*   LYMPHS PCT %  --   --  9*  --  11*  --  11*   MONOS PCT %  --   --  4  --  5  --  4   EOS PCT %  --   --  1  --  2  --  1   POTASSIUM mmol/L 3 6 3 8 3 6 4 2  --  3 8 4 1   CHLORIDE mmol/L 108 105 108 110*  --  106 105   CO2 mmol/L 24 27 25 25  --  24 25   BUN mg/dL 29* 31* 29* 32*  --  37* 39*   CREATININE mg/dL 1 47* 1 71* 1 51* 1 57*  --  1 86* 1 76*   CALCIUM mg/dL 8 8 8 6 8 4 8 5  --  9 1 8 8   ALK PHOS U/L  --   --  89  --   --  107  --    ALT U/L  --   -- 12  --   --  22  --    AST U/L  --   --  8  --   --  11  --        CUTURES:  Lab Results   Component Value Date    BLOODCX No Growth After 4 Days  10/03/2019    BLOODCX No Growth After 4 Days  10/03/2019    BLOODCX No Growth After 5 Days  10/01/2019    BLOODCX No Growth After 5 Days  10/01/2019                 Portions of the record may have been created with voice recognition software  Occasional wrong word or "sound a like" substitutions may have occurred due to the inherent limitations of voice recognition software  Read the chart carefully and recognize, using context, where substitutions have occurred  If you have any questions, please contact the dictating provider

## 2019-10-07 NOTE — ASSESSMENT & PLAN NOTE
Subacute 1 8 cm hemorrhage in the right posterior mesial temporal occipital region with surrounding vasogenic edema, stable   Few punctate foci of chronic microhemorrhage in the right frontoparietal region and in the cerebellar hemispheres  MRA on 10/02 reveal NO evidence of mycotic aneurysms  - Neurology consulted on 10/2: "discussed with cardiothoracic surgery that patient is at high risk of intracranial bleed with initiation of heparin   This was also discussed with the patient, who voiced understanding of the risks   Ultimately, the decision to heparinize will be based on risk/benefit analysis by cardiothoracic surgery in regards to the necessity of patient's valve repair versus replacement"    Plan:  - Neurology is following  - - DVT PPX with heparin discontinued on 10/03

## 2019-10-07 NOTE — ASSESSMENT & PLAN NOTE
Plan:  - continue hydroxyzine 25 mg every 8 hours as needed for anxiety  -continue Lexapro, trazodone  -scheduled melatonin and Ambien p r n  nightly

## 2019-10-07 NOTE — ASSESSMENT & PLAN NOTE
S/p thoracentesis on 10/02 by IR with 1000 mL removed from the right hemithorax and 700 mL from the left hemithorax    Plan:  - SOB has improved  Currently not on any diuretics     - will continue to monitor

## 2019-10-07 NOTE — ASSESSMENT & PLAN NOTE
BILLY 10/2 reveal ejection fraction at 60% for no wall motion abnormalities, mildly to moderately reduced systolic function of the right ventricle   Left atrium mildly dilated   Mitral valve with multiple large and mobile vegetations and the trigger aspect of both leaflets with large perforations at the base of the anterior leaflets and wide-open mitral regurgitation  Plan:  - plan for mitral VR replacement with CT surgery this week

## 2019-10-07 NOTE — PROGRESS NOTES
Progress Note - Cardiothoracic Surgery   Joselito Georges 44 y o  male MRN: [id number]  Unit/Bed#: [facility] 421-01 Encounter: [id number]    24 Hour Events: No events overnight  No complaints  Ambulating well  Medications:   Scheduled Meds:  Current Facility-Administered Medications:  acetaminophen 650 mg Oral Q6H PRN Madeleine Cifuentes MD    barium sulfate 450 mL Oral 90 min pre-procedure Jamar Trujillo MD    cefTAZidime 2,000 mg Intravenous Q8H Fernando Scruggs MD Last Rate: Stopped (10/07/19 0438)   chlorhexidine 15 mL Swish & Spit Q12H [address] Linh Rogers PA-C    escitalopram 10 mg Oral Daily Max Monna Phalen, DO    hydrOXYzine HCL 25 mg Oral Q8H PRN Max Monna Phalen, DO    levofloxacin 750 mg Oral Q24H Fernando Scruggs MD    melatonin 3 mg Oral HS Max Monna Phalen, DO    metoprolol tartrate 25 mg Oral Q12H [address] Bree Brand MD    ondansetron 4 mg Intravenous Q8H PRN Jimmy Gauthier MD    traZODone 100 mg Oral HS [place] Anselm Bourdon, DO    zolpidem 5 mg Oral HS PRN Max Monna Phalen, DO      Continuous Infusions:     Results:   Results from last 7 days   Lab Units 10/05/19  0513 10/04/19  0507 10/03/19  0634   WBC Thousand/uL 8 97 9 88 11 52*   HEMOGLOBIN g/dL 7 9* 7 1* 7 8*   HEMATOCRIT % 25 5* 22 8* 25 0*   PLATELETS Thousands/uL 225 226 267     Results from last 7 days   Lab Units 10/06/19  0613 10/05/19  0513 10/04/19  0507   POTASSIUM mmol/L 3 6 3 8 3 6   CHLORIDE mmol/L 108 105 108   CO2 mmol/L 24 27 25   BUN mg/dL 29* 31* 29*   CREATININE mg/dL 1 47* 1 71* 1 51*   CALCIUM mg/dL 8 8 8 6 8 4     Results from last 7 days   Lab Units 10/03/19  0634 10/01/19  1800   INR   --  1 20*   PTT seconds 27  --        Studies:   Cardiac Catheterization:   CORONARY CIRCULATION:  Left main: Normal   LAD: Normal   Circumflex: Normal   RCA: Normal     Echocardiogram:   SUMMARY     LEFT VENTRICLE:  The ventricle was mildly dilated  Systolic function was normal  Ejection fraction was estimated to be 60 %    There were no regional wall motion abnormalities  The changes were consistent with eccentric hypertrophy      RIGHT VENTRICLE:  The size was at the upper limits of normal   Systolic function was mildly to moderately reduced  Wall thickness was increased      LEFT ATRIUM:  The atrium was mildly to moderately dilated      ATRIAL SEPTUM:  No defect or patent foramen ovale was identified      MITRAL VALVE:  Multiple large and highly mobile vegetations were present at the atrial aspect of both leaflets  There were at least two large perforations at the base of the anterior leaflet and there was loss of coaptation of the leaflets at its middle  section due to tissue destruction  There was wide open mitral regurgitation      TRICUSPID VALVE:  There was mild to moderate regurgitation  IMPRESSION:     Unremarkable MR angiogram of the cervical vasculature  Vitals:   Vitals:    10/07/19 0600 10/07/19 0652 10/07/19 0937 10/07/19 1126   BP:  105/67 126/54 113/55   BP Location:  Right arm Left arm Right arm   Pulse:  (!) 124 (!) 124 87   Resp:  (!) 24  20   Temp:  98 6 °F (37 °C)  98 6 °F (37 °C)   TempSrc:  Oral  Oral   SpO2:  (!) 85%  93%   Weight: 73 8 kg (162 lb 11 2 oz)      Height:           Physical Exam:    HEENT/NECK:  PERRLA  No jugular venous distention  Cardiac: II/VI  systolicmurmur  Pulmonary:  Breath sounds clear bilaterally  Abdomen:  Non-tender and Non-distended  Extremities: Extremities warm/dry  Neuro: Alert and oriented X 3  Skin: Warm/Dry, without rashes or lesions  Assessment:  Patient Active Problem List   Diagnosis    Endocarditis    History of intravenous drug abuse ([facility] [place] [address] )    Acute kidney injury ([facility] [place] [address] )    Insomnia    Tachycardia    Nonrheumatic mitral valve regurgitation    Bacteremia    Anxiety    Solitary left kidney    Right parietal lobe lesion    Bilateral pleural effusion    Anemia     Mitral Valve Endocarditis     Plan:  Patient agreeable to proceed with surgery;  Informed consent signed  Continue Mupirocin 2% nasal ointment q 12 hrs  Continue topical chlorhexidine bath and mouth rise  Mitral valve repair/replacement scheduled for wednesday with KEVIN Nunez [facility]: Jessica Burton [place]  DATE: October 7, 2019  TIME: 12:06 PM

## 2019-10-07 NOTE — ASSESSMENT & PLAN NOTE
JULIENNE on CKD and congenital solitary left kidney secondary to prerenal/ATN/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 all in the setting of solitary kidney  CT at [place] shows right kidney congenitally absent with left kidney showing compensatory hypertrophy and 1 small likely infarct in the lower cortex unchanged from previous study, not associated with perinephric fluid or abscess or pyelonephritis      Plan:  - continue to monitor renal function  - nephro is following

## 2019-10-08 ENCOUNTER — ANESTHESIA EVENT (INPATIENT)
Dept: PERIOP | Facility: HOSPITAL | Age: 39
DRG: 162 | End: 2019-10-08
Payer: COMMERCIAL

## 2019-10-08 ENCOUNTER — APPOINTMENT (INPATIENT)
Dept: RADIOLOGY | Facility: HOSPITAL | Age: 39
DRG: 162 | End: 2019-10-08
Payer: COMMERCIAL

## 2019-10-08 LAB
ABO GROUP BLD: NORMAL
ANION GAP SERPL CALCULATED.3IONS-SCNC: 9 MMOL/L (ref 4–13)
BACTERIA BLD CULT: NORMAL
BACTERIA BLD CULT: NORMAL
BLD GP AB SCN SERPL QL: NEGATIVE
BUN SERPL-MCNC: 31 MG/DL (ref 5–25)
CALCIUM SERPL-MCNC: 8.7 MG/DL (ref 8.3–10.1)
CHLORIDE SERPL-SCNC: 109 MMOL/L (ref 100–108)
CO2 SERPL-SCNC: 24 MMOL/L (ref 21–32)
CREAT SERPL-MCNC: 1.51 MG/DL (ref 0.6–1.3)
ERYTHROCYTE [DISTWIDTH] IN BLOOD BY AUTOMATED COUNT: 19.9 % (ref 11.6–15.1)
GFR SERPL CREATININE-BSD FRML MDRD: 57 ML/MIN/1.73SQ M
GLUCOSE SERPL-MCNC: 130 MG/DL (ref 65–140)
HCT VFR BLD AUTO: 26.2 % (ref 36.5–49.3)
HGB BLD-MCNC: 8.2 G/DL (ref 12–17)
MAGNESIUM SERPL-MCNC: 1.9 MG/DL (ref 1.6–2.6)
MCH RBC QN AUTO: 28.5 PG (ref 26.8–34.3)
MCHC RBC AUTO-ENTMCNC: 31.3 G/DL (ref 31.4–37.4)
MCV RBC AUTO: 91 FL (ref 82–98)
PHOSPHATE SERPL-MCNC: 3.4 MG/DL (ref 2.7–4.5)
PLATELET # BLD AUTO: 220 THOUSANDS/UL (ref 149–390)
PMV BLD AUTO: 9.9 FL (ref 8.9–12.7)
POTASSIUM SERPL-SCNC: 3.4 MMOL/L (ref 3.5–5.3)
RBC # BLD AUTO: 2.88 MILLION/UL (ref 3.88–5.62)
RH BLD: POSITIVE
SODIUM SERPL-SCNC: 142 MMOL/L (ref 136–145)
SPECIMEN EXPIRATION DATE: NORMAL
WBC # BLD AUTO: 11.05 THOUSAND/UL (ref 4.31–10.16)

## 2019-10-08 PROCEDURE — 94760 N-INVAS EAR/PLS OXIMETRY 1: CPT

## 2019-10-08 PROCEDURE — 84100 ASSAY OF PHOSPHORUS: CPT | Performed by: STUDENT IN AN ORGANIZED HEALTH CARE EDUCATION/TRAINING PROGRAM

## 2019-10-08 PROCEDURE — 99232 SBSQ HOSP IP/OBS MODERATE 35: CPT | Performed by: INTERNAL MEDICINE

## 2019-10-08 PROCEDURE — 86920 COMPATIBILITY TEST SPIN: CPT

## 2019-10-08 PROCEDURE — 85027 COMPLETE CBC AUTOMATED: CPT | Performed by: INTERNAL MEDICINE

## 2019-10-08 PROCEDURE — 70450 CT HEAD/BRAIN W/O DYE: CPT

## 2019-10-08 PROCEDURE — 86850 RBC ANTIBODY SCREEN: CPT | Performed by: HOSPITALIST

## 2019-10-08 PROCEDURE — NC001 PR NO CHARGE: Performed by: THORACIC SURGERY (CARDIOTHORACIC VASCULAR SURGERY)

## 2019-10-08 PROCEDURE — 99233 SBSQ HOSP IP/OBS HIGH 50: CPT | Performed by: INTERNAL MEDICINE

## 2019-10-08 PROCEDURE — 80048 BASIC METABOLIC PNL TOTAL CA: CPT | Performed by: INTERNAL MEDICINE

## 2019-10-08 PROCEDURE — 86900 BLOOD TYPING SEROLOGIC ABO: CPT | Performed by: HOSPITALIST

## 2019-10-08 PROCEDURE — 86901 BLOOD TYPING SEROLOGIC RH(D): CPT | Performed by: HOSPITALIST

## 2019-10-08 PROCEDURE — 83735 ASSAY OF MAGNESIUM: CPT | Performed by: STUDENT IN AN ORGANIZED HEALTH CARE EDUCATION/TRAINING PROGRAM

## 2019-10-08 RX ORDER — POTASSIUM CHLORIDE 20 MEQ/1
40 TABLET, EXTENDED RELEASE ORAL ONCE
Status: COMPLETED | OUTPATIENT
Start: 2019-10-08 | End: 2019-10-08

## 2019-10-08 RX ORDER — HEPARIN SODIUM 1000 [USP'U]/ML
10000 INJECTION, SOLUTION INTRAVENOUS; SUBCUTANEOUS ONCE
Status: CANCELLED | OUTPATIENT
Start: 2019-10-09

## 2019-10-08 RX ORDER — HEPARIN SODIUM 1000 [USP'U]/ML
400 INJECTION, SOLUTION INTRAVENOUS; SUBCUTANEOUS ONCE
Status: CANCELLED | OUTPATIENT
Start: 2019-10-09

## 2019-10-08 RX ADMIN — ONDANSETRON 4 MG: 2 INJECTION INTRAMUSCULAR; INTRAVENOUS at 10:18

## 2019-10-08 RX ADMIN — CHLORHEXIDINE GLUCONATE 0.12% ORAL RINSE 15 ML: 1.2 LIQUID ORAL at 08:31

## 2019-10-08 RX ADMIN — ACETAMINOPHEN 650 MG: 325 TABLET ORAL at 14:27

## 2019-10-08 RX ADMIN — CHLORHEXIDINE GLUCONATE 0.12% ORAL RINSE 15 ML: 1.2 LIQUID ORAL at 21:23

## 2019-10-08 RX ADMIN — HYDROXYZINE HYDROCHLORIDE 25 MG: 25 TABLET ORAL at 10:21

## 2019-10-08 RX ADMIN — TRAZODONE HYDROCHLORIDE 100 MG: 100 TABLET ORAL at 21:23

## 2019-10-08 RX ADMIN — LEVOFLOXACIN 750 MG: 750 TABLET, FILM COATED ORAL at 04:36

## 2019-10-08 RX ADMIN — ACETAMINOPHEN 650 MG: 325 TABLET ORAL at 21:27

## 2019-10-08 RX ADMIN — METOPROLOL TARTRATE 25 MG: 25 TABLET, FILM COATED ORAL at 21:25

## 2019-10-08 RX ADMIN — ZOLPIDEM TARTRATE 5 MG: 5 TABLET, FILM COATED ORAL at 22:35

## 2019-10-08 RX ADMIN — CEFTAZIDIME 2000 MG: 1 INJECTION, POWDER, FOR SOLUTION INTRAMUSCULAR; INTRAVENOUS at 11:48

## 2019-10-08 RX ADMIN — CEFTAZIDIME 2000 MG: 1 INJECTION, POWDER, FOR SOLUTION INTRAMUSCULAR; INTRAVENOUS at 21:23

## 2019-10-08 RX ADMIN — HYDROXYZINE HYDROCHLORIDE 25 MG: 25 TABLET ORAL at 21:23

## 2019-10-08 RX ADMIN — HYDROXYZINE HYDROCHLORIDE 25 MG: 25 TABLET ORAL at 03:44

## 2019-10-08 RX ADMIN — METOPROLOL TARTRATE 25 MG: 25 TABLET, FILM COATED ORAL at 08:31

## 2019-10-08 RX ADMIN — CEFTAZIDIME 2000 MG: 1 INJECTION, POWDER, FOR SOLUTION INTRAMUSCULAR; INTRAVENOUS at 03:45

## 2019-10-08 RX ADMIN — MELATONIN 3 MG: 3 TAB ORAL at 21:25

## 2019-10-08 RX ADMIN — ESCITALOPRAM OXALATE 10 MG: 10 TABLET ORAL at 08:31

## 2019-10-08 RX ADMIN — POTASSIUM CHLORIDE 40 MEQ: 1500 TABLET, EXTENDED RELEASE ORAL at 08:31

## 2019-10-08 NOTE — PROGRESS NOTES
INTERNAL MEDICINE RESIDENCY PROGRESS NOTE     Name: Dru Hylton   Age & Sex: 44 y o  male   MRN: [id number]  Unit/Bed#: [facility] 421-01   Encounter: [id number]  Team: SOD Team A    PATIENT INFORMATION     Name: Dru Hylton   Age & Sex: 44 y o  male   MRN: [id number]  Hospital Stay Days: 8    ASSESSMENT/PLAN     Principal Problem:    Endocarditis  Active Problems:    History of intravenous drug abuse ([facility] )    Acute kidney injury ([facility] )    Insomnia    Tachycardia    Nonrheumatic mitral valve regurgitation    Bacteremia    Anxiety    Solitary left kidney    Right parietal lobe lesion    Bilateral pleural effusion    Anemia      Bilateral pleural effusion- improved   Assessment & Plan  Likely from severe mitral regurgitant in setting of endocarditis  S/p thoracentesis on 10/02 by IR with 1000 mL removed from the right hemithorax and 700 mL from the left hemithorax  Now on room air  Stable vitals       Plan:  - SOB has improved  Currently not on any diuretics  - will continue to monitor        Right parietal lobe lesion  Assessment & Plan  Subacute 1 8 cm hemorrhage in the right posterior mesial temporal occipital region with surrounding vasogenic edema, stable   Few punctate foci of chronic microhemorrhage in the right frontoparietal region and in the cerebellar hemispheres  MRA on 10/02 reveal NO evidence of mycotic aneurysms    - Neurology consulted on 10/2: "discussed with cardiothoracic surgery that patient is at high risk of intracranial bleed with initiation of heparin   This was also discussed with the patient, who voiced understanding of the risks   Ultimately, the decision to heparinize will be based on risk/benefit analysis by cardiothoracic surgery in regards to the necessity of patient's valve repair versus replacement"     Plan:  - Neurology is following  - DVT PPX with heparin discontinued on 10/03    - no further intervention at this time      Solitary left kidney  Assessment & Plan  JULIENNE on CKD and congenital solitary left kidney secondary to prerenal/ATN/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 all in the setting of solitary kidney  CT at [facility] shows right kidney congenitally absent with left kidney showing compensatory hypertrophy and 1 small likely infarct in the lower cortex unchanged from previous study, not associated with perinephric fluid or abscess or pyelonephritis      Plan:  - continue to monitor renal function  - nephro is following     Anxiety  Assessment & Plan  Plan:  - continue hydroxyzine 25 mg every 8 hours as needed for anxiety  -continue Lexapro, trazodone  -scheduled melatonin and Ambien p r n  nightly        Nonrheumatic mitral valve regurgitation  Assessment & Plan  BILLY 10/2 reveal ejection fraction at 60% for no wall motion abnormalities, mildly to moderately reduced systolic function of the right ventricle   Left atrium mildly dilated   Mitral valve with multiple large and mobile vegetations and the trigger aspect of both leaflets with large perforations at the base of the anterior leaflets and wide-open mitral regurgitation      Plan:  - plan for mitral VR replacement with CT surgery tomorrow       * Endocarditis  Assessment & Plan  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  CT surgery and ID following      Plan:  - plan for mitral valve replacement tomorrow  - continue antibiotics:  IV ceftazidime 2 g q 8 hours and levofloxacin 750 mg p o  Daily    Disposition: going for MVR tomorrow      SUBJECTIVE     Patient seen and examined  No acute events overnight       OBJECTIVE     Vitals:    10/08/19 0036 10/08/19 0232 10/08/19 0600 10/08/19 0710   BP:  109/59  118/63   BP Location:  Right arm  Right arm   Pulse: (!) 129 (!) 128  (!) 123   Resp:  22  20   Temp:  98 1 °F (36 7 °C)  98 1 °F (36 7 °C)   TempSrc:  Oral  Oral   SpO2: (!) 89% 95%  97%   Weight:   74 kg (163 lb 2 3 oz)    Height:          Temperature:   Temp (24hrs), Av 3 °F (36 8 °C), Min:98 1 °F (36 7 °C), Max:98 6 °F (37 °C)    Temperature: 98 1 °F (36 7 °C)  Intake & Output:  I/O       10/06 0701 - 10/07 0700 10/07 0701 - 10/08 0700 10/08 0701 - 10/09 07    P  O  960 540     I V  (mL/kg) 40 (0 5)      IV Piggyback 100 100     Total Intake(mL/kg) 1100 (14 9) 640 (8 6)     Urine (mL/kg/hr)       Total Output       Net +1100 +640            Unmeasured Urine Occurrence 2 x 2 x         Weights:   IBW: 56 9 kg    Body mass index is 28 9 kg/m²  Weight (last 2 days)     Date/Time   Weight    10/08/19 06   74 (163 14)    10/07/19 06   73 8 (162 7)    10/06/19 0307   72 6 (160 05)            Physical Exam   Constitutional: He is oriented to person, place, and time  He appears well-developed and well-nourished  No distress  HENT:   Head: Normocephalic and atraumatic  Nose: Nose normal    Mouth/Throat: Oropharynx is clear and moist  No oropharyngeal exudate  Eyes: Right eye exhibits no discharge  Left eye exhibits no discharge  No scleral icterus  Neck: Normal range of motion  Neck supple  Cardiovascular: Normal rate, regular rhythm, normal heart sounds and intact distal pulses  Exam reveals no gallop and no friction rub  No murmur heard  Holosystolic murmur at left midclavicular line   Pulmonary/Chest: Effort normal  No stridor  No respiratory distress  He has no wheezes  He has no rales  Abdominal: Soft  Bowel sounds are normal  He exhibits no distension  There is no tenderness  There is no guarding  Musculoskeletal: Normal range of motion  He exhibits no edema  Neurological: He is alert and oriented to person, place, and time  Skin: Skin is warm  He is not diaphoretic  No erythema  Psychiatric: He has a normal mood and affect  LABORATORY DATA     Labs: I have personally reviewed pertinent reports    Results from last 7 days   Lab Units 10/08/19  0229 10/05/19  0513 10/04/19  0507  10/02/19  0629 10/01/19  1023   WBC Thousand/uL 11 05* 8 97 9 88   < > 9 89 15 11*   HEMOGLOBIN g/dL 8 2* 7 9* 7 1*   < > 7 8* 8 0*   HEMATOCRIT % 26 2* 25 5* 22 8*   < > 25 0* 24 9*   PLATELETS Thousands/uL 220 225 226   < > 261 312   NEUTROS PCT %  --   --  85*  --  80* 83*   MONOS PCT %  --   --  4  --  5 4    < > = values in this interval not displayed  Results from last 7 days   Lab Units 10/08/19  0229 10/06/19  0613 10/05/19  0513 10/04/19  0507  10/01/19  1800   POTASSIUM mmol/L 3 4* 3 6 3 8 3 6   < > 3 8   CHLORIDE mmol/L 109* 108 105 108   < > 106   CO2 mmol/L 24 24 27 25   < > 24   BUN mg/dL 31* 29* 31* 29*   < > 37*   CREATININE mg/dL 1 51* 1 47* 1 71* 1 51*   < > 1 86*   CALCIUM mg/dL 8 7 8 8 8 6 8 4   < > 9 1   ALK PHOS U/L  --   --   --  89  --  107   ALT U/L  --   --   --  12  --  22   AST U/L  --   --   --  8  --  11    < > = values in this interval not displayed  Results from last 7 days   Lab Units 10/08/19  0229   MAGNESIUM mg/dL 1 9     Results from last 7 days   Lab Units 10/08/19  0229   PHOSPHORUS mg/dL 3 4      Results from last 7 days   Lab Units 10/03/19  0634 10/01/19  1800   INR   --  1 20*   PTT seconds 27  --                IMAGING & DIAGNOSTIC TESTING     Radiology Results: I have personally reviewed pertinent reports  Ct Abdomen Pelvis Wo Contrast    Result Date: 10/3/2019  Impression: Mild to moderate pleural effusions are seen at the bases  There is a unilateral left kidney without hydronephrosis  No ascites or obvious intra-abdominal collection Immediate finding was noted on the Epic system  Workstation performed: [id number]     Xr Mandible Panorex ([place] Only)    Result Date: 10/2/2019  Impression: No periapical lucencies identified  Workstation performed: [id number]     Xr Chest Portable    Result Date: 10/4/2019  Impression: Stable chest with moderate pulmonary vascular congestion   Workstation performed: [id number]     Xr Chest Portable    Result Date: 10/1/2019  Impression: New airspace opacity suggests pulmonary edema or diffuse bronchopneumonia  Immediate report was noted on the Epic system  Workstation performed: [id number]     Ct Head Wo Contrast    Addendum Date: 10/1/2019    ADDENDUM: This addendum is for the purpose of clarification: Abnormality described represents either a septic embolus, or abscess  MRI is recommended for differentiation  I personally discussed this addendum with Moise Monroe on 10/1/2019 at 7:50 PM     Result Date: 10/1/2019  Impression: Right parieto-occipital lesion, given history this likely represents a septic embolus/abscess  I personally discussed this study  with Moise Monroe on 10/1/2019 at 5:24 PM   Workstation performed: [id number]     Ct Chest Wo Contrast    Result Date: 10/1/2019  Impression: 1  Diffuse groundglass opacities consistent with pulmonary edema 2  More focal patchy opacities in left upper lobe, while this may  still represent edema, pneumonia would have a similar appearance 3  Moderate pleural effusions 4  Subcarinal lymphadenopathy 5  Wedge-shaped hypodensity within the spleen, likely infarct The study was marked in EPIC for immediate notification  Workstation performed: [id number]     Mra Head Wo Contrast    Result Date: 10/2/2019  Impression: 1  No evidence of mycotic aneurysm; however, exam sensitivity is limited by resolution and incomplete imaging of the distal branches  Catheter angiography may be helpful if there is persistent concern for mycotic aneurysm  2   No stenosis or occlusion of the major vessels of the [place]  Workstation performed: [id number]     Mra Carotids Wo Contrast    Result Date: 10/3/2019  Impression: Unremarkable MR angiogram of the cervical vasculature  Workstation performed: [id number]     Mri Brain Wo Contrast    Result Date: 10/2/2019  Impression: Subacute 1 8 cm hemorrhage in the right posterior mesial temporal occipital region with surrounding vasogenic edema, stable    Few punctate foci of chronic microhemorrhage in the right frontoparietal region and in the cerebellar hemispheres  Findings may  be secondary to subacute and chronic septic emboli and/or mycotic aneurysms  No evidence of abscess  Workstation performed: [id number]     Ir Thoracentesis    Result Date: 10/2/2019  Impression: Successful bilateral thoracentesis  _________________________________________________________________ COMPARISON: None PROCEDURE DETAILS: Operators: Dr Beryl Mendes, [address] [place] [address] attending, performed the procedure  Anesthesia: 1% lidocaine was injected in the skin and subcutaneous tissues overlying the access site  Medications: 1% lidocaine Contrast: None Fluoroscopy time: None COMMENTS: Following the discussion of the risks, benefits and alternatives to the procedure, written informed consent was obtained from the patient  The patient was placed in a sitting position  A preprocedure timeout was performed per [facility] protocol  The right back was prepped and draped in the usual sterile fashion  After local anesthesia was administered, a 5-Surinamese Content Raven catheter was advanced under continuous ultrasound guidance into the right pleural space  1000 mL of vidya fluid was obtained  After the procedure, the catheter was removed, the skin was cleansed and sterile dressings were applied  Aforementioned procedure was then performed for left-sided thoracentesis  The patient tolerated the procedure well and there were no immediate postprocedure complications  Workstation performed: [id number]     Us Right Upper Quadrant With Liver Dopplers    Result Date: 10/4/2019  Impression: 1  Normal sonographic appearance of the liver  2   Absent right kidney  3   Mild splenomegaly  4   Incompletely visualized bilateral pleural effusions  Workstation performed: [id number]     Other Diagnostic Testing: I have personally reviewed pertinent reports      ACTIVE MEDICATIONS     Current Facility-Administered Medications   Medication Dose Route Frequency    acetaminophen (TYLENOL) tablet 650 mg  650 mg Oral Q6H PRN    barium sulfate 2 1 % suspension 450 mL  450 mL Oral 90 min pre-procedure    cefTAZidime (FORTAZ) 2,000 mg in sodium chloride 0 9 % 50 mL IVPB  2,000 mg Intravenous Q8H    chlorhexidine (PERIDEX) 0 12 % oral rinse 15 mL  15 mL Swish & Spit Q12H [facility]    escitalopram (LEXAPRO) tablet 10 mg  10 mg Oral Daily    hydrOXYzine HCL (ATARAX) tablet 25 mg  25 mg Oral Q8H PRN    levofloxacin (LEVAQUIN) tablet 750 mg  750 mg Oral Q24H    melatonin tablet 3 mg  3 mg Oral HS    metoprolol tartrate (LOPRESSOR) tablet 25 mg  25 mg Oral Q12H [facility]    ondansetron (ZOFRAN) injection 4 mg  4 mg Intravenous Q8H PRN    traZODone (DESYREL) tablet 100 mg  100 mg Oral HS    zolpidem (AMBIEN) tablet 5 mg  5 mg Oral HS PRN       VTE Pharmacologic Prophylaxis: Sequential compression device (Venodyne)   VTE Mechanical Prophylaxis: sequential compression device    Portions of the record may have been created with voice recognition software  Occasional wrong word or "sound a like" substitutions may have occurred due to the inherent limitations of voice recognition software    Read the chart carefully and recognize, using context, where substitutions have occurred   ==  Christy Peabody, [facility]  Internal Medicine Residency PGY-1

## 2019-10-08 NOTE — PROGRESS NOTES
Progress Note - Infectious Disease   Miley Bartholomew 44 y o  male MRN: [id number]  Unit/Bed#: [facility] 421-01 Encounter: [id number]      Impression/Plan:  44year old male with a PMH of ivdu, congential solitary kidney and MSSA endocarditis presents as a transfer from [facility] with Infective endocarditis of the mitral valve with severe mitral regurgitation and bacteremia due to Pseudomonas, complicated by emboli to spleen and brain, currently on ceftazidime and levofloxacin      Bacteremia  · Pseudomonas bacteremia likely secondary to injection drug use   · Blood cultures were positive from 09/06- 09/11  · Repeat blood cultures from 09/13 and 09/14 were negative  · Blood cultures on 09/28 grew pseudomonas again   · Repeat blood cultures from 10/01, 10/03, 10/06 negative preliminary  · Patient was previously on cefepime but had diarrhea  · Was also on gentamicin but given solitary kidney he developed JULIENNE and was stopped  · Currently on ceftazidime and levofloxacin, will need total 6 weeks of iv post-op and not a candidate for home iv due to h/o ivdu  · Monitor temperature and WBC count      Infective endocarditis  · Pseudomonas endocarditis with severe mitral regurgitation  · Patient has h/o MSSA endocarditis that was treated with 6 weeks of iv cefazolin in March of 2019  · Patient had 2D echo on September 6, 2019 which showed echodensity on the posterior mitral leaflet, possibly representing residual vegetation from prior treated endocarditis  · BILLY showed increasing size of mitral valve vegetation compared to prior study in June 2019 and also new vegetation on anterior leaflet  · Repeat BILLY 10/02 showed multiple large and highly mobile vegetations were present at the atrial aspect of both leaflets   There were at least two large perforations at the base of the anterior leaflet, and severe mitral regurgitation  · Currently on ceftazidime and levofloxacin for dual antipseudomonal coverage  · CT shows splenic infarct and right parieto-occipital lesion representing septic embolism; MRI confirms  · Transferred for CT surgery evaluation for valve replacement, getting pre-op evaluation with GI and panorex was normal  · Scheduled for mitral valve replacement on Wednesday      Hep C antibody positive  · 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  · Hep C RNA levels undetectable   · RUQ USG normal, no cirrhosis, cleared by GI for surgery   · HIV negative     Vomiting and diarrhea  · Secondary to cefepime, now improved  · C  Diff was negative at [facility]     I v drug use  · Will need drug rehab after hospitalization to prevent recurrent endocarditis     JULIENNE   · Solitary kidney on the left  · Renal dosage of levofloxacin  · Avoid aminoglycosides and other nephrotoxic agents   · Creatine istable      Antibiotics:  · On ceftazidime and levofloxacin  · Unsure what antibiotic day as patient transferred from [facility]  · Abx sensitivity obtained from [facility] showed sensitive to:                                      TOYIN  -Zosyn                         <4  -Tobramycin                <1  -Meropenem                <0 25  -Levofloxacin               <0 25  -Gentamicin                 <1  -Ceftazidine                 <4  -Cefepime                   <2    Subjective:  Patient has no fever, chills, sweats; no nausea, vomiting, diarrhea; no cough, shortness of breath; no pain  No new symptoms  Objective:  Vitals:  Temp:  [98 1 °F (36 7 °C)-98 6 °F (37 °C)] 98 1 °F (36 7 °C)  HR:  [] 123  Resp:  [18-22] 20  BP: ()/(55-70) 118/63  SpO2:  [87 %-97 %] 97 %  Temp (24hrs), Av 3 °F (36 8 °C), Min:98 1 °F (36 7 °C), Max:98 6 °F (37 °C)  Current: Temperature: 98 1 °F (36 7 °C)    Physical Exam:   General Appearance:  Alert, interactive, nontoxic, no acute distress  Throat: Oropharynx moist without lesions      Lungs:   Clear to auscultation bilaterally; no wheezes, rhonchi or rales; respirations unlabored   Heart:  Holosystolic murmur heard at the apex radiating to axilla    Abdomen:   Soft, non-tender, non-distended, positive bowel sounds  Extremities: No clubbing, cyanosis or edema   Skin: No new rashes or lesions  No draining wounds noted  Labs, Imaging, & Other studies:   All pertinent labs and imaging studies were personally reviewed  Results from last 7 days   Lab Units 10/08/19  0229 10/05/19  0513 10/04/19  0507   WBC Thousand/uL 11 05* 8 97 9 88   HEMOGLOBIN g/dL 8 2* 7 9* 7 1*   PLATELETS Thousands/uL 220 225 226     Results from last 7 days   Lab Units 10/08/19  0229 10/06/19  0613 10/05/19  0513 10/04/19  0507  10/01/19  1800   SODIUM mmol/L 142 139 141 141   < > 141   POTASSIUM mmol/L 3 4* 3 6 3 8 3 6   < > 3 8   CHLORIDE mmol/L 109* 108 105 108   < > 106   CO2 mmol/L 24 24 27 25   < > 24   BUN mg/dL 31* 29* 31* 29*   < > 37*   CREATININE mg/dL 1 51* 1 47* 1 71* 1 51*   < > 1 86*   EGFR ml/min/1 73sq m 57 59 49 57   < > 45   CALCIUM mg/dL 8 7 8 8 8 6 8 4   < > 9 1   AST U/L  --   --   --  8  --  11   ALT U/L  --   --   --  12  --  22   ALK PHOS U/L  --   --   --  89  --  107    < > = values in this interval not displayed  Results from last 7 days   Lab Units 10/06/19  0613 10/03/19  0634 10/02/19  1345 10/02/19  0955 10/02/19  0951 10/01/19  1800   BLOOD CULTURE  No Growth at 24 hrs  No Growth at 24 hrs  No Growth After 5 Days  No Growth After 5 Days  --   --   --  No Growth After 5 Days  No Growth After 5 Days     GRAM STAIN RESULT   --   --   --  1+ Polys  No bacteria seen No Polys or Bacteria seen  --    BODY FLUID CULTURE, STERILE   --   --   --  No growth No growth  --    MRSA CULTURE ONLY   --   --  No Methicillin Resistant Staphlyococcus aureus (MRSA) isolated  --   --   --

## 2019-10-08 NOTE — RESPIRATORY THERAPY NOTE
RT Protocol Note  Gregorio Yee 44 y o  male MRN: [id number]  Unit/Bed#: [facility] 421-01 Encounter: [id number]    Assessment    Principal Problem:    Endocarditis  Active Problems:    History of intravenous drug abuse ([facility] )    Acute kidney injury ([facility] )    Insomnia    Tachycardia    Nonrheumatic mitral valve regurgitation    Bacteremia    Anxiety    Solitary left kidney    Right parietal lobe lesion    Bilateral pleural effusion    Anemia      Home Pulmonary Medications:  None         Past Medical History:   Diagnosis Date    Anxiety 10/1/2019    Bacteremia 10/1/2019    Endocarditis 10/1/2019    History of intravenous drug abuse ([facility] ) 10/1/2019    Nonrheumatic mitral valve regurgitation 10/1/2019     Social History     Socioeconomic History    Marital status: Single     Spouse name: None    Number of children: None    Years of education: None    Highest education level: None   Occupational History    None   Social Needs    Financial resource strain: None    Food insecurity:     Worry: None     Inability: None    Transportation needs:     Medical: None     Non-medical: None   Tobacco Use    Smoking status: Never Smoker    Smokeless tobacco: Never Used   Substance and Sexual Activity    Alcohol use: Never     Frequency: Never     Binge frequency: Never    Drug use:  Yes    Sexual activity: Yes     Partners: Female   Lifestyle    Physical activity:     Days per week: None     Minutes per session: None    Stress: None   Relationships    Social connections:     Talks on phone: None     Gets together: None     Attends Bahai service: None     Active member of club or organization: None     Attends meetings of clubs or organizations: None     Relationship status: None    Intimate partner violence:     Fear of current or ex partner: None     Emotionally abused: None     Physically abused: None     Forced sexual activity: None   Other Topics Concern    None   Social History Narrative    None Subjective         Objective    Physical Exam:   Assessment Type: Assess only  General Appearance: Alert, Awake  Respiratory Pattern: Normal  Chest Assessment: Chest expansion symmetrical  Bilateral Breath Sounds: Diminished    Vitals:  Blood pressure 100/58, pulse (!) 128, temperature 98 1 °F (36 7 °C), temperature source Oral, resp  rate 20, height 5' 3" (1 6 m), weight 74 kg (163 lb 2 3 oz), SpO2 92 %  Imaging and other studies: I have personally reviewed pertinent reports  Plan    Respiratory Plan: (scheduled for MVR 10/9)  Airway Clearance Plan: Incentive Spirometer     Resp Comments: Pt assessed at this time per resp/acp protocol  Pt is scheduled for an MVR tomorrow 10/9  Pt is in no apparent resp distress at this time  He has no hx of pulmonary disease and takes no resp medications at home  He was instructed on IS and understands the use and frequency of device  Pt will be followed per ACP post surgery

## 2019-10-08 NOTE — PROGRESS NOTES
Progress Note - Cardiothoracic Surgery   Christie Tabares 44 y o  male MRN: [id number]  Unit/Bed#: [facility] 421-01 Encounter: [id number]    24 Hour Events: No events overnight  NO complaints this morning  Moving around room comfortably  Medications:   Scheduled Meds:  Current Facility-Administered Medications:  acetaminophen 650 mg Oral Q6H PRN Porfirio Champion MD    barium sulfate 450 mL Oral 90 min pre-procedure Gilberto Delong MD    cefTAZidime 2,000 mg Intravenous Q8H Zayda Mason MD Last Rate: Stopped (10/08/19 0415)   chlorhexidine 15 mL Swish & Spit Q12H [address] Agata Murillo PA-C    escitalopram 10 mg Oral Daily Max Talita Hilding, DO    hydrOXYzine HCL 25 mg Oral Q8H PRN Max Talita Hilding, DO    levofloxacin 750 mg Oral Q24H Zayda Mason MD    melatonin 3 mg Oral HS Max Talita Hilding, DO    metoprolol tartrate 25 mg Oral Q12H [address] Wanda Khan, DO    ondansetron 4 mg Intravenous Q8H PRN Scar Siddiqui MD    traZODone 100 mg Oral HS [place] Zackary, DO    zolpidem 5 mg Oral HS PRN Max Talita Hilding, DO      Continuous Infusions:     Results:   Results from last 7 days   Lab Units 10/08/19  0229 10/05/19  0513 10/04/19  0507   WBC Thousand/uL 11 05* 8 97 9 88   HEMOGLOBIN g/dL 8 2* 7 9* 7 1*   HEMATOCRIT % 26 2* 25 5* 22 8*   PLATELETS Thousands/uL 220 225 226     Results from last 7 days   Lab Units 10/08/19  0229 10/06/19  0613 10/05/19  0513   POTASSIUM mmol/L 3 4* 3 6 3 8   CHLORIDE mmol/L 109* 108 105   CO2 mmol/L 24 24 27   BUN mg/dL 31* 29* 31*   CREATININE mg/dL 1 51* 1 47* 1 71*   CALCIUM mg/dL 8 7 8 8 8 6     Results from last 7 days   Lab Units 10/03/19  0634 10/01/19  1800   INR   --  1 20*   PTT seconds 27  --        Studies:   Cardiac Catheterization:   CORONARY CIRCULATION:  Left main: Normal   LAD: Normal   Circumflex: Normal   RCA: Normal      Echocardiogram:   SUMMARY     LEFT VENTRICLE:  The ventricle was mildly dilated  Systolic function was normal  Ejection fraction was estimated to be 60 %    There were no regional wall motion abnormalities  The changes were consistent with eccentric hypertrophy      RIGHT VENTRICLE:  The size was at the upper limits of normal   Systolic function was mildly to moderately reduced  Wall thickness was increased      LEFT ATRIUM:  The atrium was mildly to moderately dilated      ATRIAL SEPTUM:  No defect or patent foramen ovale was identified      MITRAL VALVE:  Multiple large and highly mobile vegetations were present at the atrial aspect of both leaflets  There were at least two large perforations at the base of the anterior leaflet and there was loss of coaptation of the leaflets at its middle  section due to tissue destruction  There was wide open mitral regurgitation      TRICUSPID VALVE:  There was mild to moderate regurgitation  MRA  IMPRESSION:     Unremarkable MR angiogram of the cervical vasculature  Vitals:   Vitals:    10/08/19 0036 10/08/19 0232 10/08/19 0600 10/08/19 0710   BP:  109/59  118/63   BP Location:  Right arm  Right arm   Pulse: (!) 129 (!) 128  (!) 123   Resp:  22  20   Temp:  98 1 °F (36 7 °C)  98 1 °F (36 7 °C)   TempSrc:  Oral  Oral   SpO2: (!) 89% 95%  97%   Weight:   74 kg (163 lb 2 3 oz)    Height:           Physical Exam:    HEENT/NECK:  PERRLA  No jugular venous distention  Cardiac: tachycardia 120s   Pulmonary:  Breath sounds clear bilaterally  Abdomen:  Non-tender, Non-distended and Normal bowel sounds  Extremities: Extremities warm/dry  Neuro: Alert and oriented X 3  Skin: Warm/Dry, without rashes or lesions  Assessment:  Patient Active Problem List   Diagnosis    Endocarditis    History of intravenous drug abuse ([facility] [address] )    Acute kidney injury ([facility] [address] )    Insomnia    Tachycardia    Nonrheumatic mitral valve regurgitation    Bacteremia    Anxiety    Solitary left kidney    Right parietal lobe lesion    Bilateral pleural effusion    Anemia     Mitral Valve endocarditis;  Ongoing MVR workup    Plan:  Spoke with neurology team to comment on risk of bleed for tomorrows valve surgery  They requested a repeat head CT wo  Order placed and will get CT this morning  Patient agreeable to proceed with surgery;  Informed consent signed  Blood type and cross match ordered; RBC prepared  Continue Mupirocin 2% nasal ointment q 12 hrs  Continue topical chlorhexidine bath and mouth rise  NPO after midnight  Discontinue heparin infusion on call to OR  Preoperative oxygen, beta blocker, insulin, and antibiotics ordered mitral valve repair vs replacement scheduled for tomorrow with KEVIN Wells: Mumtaz Roger [place]  DATE: October 8, 2019  TIME: 10:05 AM

## 2019-10-08 NOTE — PROGRESS NOTES
SOD made aware of that patient was placed on 2 Liters of O2 due to patient SPO2 being at 89-90% on room air  SOD made aware

## 2019-10-08 NOTE — QUICK NOTE
Repeat CTH reviewed  R temporal/ occipital hemorrhage decreased in size, no new areas of hemorrhage identified  Due to pt's history of endocarditis and likely septic intracranial emobli as cause of above CVA with hemorrhagic conversion, pt is still at increased bleeding risk with anticoagulation  In setting of life-saving necessity of valvular surgery, would recommend proceeding with surgery and anticoagulation as needed, though there is moderate intracranial bleeding risk

## 2019-10-08 NOTE — PROGRESS NOTES
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 IVDU  · BCx #1-3 all negative, continue ceftazidime 2g tid+ levaquin 750mg daily  · No interval changes, tachy overnight 100-120s   · OR tomorrow (10/9) for MV replacement w/ bioprosthetic valve (CT Sx input noted)  · Will need ASA + coumadin for 3-6mo postop  · Continue lopressor 25mg bid tele, cardiac diet and fluid restriction  · Replete K     Shortness of breath  · Likely 2/2 to above, ANNE remains unchanged, remains euvolemic on exam  · Cr hovering at 1 4-1 5, no diuretics at this time    Subjective:  No acute events overnight, slept well  Appears more comfortable this am, but does endorse some N/V  ANNE remains unchanged   Denies chest pain, shortness of breath, palpitations, orthopnea, PND, pedal edema, syncope, presyncope, diaphoresis, nausea/vomiting     Remainder of 12-point ROS negative    Telemetry: sinus tach    Net fluid balance: +840 (inaccurate)    Weight:74kg    10/3 EKG: sinus tach    Labs: K 3 4, WBC 11    Family History: non-contributory  Historical Information   Past Medical History:   Diagnosis Date    Anxiety 10/1/2019    Bacteremia 10/1/2019    Endocarditis 10/1/2019    History of intravenous drug abuse ([address] ) 10/1/2019    Nonrheumatic mitral valve regurgitation 10/1/2019     Past Surgical History:   Procedure Laterality Date    CHEST WALL TUMOR EXCISION  2013    Anterior chest wall cyst removed    IR THORACENTESIS  10/2/2019     Social History   Social History     Substance and Sexual Activity   Alcohol Use Never    Frequency: Never    Binge frequency: Never     Social History     Substance and Sexual Activity   Drug Use Yes     Social History     Tobacco Use   Smoking Status Never Smoker   Smokeless Tobacco Never Used     Family History:   Family History   Problem Relation Age of Onset    Heart disease Maternal Grandmother        Scheduled Meds:  Current Facility-Administered Medications:  acetaminophen 650 mg Oral Q6H PRN Carol Paris MD    barium sulfate 450 mL Oral 90 min pre-procedure Kristi Brumfield MD    cefTAZidime 2,000 mg Intravenous Q8H Leah Gregg MD Last Rate: Stopped (10/08/19 0415)   chlorhexidine 15 mL Swish & Spit Q12H [address] Viki Merlin, PA-C    escitalopram 10 mg Oral Daily Max Limmie Jesus, DO    hydrOXYzine HCL 25 mg Oral Q8H PRN Max Limmie Jesus, DO    levofloxacin 750 mg Oral Q24H Leah Gregg MD    melatonin 3 mg Oral HS Max Limmie Jesus, DO    metoprolol tartrate 25 mg Oral Q12H [address] Josh Rawls MD    ondansetron 4 mg Intravenous Q8H PRN Brandon Pollack MD    traZODone 100 mg Oral HS Nadine Mishra Artist, DO    zolpidem 5 mg Oral HS PRN Max Limmie Jesus, DO      Continuous Infusions:   PRN Meds:   acetaminophen    hydrOXYzine HCL    ondansetron    zolpidem  all current active meds have been reviewed    No Known Allergies    Objective:  Blood pressure 118/63, pulse (!) 123, temperature 98 1 °F (36 7 °C), temperature source Oral, resp  rate 20, height 5' 3" (1 6 m), weight 74 kg (163 lb 2 3 oz), SpO2 97 %  Body mass index is 28 9 kg/m²   Orthostatic Blood Pressures      Most Recent Value   Blood Pressure  118/63 filed at 10/08/2019 0710   Patient Position - Orthostatic VS  Sitting filed at 10/08/2019 0710          Intake/Output Summary (Last 24 hours) at 10/8/2019 0926  Last data filed at 10/8/2019 0415  Gross per 24 hour   Intake 640 ml   Output    Net 640 ml     Invasive Devices     Peripheral Intravenous Line            Peripheral IV 10/08/19 Proximal;Right;Ventral (anterior) Forearm less than 1 day                  Intake/Output Summary (Last 24 hours) at 10/8/2019 0926  Last data filed at 10/8/2019 0415  Gross per 24 hour   Intake 640 ml   Output    Net 640 ml       Invasive Devices     Peripheral Intravenous Line            Peripheral IV 10/08/19 Proximal;Right;Ventral (anterior) Forearm less than 1 day                Physical Exam:  Physical Exam   Constitutional: He is oriented to person, place, and time  He appears well-developed and well-nourished  HENT:   Head: Normocephalic  Eyes: Pupils are equal, round, and reactive to light  Neck: Neck supple  Cardiovascular: Regular rhythm and intact distal pulses  Tachycardia present  Exam reveals no gallop and no friction rub  Murmur heard  Systolic murmur is present with a grade of 3/6  MR remains unchanged   Pulmonary/Chest: Effort normal and breath sounds normal  No respiratory distress  He has no wheezes  He has no rales  Abdominal: Soft  Bowel sounds are normal    Musculoskeletal: Normal range of motion  He exhibits no edema  Neurological: He is alert and oriented to person, place, and time  Skin: Skin is warm and dry  Capillary refill takes less than 2 seconds  Labs and Imaging:   Basic Laboratory Review:   Results from last 7 days   Lab Units 10/08/19  0229 10/06/19  7053 10/05/19  0513 10/04/19  0507  10/01/19  1800   SODIUM mmol/L 142 139 141 141   < > 141   POTASSIUM mmol/L 3 4* 3 6 3 8 3 6   < > 3 8   CHLORIDE mmol/L 109* 108 105 108   < > 106   CO2 mmol/L 24 24 27 25   < > 24   BUN mg/dL 31* 29* 31* 29*   < > 37*   CREATININE mg/dL 1 51* 1 47* 1 71* 1 51*   < > 1 86*   GLUCOSE RANDOM mg/dL 130 103 180* 108   < > 108   CALCIUM mg/dL 8 7 8 8 8 6 8 4   < > 9 1   ALBUMIN g/dL  --   --   --  2 0*  --  2 8*   ALK PHOS U/L  --   --   --  89  --  107   ALT U/L  --   --   --  12  --  22   AST U/L  --   --   --  8  --  11   EGFR ml/min/1 73sq m 57 59 49 57   < > 45    < > = values in this interval not displayed       Results from last 7 days   Lab Units 10/08/19  0229 10/05/19  2512 10/04/19  0507  10/02/19  0629 10/01/19  1023   WBC Thousand/uL 11 05* 8 97 9 88   < > 9 89 15 11*   HEMOGLOBIN g/dL 8 2* 7 9* 7 1*   < > 7 8* 8 0*   HEMATOCRIT % 26 2* 25 5* 22 8*   < > 25 0* 24 9*   PLATELETS Thousands/uL 220 225 226   < > 261 312   NEUTROS PCT %  --   --  85*  --  80* 83*   LYMPHS PCT %  --   --  9*  --  11* 11*   MONOS PCT %  --   --  4  --  5 4   EOS PCT %  --   --  1  --  2 1   BASOS PCT %  --   --  0  --  1 0   NEUTROS ABS Thousands/µL  --   --  8 34*  --  8 06* 12 47*   LYMPHS ABS Thousands/µL  --   --  0 90  --  1 04 1 61   MONOS ABS Thousand/µL  --   --  0 41  --  0 50 0 67   EOS ABS Thousand/µL  --   --  0 12  --  0 17 0 19   BASOS ABS Thousands/µL  --   --  0 03  --  0 05 0 05    < > = values in this interval not displayed  Results from last 7 days   Lab Units 10/03/19  0634 10/01/19  1800   PROTIME seconds  --  14 8*   INR   --  1 20*   PTT seconds 27  --            Additional Labs:  Results from last 7 days   Lab Units 10/08/19  0229   MAGNESIUM mg/dL 1 9   PHOSPHORUS mg/dL 3 4          Results from last 7 days   Lab Units 10/03/19  0634   TRIGLYCERIDES mg/dL 101   HDL mg/dL 27*   LDL CALC mg/dL 76       Recent Cultures (last 7 days):   Results from last 7 days   Lab Units 10/06/19  0613 10/03/19  0634 10/02/19  0955 10/02/19  0951 10/01/19  1800   BLOOD CULTURE  No Growth at 24 hrs  No Growth at 24 hrs  No Growth After 4 Days  No Growth After 4 Days  --   --  No Growth After 5 Days  No Growth After 5 Days  GRAM STAIN RESULT   --   --  1+ Polys  No bacteria seen No Polys or Bacteria seen  --    BODY FLUID CULTURE, STERILE   --   --  No growth No growth  --      No results displayed because visit has over 200 results  * I Have Reviewed All Lab Data Listed Above  * Additional Pertinent Lab Tests Reviewed:  All [place] Admission Reviewed    Imaging:  I have personally reviewed pertinent imaging studies and reports    Last 24 Hours Medication List: Current Facility-Administered Medications:  acetaminophen 650 mg Oral Q6H PRN Charles Rincon MD    barium sulfate 450 mL Oral 90 min pre-procedure Cadence Cornelius MD    cefTAZidime 2,000 mg Intravenous Q8H Reid Hernandez MD Last Rate: Stopped (10/08/19 2090)   chlorhexidine 15 mL Swish & Spit Q12H [address] Qi Gonzáles PA-C    escitalopram 10 mg Oral Daily Max Charlotte, DO    hydrOXYzine HCL 25 mg Oral Q8H PRN Max Daniel Wiggins, DO    levofloxacin 750 mg Oral Q24H Reid Hernandez MD    melatonin 3 mg Oral HS Max Danetta Riggers, DO    metoprolol tartrate 25 mg Oral Q12H [address] [place] Abel MD    ondansetron 4 mg Intravenous Q8H PRN Raquel Jimenez MD    traZODone 100 mg Oral HS [place] Zackary, DO    zolpidem 5 mg Oral HS PRN Max Daniel Wiggins, DO      Today, Patient Was Seen By: Laree Alpers, MS4

## 2019-10-08 NOTE — PROGRESS NOTES
Progress Note - Nephrology   Luna Avila 44 y o  male MRN: [id number]  Unit/Bed#: [facility] 421-01 Encounter: [id number]      Assessment / Plan:  1  JULIENNE, unknown prior baseline creatinine  -JULIENNE suspected to be secondary to prerenal/ATN/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 all in the setting of congenital solitary kidney  -also was found to have small renal infarct from prior endocarditis  -status post contrast exposure with cardiac catheterization on 10/2/19   -admission creatinine 1 4 at [facility], peaked at 2 2    -now seems to be fluctuating in mid one range, creatinine 1 51 and stable  -f/u am BMP     -recent workup includes:  -urinalysis this admission shows 4 to 10 RBCs, 4 to 10 WBCs, 1+ proteinuria, very concentrated sample with 10 to 25 hyaline cast   -CT scan showed congenitally absent right kidney with left kidney showing compensatory hypertrophy, one small likely infarct in the lower pole of left kidney unchanged from the previous study and not associated with any abscess for pyelonephritis  - February 2019: positive hep C antibody with hep C RNA PCR less than 15  -complements normal, CK normal  -avoid nephrotoxins or NSAIDs  -urine eosinophils 0%, no peripheral eosinophilia     2  Blood pressure acceptable on metoprolol 25mg po q12 hr with hold parameters     3  Anemia  -closely monitor hemoglobin, now 8 2, transfuse p r n  For Hgb < 7  -low iron stores recently although avoiding IV Venofer due to active infection issues  -recent FOBT was negative at [facility]      4  Pseudomonas mitral valve endocarditis/bacteremia  -kely as per primary team and ID  -patient is active IV drug user and had prior history of MSSA bacteremia with endocarditis in March 2019  -BILLY in September 2019 showed echodensity on the anterior mitral leaflet      5  Pseudomonas bacteremia, blood culture from 9/28/19 showed Pseudomonas  -Repeat blood cultures negative so far     6  Bilateral pleural effusion, status post thoracentesis on 10/2/19  -currently remains on room air  -holding on further diuretics as appears euvolemic on exam    7  Hypokalemia - Mag ok, s/p repletion today, monitor K level     Other issues:  CT scan suggestive of pulmonary congestion versus suspicion for pneumonia, pleural effusions, splenic infarct  Septic embolus/abscess on CT scan of brain        Subjective:   He denies any chest pain but does have shortness of breath  No other complaints  Objective:     Vitals: Blood pressure 100/58, pulse (!) 128, temperature 98 1 °F (36 7 °C), temperature source Oral, resp  rate 20, height 5' 3" (1 6 m), weight 74 kg (163 lb 2 3 oz), SpO2 92 %  ,Body mass index is 28 9 kg/m²  Temp (24hrs), Av 2 °F (36 8 °C), Min:98 1 °F (36 7 °C), Max:98 6 °F (37 °C)      Weight (last 2 days)     Date/Time   Weight    10/08/19 0600   74 (163 14)    10/07/19 0600   73 8 (162 7)    10/06/19 0307   72 6 (160 05)                Intake/Output Summary (Last 24 hours) at 10/8/2019 1310  Last data filed at 10/8/2019 0415  Gross per 24 hour   Intake 100 ml   Output    Net 100 ml     I/O last 24 hours: In: 0 [P O :540; IV Piggyback:100]  Out: -         Physical Exam:   Physical Exam   Constitutional: He appears well-developed and well-nourished  No distress  HENT:   Head: Normocephalic and atraumatic  Mouth/Throat: No oropharyngeal exudate  Eyes: Right eye exhibits no discharge  Left eye exhibits no discharge  No scleral icterus  Neck: Neck supple  Cardiovascular: Regular rhythm and normal heart sounds  tachycardic   Pulmonary/Chest: Effort normal and breath sounds normal  He has no wheezes  He has no rales  Abdominal: Soft  Bowel sounds are normal  He exhibits no distension  There is no tenderness  Musculoskeletal: Normal range of motion  He exhibits edema (b/l LE)  Neurological: He is alert  awake   Skin: Skin is warm and dry  No rash noted  He is not diaphoretic  Psychiatric: He has a normal mood and affect  His behavior is normal    Vitals reviewed        Invasive Devices     Peripheral Intravenous Line            Peripheral IV 10/08/19 Proximal;Right;Ventral (anterior) Forearm less than 1 day                Medications:    Scheduled Meds:  Current Facility-Administered Medications:  acetaminophen 650 mg Oral Q6H PRN Heather Torres MD    barium sulfate 450 mL Oral 90 min pre-procedure Gretchen Vaca MD    cefTAZidime 2,000 mg Intravenous Q8H Jona Nj MD Last Rate: 2,000 mg (10/08/19 1148)   chlorhexidine 15 mL Swish & Spit Q12H [address] Mario Weber PA-C    escitalopram 10 mg Oral Daily Max Charlotte,     hydrOXYzine HCL 25 mg Oral Q8H PRN Max Duane Musca, DO    levofloxacin 750 mg Oral Q24H Jona Nj MD    melatonin 3 mg Oral HS Max Duane Musca, DO    [START ON 10/9/2019] metoprolol tartrate 12 5 mg Oral Once Krystal Metcalf PA-C    metoprolol tartrate 25 mg Oral Q12H [address] Wanda Khan,     ondansetron 4 mg Intravenous Q8H PRN Olivia Martínez MD    traZODone 100 mg Oral HS [place] Esthela Chris, DO    zolpidem 5 mg Oral HS PRN Max Duane Musca, DO        PRN Meds:   acetaminophen    hydrOXYzine HCL    ondansetron    zolpidem    Continuous Infusions:         LAB RESULTS:      Results from last 7 days   Lab Units 10/08/19  0229 10/06/19  6368 10/05/19  0513 10/04/19  0507 10/03/19  0634 10/02/19  0629 10/01/19  1800   WBC Thousand/uL 11 05*  --  8 97 9 88 11 52* 9 89  --    HEMOGLOBIN g/dL 8 2*  --  7 9* 7 1* 7 8* 7 8*  --    HEMATOCRIT % 26 2*  --  25 5* 22 8* 25 0* 25 0*  --    PLATELETS Thousands/uL 220  --  225 226 267 261  --    NEUTROS PCT %  --   --   --  85*  --  80*  --    LYMPHS PCT %  --   --   --  9*  --  11*  --    MONOS PCT %  --   --   --  4  --  5  --    EOS PCT %  --   --   --  1  --  2  --    POTASSIUM mmol/L 3 4* 3 6 3 8 3 6 4 2  --  3 8   CHLORIDE mmol/L 109* 108 105 108 110*  --  106   CO2 mmol/L 24 24 27 25 25  --  24   BUN mg/dL 31* 29* 31* 29* 32* --  37*   CREATININE mg/dL 1 51* 1 47* 1 71* 1 51* 1 57*  --  1 86*   CALCIUM mg/dL 8 7 8 8 8 6 8 4 8 5  --  9 1   ALK PHOS U/L  --   --   --  89  --   --  107   ALT U/L  --   --   --  12  --   --  22   AST U/L  --   --   --  8  --   --  11   MAGNESIUM mg/dL 1 9  --   --   --   --   --   --    PHOSPHORUS mg/dL 3 4  --   --   --   --   --   --        CUTURES:  Lab Results   Component Value Date    BLOODCX No Growth at 48 hrs  10/06/2019    BLOODCX No Growth at 48 hrs  10/06/2019    BLOODCX No Growth After 5 Days  10/03/2019    BLOODCX No Growth After 5 Days  10/03/2019    BLOODCX No Growth After 5 Days  10/01/2019    BLOODCX No Growth After 5 Days  10/01/2019                 Portions of the record may have been created with voice recognition software  Occasional wrong word or "sound a like" substitutions may have occurred due to the inherent limitations of voice recognition software  Read the chart carefully and recognize, using context, where substitutions have occurred  If you have any questions, please contact the dictating provider

## 2019-10-08 NOTE — ANESTHESIA PREPROCEDURE EVALUATION
Review of Systems/Medical History          Cardiovascular  Valvular heart disease , mitral regurgitation and tricuspid regurgitation,   Comment: BILLY: normal LV systolic function, MV endocarditis with torrential MR - both leaflets involved, most disease burden appears to be on the lateral portion of the anterior leaflet; RV mildly dilated, mild-mod systolic depression, mild-mod TR possibly due to annular dilation (pt likely has significant pulmonary venous hypertension)    Cath: no CAD    No carotid disease,  Pulmonary    Comment: Recent b/l pleural effusions tapped     GI/Hepatic    Hepatitis C,   Comment: Hep C antibody positive, viral loan NOT detectable     Kidney disease ,   Comment: Unilateral left kidney     Endo/Other     GYN       Hematology  Anemia ,    Comment: Pseudomonas bacteremia    Hgb 8 2 Musculoskeletal       Neurology      Comment: CVA, hemorrhagic; likely septic emboli given endocarditis    Neurology suggests moderate risk to proceed Psychology   Anxiety,     Comment: IVDA              Anesthesia Plan  ASA Score- 4 Emergent    Anesthesia Type- general with ASA Monitors  Additional Monitors: pulmonary artery catheter, central venous line and arterial line  Airway Plan: ETT  Comment:   General anesthesia, endotracheal Intubation  Standard ASA monitors  Large bore peripheral IV  Pre-induction arterial line  CVP (Triple Lumen) and Cordis with PAC  BILLY for perioperative monitoring and diagnosis  Post-op ICU/vent  Risks and benefits discussed with patient; patient consented and agrees to proceed  Pt on fortaz, levaquin  High risk given bacteremia, stroke, septic emboli  Pt unable to lay flat; significant SOB; coughing and retching in pre-op  Plan Factors-    Induction- intravenous  Postoperative Plan-     Informed Consent- Anesthetic plan and risks discussed with patient

## 2019-10-08 NOTE — PROGRESS NOTES
Per PCA Geneva Wolf patient is refusing chlorhexidine scrub for preoperative preparation  Patient IV is painful and unable to flush  Patient informed that due to not having proper IV access a new IV would need to be placed  Patient refused to be placed with new IV access  Patient stated "I am not in the mood"  SOD on call was made aware that patient refused chlorhexidine preparation  and IV placement for IV access  SOD came to bedside to talk to patient  SOD made me aware that patient will think about chlorhexidine scrub and IV placement but is refusing at this time  SOD said to ask again around approximately 0300  Patient was made aware by SOD that we will ask patient again to obtain IV access and chlorhexidine scrub  Will attempt again  Will continue to monitor

## 2019-10-09 ENCOUNTER — ANESTHESIA (INPATIENT)
Dept: PERIOP | Facility: HOSPITAL | Age: 39
DRG: 162 | End: 2019-10-09
Payer: COMMERCIAL

## 2019-10-09 ENCOUNTER — APPOINTMENT (INPATIENT)
Dept: NON INVASIVE DIAGNOSTICS | Facility: HOSPITAL | Age: 39
DRG: 162 | End: 2019-10-09
Payer: COMMERCIAL

## 2019-10-09 ENCOUNTER — APPOINTMENT (INPATIENT)
Dept: RADIOLOGY | Facility: HOSPITAL | Age: 39
DRG: 162 | End: 2019-10-09
Payer: COMMERCIAL

## 2019-10-09 PROBLEM — R00.0 TACHYCARDIA: Status: RESOLVED | Noted: 2019-10-01 | Resolved: 2019-10-09

## 2019-10-09 PROBLEM — Z95.2 S/P MVR (MITRAL VALVE REPLACEMENT): Status: ACTIVE | Noted: 2019-10-09

## 2019-10-09 PROBLEM — B96.5 BACTEREMIA DUE TO PSEUDOMONAS: Status: ACTIVE | Noted: 2019-10-01

## 2019-10-09 PROBLEM — D62 ACUTE BLOOD LOSS AS CAUSE OF POSTOPERATIVE ANEMIA: Status: ACTIVE | Noted: 2019-10-07

## 2019-10-09 PROBLEM — I95.9 HYPOTENSION: Status: ACTIVE | Noted: 2019-10-09

## 2019-10-09 PROBLEM — J95.2 ACUTE POSTOPERATIVE PULMONARY INSUFFICIENCY (HCC): Status: ACTIVE | Noted: 2019-10-09

## 2019-10-09 LAB
ANION GAP SERPL CALCULATED.3IONS-SCNC: 11 MMOL/L (ref 4–13)
ANION GAP SERPL CALCULATED.3IONS-SCNC: 12 MMOL/L (ref 4–13)
ANION GAP SERPL CALCULATED.3IONS-SCNC: 8 MMOL/L (ref 4–13)
APTT PPP: 44 SECONDS (ref 23–37)
ATRIAL RATE: 97 BPM
BASE EXCESS BLDA CALC-SCNC: -2 MMOL/L (ref -2–3)
BASE EXCESS BLDA CALC-SCNC: -2 MMOL/L (ref -2–3)
BASE EXCESS BLDA CALC-SCNC: -3 MMOL/L (ref -2–3)
BASE EXCESS BLDA CALC-SCNC: -3 MMOL/L (ref -2–3)
BASE EXCESS BLDA CALC-SCNC: -3.4 MMOL/L
BASE EXCESS BLDA CALC-SCNC: -4 MMOL/L (ref -2–3)
BASE EXCESS BLDA CALC-SCNC: -5 MMOL/L (ref -2–3)
BASE EXCESS BLDA CALC-SCNC: -5.3 MMOL/L
BASE EXCESS BLDA CALC-SCNC: 0 MMOL/L (ref -2–3)
BASE EXCESS BLDA CALC-SCNC: 4 MMOL/L (ref -2–3)
BASOPHILS # BLD AUTO: 0.07 THOUSANDS/ΜL (ref 0–0.1)
BASOPHILS NFR BLD AUTO: 1 % (ref 0–1)
BUN SERPL-MCNC: 32 MG/DL (ref 5–25)
BUN SERPL-MCNC: 33 MG/DL (ref 5–25)
BUN SERPL-MCNC: 35 MG/DL (ref 5–25)
CA-I BLD-SCNC: 0.96 MMOL/L (ref 1.12–1.32)
CA-I BLD-SCNC: 0.99 MMOL/L (ref 1.12–1.32)
CA-I BLD-SCNC: 1.02 MMOL/L (ref 1.12–1.32)
CA-I BLD-SCNC: 1.17 MMOL/L (ref 1.12–1.32)
CA-I BLD-SCNC: 1.18 MMOL/L (ref 1.12–1.32)
CA-I BLD-SCNC: 1.19 MMOL/L (ref 1.12–1.32)
CA-I BLD-SCNC: 1.25 MMOL/L (ref 1.12–1.32)
CA-I BLD-SCNC: 1.4 MMOL/L (ref 1.12–1.32)
CALCIUM SERPL-MCNC: 7.8 MG/DL (ref 8.3–10.1)
CALCIUM SERPL-MCNC: 8.2 MG/DL (ref 8.3–10.1)
CALCIUM SERPL-MCNC: 9.2 MG/DL (ref 8.3–10.1)
CHLORIDE SERPL-SCNC: 108 MMOL/L (ref 100–108)
CHLORIDE SERPL-SCNC: 115 MMOL/L (ref 100–108)
CHLORIDE SERPL-SCNC: 115 MMOL/L (ref 100–108)
CO2 SERPL-SCNC: 20 MMOL/L (ref 21–32)
CO2 SERPL-SCNC: 21 MMOL/L (ref 21–32)
CO2 SERPL-SCNC: 24 MMOL/L (ref 21–32)
CREAT SERPL-MCNC: 1.68 MG/DL (ref 0.6–1.3)
CREAT SERPL-MCNC: 1.69 MG/DL (ref 0.6–1.3)
CREAT SERPL-MCNC: 1.74 MG/DL (ref 0.6–1.3)
EOSINOPHIL # BLD AUTO: 0.09 THOUSAND/ΜL (ref 0–0.61)
EOSINOPHIL NFR BLD AUTO: 1 % (ref 0–6)
ERYTHROCYTE [DISTWIDTH] IN BLOOD BY AUTOMATED COUNT: 19.6 % (ref 11.6–15.1)
FIBRINOGEN PPP-MCNC: 295 MG/DL (ref 227–495)
GFR SERPL CREATININE-BSD FRML MDRD: 48 ML/MIN/1.73SQ M
GFR SERPL CREATININE-BSD FRML MDRD: 50 ML/MIN/1.73SQ M
GFR SERPL CREATININE-BSD FRML MDRD: 50 ML/MIN/1.73SQ M
GLUCOSE SERPL-MCNC: 100 MG/DL (ref 65–140)
GLUCOSE SERPL-MCNC: 103 MG/DL (ref 65–140)
GLUCOSE SERPL-MCNC: 106 MG/DL (ref 65–140)
GLUCOSE SERPL-MCNC: 120 MG/DL (ref 65–140)
GLUCOSE SERPL-MCNC: 126 MG/DL (ref 65–140)
GLUCOSE SERPL-MCNC: 128 MG/DL (ref 65–140)
GLUCOSE SERPL-MCNC: 130 MG/DL (ref 65–140)
GLUCOSE SERPL-MCNC: 131 MG/DL (ref 65–140)
GLUCOSE SERPL-MCNC: 144 MG/DL (ref 65–140)
GLUCOSE SERPL-MCNC: 145 MG/DL (ref 65–140)
GLUCOSE SERPL-MCNC: 147 MG/DL (ref 65–140)
GLUCOSE SERPL-MCNC: 149 MG/DL (ref 65–140)
GLUCOSE SERPL-MCNC: 156 MG/DL (ref 65–140)
GLUCOSE SERPL-MCNC: 168 MG/DL (ref 65–140)
GLUCOSE SERPL-MCNC: 189 MG/DL (ref 65–140)
GLUCOSE SERPL-MCNC: 192 MG/DL (ref 65–140)
GLUCOSE SERPL-MCNC: 193 MG/DL (ref 65–140)
GLUCOSE SERPL-MCNC: 206 MG/DL (ref 65–140)
HCO3 BLDA-SCNC: 19.4 MMOL/L (ref 22–28)
HCO3 BLDA-SCNC: 21.7 MMOL/L (ref 22–28)
HCO3 BLDA-SCNC: 21.8 MMOL/L (ref 24–30)
HCO3 BLDA-SCNC: 22.2 MMOL/L (ref 24–30)
HCO3 BLDA-SCNC: 22.6 MMOL/L (ref 22–28)
HCO3 BLDA-SCNC: 23.2 MMOL/L (ref 22–28)
HCO3 BLDA-SCNC: 23.4 MMOL/L (ref 22–28)
HCO3 BLDA-SCNC: 24.2 MMOL/L (ref 22–28)
HCO3 BLDA-SCNC: 26.1 MMOL/L (ref 22–28)
HCO3 BLDA-SCNC: 29.2 MMOL/L (ref 22–28)
HCT VFR BLD AUTO: 27.2 % (ref 36.5–49.3)
HCT VFR BLD AUTO: 27.7 % (ref 36.5–49.3)
HCT VFR BLD AUTO: 30.7 % (ref 36.5–49.3)
HCT VFR BLD CALC: 18 % (ref 36.5–49.3)
HCT VFR BLD CALC: 20 % (ref 36.5–49.3)
HCT VFR BLD CALC: 22 % (ref 36.5–49.3)
HCT VFR BLD CALC: 24 % (ref 36.5–49.3)
HCT VFR BLD CALC: 25 % (ref 36.5–49.3)
HCT VFR BLD CALC: 27 % (ref 36.5–49.3)
HGB BLD-MCNC: 8.5 G/DL (ref 12–17)
HGB BLD-MCNC: 8.8 G/DL (ref 12–17)
HGB BLD-MCNC: 9.8 G/DL (ref 12–17)
HGB BLDA-MCNC: 6.1 G/DL (ref 12–17)
HGB BLDA-MCNC: 6.8 G/DL (ref 12–17)
HGB BLDA-MCNC: 7.5 G/DL (ref 12–17)
HGB BLDA-MCNC: 8.2 G/DL (ref 12–17)
HGB BLDA-MCNC: 8.5 G/DL (ref 12–17)
HGB BLDA-MCNC: 9.2 G/DL (ref 12–17)
HOROWITZ INDEX BLDA+IHG-RTO: 30 MM[HG]
IMM GRANULOCYTES # BLD AUTO: 0.1 THOUSAND/UL (ref 0–0.2)
IMM GRANULOCYTES NFR BLD AUTO: 1 % (ref 0–2)
INR PPP: 1.7 (ref 0.84–1.19)
KCT BLD-ACNC: 115 SEC (ref 89–137)
KCT BLD-ACNC: 132 SEC (ref 89–137)
KCT BLD-ACNC: 138 SEC (ref 89–137)
KCT BLD-ACNC: 405 SEC (ref 89–137)
KCT BLD-ACNC: 473 SEC (ref 89–137)
KCT BLD-ACNC: 548 SEC (ref 89–137)
KCT BLD-ACNC: 588 SEC (ref 89–137)
KCT BLD-ACNC: 605 SEC (ref 89–137)
KCT BLD-ACNC: 605 SEC (ref 89–137)
LYMPHOCYTES # BLD AUTO: 1.42 THOUSANDS/ΜL (ref 0.6–4.47)
LYMPHOCYTES NFR BLD AUTO: 12 % (ref 14–44)
MCH RBC QN AUTO: 28.2 PG (ref 26.8–34.3)
MCHC RBC AUTO-ENTMCNC: 30.7 G/DL (ref 31.4–37.4)
MCV RBC AUTO: 92 FL (ref 82–98)
MONOCYTES # BLD AUTO: 0.62 THOUSAND/ΜL (ref 0.17–1.22)
MONOCYTES NFR BLD AUTO: 5 % (ref 4–12)
NEUTROPHILS # BLD AUTO: 9.93 THOUSANDS/ΜL (ref 1.85–7.62)
NEUTS SEG NFR BLD AUTO: 80 % (ref 43–75)
NRBC BLD AUTO-RTO: 0 /100 WBCS
O2 CT BLDA-SCNC: 15.3 ML/DL (ref 16–23)
O2 CT BLDA-SCNC: 15.4 ML/DL (ref 16–23)
OXYHGB MFR BLDA: 96.7 % (ref 94–97)
OXYHGB MFR BLDA: 97.6 % (ref 94–97)
PCO2 BLD: 23 MMOL/L (ref 21–32)
PCO2 BLD: 24 MMOL/L (ref 21–32)
PCO2 BLD: 24 MMOL/L (ref 21–32)
PCO2 BLD: 25 MMOL/L (ref 21–32)
PCO2 BLD: 25 MMOL/L (ref 21–32)
PCO2 BLD: 26 MMOL/L (ref 21–32)
PCO2 BLD: 28 MMOL/L (ref 21–32)
PCO2 BLD: 31 MMOL/L (ref 21–32)
PCO2 BLD: 38.4 MM HG (ref 42–50)
PCO2 BLD: 42.7 MM HG (ref 36–44)
PCO2 BLD: 45.5 MM HG (ref 36–44)
PCO2 BLD: 47.7 MM HG (ref 36–44)
PCO2 BLD: 48.5 MM HG (ref 36–44)
PCO2 BLD: 50.7 MM HG (ref 36–44)
PCO2 BLD: 51.9 MM HG (ref 36–44)
PCO2 BLD: 52.7 MM HG (ref 42–50)
PCO2 BLDA: 34.8 MM HG (ref 36–44)
PCO2 BLDA: 39.1 MM HG (ref 36–44)
PEEP RESPIRATORY: 5 CM[H2O]
PH BLD: 7.23 [PH] (ref 7.3–7.4)
PH BLD: 7.28 [PH] (ref 7.35–7.45)
PH BLD: 7.29 [PH] (ref 7.35–7.45)
PH BLD: 7.31 [PH] (ref 7.35–7.45)
PH BLD: 7.32 [PH] (ref 7.35–7.45)
PH BLD: 7.34 [PH] (ref 7.35–7.45)
PH BLD: 7.36 [PH] (ref 7.3–7.4)
PH BLD: 7.39 [PH] (ref 7.35–7.45)
PH BLDA: 7.36 [PH] (ref 7.35–7.45)
PH BLDA: 7.36 [PH] (ref 7.35–7.45)
PLATELET # BLD AUTO: 139 THOUSANDS/UL (ref 149–390)
PLATELET # BLD AUTO: 187 THOUSANDS/UL (ref 149–390)
PLATELET # BLD AUTO: 271 THOUSANDS/UL (ref 149–390)
PMV BLD AUTO: 9.3 FL (ref 8.9–12.7)
PMV BLD AUTO: 9.6 FL (ref 8.9–12.7)
PMV BLD AUTO: 9.9 FL (ref 8.9–12.7)
PO2 BLD: 166 MM HG (ref 75–129)
PO2 BLD: 195 MM HG (ref 75–129)
PO2 BLD: 228 MM HG (ref 35–45)
PO2 BLD: 271 MM HG (ref 75–129)
PO2 BLD: 352 MM HG (ref 75–129)
PO2 BLD: 384 MM HG (ref 75–129)
PO2 BLD: 48 MM HG (ref 35–45)
PO2 BLD: >400 MM HG (ref 75–129)
PO2 BLDA: 119.3 MM HG (ref 75–129)
PO2 BLDA: 165 MM HG (ref 75–129)
POTASSIUM BLD-SCNC: 3.9 MMOL/L (ref 3.5–5.3)
POTASSIUM BLD-SCNC: 3.9 MMOL/L (ref 3.5–5.3)
POTASSIUM BLD-SCNC: 4.2 MMOL/L (ref 3.5–5.3)
POTASSIUM BLD-SCNC: 4.6 MMOL/L (ref 3.5–5.3)
POTASSIUM BLD-SCNC: 4.6 MMOL/L (ref 3.5–5.3)
POTASSIUM BLD-SCNC: 5 MMOL/L (ref 3.5–5.3)
POTASSIUM BLD-SCNC: 5 MMOL/L (ref 3.5–5.3)
POTASSIUM BLD-SCNC: 5.4 MMOL/L (ref 3.5–5.3)
POTASSIUM SERPL-SCNC: 4 MMOL/L (ref 3.5–5.3)
POTASSIUM SERPL-SCNC: 4.4 MMOL/L (ref 3.5–5.3)
POTASSIUM SERPL-SCNC: 4.4 MMOL/L (ref 3.5–5.3)
POTASSIUM SERPL-SCNC: 4.9 MMOL/L (ref 3.5–5.3)
PROTHROMBIN TIME: 19.5 SECONDS (ref 11.6–14.5)
PS CM H2O: 5
PS VENT FIO2: 30
PS VENT PEEP: 5
QRS AXIS: 113 DEGREES
QRSD INTERVAL: 92 MS
QT INTERVAL: 371 MS
QTC INTERVAL: 472 MS
RBC # BLD AUTO: 3.01 MILLION/UL (ref 3.88–5.62)
SAO2 % BLD FROM PO2: 100 % (ref 95–98)
SAO2 % BLD FROM PO2: 76 % (ref 95–98)
SAO2 % BLD FROM PO2: 99 % (ref 95–98)
SODIUM BLD-SCNC: 136 MMOL/L (ref 136–145)
SODIUM BLD-SCNC: 137 MMOL/L (ref 136–145)
SODIUM BLD-SCNC: 138 MMOL/L (ref 136–145)
SODIUM BLD-SCNC: 139 MMOL/L (ref 136–145)
SODIUM BLD-SCNC: 140 MMOL/L (ref 136–145)
SODIUM BLD-SCNC: 140 MMOL/L (ref 136–145)
SODIUM BLD-SCNC: 142 MMOL/L (ref 136–145)
SODIUM BLD-SCNC: 143 MMOL/L (ref 136–145)
SODIUM SERPL-SCNC: 140 MMOL/L (ref 136–145)
SODIUM SERPL-SCNC: 146 MMOL/L (ref 136–145)
SODIUM SERPL-SCNC: 148 MMOL/L (ref 136–145)
SPECIMEN SOURCE: ABNORMAL
SPECIMEN SOURCE: NORMAL
SPECIMEN SOURCE: NORMAL
T WAVE AXIS: 33 DEGREES
VENT - PS: ABNORMAL
VENT AC: 20
VENT- AC: AC
VENTRICULAR RATE: 97 BPM
VT SETTING VENT: 400 ML
WBC # BLD AUTO: 12.23 THOUSAND/UL (ref 4.31–10.16)

## 2019-10-09 PROCEDURE — 80048 BASIC METABOLIC PNL TOTAL CA: CPT | Performed by: INTERNAL MEDICINE

## 2019-10-09 PROCEDURE — 0KXH0ZZ TRANSFER RIGHT THORAX MUSCLE, OPEN APPROACH: ICD-10-PCS | Performed by: PLASTIC SURGERY

## 2019-10-09 PROCEDURE — 99232 SBSQ HOSP IP/OBS MODERATE 35: CPT | Performed by: INTERNAL MEDICINE

## 2019-10-09 PROCEDURE — 82805 BLOOD GASES W/O2 SATURATION: CPT | Performed by: NURSE PRACTITIONER

## 2019-10-09 PROCEDURE — 15734 MUSCLE-SKIN GRAFT TRUNK: CPT | Performed by: PLASTIC SURGERY

## 2019-10-09 PROCEDURE — 87176 TISSUE HOMOGENIZATION CULTR: CPT | Performed by: THORACIC SURGERY (CARDIOTHORACIC VASCULAR SURGERY)

## 2019-10-09 PROCEDURE — 85014 HEMATOCRIT: CPT

## 2019-10-09 PROCEDURE — 85025 COMPLETE CBC W/AUTO DIFF WBC: CPT | Performed by: INTERNAL MEDICINE

## 2019-10-09 PROCEDURE — 85610 PROTHROMBIN TIME: CPT | Performed by: PHYSICIAN ASSISTANT

## 2019-10-09 PROCEDURE — 87252 VIRUS INOCULATION TISSUE: CPT | Performed by: THORACIC SURGERY (CARDIOTHORACIC VASCULAR SURGERY)

## 2019-10-09 PROCEDURE — 87070 CULTURE OTHR SPECIMN AEROBIC: CPT | Performed by: THORACIC SURGERY (CARDIOTHORACIC VASCULAR SURGERY)

## 2019-10-09 PROCEDURE — 84132 ASSAY OF SERUM POTASSIUM: CPT

## 2019-10-09 PROCEDURE — 99233 SBSQ HOSP IP/OBS HIGH 50: CPT | Performed by: INTERNAL MEDICINE

## 2019-10-09 PROCEDURE — 33430 REPLACEMENT OF MITRAL VALVE: CPT | Performed by: THORACIC SURGERY (CARDIOTHORACIC VASCULAR SURGERY)

## 2019-10-09 PROCEDURE — 5A1221Z PERFORMANCE OF CARDIAC OUTPUT, CONTINUOUS: ICD-10-PCS | Performed by: THORACIC SURGERY (CARDIOTHORACIC VASCULAR SURGERY)

## 2019-10-09 PROCEDURE — 71045 X-RAY EXAM CHEST 1 VIEW: CPT

## 2019-10-09 PROCEDURE — 87206 SMEAR FLUORESCENT/ACID STAI: CPT | Performed by: THORACIC SURGERY (CARDIOTHORACIC VASCULAR SURGERY)

## 2019-10-09 PROCEDURE — 82803 BLOOD GASES ANY COMBINATION: CPT

## 2019-10-09 PROCEDURE — 85347 COAGULATION TIME ACTIVATED: CPT

## 2019-10-09 PROCEDURE — 87102 FUNGUS ISOLATION CULTURE: CPT | Performed by: THORACIC SURGERY (CARDIOTHORACIC VASCULAR SURGERY)

## 2019-10-09 PROCEDURE — 87116 MYCOBACTERIA CULTURE: CPT | Performed by: THORACIC SURGERY (CARDIOTHORACIC VASCULAR SURGERY)

## 2019-10-09 PROCEDURE — 82330 ASSAY OF CALCIUM: CPT

## 2019-10-09 PROCEDURE — 85049 AUTOMATED PLATELET COUNT: CPT | Performed by: THORACIC SURGERY (CARDIOTHORACIC VASCULAR SURGERY)

## 2019-10-09 PROCEDURE — 84295 ASSAY OF SERUM SODIUM: CPT

## 2019-10-09 PROCEDURE — NC001 PR NO CHARGE: Performed by: PHYSICIAN ASSISTANT

## 2019-10-09 PROCEDURE — 85384 FIBRINOGEN ACTIVITY: CPT | Performed by: PHYSICIAN ASSISTANT

## 2019-10-09 PROCEDURE — 30233N0 TRANSFUSION OF AUTOLOGOUS RED BLOOD CELLS INTO PERIPHERAL VEIN, PERCUTANEOUS APPROACH: ICD-10-PCS | Performed by: THORACIC SURGERY (CARDIOTHORACIC VASCULAR SURGERY)

## 2019-10-09 PROCEDURE — 80048 BASIC METABOLIC PNL TOTAL CA: CPT | Performed by: PHYSICIAN ASSISTANT

## 2019-10-09 PROCEDURE — 97605 NEG PRS WND THER DME<=50SQCM: CPT | Performed by: PLASTIC SURGERY

## 2019-10-09 PROCEDURE — 87205 SMEAR GRAM STAIN: CPT | Performed by: THORACIC SURGERY (CARDIOTHORACIC VASCULAR SURGERY)

## 2019-10-09 PROCEDURE — 85018 HEMOGLOBIN: CPT | Performed by: PHYSICIAN ASSISTANT

## 2019-10-09 PROCEDURE — P9016 RBC LEUKOCYTES REDUCED: HCPCS

## 2019-10-09 PROCEDURE — 30233N1 TRANSFUSION OF NONAUTOLOGOUS RED BLOOD CELLS INTO PERIPHERAL VEIN, PERCUTANEOUS APPROACH: ICD-10-PCS | Performed by: THORACIC SURGERY (CARDIOTHORACIC VASCULAR SURGERY)

## 2019-10-09 PROCEDURE — 82947 ASSAY GLUCOSE BLOOD QUANT: CPT

## 2019-10-09 PROCEDURE — 0KXJ0ZZ TRANSFER LEFT THORAX MUSCLE, OPEN APPROACH: ICD-10-PCS | Performed by: PLASTIC SURGERY

## 2019-10-09 PROCEDURE — 85014 HEMATOCRIT: CPT | Performed by: PHYSICIAN ASSISTANT

## 2019-10-09 PROCEDURE — 84132 ASSAY OF SERUM POTASSIUM: CPT | Performed by: PHYSICIAN ASSISTANT

## 2019-10-09 PROCEDURE — 93312 ECHO TRANSESOPHAGEAL: CPT

## 2019-10-09 PROCEDURE — 93010 ELECTROCARDIOGRAM REPORT: CPT | Performed by: INTERNAL MEDICINE

## 2019-10-09 PROCEDURE — 85730 THROMBOPLASTIN TIME PARTIAL: CPT | Performed by: PHYSICIAN ASSISTANT

## 2019-10-09 PROCEDURE — 94760 N-INVAS EAR/PLS OXIMETRY 1: CPT

## 2019-10-09 PROCEDURE — 93005 ELECTROCARDIOGRAM TRACING: CPT

## 2019-10-09 PROCEDURE — 87075 CULTR BACTERIA EXCEPT BLOOD: CPT | Performed by: THORACIC SURGERY (CARDIOTHORACIC VASCULAR SURGERY)

## 2019-10-09 PROCEDURE — 02RG08Z REPLACEMENT OF MITRAL VALVE WITH ZOOPLASTIC TISSUE, OPEN APPROACH: ICD-10-PCS | Performed by: THORACIC SURGERY (CARDIOTHORACIC VASCULAR SURGERY)

## 2019-10-09 PROCEDURE — 33430 REPLACEMENT OF MITRAL VALVE: CPT | Performed by: PHYSICIAN ASSISTANT

## 2019-10-09 PROCEDURE — 5A1223Z PERFORMANCE OF CARDIAC PACING, CONTINUOUS: ICD-10-PCS | Performed by: THORACIC SURGERY (CARDIOTHORACIC VASCULAR SURGERY)

## 2019-10-09 PROCEDURE — 82948 REAGENT STRIP/BLOOD GLUCOSE: CPT

## 2019-10-09 PROCEDURE — 05HM33Z INSERTION OF INFUSION DEVICE INTO RIGHT INTERNAL JUGULAR VEIN, PERCUTANEOUS APPROACH: ICD-10-PCS | Performed by: ANESTHESIOLOGY

## 2019-10-09 PROCEDURE — 85049 AUTOMATED PLATELET COUNT: CPT | Performed by: PHYSICIAN ASSISTANT

## 2019-10-09 PROCEDURE — 94002 VENT MGMT INPAT INIT DAY: CPT

## 2019-10-09 DEVICE — VALVE MITRAL PERI+ 27MM TRICENTRIX HOLDER THERMAFIX: Type: IMPLANTABLE DEVICE | Site: HEART | Status: FUNCTIONAL

## 2019-10-09 RX ORDER — SODIUM CHLORIDE 9 MG/ML
INJECTION, SOLUTION INTRAVENOUS CONTINUOUS PRN
Status: DISCONTINUED | OUTPATIENT
Start: 2019-10-09 | End: 2019-10-09 | Stop reason: SURG

## 2019-10-09 RX ORDER — POTASSIUM CHLORIDE 14.9 MG/ML
20 INJECTION INTRAVENOUS
Status: DISCONTINUED | OUTPATIENT
Start: 2019-10-09 | End: 2019-10-11

## 2019-10-09 RX ORDER — SODIUM CHLORIDE, SODIUM LACTATE, POTASSIUM CHLORIDE, CALCIUM CHLORIDE 600; 310; 30; 20 MG/100ML; MG/100ML; MG/100ML; MG/100ML
INJECTION, SOLUTION INTRAVENOUS CONTINUOUS PRN
Status: DISCONTINUED | OUTPATIENT
Start: 2019-10-09 | End: 2019-10-09 | Stop reason: SURG

## 2019-10-09 RX ORDER — VANCOMYCIN HYDROCHLORIDE 1 G/20ML
INJECTION, POWDER, LYOPHILIZED, FOR SOLUTION INTRAVENOUS AS NEEDED
Status: DISCONTINUED | OUTPATIENT
Start: 2019-10-09 | End: 2019-10-09 | Stop reason: HOSPADM

## 2019-10-09 RX ORDER — HYDROMORPHONE HCL/PF 1 MG/ML
1 SYRINGE (ML) INJECTION
Status: DISCONTINUED | OUTPATIENT
Start: 2019-10-09 | End: 2019-10-10

## 2019-10-09 RX ORDER — CHLORHEXIDINE GLUCONATE 0.12 MG/ML
15 RINSE ORAL 2 TIMES DAILY
Status: DISCONTINUED | OUTPATIENT
Start: 2019-10-09 | End: 2019-10-11

## 2019-10-09 RX ORDER — BISACODYL 10 MG
10 SUPPOSITORY, RECTAL RECTAL DAILY PRN
Status: DISCONTINUED | OUTPATIENT
Start: 2019-10-09 | End: 2019-10-15 | Stop reason: HOSPADM

## 2019-10-09 RX ORDER — FENTANYL CITRATE 50 UG/ML
INJECTION, SOLUTION INTRAMUSCULAR; INTRAVENOUS AS NEEDED
Status: DISCONTINUED | OUTPATIENT
Start: 2019-10-09 | End: 2019-10-09 | Stop reason: SURG

## 2019-10-09 RX ORDER — POTASSIUM CHLORIDE 14.9 MG/ML
20 INJECTION INTRAVENOUS ONCE AS NEEDED
Status: DISCONTINUED | OUTPATIENT
Start: 2019-10-09 | End: 2019-10-11

## 2019-10-09 RX ORDER — DEXTROSE MONOHYDRATE 50 MG/ML
125 INJECTION, SOLUTION INTRAVENOUS CONTINUOUS
Status: DISCONTINUED | OUTPATIENT
Start: 2019-10-09 | End: 2019-10-09

## 2019-10-09 RX ORDER — AMIODARONE HYDROCHLORIDE 200 MG/1
200 TABLET ORAL EVERY 8 HOURS SCHEDULED
Status: DISCONTINUED | OUTPATIENT
Start: 2019-10-09 | End: 2019-10-15 | Stop reason: HOSPADM

## 2019-10-09 RX ORDER — PROTAMINE SULFATE 10 MG/ML
INJECTION, SOLUTION INTRAVENOUS AS NEEDED
Status: DISCONTINUED | OUTPATIENT
Start: 2019-10-09 | End: 2019-10-09 | Stop reason: SURG

## 2019-10-09 RX ORDER — MIDAZOLAM HYDROCHLORIDE 1 MG/ML
INJECTION INTRAMUSCULAR; INTRAVENOUS AS NEEDED
Status: DISCONTINUED | OUTPATIENT
Start: 2019-10-09 | End: 2019-10-09 | Stop reason: SURG

## 2019-10-09 RX ORDER — MAGNESIUM SULFATE HEPTAHYDRATE 40 MG/ML
2 INJECTION, SOLUTION INTRAVENOUS ONCE
Status: COMPLETED | OUTPATIENT
Start: 2019-10-09 | End: 2019-10-09

## 2019-10-09 RX ORDER — EPHEDRINE SULFATE 50 MG/ML
INJECTION INTRAVENOUS AS NEEDED
Status: DISCONTINUED | OUTPATIENT
Start: 2019-10-09 | End: 2019-10-09 | Stop reason: SURG

## 2019-10-09 RX ORDER — CALCIUM CHLORIDE 100 MG/ML
1 INJECTION INTRAVENOUS; INTRAVENTRICULAR ONCE
Status: DISCONTINUED | OUTPATIENT
Start: 2019-10-09 | End: 2019-10-10

## 2019-10-09 RX ORDER — NEOSTIGMINE METHYLSULFATE 1 MG/ML
INJECTION INTRAVENOUS AS NEEDED
Status: DISCONTINUED | OUTPATIENT
Start: 2019-10-09 | End: 2019-10-09 | Stop reason: SURG

## 2019-10-09 RX ORDER — ASPIRIN 325 MG
325 TABLET ORAL DAILY
Status: DISCONTINUED | OUTPATIENT
Start: 2019-10-09 | End: 2019-10-15 | Stop reason: HOSPADM

## 2019-10-09 RX ORDER — AMINOCAPROIC ACID 250 MG/ML
INJECTION, SOLUTION INTRAVENOUS AS NEEDED
Status: DISCONTINUED | OUTPATIENT
Start: 2019-10-09 | End: 2019-10-09 | Stop reason: SURG

## 2019-10-09 RX ORDER — MILRINONE LACTATE 0.2 MG/ML
0.25 INJECTION, SOLUTION INTRAVENOUS CONTINUOUS
Status: DISCONTINUED | OUTPATIENT
Start: 2019-10-09 | End: 2019-10-11

## 2019-10-09 RX ORDER — ACETAMINOPHEN 325 MG/1
650 TABLET ORAL EVERY 4 HOURS PRN
Status: DISCONTINUED | OUTPATIENT
Start: 2019-10-09 | End: 2019-10-15 | Stop reason: HOSPADM

## 2019-10-09 RX ORDER — VASOPRESSIN 20 U/ML
INJECTION PARENTERAL AS NEEDED
Status: DISCONTINUED | OUTPATIENT
Start: 2019-10-09 | End: 2019-10-09 | Stop reason: SURG

## 2019-10-09 RX ORDER — POLYETHYLENE GLYCOL 3350 17 G/17G
17 POWDER, FOR SOLUTION ORAL DAILY
Status: DISCONTINUED | OUTPATIENT
Start: 2019-10-09 | End: 2019-10-13

## 2019-10-09 RX ORDER — CEFAZOLIN SODIUM 2 G/50ML
2000 SOLUTION INTRAVENOUS ONCE
Status: COMPLETED | OUTPATIENT
Start: 2019-10-09 | End: 2019-10-09

## 2019-10-09 RX ORDER — ONDANSETRON 2 MG/ML
4 INJECTION INTRAMUSCULAR; INTRAVENOUS EVERY 6 HOURS PRN
Status: DISCONTINUED | OUTPATIENT
Start: 2019-10-09 | End: 2019-10-15 | Stop reason: HOSPADM

## 2019-10-09 RX ORDER — FENTANYL CITRATE 50 UG/ML
50 INJECTION, SOLUTION INTRAMUSCULAR; INTRAVENOUS
Status: DISCONTINUED | OUTPATIENT
Start: 2019-10-09 | End: 2019-10-11

## 2019-10-09 RX ORDER — PANTOPRAZOLE SODIUM 40 MG/1
40 TABLET, DELAYED RELEASE ORAL DAILY
Status: DISCONTINUED | OUTPATIENT
Start: 2019-10-09 | End: 2019-10-15 | Stop reason: HOSPADM

## 2019-10-09 RX ORDER — PROPOFOL 10 MG/ML
INJECTION, EMULSION INTRAVENOUS CONTINUOUS PRN
Status: DISCONTINUED | OUTPATIENT
Start: 2019-10-09 | End: 2019-10-09 | Stop reason: SURG

## 2019-10-09 RX ORDER — CEFAZOLIN SODIUM 1 G/3ML
INJECTION, POWDER, FOR SOLUTION INTRAMUSCULAR; INTRAVENOUS AS NEEDED
Status: DISCONTINUED | OUTPATIENT
Start: 2019-10-09 | End: 2019-10-09 | Stop reason: SURG

## 2019-10-09 RX ORDER — MAGNESIUM HYDROXIDE 1200 MG/15ML
LIQUID ORAL AS NEEDED
Status: DISCONTINUED | OUTPATIENT
Start: 2019-10-09 | End: 2019-10-09 | Stop reason: HOSPADM

## 2019-10-09 RX ORDER — CALCIUM CHLORIDE 100 MG/ML
INJECTION INTRAVENOUS; INTRAVENTRICULAR AS NEEDED
Status: DISCONTINUED | OUTPATIENT
Start: 2019-10-09 | End: 2019-10-09 | Stop reason: SURG

## 2019-10-09 RX ORDER — HEPARIN SODIUM 1000 [USP'U]/ML
INJECTION, SOLUTION INTRAVENOUS; SUBCUTANEOUS AS NEEDED
Status: DISCONTINUED | OUTPATIENT
Start: 2019-10-09 | End: 2019-10-09 | Stop reason: SURG

## 2019-10-09 RX ORDER — ALBUMIN, HUMAN INJ 5% 5 %
SOLUTION INTRAVENOUS CONTINUOUS PRN
Status: DISCONTINUED | OUTPATIENT
Start: 2019-10-09 | End: 2019-10-09 | Stop reason: SURG

## 2019-10-09 RX ORDER — ROCURONIUM BROMIDE 10 MG/ML
INJECTION, SOLUTION INTRAVENOUS AS NEEDED
Status: DISCONTINUED | OUTPATIENT
Start: 2019-10-09 | End: 2019-10-09 | Stop reason: SURG

## 2019-10-09 RX ORDER — HYDROMORPHONE HCL/PF 1 MG/ML
0.5 SYRINGE (ML) INJECTION
Status: DISCONTINUED | OUTPATIENT
Start: 2019-10-09 | End: 2019-10-10

## 2019-10-09 RX ORDER — ONDANSETRON 2 MG/ML
INJECTION INTRAMUSCULAR; INTRAVENOUS AS NEEDED
Status: DISCONTINUED | OUTPATIENT
Start: 2019-10-09 | End: 2019-10-09 | Stop reason: SURG

## 2019-10-09 RX ORDER — GLYCOPYRROLATE 0.2 MG/ML
INJECTION INTRAMUSCULAR; INTRAVENOUS AS NEEDED
Status: DISCONTINUED | OUTPATIENT
Start: 2019-10-09 | End: 2019-10-09 | Stop reason: SURG

## 2019-10-09 RX ORDER — OXYCODONE HYDROCHLORIDE AND ACETAMINOPHEN 5; 325 MG/1; MG/1
1 TABLET ORAL EVERY 4 HOURS PRN
Status: DISCONTINUED | OUTPATIENT
Start: 2019-10-09 | End: 2019-10-15 | Stop reason: HOSPADM

## 2019-10-09 RX ORDER — OXYCODONE HYDROCHLORIDE AND ACETAMINOPHEN 5; 325 MG/1; MG/1
2 TABLET ORAL EVERY 6 HOURS PRN
Status: DISCONTINUED | OUTPATIENT
Start: 2019-10-09 | End: 2019-10-15 | Stop reason: HOSPADM

## 2019-10-09 RX ORDER — FENTANYL CITRATE 50 UG/ML
50 INJECTION, SOLUTION INTRAMUSCULAR; INTRAVENOUS ONCE
Status: COMPLETED | OUTPATIENT
Start: 2019-10-09 | End: 2019-10-09

## 2019-10-09 RX ORDER — SODIUM CHLORIDE 450 MG/100ML
20 INJECTION, SOLUTION INTRAVENOUS CONTINUOUS
Status: DISCONTINUED | OUTPATIENT
Start: 2019-10-09 | End: 2019-10-10

## 2019-10-09 RX ORDER — ATORVASTATIN CALCIUM 80 MG/1
80 TABLET, FILM COATED ORAL
Status: DISCONTINUED | OUTPATIENT
Start: 2019-10-09 | End: 2019-10-15 | Stop reason: HOSPADM

## 2019-10-09 RX ORDER — FONDAPARINUX SODIUM 2.5 MG/.5ML
2.5 INJECTION SUBCUTANEOUS DAILY
Status: DISCONTINUED | OUTPATIENT
Start: 2019-10-10 | End: 2019-10-15 | Stop reason: HOSPADM

## 2019-10-09 RX ORDER — MILRINONE LACTATE 0.2 MG/ML
INJECTION, SOLUTION INTRAVENOUS CONTINUOUS PRN
Status: DISCONTINUED | OUTPATIENT
Start: 2019-10-09 | End: 2019-10-09 | Stop reason: SURG

## 2019-10-09 RX ADMIN — NOREPINEPHRINE BITARTRATE 2 MCG/MIN: 1 INJECTION INTRAVENOUS at 09:12

## 2019-10-09 RX ADMIN — PROTAMINE SULFATE 300 MG: 10 INJECTION, SOLUTION INTRAVENOUS at 10:57

## 2019-10-09 RX ADMIN — SODIUM CHLORIDE 0.5 UNITS/HR: 9 INJECTION, SOLUTION INTRAVENOUS at 15:55

## 2019-10-09 RX ADMIN — MAGNESIUM SULFATE HEPTAHYDRATE 2 G: 40 INJECTION, SOLUTION INTRAVENOUS at 14:42

## 2019-10-09 RX ADMIN — MILRINONE LACTATE IN DEXTROSE 0.13 MCG/KG/MIN: 200 INJECTION, SOLUTION INTRAVENOUS at 14:10

## 2019-10-09 RX ADMIN — FENTANYL CITRATE 50 MCG: 50 INJECTION INTRAMUSCULAR; INTRAVENOUS at 16:55

## 2019-10-09 RX ADMIN — VASOPRESSIN 0.04 UNITS/MIN: 20 INJECTION INTRAVENOUS at 08:53

## 2019-10-09 RX ADMIN — SODIUM CHLORIDE 2 UNITS/HR: 9 INJECTION, SOLUTION INTRAVENOUS at 10:05

## 2019-10-09 RX ADMIN — ASPIRIN 325 MG ORAL TABLET 325 MG: 325 PILL ORAL at 16:54

## 2019-10-09 RX ADMIN — VASOPRESSIN 2 UNITS: 20 INJECTION INTRAVENOUS at 09:27

## 2019-10-09 RX ADMIN — OXYCODONE HYDROCHLORIDE AND ACETAMINOPHEN 2 TABLET: 5; 325 TABLET ORAL at 16:54

## 2019-10-09 RX ADMIN — CEFTAZIDIME 2000 MG: 1 INJECTION, POWDER, FOR SOLUTION INTRAMUSCULAR; INTRAVENOUS at 03:08

## 2019-10-09 RX ADMIN — SODIUM CHLORIDE 20 ML/HR: 0.45 INJECTION, SOLUTION INTRAVENOUS at 14:30

## 2019-10-09 RX ADMIN — CEFTAZIDIME 2000 MG: 1 INJECTION, POWDER, FOR SOLUTION INTRAMUSCULAR; INTRAVENOUS at 18:33

## 2019-10-09 RX ADMIN — PROPOFOL 30 MCG/KG/MIN: 10 INJECTION, EMULSION INTRAVENOUS at 11:46

## 2019-10-09 RX ADMIN — FENTANYL CITRATE 50 MCG: 50 INJECTION, SOLUTION INTRAMUSCULAR; INTRAVENOUS at 09:33

## 2019-10-09 RX ADMIN — SODIUM CHLORIDE, SODIUM LACTATE, POTASSIUM CHLORIDE, AND CALCIUM CHLORIDE 500 ML: .6; .31; .03; .02 INJECTION, SOLUTION INTRAVENOUS at 23:22

## 2019-10-09 RX ADMIN — CALCIUM CHLORIDE 0.4 G: 100 INJECTION, SOLUTION INTRAVENOUS; INTRAVENTRICULAR at 11:15

## 2019-10-09 RX ADMIN — Medication 1 APPLICATION: at 21:00

## 2019-10-09 RX ADMIN — VASOPRESSIN 0.1 UNITS/MIN: 20 INJECTION INTRAVENOUS at 17:17

## 2019-10-09 RX ADMIN — FENTANYL CITRATE 50 MCG: 50 INJECTION INTRAMUSCULAR; INTRAVENOUS at 16:16

## 2019-10-09 RX ADMIN — METHYLENE BLUE 100 MG: 5 INJECTION INTRAVENOUS at 12:25

## 2019-10-09 RX ADMIN — Medication 2 MCG/MIN: at 10:41

## 2019-10-09 RX ADMIN — SODIUM CHLORIDE: 0.9 INJECTION, SOLUTION INTRAVENOUS at 08:30

## 2019-10-09 RX ADMIN — HEPARIN SODIUM 29600 UNITS: 1000 INJECTION INTRAVENOUS; SUBCUTANEOUS at 09:20

## 2019-10-09 RX ADMIN — VASOPRESSIN 2 UNITS: 20 INJECTION INTRAVENOUS at 09:32

## 2019-10-09 RX ADMIN — EPINEPHRINE 2 MCG/MIN: 1 INJECTION, SOLUTION, CONCENTRATE INTRAVENOUS at 14:10

## 2019-10-09 RX ADMIN — AMIODARONE HYDROCHLORIDE 200 MG: 200 TABLET ORAL at 16:54

## 2019-10-09 RX ADMIN — FENTANYL CITRATE 150 MCG: 50 INJECTION, SOLUTION INTRAMUSCULAR; INTRAVENOUS at 11:52

## 2019-10-09 RX ADMIN — SODIUM CHLORIDE: 0.9 INJECTION, SOLUTION INTRAVENOUS at 08:25

## 2019-10-09 RX ADMIN — ONDANSETRON 4 MG: 2 INJECTION INTRAMUSCULAR; INTRAVENOUS at 21:24

## 2019-10-09 RX ADMIN — NOREPINEPHRINE BITARTRATE 10 MCG/MIN: 1 INJECTION INTRAVENOUS at 14:10

## 2019-10-09 RX ADMIN — VASOPRESSIN 0.14 UNITS/MIN: 20 INJECTION INTRAVENOUS at 14:34

## 2019-10-09 RX ADMIN — DEXTROSE 125 ML/HR: 5 SOLUTION INTRAVENOUS at 16:30

## 2019-10-09 RX ADMIN — ALBUMIN (HUMAN): 12.5 SOLUTION INTRAVENOUS at 11:29

## 2019-10-09 RX ADMIN — NOREPINEPHRINE BITARTRATE 9 MCG/MIN: 1 INJECTION INTRAVENOUS at 16:32

## 2019-10-09 RX ADMIN — AMINOCAPROIC ACID 2 G/HR: 250 INJECTION, SOLUTION INTRAVENOUS at 09:00

## 2019-10-09 RX ADMIN — EPHEDRINE SULFATE 5 MG: 50 INJECTION, SOLUTION INTRAVENOUS at 08:17

## 2019-10-09 RX ADMIN — AMIODARONE HYDROCHLORIDE 200 MG: 200 TABLET ORAL at 21:00

## 2019-10-09 RX ADMIN — PHENYLEPHRINE HYDROCHLORIDE 200 MCG: 10 INJECTION INTRAVENOUS at 08:15

## 2019-10-09 RX ADMIN — HEPARIN SODIUM 10000 UNITS: 1000 INJECTION INTRAVENOUS; SUBCUTANEOUS at 09:29

## 2019-10-09 RX ADMIN — MILRINONE LACTATE IN DEXTROSE 0.25 MCG/KG/MIN: 200 INJECTION, SOLUTION INTRAVENOUS at 08:53

## 2019-10-09 RX ADMIN — EPHEDRINE SULFATE 5 MG: 50 INJECTION, SOLUTION INTRAVENOUS at 08:15

## 2019-10-09 RX ADMIN — DEXMEDETOMIDINE 0.2 MCG/KG/HR: 100 INJECTION, SOLUTION, CONCENTRATE INTRAVENOUS at 15:45

## 2019-10-09 RX ADMIN — PHENYLEPHRINE HYDROCHLORIDE 200 MCG: 10 INJECTION INTRAVENOUS at 08:17

## 2019-10-09 RX ADMIN — CEFAZOLIN SODIUM 2000 MG: 2 SOLUTION INTRAVENOUS at 11:10

## 2019-10-09 RX ADMIN — CALCIUM CHLORIDE 0.2 G: 100 INJECTION, SOLUTION INTRAVENOUS; INTRAVENTRICULAR at 09:32

## 2019-10-09 RX ADMIN — GLYCOPYRROLATE 0.6 MG: 0.2 INJECTION, SOLUTION INTRAMUSCULAR; INTRAVENOUS at 13:07

## 2019-10-09 RX ADMIN — ALBUMIN (HUMAN): 12.5 SOLUTION INTRAVENOUS at 11:20

## 2019-10-09 RX ADMIN — CEFAZOLIN SODIUM 2000 MG: 2 SOLUTION INTRAVENOUS at 08:49

## 2019-10-09 RX ADMIN — ROCURONIUM BROMIDE 50 MG: 50 INJECTION, SOLUTION INTRAVENOUS at 09:33

## 2019-10-09 RX ADMIN — VASOPRESSIN 2 UNITS: 20 INJECTION INTRAVENOUS at 11:15

## 2019-10-09 RX ADMIN — MIDAZOLAM 2 MG: 1 INJECTION INTRAMUSCULAR; INTRAVENOUS at 07:27

## 2019-10-09 RX ADMIN — FENTANYL CITRATE 50 MCG: 50 INJECTION, SOLUTION INTRAMUSCULAR; INTRAVENOUS at 07:48

## 2019-10-09 RX ADMIN — CEFAZOLIN 2000 MG: 1 INJECTION, POWDER, FOR SOLUTION INTRAVENOUS at 13:15

## 2019-10-09 RX ADMIN — FENTANYL CITRATE 700 MCG: 50 INJECTION, SOLUTION INTRAMUSCULAR; INTRAVENOUS at 08:07

## 2019-10-09 RX ADMIN — NEOSTIGMINE METHYLSULFATE 3 MG: 1 INJECTION, SOLUTION INTRAVENOUS at 13:07

## 2019-10-09 RX ADMIN — FENTANYL CITRATE 100 MCG: 50 INJECTION, SOLUTION INTRAMUSCULAR; INTRAVENOUS at 13:07

## 2019-10-09 RX ADMIN — PANTOPRAZOLE SODIUM 40 MG: 40 TABLET, DELAYED RELEASE ORAL at 16:54

## 2019-10-09 RX ADMIN — AMINOCAPROIC ACID 5 G: 250 INJECTION, SOLUTION INTRAVENOUS at 09:33

## 2019-10-09 RX ADMIN — ROCURONIUM BROMIDE 100 MG: 50 INJECTION, SOLUTION INTRAVENOUS at 08:07

## 2019-10-09 RX ADMIN — SODIUM CHLORIDE, SODIUM LACTATE, POTASSIUM CHLORIDE, AND CALCIUM CHLORIDE: .6; .31; .03; .02 INJECTION, SOLUTION INTRAVENOUS at 07:18

## 2019-10-09 RX ADMIN — METOPROLOL TARTRATE 12.5 MG: 25 TABLET ORAL at 05:48

## 2019-10-09 RX ADMIN — ONDANSETRON 4 MG: 2 INJECTION INTRAMUSCULAR; INTRAVENOUS at 10:57

## 2019-10-09 RX ADMIN — SODIUM CHLORIDE, SODIUM LACTATE, POTASSIUM CHLORIDE, AND CALCIUM CHLORIDE: .6; .31; .03; .02 INJECTION, SOLUTION INTRAVENOUS at 08:30

## 2019-10-09 RX ADMIN — VASOPRESSIN 2 UNITS: 20 INJECTION INTRAVENOUS at 08:19

## 2019-10-09 RX ADMIN — VASOPRESSIN 2 UNITS: 20 INJECTION INTRAVENOUS at 09:28

## 2019-10-09 RX ADMIN — MIDAZOLAM 8 MG: 1 INJECTION INTRAMUSCULAR; INTRAVENOUS at 08:07

## 2019-10-09 RX ADMIN — CHLORHEXIDINE GLUCONATE 0.12% ORAL RINSE 15 ML: 1.2 LIQUID ORAL at 18:02

## 2019-10-09 NOTE — RESPIRATORY THERAPY NOTE
RT Ventilator Management Note  Myranda Newton 44 y o  male MRN: [id number]  Unit/Bed#: [facility] 416-01 Encounter: [id number]      Daily Screen     No data found in the last 10 encounters  Physical Exam:   Assessment Type: Assess only  General Appearance: Drowsy  Respiratory Pattern: Assisted, Spontaneous  Chest Assessment: Chest expansion symmetrical  Bilateral Breath Sounds: Diminished      Resp Comments: pt placed on cpap/ps at this time

## 2019-10-09 NOTE — INTERVAL H&P NOTE
Vitals:    10/09/19 0548   BP: 95/62   Pulse: (!) 119   Resp:    Temp:    SpO2:      H&P reviewed  After examining the patient I find no changes in the patients condition since the H&P was completed  Plan for MV replacement - tissue; soft tissue flap coverage of sternum by plastic surgeon  Preoperative Beta Blocker: indicated        Bimal Ortega MD  10/09/19  7:21 AM

## 2019-10-09 NOTE — ANESTHESIA PROCEDURE NOTES
Introducer/Shreyas  Performed by: Howie Fraser MD  Authorized by: Howie Fraser MD   Date/Time: 10/9/2019 8:16 AM  Consent: Verbal consent obtained  Written consent obtained  Risks and benefits: risks, benefits and alternatives were discussed  Consent given by: patient  Patient understanding: patient states understanding of the procedure being performed  Patient consent: the patient's understanding of the procedure matches consent given  Required items: required blood products, implants, devices, and special equipment available  Patient identity confirmed: verbally with patient and arm band  Time out: Immediately prior to procedure a "time out" was called to verify the correct patient, procedure, equipment, support staff and site/side marked as required  Indications: vascular access and central pressure monitoring  Location details: right internal jugular  Catheter size: 9 Fr  Patient position: Trendelenburg  Anesthesia method: ga    Assessment: blood return through all ports and free fluid flow  Preparation: Chloraprep  Skin prep agent dried: skin prep agent completely dried prior to procedure  Sterile barriers: all five maximum sterile barriers used - cap, mask, sterile gown, sterile gloves, and large sterile sheet  Hand hygiene: hand hygiene performed prior to central venous catheter insertion  Ultrasound guidance: yes  ultrasound permanent image saved  Site selection rationale: mvr  Number of attempts: 1  Successful placement: yes  Post-procedure: line sutured and dressing applied  Patient tolerance: Patient tolerated the procedure well with no immediate complications

## 2019-10-09 NOTE — PROGRESS NOTES
INTERNAL MEDICINE RESIDENCY PROGRESS NOTE     Name: Zeny Wood   Age & Sex: 44 y o  male   MRN: [id number]  Unit/Bed#: OR POOL   Encounter: [id number]  Team: SOD Team A    PATIENT INFORMATION     Name: Zeny Wood   Age & Sex: 44 y o  male   MRN: [id number]  Hospital Stay Days: 9    ASSESSMENT/PLAN     Principal Problem:    Endocarditis  Active Problems:    History of intravenous drug abuse ([facility] [address] )    Acute kidney injury ([facility] [address] )    Insomnia    Tachycardia    Nonrheumatic mitral valve regurgitation    Bacteremia    Anxiety    Solitary left kidney    Right parietal lobe lesion    Bilateral pleural effusion    Anemia        Bilateral pleural effusion- improved   Assessment & Plan  Likely from severe mitral regurgitant in setting of endocarditis  S/p thoracentesis on 10/02 by IR with 1000 mL removed from the right hemithorax and 700 mL from the left hemithorax  Now on room air  Stable vitals       Plan:  - Currently not on any diuretics  - will continue to monitor         Right parietal lobe lesion  Assessment & Plan  Patient was found to have 1 8 cm right parietal lobe lesion  Likely septic emboli  Repeat imaging on 10/08 showed slightly diminished vasogenic edema associated with approximately 1 8 cm previously acute hemorrhage involving medial right temporal occipital junction  Imaging studies likely a sequelae from prior septic emboli    DVT PPX with heparin discontinued on 10/03       Plan:  - Neurology is following- patient at moderate risk for ICH but benefits of MVR out weight risk of ICH, patient will go for MVR today  - will follow up with Neurology over recommendation to start DVT ppx     Solitary left kidney  Assessment & Plan  JULIENNE on CKD and congenital solitary left kidney secondary to prerenal/ATN/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 all in the setting of solitary kidney  CT at [place] shows right kidney congenitally absent with left kidney showing compensatory hypertrophy and 1 small likely infarct in the lower cortex unchanged from previous study, not associated with perinephric fluid or abscess or pyelonephritis      Plan:  - reiterate has been stable  continue to monitor renal function  - nephro is following     Anxiety  Assessment & Plan  Plan:  - continue hydroxyzine 25 mg every 8 hours as needed for anxiety  -continue Lexapro, trazodone  -scheduled melatonin and Ambien p r n  nightly        Nonrheumatic mitral valve regurgitation  Assessment & Plan  BILLY 10/2 reveal ejection fraction at 60% for no wall motion abnormalities, mildly to moderately reduced systolic function of the right ventricle   Left atrium mildly dilated   Mitral valve with multiple large and mobile vegetations and the trigger aspect of both leaflets with large perforations at the base of the anterior leaflets and wide-open mitral regurgitation      Plan:  - plan for mitral VR replacement with CT surgery today       * Endocarditis  Assessment & Plan  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   CT surgery and ID following      Plan:  - plan for mitral valve replacement  today  - continue antibiotics:  IV ceftazidime 2 g q 8 hours and levofloxacin 750 mg p o  Daily         Disposition:  Going for MVR today     SUBJECTIVE     Patient seen and examined  No acute events overnight  OBJECTIVE     Vitals:    10/08/19 2251 10/09/19 0253 10/09/19 0547 10/09/19 0548   BP: 95/61 91/55  95/62   BP Location: Left arm Right arm     Pulse: 76 68  (!) 119   Resp: 18 18     Temp: 98 1 °F (36 7 °C) 98 7 °F (37 1 °C)     TempSrc: Oral Oral     SpO2: 97% 98%     Weight:   74 5 kg (164 lb 3 9 oz)    Height:          Temperature:   Temp (24hrs), Av 2 °F (36 8 °C), Min:98 °F (36 7 °C), Max:98 7 °F (37 1 °C)    Temperature: 98 7 °F (37 1 °C)  Intake & Output:  I/O       10/07 0701 - 10/08 0700 10/08 0701 - 10/09 0700 10/09 0701 - 10/10 0700    P  O  540 893     I V  (mL/kg) IV Piggyback 100 820     Total Intake(mL/kg) 640 (8 6) 1713 (23)     Urine (mL/kg/hr)  0 (0)     Total Output  0     Net +640 +1713            Unmeasured Urine Occurrence 2 x 5 x     Unmeasured Stool Occurrence  1 x         Weights:   IBW: 56 9 kg    Body mass index is 29 09 kg/m²  Weight (last 2 days)     Date/Time   Weight    10/09/19 0547   74 5 (164 24)    10/08/19 0600   74 (163 14)    10/07/19 0600   73 8 (162 7)            Physical Exam   Constitutional: He is oriented to person, place, and time  He appears well-developed and well-nourished  No distress  HENT:   Head: Normocephalic and atraumatic  Nose: Nose normal    Mouth/Throat: Oropharynx is clear and moist  No oropharyngeal exudate  Eyes: Right eye exhibits no discharge  Left eye exhibits no discharge  No scleral icterus  Neck: Normal range of motion  Neck supple  Cardiovascular: Normal rate, regular rhythm and intact distal pulses  Exam reveals no gallop and no friction rub  Murmur heard  Holosystolic murmur at left midclavicular line  Pulmonary/Chest: Effort normal  No stridor  No respiratory distress  He has no wheezes  He has no rales  Abdominal: Soft  Bowel sounds are normal  He exhibits no distension  There is no tenderness  There is no guarding  Musculoskeletal: Normal range of motion  He exhibits no edema  Neurological: He is alert and oriented to person, place, and time  Skin: Skin is warm  He is not diaphoretic  No erythema  Psychiatric: He has a normal mood and affect  LABORATORY DATA     Labs: I have personally reviewed pertinent reports    Results from last 7 days   Lab Units 10/09/19  0537 10/08/19  0229 10/05/19  0513 10/04/19  0507   WBC Thousand/uL 12 23* 11 05* 8 97 9 88   HEMOGLOBIN g/dL 8 5* 8 2* 7 9* 7 1*   HEMATOCRIT % 27 7* 26 2* 25 5* 22 8*   PLATELETS Thousands/uL 271 220 225 226   NEUTROS PCT % 80*  --   --  85*   MONOS PCT % 5  --   --  4      Results from last 7 days   Lab Units 10/09/19  0537 10/08/19  0229 10/06/19  0613  10/04/19  0507   POTASSIUM mmol/L 4 4 3 4* 3 6   < > 3 6   CHLORIDE mmol/L 108 109* 108   < > 108   CO2 mmol/L 24 24 24   < > 25   BUN mg/dL 35* 31* 29*   < > 29*   CREATININE mg/dL 1 68* 1 51* 1 47*   < > 1 51*   CALCIUM mg/dL 9 2 8 7 8 8   < > 8 4   ALK PHOS U/L  --   --   --   --  89   ALT U/L  --   --   --   --  12   AST U/L  --   --   --   --  8    < > = values in this interval not displayed  Results from last 7 days   Lab Units 10/08/19  0229   MAGNESIUM mg/dL 1 9     Results from last 7 days   Lab Units 10/08/19  0229   PHOSPHORUS mg/dL 3 4      Results from last 7 days   Lab Units 10/03/19  0634   PTT seconds 27               IMAGING & DIAGNOSTIC TESTING     Radiology Results: I have personally reviewed pertinent reports  Ct Abdomen Pelvis Wo Contrast    Result Date: 10/3/2019  Impression: Mild to moderate pleural effusions are seen at the bases  There is a unilateral left kidney without hydronephrosis  No ascites or obvious intra-abdominal collection Immediate finding was noted on the Epic system  Workstation performed: [id number]     Xr Mandible Panorex ([place] Only)    Result Date: 10/2/2019  Impression: No periapical lucencies identified  Workstation performed: [id number]     Xr Chest Portable    Result Date: 10/4/2019  Impression: Stable chest with moderate pulmonary vascular congestion  Workstation performed: [id number]     Xr Chest Portable    Result Date: 10/1/2019  Impression: New airspace opacity suggests pulmonary edema or diffuse bronchopneumonia  Immediate report was noted on the Epic system  Workstation performed: [id number]     Ct Head Wo Contrast    Addendum Date: 10/1/2019    ADDENDUM: This addendum is for the purpose of clarification: Abnormality described represents either a septic embolus, or abscess  MRI is recommended for differentiation   I personally discussed this addendum with Lilia Chris on 10/1/2019 at 7:50 PM     Result Date: 10/1/2019  Impression: Right parieto-occipital lesion, given history this likely represents a septic embolus/abscess  I personally discussed this study  with Stepan Hanna on 10/1/2019 at 5:24 PM   Workstation performed: [id number]     Ct Chest Wo Contrast    Result Date: 10/1/2019  Impression: 1  Diffuse groundglass opacities consistent with pulmonary edema 2  More focal patchy opacities in left upper lobe, while this may  still represent edema, pneumonia would have a similar appearance 3  Moderate pleural effusions 4  Subcarinal lymphadenopathy 5  Wedge-shaped hypodensity within the spleen, likely infarct The study was marked in EPIC for immediate notification  Workstation performed: [id number]     Mra Head Wo Contrast    Result Date: 10/2/2019  Impression: 1  No evidence of mycotic aneurysm; however, exam sensitivity is limited by resolution and incomplete imaging of the distal branches  Catheter angiography may be helpful if there is persistent concern for mycotic aneurysm  2   No stenosis or occlusion of the major vessels of the [place]  Workstation performed: [id number]     Mra Carotids Wo Contrast    Result Date: 10/3/2019  Impression: Unremarkable MR angiogram of the cervical vasculature  Workstation performed: [id number]     Mri Brain Wo Contrast    Result Date: 10/2/2019  Impression: Subacute 1 8 cm hemorrhage in the right posterior mesial temporal occipital region with surrounding vasogenic edema, stable  Few punctate foci of chronic microhemorrhage in the right frontoparietal region and in the cerebellar hemispheres  Findings may  be secondary to subacute and chronic septic emboli and/or mycotic aneurysms  No evidence of abscess  Workstation performed: [id number]     Ir Thoracentesis    Result Date: 10/2/2019  Impression: Successful bilateral thoracentesis   _________________________________________________________________ COMPARISON: None PROCEDURE DETAILS: Operators: Dr Earl Crowe attending, performed the procedure  Anesthesia: 1% lidocaine was injected in the skin and subcutaneous tissues overlying the access site  Medications: 1% lidocaine Contrast: None Fluoroscopy time: None COMMENTS: Following the discussion of the risks, benefits and alternatives to the procedure, written informed consent was obtained from the patient  The patient was placed in a sitting position  A preprocedure timeout was performed per [facility] protocol  The right back was prepped and draped in the usual sterile fashion  After local anesthesia was administered, a 5-[place] Voicebase catheter was advanced under continuous ultrasound guidance into the right pleural space  1000 mL of vidya fluid was obtained  After the procedure, the catheter was removed, the skin was cleansed and sterile dressings were applied  Aforementioned procedure was then performed for left-sided thoracentesis  The patient tolerated the procedure well and there were no immediate postprocedure complications  Workstation performed: [id number]     Us Right Upper Quadrant With Liver Dopplers    Result Date: 10/4/2019  Impression: 1  Normal sonographic appearance of the liver  2   Absent right kidney  3   Mild splenomegaly  4   Incompletely visualized bilateral pleural effusions  Workstation performed: [id number]     Other Diagnostic Testing: I have personally reviewed pertinent reports      ACTIVE MEDICATIONS     Current Facility-Administered Medications   Medication Dose Route Frequency    [MAR Hold] acetaminophen (TYLENOL) tablet 650 mg  650 mg Oral Q6H PRN    [MAR Hold] barium sulfate 2 1 % suspension 450 mL  450 mL Oral 90 min pre-procedure    ceFAZolin (ANCEF) IVPB (premix) 2,000 mg  2,000 mg Intravenous Once    [MAR Hold] cefTAZidime (FORTAZ) 2,000 mg in sodium chloride 0 9 % 50 mL IVPB  2,000 mg Intravenous Q8H    [MAR Hold] chlorhexidine (PERIDEX) 0 12 % oral rinse 15 mL  15 mL Swish & Spit Q12H [address]    [MAR Hold] escitalopram (LEXAPRO) tablet 10 mg  10 mg Oral Daily    [MAR Hold] hydrOXYzine HCL (ATARAX) tablet 25 mg  25 mg Oral Q8H PRN    [MAR Hold] levofloxacin (LEVAQUIN) tablet 750 mg  750 mg Oral Q24H    [MAR Hold] melatonin tablet 3 mg  3 mg Oral HS    [MAR Hold] metoprolol tartrate (LOPRESSOR) tablet 25 mg  25 mg Oral Q12H [address]    [MAR Hold] ondansetron (ZOFRAN) injection 4 mg  4 mg Intravenous Q8H PRN    [MAR Hold] traZODone (DESYREL) tablet 100 mg  100 mg Oral HS    [MAR Hold] zolpidem (AMBIEN) tablet 5 mg  5 mg Oral HS PRN       VTE Pharmacologic Prophylaxis: Sequential compression device (Venodyne)   VTE Mechanical Prophylaxis: sequential compression device    Portions of the record may have been created with voice recognition software  Occasional wrong word or "sound a like" substitutions may have occurred due to the inherent limitations of voice recognition software    Read the chart carefully and recognize, using context, where substitutions have occurred   ==  Imelda Min, [facility]  Internal Medicine Residency PGY-1

## 2019-10-09 NOTE — PROGRESS NOTES
Progress Note - Infectious Disease   Elsa Page 44 y o  male MRN: [id number]  Unit/Bed#: OR POOL Encounter: [id number]      Impression/Plan:  44year old male with a PMH of ivdu, congential solitary kidney and MSSA endocarditis presents as a transfer from [address] [place] [address] with Infective endocarditis of the mitral valve with severe mitral regurgitation and bacteremia due to Pseudomonas, complicated by emboli to spleen and brain, currently on ceftazidime and levofloxacin      Bacteremia  ·           Pseudomonas bacteremia likely secondary to injection drug use   ·           Blood cultures were positive from 09/06- 09/11  ·           Repeat blood cultures from 09/13 and 09/14 were negative  ·           Blood cultures on 09/28 grew pseudomonas again   ·           Repeat blood cultures from 10/01, 10/03, 10/06 negative preliminary  ·           Patient was previously on cefepime but had diarrhea  ·           Was also on gentamicin but given solitary kidney he developed JULIENNE and was stopped  ·           Currently on ceftazidime and levofloxacin, will need total 6 weeks of iv post-op and not a candidate for home iv due to h/o ivdu  ·           Monitor temperature and WBC count      Infective endocarditis  ·           Pseudomonas endocarditis with severe mitral regurgitation  ·           Patient has h/o MSSA endocarditis that was treated with 6 weeks of iv cefazolin in March of 2019  ·           Patient had 2D echo on September 6, 2019 which showed echodensity on the posterior mitral leaflet, possibly representing residual vegetation from prior treated endocarditis  ·           BILLY showed increasing size of mitral valve vegetation compared to prior study in June 2019 and also new vegetation on anterior leaflet  ·           Repeat BILLY 10/02 showed multiple large and highly mobile vegetations were present at the atrial aspect of both leaflets   There were at least two large perforations at the base of the anterior leaflet, and severe mitral regurgitation  ·           Currently on ceftazidime and levofloxacin for dual antipseudomonal coverage  ·           CT shows splenic infarct and right parieto-occipital lesion representing septic embolism; MRI confirms  ·           Transferred for CT surgery evaluation for valve replacement  ·           Patient to get a mitral valve replacement today      Hep C antibody positive  ·           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  ·           Hep C RNA levels undetectable   ·           RUQ USG normal, no cirrhosis, cleared by GI for surgery   ·           HIV negative     Vomiting and diarrhea  ·           Secondary to cefepime, now improved  ·           C  Diff was negative at [facility]     I v drug use  ·           Will need drug rehab after hospitalization to prevent recurrent endocarditis     JULIENNE   ·           Solitary kidney on the left  ·           Renal dosage of levofloxacin  ·           Avoid aminoglycosides and other nephrotoxic agents   ·           Creatine istable      Antibiotics:  ·           On ceftazidime and levofloxacin  ·           Unsure what antibiotic day as patient transferred from [facility]  ·           Abx sensitivity obtained from [facility] showed sensitive to:                                      TOYIN  -Zosyn                         <4  -Tobramycin                <1  -Meropenem                <0 25  -Levofloxacin               <0 25  -Gentamicin                 <1  -Ceftazidine                 <4  -Cefepime                   <2  Subjective:  Patient has no fever, chills, sweats; no nausea, vomiting, diarrhea; no cough, shortness of breath; no pain  No new symptoms      Objective:  Vitals:  Temp:  [98 °F (36 7 °C)-98 7 °F (37 1 °C)] 98 7 °F (37 1 °C)  HR:  [] 119  Resp:  [18-20] 18  BP: ()/(55-68) 95/62  SpO2:  [92 %-98 %] 98 %  Temp (24hrs), Av 3 °F (36 8 °C), Min:98 °F (36 7 °C), Max:98 7 °F (37 1 °C)  Current: Temperature: 98 7 °F (37 1 °C)    Physical Exam:   Patient not examined as he is in the OR, will follow up once he returns     Labs, Imaging, & Other studies:   All pertinent labs and imaging studies were personally reviewed  Results from last 7 days   Lab Units 10/09/19  1017 10/09/19  0952 10/09/19  0927  10/09/19  0537 10/08/19  0229 10/05/19  0513   WBC Thousand/uL  --   --   --   --  12 23* 11 05* 8 97   HEMOGLOBIN g/dL  --   --   --   --  8 5* 8 2* 7 9*   I STAT HEMOGLOBIN g/dl 6 8* 6 1* 6 8*   < >  --   --   --    PLATELETS Thousands/uL  --   --   --   --  271 220 225    < > = values in this interval not displayed  Results from last 7 days   Lab Units 10/09/19  1017  10/09/19  0537 10/08/19  0229 10/06/19  0613  10/04/19  0507   SODIUM mmol/L  --   --  140 142 139   < > 141   POTASSIUM mmol/L  --   --  4 4 3 4* 3 6   < > 3 6   CHLORIDE mmol/L  --   --  108 109* 108   < > 108   CO2 mmol/L  --   --  24 24 24   < > 25   CO2, I-STAT mmol/L 31   < >  --   --   --   --   --    BUN mg/dL  --   --  35* 31* 29*   < > 29*   CREATININE mg/dL  --   --  1 68* 1 51* 1 47*   < > 1 51*   EGFR ml/min/1 73sq m  --   --  50 57 59   < > 57   GLUCOSE, ISTAT mg/dl 206*   < >  --   --   --   --   --    CALCIUM mg/dL  --   --  9 2 8 7 8 8   < > 8 4   AST U/L  --   --   --   --   --   --  8   ALT U/L  --   --   --   --   --   --  12   ALK PHOS U/L  --   --   --   --   --   --  89    < > = values in this interval not displayed  Results from last 7 days   Lab Units 10/06/19  0613 10/03/19  0634 10/02/19  1345   BLOOD CULTURE  No Growth at 48 hrs  No Growth at 48 hrs  No Growth After 5 Days  No Growth After 5 Days    --    MRSA CULTURE ONLY   --   --  No Methicillin Resistant Staphlyococcus aureus (MRSA) isolated

## 2019-10-09 NOTE — OP NOTE
OPERATIVE REPORT  PATIENT NAME: Zeny Wood    :  1980  MRN: [id number]  Pt Location: [facility] OR ROOM 10    SURGERY DATE: 10/9/2019    Surgeon(s) and Role:  Panel 1:     * Anastacio Dangelo MD - Primary     * Didier Quiñonez PA-C - Assisting  Panel 2:     * Darrick Amaral MD - Primary    Preop Diagnosis:  Nonrheumatic mitral valve regurgitation [I34 0]    Post-Op Diagnosis Codes:     * Nonrheumatic mitral valve regurgitation [I34 0]    Procedure(s) (LRB):  BILATERAL MYOCUTANEOUS PECTORALIS MAJOR ADVANCEMENT FLAPS (Bilateral)  APPLICATION NEGATIVE PRESSURE VAC DRESSING (N/A)    Specimen(s):  ID Type Source Tests Collected by Time Destination   A :  Tissue Heart Valve ANAEROBIC CULTURE AND GRAM STAIN, FUNGAL CULTURE, VIRUS CULTURE, CULTURE, TISSUE AND GRAM STAIN, AFB CULTURE WITH STAIN Anastacio Dangelo MD 10/9/2019 6365        Estimated Blood Loss:   750 mL    Drains:  Chest Tube 1 Left Pleural 28 Fr  (Active)   Number of days: 0       Chest Tube 4 Right Pleural 28 Fr  (Active)   Number of days: 0       Chest Tube 3 Posterior Mediastinal 32 Fr  (Active)   Number of days: 0       Chest Tube 2 Anterior Mediastinal 32 Fr  (Active)   Number of days: 0       Closed/Suction Drain Left Chest Bulb 15 Fr  (Active)   Number of days: 0       Closed/Suction Drain Right Chest Bulb 15 Fr  (Active)   Number of days: 0       Negative Pressure Wound Therapy (V A C ) Chest Medial (Active)   Other- # applied 1 10/9/2019  1:00 PM   Dressing Status Clean;Dry; Intact 2019 12:00 AM   Number of days: 0       Urethral Catheter Non-latex; Temperature probe 16 Fr   (Active)   Site Assessment Clean;Skin intact 10/9/2019  8:30 AM   Collection Container Standard drainage bag 10/9/2019  8:30 AM   Securement Method Securing device (Describe) 10/9/2019  8:30 AM   Number of days: 0       Anesthesia Type:   General    Operative Indications:  Nonrheumatic mitral valve regurgitation [I34 0]      Operative Findings:  Sternotomy with wires in places  tenuous subcutaneous tissue with high risk of post op wire exposure  Complications:   None    Procedure and Technique:  Mr Ankush García is a 55-year-old male who presents with infective bacterial endocarditis and severe mitral regurgitation in need of mitral valve replacement  I was asked to assist with closure of the anticipated secondary defect given his prior history of left clavicle head debridements in the past   Had extensive discussion with the patient during our preoperative Copper surgeon with his family with regards my role so Plastic surgery injuring his care  I discussed options for reconstruction including complex closure, adjacent tissue arrangement versus muscle advancement flap  I explained the final decision will be made based on our intraoperative findings  All risks, benefits, and options, including but not limited to, pain, scarring, bleeding, infection, asymmetry, contour deformity, damage to adjacent nerves, vessels, and organs, hematoma, seroma, paresthesias, anesthesia (temporary versus permanent), skin necrosis, fat necrosis, flap necrosis, wound dehiscence with need for healing by secondary intention and postoperative dressing changes, need for prolonged mechanical ventilation and ICU stay, hypertrophic and/or keloid scar formation, deep venous thrombosis, pulmonary embolism, death, recurrence, and need for revision and/or reoperation were fully explained to the patient  Informed consent was then obtained  The patient was taken to the operative theater by the primary team   Please refer further op note for the 1st panel of the operation  Was a mitral valve replacement was performed and the sternum was closed was then called into the room to assess for closure the secondary defect  I obtained a sign out from the 1st panel surgeon found the patient under general anesthesia with the chest sterilely prepped    Then scrubbed in and performed a 2nd time-out  The defect was evaluated finding and mid sternum completely exposed with wires in place with a tenuous subcutaneous tissue at the superior 3rd with medial head of the clavicle scar and prior bursa  This point was decided to perform bilateral myocutaneous pectoralis major muscle advancement flap in order to provide muscle coverage over all the exposed bone portions and wires the with the patient high risk of postoperative wound related complications  Procedure started 1st on the left chest with the assistance of a lighted retractor and assistant the subpectoral plane was then developed from the sternum by disinserted the muscle from the sternum using electrocautery and careful hemostasis of any encounter perforators from the mammary with hemoclips  Subpectoral plane was raised from medial to lateral up to anterior axillary line careful separation of the pectoralis minor muscle  Attention was then turned to the inferior chest wall fibers was also disinserted using electrocautery in the same fashion  Satisfactory advancement was confirmed nevertheless contralateral flap was also raised given that there was no complete coverage of contralateral wires and tenuous skin in the area  The same procedure was performed contralateral right chest with a myocutaneous pectoralis with muscle advancement flap performed the same fashion  Was dissection was then performed the bilateral skin was then raised for about 2 cm from the anterior surface of the muscle  Hemostasis and irrigation was then performed  Two 15  SHERICE hubless drains were then inserted and secured with 3-0 nylon  The tendon skin was then debrided by full-thickness excision to superior 3rd of the sternum with an extension to the left supraclavicular area  This was passed of the table for discard  Secondary defect measuring 24 by 8 cm x2  cm at the widest point    Bilateral muscles were then advanced and inset into position using interrupted figure-eight 2-0 PDS 3 point sutures incorporating the central wires and some of the periosteum along the left side of the maneuver and clavicle  Vancomycin powder was instilled into the wound  All the sutures were then tied down with complete coverage of our defect obtained  Walter's layer was then closed with interrupted 2-0 PDS sutures  Deep dermal layer was then closed with interrupted 3-0 Monocryl sutures followed by running subcuticular 4-0 Monocryl suture  Negative pressure therapy was then applied along the sternal wound order to decrease tension on the incision and decrease wound related complications  All the drain site was then covered  With the completion of our portion of the case the patient was then turned back to the care of the cardiac surgery team for standard postoperative cardiac protocol       I was present for the entire procedure and A qualified resident physician was not available    Patient Disposition:  PACU  and Critical Care Unit    SIGNATURE: Delia Johnson MD  DATE: October 9, 2019  TIME: 1:42 PM

## 2019-10-09 NOTE — ANESTHESIA PROCEDURE NOTES
Arterial Line Insertion  Performed by: Horton Cushing, MD  Authorized by: Horton Cushing, MD   Consent: Verbal consent obtained  Written consent obtained  Risks and benefits: risks, benefits and alternatives were discussed  Consent given by: patient  Patient understanding: patient states understanding of the procedure being performed  Patient consent: the patient's understanding of the procedure matches consent given  Required items: required blood products, implants, devices, and special equipment available  Patient identity confirmed: verbally with patient and arm band  Time out: Immediately prior to procedure a "time out" was called to verify the correct patient, procedure, equipment, support staff and site/side marked as required  Preparation: Patient was prepped and draped in the usual sterile fashion  Indications: multiple ABGs and hemodynamic monitoring  Orientation:  Right  Location: brachial artery  Sedation:  Patient sedated: yes  Analgesia: see MAR for details  Vitals: Vital signs were monitored during sedation  Procedure Details:  Needle gauge: 4 Fr    Seldinger technique: Seldinger technique used  Number of attempts: 5 or more    Post-procedure:  Post-procedure: dressing applied and chlorhexidine patch applied  Waveform: good waveform and waveform confirmed  Post-procedure CNS: normal  Patient tolerance: Patient tolerated the procedure well with no immediate complications  Comments: Pre-induction, very difficult  Attempt left radial x1, unable to thread catheter  Attempt left brachial, no flash but appeared in artery, unable to thread wire (hematoma present, held pressure, aborted)  Attempt right radial, unable to thread wire (needle clearly in artery)  Attempt right brachial - arrow placed, changed over wire to 4 Fr catheter

## 2019-10-09 NOTE — PROGRESS NOTES
Progress Note - Nephrology   El Gilbert 44 y o  male MRN: [id number]  Unit/Bed#: 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  -also was found to have small renal infarct from prior endocarditis  -status post contrast exposure with cardiac catheterization on 10/2/19   -admission creatinine 1 4 at [facility], peaked at 2 2    -now seems to be fluctuating in mid one range, creatinine 1 68 pre op  -monitor renal indices closely  Now on pressors  -f/u am BMP     -recent workup includes:  -urinalysis this admission shows 4 to 10 RBCs, 4 to 10 WBCs, 1+ proteinuria, very concentrated sample with 10 to 25 hyaline cast   -CT scan showed congenitally absent right kidney with left kidney showing compensatory hypertrophy, one small likely infarct in the lower pole of left kidney unchanged from the previous study and not associated with any abscess for pyelonephritis  - February 2019: positive hep C antibody with hep C RNA PCR less than 15  -complements normal, CK normal  -avoid nephrotoxins or NSAIDs  -urine eosinophils 0%, no peripheral eosinophilia     2  Hypotension post op - now on multiple pressors, milrinone gtt  Goal MAP >65 for renal perfusion in setting of JULIENNE vs CKD     3  Anemia  -closely monitor hemoglobin, now 8 8, transfuse p r n  For Hgb < 7   Did receive 2 u blood intraoperatively    -low iron stores recently although avoiding IV Venofer due to active infection issues  -recent FOBT was negative at [facility]      4  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 and vac  -fortaz/levaquin as per primary team and ID  -patient is active IV drug user and had prior history of MSSA bacteremia with endocarditis in March 2019  -Haylee Ulrich September  showed echodensity on the anterior mitral leaflet      5  Pseudomonas bacteremia, blood culture from 19 showed Pseudomonas  -Repeat blood cultures negative so far     6  Bilateral pleural effusion, status post thoracentesis on 10/2/19  -currently remains on room air  -holding on further diuretics as appears euvolemic on exam     Other issues:  CT scan suggestive of pulmonary congestion versus suspicion for pneumonia, pleural effusions, splenic infarct  Septic embolus/abscess on CT scan of brain      Subjective:   Unable to elicit HPI or review of systems patient is postop, intubated and sedated  He is on multiple pressors including epinephrine, Levophed, vasopressin drips as well as Milrinone drip  He did make 350 mL of urine while in the operating room  Objective:     Vitals: Blood pressure 95/62, pulse (!) 119, temperature 98 7 °F (37 1 °C), temperature source Oral, resp  rate 18, height 5' 3" (1 6 m), weight 74 5 kg (164 lb 3 9 oz), SpO2 98 %  ,Body mass index is 29 09 kg/m²  Temp (24hrs), Av 3 °F (36 8 °C), Min:98 °F (36 7 °C), Max:98 7 °F (37 1 °C)      Weight (last 2 days)     Date/Time   Weight    10/09/19 0547   74 5 (164 24)    10/08/19 0600   74 (163 14)    10/07/19 0600   73 8 (162 7)                Intake/Output Summary (Last 24 hours) at 10/9/2019 1450  Last data filed at 10/9/2019 1353  Gross per 24 hour   Intake 6541 ml   Output 1075 ml   Net 5466 ml     I/O last 24 hours: In: 3123 [P O :893; I V :3100; Blood:700; IV Piggyback:1320]  Out: 1075 [Urine:325; Blood:750]        Physical Exam:   Physical Exam   Constitutional: He appears well-developed and well-nourished  No distress  HENT:   Head: Normocephalic and atraumatic  Mouth/Throat: No oropharyngeal exudate  +ETT   Eyes: Right eye exhibits no discharge  Left eye exhibits no discharge  tape over eyes   Neck: Neck supple  Cardiovascular: Normal rate, regular rhythm and normal heart sounds     Pulmonary/Chest: Effort normal and breath sounds normal  He has no wheezes  He has no rales  +chest tubes, +drains, chest wound vac   Abdominal: Soft  Bowel sounds are normal  He exhibits no distension  There is no tenderness  Genitourinary:   Genitourinary Comments: +smith   Musculoskeletal: He exhibits no edema  Neurological:   Intubated/sedated   Skin: Skin is warm and dry  No rash noted  He is not diaphoretic  Psychiatric:   Unable to elicit as patient Intubated/sedated   Vitals reviewed  Invasive Devices     Central Venous Catheter Line            CVC Central Lines 10/09/19 Triple less than 1 day    Introducer 10/09/19 less than 1 day          Peripheral Intravenous Line            Peripheral IV 10/08/19 Proximal;Right;Ventral (anterior) Forearm 1 day          Arterial Line            Arterial Line 10/09/19 Right Brachial less than 1 day          Line            Pacer Wires less than 1 day    Pacer Wires less than 1 day          Drain            Chest Tube 1 Left Pleural 28 Fr  less than 1 day    Chest Tube 2 Anterior Mediastinal 32 Fr  less than 1 day    Chest Tube 3 Posterior Mediastinal 32 Fr  less than 1 day    Chest Tube 4 Right Pleural 28 Fr  less than 1 day    Closed/Suction Drain Left Chest Bulb 15 Fr  less than 1 day    Closed/Suction Drain Right Chest Bulb 15 Fr  less than 1 day    Negative Pressure Wound Therapy (V A C ) Chest Medial less than 1 day    Urethral Catheter Non-latex; Temperature probe 16 Fr  less than 1 day          Airway            ETT  Hi-Lo; Oral;Cuffed 8 5 mm less than 1 day                Medications:    Scheduled Meds:  Current Facility-Administered Medications:  acetaminophen 650 mg Rectal Q4H PRN Donnalee Lapping, PA-C    acetaminophen 650 mg Oral Q4H PRN Donnalee Lapping, PA-C    amiodarone 200 mg Oral Q8H [address] Princess Fountain PA-C    aspirin 325 mg Oral Daily Donnalee Lapping, PA-PAU    atorvastatin 80 mg Oral Daily With Dinner Donnalee Lapping, PA-PAU    bisacodyl 10 mg Rectal Daily PRN Princess KaelynREY Dunham-C    calcium chloride 1 g Intravenous Once Cindra Smart, PA-C    cefTAZidime 2,000 mg Intravenous Q8H Cindra Smart, PA-C Last Rate: 2,000 mg (10/09/19 0308)   chlorhexidine 15 mL Swish & Spit BID Cindra Smart, PA-C    dexmedetomidine 0 1-0 7 mcg/kg/hr Intravenous Titrated Aleida Spironello V, CRNP    epinephrine 1-10 mcg/min Intravenous Titrated Cindra Smart, PA-C    escitalopram 10 mg Oral Daily Cindra Smart, PA-C    fentanyl citrate (PF) 50 mcg Intravenous Once Cindra Smart, PA-C    fentanyl citrate (PF) 50 mcg Intravenous Q1H PRN Cindra Smart, PA-C    [START ON 10/10/2019] fondaparinux 2 5 mg Subcutaneous Daily Cindra Smart, PA-C    HYDROmorphone 0 5 mg Intravenous Q1H PRN Cindra Smart, PA-C    HYDROmorphone 1 mg Intravenous Q1H PRN Cindra Smart, PA-C    hydrOXYzine HCL 25 mg Oral Q8H PRN Cindra Smart, PA-C    insulin regular (HumuLIN R,NovoLIN R) infusion 0 3-21 Units/hr Intravenous Titrated Cindra Smart, PA-C    levofloxacin 750 mg Oral Q24H Cindra Smart, PA-C    lidocaine (cardiac) 100 mg Intravenous Q30 Min PRN Cindra Smart, PA-C    magnesium sulfate 2 g Intravenous Once Cindra Smart, PA-C    milrinone (PRIMACOR) infusion 0 13 mcg/kg/min Intravenous Continuous Cindra Smart, PA-C    mupirocin 1 application Nasal F96L [address] Cindra Smart, PA-C    niCARdipine 2 5-15 mg/hr Intravenous Titrated Cindra Smart, PA-C    norepinephrine 1-30 mcg/min Intravenous Titrated Cindra Smart, PA-C    ondansetron 4 mg Intravenous Q6H PRN Cindra Smart, PA-C    oxyCODONE-acetaminophen 1 tablet Oral Q4H PRN Cindra Smart, PA-C    oxyCODONE-acetaminophen 2 tablet Oral Q6H PRN Cindra Smart, PA-C    pantoprazole 40 mg Oral Daily Cindra Smart, PA-C    polyethylene glycol 17 g Oral Daily Cindra Smart, PA-C    potassium chloride 20 mEq Intravenous Once PRN Cindra Smart, PA-C    potassium chloride 20 mEq Intravenous Q1H PRN Cindra Smart, PADHARMESH potassium chloride 20 mEq Intravenous Q30 Min PRN Gaynelle Rist, PA-C    sodium chloride 20 mL/hr Intravenous Continuous Gaynelle Rist, PA-C    vasopressin (PITRESSIN) in 0 9 % sodium chloride 100 mL 0 14 Units/min Intravenous Continuous Gaynelle Rist, PA-C Last Rate: 0 14 Units/min (10/09/19 1434)       PRN Meds:   acetaminophen    acetaminophen    bisacodyl    fentanyl citrate (PF)    HYDROmorphone    HYDROmorphone    hydrOXYzine HCL    lidocaine (cardiac)    ondansetron    oxyCODONE-acetaminophen    oxyCODONE-acetaminophen    potassium chloride    potassium chloride    potassium chloride    Continuous Infusions:  dexmedetomidine 0 1-0 7 mcg/kg/hr    epinephrine 1-10 mcg/min    insulin regular (HumuLIN R,NovoLIN R) infusion 0 3-21 Units/hr    milrinone (PRIMACOR) infusion 0 13 mcg/kg/min    niCARdipine 2 5-15 mg/hr    norepinephrine 1-30 mcg/min    sodium chloride 20 mL/hr    vasopressin (PITRESSIN) in 0 9 % sodium chloride 100 mL 0 14 Units/min Last Rate: 0 14 Units/min (10/09/19 1434)           LAB RESULTS:      Results from last 7 days   Lab Units 10/09/19  1410 10/09/19  1409 10/09/19  1255 10/09/19  1120 10/09/19  1046 10/09/19  1024 10/09/19  1017 10/09/19  0952 10/09/19  0927  10/09/19  0537 10/08/19  0229 10/06/19  0613 10/05/19  0513 10/04/19  0507 10/03/19  0634   WBC Thousand/uL  --   --   --   --   --   --   --   --   --   --  12 23* 11 05*  --  8 97 9 88 11 52*   HEMOGLOBIN g/dL  --  8 8*  --   --   --   --   --   --   --   --  8 5* 8 2*  --  7 9* 7 1* 7 8*   I STAT HEMOGLOBIN g/dl 8 2*  --  9 2* 8 5* 7 5*  --  6 8* 6 1* 6 8*   < >  --   --   --   --   --   --    HEMATOCRIT %  --  27 2*  --   --   --   --   --   --   --   --  27 7* 26 2*  --  25 5* 22 8* 25 0*   HEMATOCRIT, ISTAT % 24*  --  27* 25* 22*  --  20* 18* 20*   < >  --   --   --   --   --   --    PLATELETS Thousands/uL  --  139*  --   --   --  187  --   --   --   --  271 220  --  225 226 267   NEUTROS PCT %  --   -- --   --   --   --   --   --   --   --  80*  --   --   --  85*  --    LYMPHS PCT %  --   --   --   --   --   --   --   --   --   --  12*  --   --   --  9*  --    MONOS PCT %  --   --   --   --   --   --   --   --   --   --  5  --   --   --  4  --    EOS PCT %  --   --   --   --   --   --   --   --   --   --  1  --   --   --  1  --    POTASSIUM mmol/L  --   --   --   --   --   --   --   --   --   --  4 4 3 4* 3 6 3 8 3 6 4 2   CHLORIDE mmol/L  --   --   --   --   --   --   --   --   --   --  108 109* 108 105 108 110*   CO2 mmol/L  --   --   --   --   --   --   --   --   --   --  24 24 24 27 25 25   CO2, I-STAT mmol/L 23  --  24 26 28  --  31 24 25   < >  --   --   --   --   --   --    BUN mg/dL  --   --   --   --   --   --   --   --   --   --  35* 31* 29* 31* 29* 32*   CREATININE mg/dL  --   --   --   --   --   --   --   --   --   --  1 68* 1 51* 1 47* 1 71* 1 51* 1 57*   CALCIUM mg/dL  --   --   --   --   --   --   --   --   --   --  9 2 8 7 8 8 8 6 8 4 8 5   ALK PHOS U/L  --   --   --   --   --   --   --   --   --   --   --   --   --   --  89  --    ALT U/L  --   --   --   --   --   --   --   --   --   --   --   --   --   --  12  --    AST U/L  --   --   --   --   --   --   --   --   --   --   --   --   --   --  8  --    GLUCOSE, ISTAT mg/dl 103  --  126 189* 193*  --  206* 192* 168*   < >  --   --   --   --   --   --    MAGNESIUM mg/dL  --   --   --   --   --   --   --   --   --   --   --  1 9  --   --   --   --    PHOSPHORUS mg/dL  --   --   --   --   --   --   --   --   --   --   --  3 4  --   --   --   --     < > = values in this interval not displayed  CUTURES:  Lab Results   Component Value Date    BLOODCX No Growth at 72 hrs  10/06/2019    BLOODCX No Growth at 72 hrs  10/06/2019    BLOODCX No Growth After 5 Days  10/03/2019    BLOODCX No Growth After 5 Days  10/03/2019    BLOODCX No Growth After 5 Days  10/01/2019    BLOODCX No Growth After 5 Days   10/01/2019                 Portions of the record may have been created with voice recognition software  Occasional wrong word or "sound a like" substitutions may have occurred due to the inherent limitations of voice recognition software  Read the chart carefully and recognize, using context, where substitutions have occurred  If you have any questions, please contact the dictating provider

## 2019-10-09 NOTE — OP NOTE
OPERATIVE REPORT  PATIENT NAME: Yessenia Orr    :  1980  MRN: [id number]  Pt Location: [facility] OR ROOM 10    SURGERY DATE: 10/9/2019    SURGEON: Killian Ellis MD     ASSISTANT: Estela Velazco PA-C    PREOPERATIVE DIAGNOSIS: Wide open Mitral regurgitation, Mitral valve endocarditis, Congestive heart failure;     POSTOPERATIVE DIAGNOSES: Wide open Mitral regurgitation, Mitral valve endocarditis, Congestive heart failure    NYHA CLASS: 4    CCS CLASS: 4    PROCEDURES: 1  Mitral valve replacement with a 27 mm [facility] Mitral Ease bioprosthetic; 2  Plastic surgery flap coverage/closure of upper sternal soft tissue defect (Dr Irvin Harada)    ANESTHESIA: General endotracheal anesthesia with transesophageal echocardiogram guidance, Dr Arreguin Ground Mitral valve to microbiology    BLOOD PRODUCTS: 2u RBC's     CHEST TUBES: x 4     PACING WIRES: A x1, V x1  ESTIMATED BLOOD LOSS: 200 mL    OPERATIVE TECHNIQUE: The patient was taken to the operating room and placed supine on the operating table  Following the satisfactory induction of general anesthesia and placement of monitoring lines, the patient was prepped and draped in the usual sterile fashion  A time-out procedure was performed  The patient underwent median sternotomy, pericardiotomy and systemic heparinization  The bilateral pleural spaces were opened and large serous effusions were drained consistent with the patient's acute severe CHF due to wide open MR  Epiaortic ultrasound was used to evaluate the ascending aorta, which was found to be free of significant atheromatous disease  The patient underwent aortic and bicaval cannulation, and an antegrade was placed  The patient was initiated on bypass  The ascending aorta was crossclamped  Antegrade [place] cardioplegia was delivered with an excellent arrest   A left ventricular vent was placed through the right superior pulmonary vein  A left atriotomy was performed   A self-retaining retractor was placed, and the mitral valve was exposed  Thorough mitral valve analysis was performed  There was destruction of large portions of both the anterior and posterior leaflets  The annulus was spared  The infected portions of the leaflets and about half of the chordae tendinae required resection  The valve was clearly not repairable  The resected portions of the mitral valve were sent to microbiology  The field was irrigated with half strength betadine and then copiously irrigated with saline  The annulus was sized to a 27 mm [facility] Mitral Ease bioprosthetic  Valve stitches were placed with pledgets on the atrial side  The valve was brought to the field  The sutures were passed through the sewing ring of the valve  The valve was seated and the sutures were tied down  The circumference of the valve was probed to ensure no perivalvular leaks  The valve was tested and found to be competent with no perivalvular leaks  While de-airing the heart, the left atriotomy was closed with running 3-0 Prolene suture  The heart was extensively de-aired and the crossclamp was removed  Atrial and ventricular pacing wires were placed  Following a period of reperfusion, the patient was weaned from cardiopulmonary bypass and decannulated  Protamine was administered with normalization of the ACT  Hemostasis was confirmed in all fields  Thoracostomy tubes were placed  The sternum was closed with stainless steel wires  The superficial layers were not closed at this time due to the need for flap coverage of the upper sternal soft tissue defect by plastic surgery  At this point, Dr Jero Soto took over the flap coverage and superficial closure  That portion of the procedure will be dictated separately  As the attending surgeon, I was present and scrubbed for all critical portions of this procedure  There was no qualified surgical resident available   Sponge, needle and instrument counts were reported as correct by the nursing staff  Final transesophageal echocardiogram demonstrated a well-seated bioprosthetic mitral valve with normal function and no perivalvular leaks  There was normal LV function and improved RV size and function at the completion of the procedure  The assistance of a PA-C was required to complete this case, specifically for assistance with cannulation, decannulation and exposure during mitral valve replacement         Ronnie Foss MD  DATE: October 9, 2019  TIME: 12:04 PM

## 2019-10-09 NOTE — ANESTHESIA PROCEDURE NOTES
Central Line Insertion  Performed by: Miroslava Fletcher MD  Authorized by: Miroslava Fletcher MD   Date/Time: 10/9/2019 8:15 AM  Catheter Type:  triple lumen  Consent: Verbal consent obtained  Written consent obtained  Risks and benefits: risks, benefits and alternatives were discussed  Consent given by: patient  Patient understanding: patient states understanding of the procedure being performed  Patient consent: the patient's understanding of the procedure matches consent given  Required items: required blood products, implants, devices, and special equipment available  Patient identity confirmed: verbally with patient and arm band  Time out: Immediately prior to procedure a "time out" was called to verify the correct patient, procedure, equipment, support staff and site/side marked as required  Indications: vascular access and central pressure monitoring  Location details: right internal jugular  Catheter size: 7 Fr  Patient position: Trendelenburg  Anesthesia method: ga    Assessment: blood return through all ports and free fluid flow  Preparation: Chloraprep  Skin prep agent dried: skin prep agent completely dried prior to procedure  Sterile barriers: all five maximum sterile barriers used - cap, mask, sterile gown, sterile gloves, and large sterile sheet  Hand hygiene: hand hygiene performed prior to central venous catheter insertion  Ultrasound guidance: yes  sterile gel and probe cover used in ultrasound-guided central venous catheter insertionultrasound permanent image saved  Site selection rationale: mvr  Vessel of Catheter Tip End: central venous  Number of attempts: 1  Successful placement: yes  Post-procedure: dressing applied,  chlorhexidine patch applied and line sutured  Patient tolerance: Patient tolerated the procedure well with no immediate complications

## 2019-10-09 NOTE — ANESTHESIA POSTPROCEDURE EVALUATION
Post-Op Assessment Note        Airway: intubated   Post Op Vitals Reviewed: Yes      Staff: Anesthesiologist   Comments: see intar-op quick note for details of ICU handoff          BP      Temp      Pulse    Resp      SpO2

## 2019-10-09 NOTE — RESPIRATORY THERAPY NOTE
RT Protocol Note  Ramsey Lopez 44 y o  male MRN: [id number]  Unit/Bed#: [facility] 416-01 Encounter: [id number]    Assessment    Principal Problem:    Endocarditis  Active Problems:    History of intravenous drug abuse ([facility] )    Acute kidney injury ([facility] )    Insomnia    Tachycardia    Nonrheumatic mitral valve regurgitation    Bacteremia    Anxiety    Solitary left kidney    Right parietal lobe lesion    Bilateral pleural effusion    Anemia      Home Pulmonary Medications:  None         Past Medical History:   Diagnosis Date    Anxiety 10/1/2019    Bacteremia 10/1/2019    Endocarditis 10/1/2019    Hepatitis C     History of intravenous drug abuse ([facility] ) 10/1/2019    Nonrheumatic mitral valve regurgitation 10/1/2019     Social History     Socioeconomic History    Marital status: Single     Spouse name: None    Number of children: None    Years of education: None    Highest education level: None   Occupational History    None   Social Needs    Financial resource strain: None    Food insecurity:     Worry: None     Inability: None    Transportation needs:     Medical: None     Non-medical: None   Tobacco Use    Smoking status: Never Smoker    Smokeless tobacco: Never Used   Substance and Sexual Activity    Alcohol use: Never     Frequency: Never     Binge frequency: Never    Drug use:  Yes    Sexual activity: Yes     Partners: Female   Lifestyle    Physical activity:     Days per week: None     Minutes per session: None    Stress: None   Relationships    Social connections:     Talks on phone: None     Gets together: None     Attends Restorationist service: None     Active member of club or organization: None     Attends meetings of clubs or organizations: None     Relationship status: None    Intimate partner violence:     Fear of current or ex partner: None     Emotionally abused: None     Physically abused: None     Forced sexual activity: None   Other Topics Concern    None   Social History Narrative    None       Subjective         Objective    Physical Exam:   Assessment Type: (P) Assess only  General Appearance: (P) Sedated  Respiratory Pattern: (P) Normal  Chest Assessment: (P) Chest expansion symmetrical  Bilateral Breath Sounds: (P) Diminished    Vitals:  Blood pressure 95/62, pulse (!) 119, temperature 98 7 °F (37 1 °C), temperature source Oral, resp  rate 18, height 5' 3" (1 6 m), weight 74 5 kg (164 lb 3 9 oz), SpO2 98 %  Imaging and other studies: I have personally reviewed pertinent reports  Plan    Respiratory Plan: (P) Vent/NIV/HFNC  Airway Clearance Plan: Incentive Spirometer     Resp Comments: (P) pt received from OR at this time and placed on [id number] at current settings  pt placed on respiratory protocol for ventilator management  I agree with previous respiratory protocol, pt does not require udn  will continue to monitor per respiratory protocol  will continue with IS post extubation

## 2019-10-09 NOTE — CONSULTS
Consult - Critical Care    Darrell Bangura 44 y o  male MRN: [id number]  Unit/Bed#: [facility] 416-01 Encounter: [id number]      Physician Requesting Consult: Dori Baptiste MD    Reason for Consult / Principal Problem: Endocarditis    HPI: Darrell Bangura is a 44year old male with PMH of IVDA, hep C, and MV endocarditis/MSSA bacteremia treated 3/2019  He completed 6 week course of Ancef  BILLY was done 6/2019 which showed echodensity on posterior aspect of MV and severe MR  He had been following with outpatient cardiology at that time through [facility]  On 9/6 he presented to [facility] with c/o insomnia and malaise, he admitted to Blood cultures obtained on 9/6, 9/8, 9/10, 9/11 all grew pseudomonas, for which he was started on double coverage with ceftazidime/levofloxacin  Blood cultures were negative on 9/13 and 9/14  He was transferred to [facility] 10/1 for CT surgery evaluation  He underwent pre-op testing and he was recommended to proceed with MV repair vs replacement under the condition that he stop abusing IV drugs  Patient was agreeable to this and he proceeded with surgery  He presents to the ICU today s/p MV replacement with 27mm tissue valve and plastic surgery flap coverage/closure of upper sternal soft tissue defect  He received 2 units PRBC intra-operatively  He also received Methylene Blue for profound vasoplegia/hypotension  Post procedure BILLY demonstrated EF 60% and moderately reduced RV function  He arrived on Levo 10mcg, Epi 2mcg, Vaso 0 14 units, and Milrinone 0 13mcg  Cardiopulmonary bypass: 81 min  Crossclamp time: 55 min     PMH: IVDA, Hep C, Anxiety, CKD/congenital solitary left kidney, left clavicular head debridements     History obtained from chart review due to patient being intubated and sedated       PMH:   Past Medical History:   Diagnosis Date    Anxiety 10/1/2019    Bacteremia 10/1/2019    Endocarditis 10/1/2019    Hepatitis C     [facility] of intravenous [facility] abuse ([facility] ) 10/1/2019    Nonrheumatic mitral valve regurgitation 10/1/2019       PSH:   Past Surgical History:   Procedure Laterality Date    CARDIAC CATHETERIZATION      CHEST WALL TUMOR EXCISION  2013    Anterior chest wall cyst removed    IR THORACENTESIS  10/2/2019       Family History:   Family History   Problem Relation Age of Onset    Heart disease Maternal Grandmother        Social History:   Social History     Tobacco Use   Smoking Status Never Smoker   Smokeless Tobacco Never Used      Social History     Substance and Sexual Activity   Alcohol Use Never    Frequency: Never    Binge frequency: Never      Marital Status: Single    ROS: ROS unable to be obtained secondary to intubation and sedation       Allergies: No Known Allergies    Home Medications:   Prior to Admission medications    Not on File       Inpatient Medications:  Scheduled Meds:    Current Facility-Administered Medications:  acetaminophen 650 mg Rectal Q4H PRN Agata Maple, PA-C    acetaminophen 650 mg Oral Q4H PRN Agata Maple, PA-C    amiodarone 200 mg Oral Q8H [address] Agata Maple, PA-C    aspirin 325 mg Oral Daily Agata Maple, PA-C    atorvastatin 80 mg Oral Daily With Dinner Agata Maple, PA-C    bisacodyl 10 mg Rectal Daily PRN Agata Maple, PA-C    calcium chloride 1 g Intravenous Once Agata Maple, PA-C    cefTAZidime 2,000 mg Intravenous Q8H Agata Maple, PA-C Last Rate: 2,000 mg (10/09/19 0308)   chlorhexidine 15 mL Swish & Spit BID Agata Maple, PA-C    dexmedetomidine 0 1-0 7 mcg/kg/hr Intravenous Titrated Aleida Spironello V, CRNP    epinephrine 1-10 mcg/min Intravenous Titrated Agata Maple, PA-C    escitalopram 10 mg Oral Daily Agata Maple, PA-C    fentanyl citrate (PF) 50 mcg Intravenous Once Agata Maple, PA-C    fentanyl citrate (PF) 50 mcg Intravenous Q1H PRN Agata Maple, PA-C    [START ON 10/10/2019] fondaparinux 2 5 mg Subcutaneous Daily Agata Maple, PA-C    HYDROmorphone 0 5 mg Intravenous [facility]H [facility], [facility]    HYDROmorphone 1 mg Intravenous Q1H [facility]N [facility], [facility]-C    hydrOXYzine HCL 25 mg Oral Q8H PRN [facility], PA-C    insulin regular (HumuLIN R,NovoLIN R) infusion 0 3-21 Units/hr Intravenous Titrated [facility], [facility]    levofloxacin 750 mg Oral Q24H [facility], PA-C    lidocaine (cardiac) 100 mg Intravenous Q30 Min [facility]N [facility], PA-C    magnesium sulfate 2 g Intravenous [facility], [facility]    milrinone (PRIMACOR) infusion 0 13 mcg/kg/min Intravenous Continuous [facility], PA-C    mupirocin 1 application Nasal S27I [address] Ana Lilia Marce Sommers, PA-C    niCARdipine 2 5-15 mg/hr Intravenous Titrated [facility], PA-C    norepinephrine 1-30 mcg/min Intravenous Titrated [facility], PA-C    ondansetron 4 mg Intravenous Q6H PRN [facility], PA-C    oxyCODONE-acetaminophen 1 tablet Oral Q4H PRN [facility], PA-C    oxyCODONE-acetaminophen 2 tablet Oral Q6H PRN [facility], PA-C    pantoprazole 40 mg Oral Daily [facility], PA-C    polyethylene glycol 17 g Oral Daily [facility], PA-C    potassium chloride 20 mEq Intravenous Once PRN [facility], PA-C    potassium chloride 20 mEq Intravenous Q1H [facility]N [facility], PA-[facility]    potassium chloride 20 mEq Intravenous Q30 Min PRN [facility], PA-C    sodium chloride 20 mL/hr Intravenous Continuous [facility], [facility]    vasopressin (PITRESSIN) in 0 9 % sodium chloride 100 mL 0 14 Units/min Intravenous Continuous [facility], RALPH Last Rate: 0 14 Units/min (10/09/19 6304)     Continuous Infusions:    dexmedetomidine 0 1-0 7 mcg/kg/hr    epinephrine 1-10 mcg/min    insulin regular (HumuLIN R,NovoLIN R) infusion 0 3-21 Units/hr    milrinone (PRIMACOR) infusion 0 13 mcg/kg/min    niCARdipine 2 5-15 mg/hr    norepinephrine 1-30 mcg/min    sodium chloride 20 mL/hr    vasopressin (PITRESSIN) in 0 9 % sodium chloride 100 mL 0 14 Units/min Last Rate: 0 14 Units/min (10/09/19 1434)     PRN Meds:    acetaminophen 650 mg Q4H PRN   acetaminophen 650 mg Q4H PRN   bisacodyl 10 mg Daily PRN   fentanyl citrate (PF) 50 mcg Q1H PRN   HYDROmorphone 0 5 mg Q1H PRN   HYDROmorphone 1 mg Q1H PRN   hydrOXYzine HCL 25 mg Q8H PRN   lidocaine (cardiac) 100 mg Q30 Min PRN   ondansetron 4 mg Q6H PRN   oxyCODONE-acetaminophen 1 tablet Q4H PRN   oxyCODONE-acetaminophen 2 tablet Q6H PRN   potassium chloride 20 mEq Once PRN   potassium chloride 20 mEq Q1H PRN   potassium chloride 20 mEq Q30 Min PRN       VTE Pharmacologic Prophylaxis: Heparin  VTE Mechanical Prophylaxis: sequential compression device    Invasive lines and devices: Invasive Devices     Central Venous Catheter Line            CVC Central Lines 10/09/19 Triple less than 1 day    Introducer 10/09/19 less than 1 day          Peripheral Intravenous Line            Peripheral IV 10/08/19 Proximal;Right;Ventral (anterior) Forearm 1 day          Arterial Line            Arterial Line 10/09/19 Right Brachial less than 1 day          Line            Pacer Wires less than 1 day    Pacer Wires less than 1 day          Drain            Chest Tube 1 Left Pleural 28 Fr  less than 1 day    Chest Tube 2 Anterior Mediastinal 32 Fr  less than 1 day    Chest Tube 3 Posterior Mediastinal 32 Fr  less than 1 day    Chest Tube 4 Right Pleural 28 Fr  less than 1 day    Closed/Suction Drain Left Chest Bulb 15 Fr  less than 1 day    Closed/Suction Drain Right Chest Bulb 15 Fr  less than 1 day    Negative Pressure Wound Therapy (V A C ) Chest Medial less than 1 day    Urethral Catheter Non-latex; Temperature probe 16 Fr  less than 1 day          Airway            ETT  Hi-Lo; Oral;Cuffed 8 5 mm less than 1 day                Vitals:   Vitals:    10/09/19 1410 10/09/19 1415 10/09/19 1430 10/09/19 1445   BP:       BP Location:       Pulse: 94 94 94 94   Resp: (!) 23 (!) 23 (!) 23 (!) 23   Temp: 98 6 °F (37 °C) 98 6 °F (37 °C)     TempSrc: Probe Probe     SpO2: 99% 100% 100% 100%   Weight:       Height:           Arterial Line:  Arterial Line BP: 110/60  Arterial Line MAP (mmHg): 76 mmHg    Temperature: Temp (24hrs), Av 4 °F (36 9 °C), Min:98 °F (36 7 °C), Max:98 7 °F (37 1 °C)  Current: Temperature: 98 6 °F (37 °C)    Weights: IBW: 56 9 kg  Body mass index is 29 09 kg/m²  Hemodynamic Monitoring:  PAP: PAP: 46/24, CVP: CVP (mean): 17 mmHg, CO: CO (L/min): 4 6 L/min, CI: CI (L/min/m2): 2 5 L/min/m2, SVR: SVR (dyne*sec)/cm5: 886 (dyne*sec)/cm5    Ventilator Settings:   Vent Mode: AC/VC  Resp Rate (BPM): 20 BPM  Vt (mL): 400 mL  FIO2 (%): 50 %  PEEP (cmH2O): 5 cmH2O  SpO2: 100 %    Physical Exam:    Constitutional: well developed  HENT: Intubated  Neck: supple  +CVC  Cardiovascular: regular rate and rhythm  + murmur heard  Exam reveals + pericardial friction rub  Wound vac in place  Pulmonary/Chest: Breath sounds course crackles to auscultation bilaterally  Abdominal: bowel sounds present  no distension  Musculoskeletal: 1+ edema  Neurological: Unable to assess due to sedation  Skin: Skin is warm, dry, intact  Psychiatric: Unable to assess due to sedation  Labs/Imaging:   Results from last 7 days   Lab Units 10/09/19  1410 10/09/19  1409 10/09/19  1255  10/09/19  1024  10/09/19  0537 10/08/19  0229 10/05/19  0513 10/04/19  0507   WBC Thousand/uL  --   --   --   --   --   --  12 23* 11 05* 8 97 9 88   HEMOGLOBIN g/dL  --  8 8*  --   --   --   --  8 5* 8 2* 7 9* 7 1*   I STAT HEMOGLOBIN g/dl 8 2*  --  9 2*   < >  --    < >  --   --   --   --    HEMATOCRIT %  --  27 2*  --   --   --   --  27 7* 26 2* 25 5* 22 8*   HEMATOCRIT, ISTAT % 24*  --  27*   < >  --    < >  --   --   --   --    PLATELETS Thousands/uL  --  139*  --   --  187  --  271 220 225 226   NEUTROS PCT %  --   --   --   --   --   --  80*  --   --  85*   MONOS PCT %  --   --   --   --   --   --  5  --   --  4    < > = values in this interval not displayed       Results from last 7 days   Lab Units 10/09/19  1410 10/09/19  1409 10/09/19  1255 10/09/19  1120  10/09/19  0537 10/08/19  0229  10/04/19  0507   SODIUM mmol/L  --  148*  --   --   --  140 142   < > 141   POTASSIUM mmol/L  --  4 0  --   --   --  4 4 3 4*   < > 3 6   CHLORIDE mmol/L  --  115*  --   --   --  108 109*   < > 108   CO2 mmol/L  --  21  --   --   --  24 24   < > 25   CO2, I-STAT mmol/L 23  --  24 26   < >  --   --   --   --    BUN mg/dL  --  33*  --   --   --  35* 31*   < > 29*   CREATININE mg/dL  --  1 69*  --   --   --  1 68* 1 51*   < > 1 51*   CALCIUM mg/dL  --  7 8*  --   --   --  9 2 8 7   < > 8 4   ALK PHOS U/L  --   --   --   --   --   --   --   --  89   ALT U/L  --   --   --   --   --   --   --   --  12   AST U/L  --   --   --   --   --   --   --   --  8   GLUCOSE, ISTAT mg/dl 103  --  126 189*   < >  --   --   --   --     < > = values in this interval not displayed  Results from last 7 days   Lab Units 10/08/19  022   MAGNESIUM mg/dL 1 9           Post-op AB 36/38 4/228/21 8/-3/100%  Post-op CXR: lines and tubes stable post-op, no pneumo  Mild pulmonary vascular congestion  I have personally reviewed pertinent films in PACS  Post-op EKG: NSR-short CT, rate 97  This was personally reviewed by myself  Impression:  Principal Problem:    Endocarditis  Active Problems:    Severe mitral regurgitation    Bacteremia due to Pseudomonas    S/P MVR (mitral valve replacement)    History of intravenous drug abuse ([facility] )    Acute kidney injury ([facility] )    Acute postoperative pulmonary insufficiency (HCC)    Bilateral pleural effusion    Right parietal lobe lesion    Acute blood loss as cause of postoperative anemia    Solitary left kidney    Anxiety    Insomnia    Hypotension      Plan:    Neuro: Start precedex for continuous sedation  PRN dilaudid and percocet for pain  Trend neuro exam   Delirium precautions  CV: MAP goal >65  SBP goal <140  CI>2 2   Post-op medications: Epinephrine, 2 mcg/min, Primacor, 0 13 mcg/kg/min, Levophed, 10 mcg/min, Vasopressin, 0 14 Units/min  Wean vasopressors/inotropes as able  Volume resuscitation as needed  Monitor rhythm on telemetry  Epicardial pacing wires in place  Will pace as needed for bradycardia or heart block  Intra-op BILLY LVEF 60%  RV function moderately impaired  Lung: Check STAT post-op ABG and CXR  Wean vent with spontaneous breathing trial with goal to extubate today  GI: GI prophylaxis with PPI  Bowel regimen  Zofran PRN for nausea  FEN: NPO  Replete K >4 0, Mag >2 0 and calcium >7 0  : Check STAT post-op BMP  Santiago in place  Monitor UOP with goal >0 5cc/kg/hour  Lasix prn  Volume resuscitate as needed  ID: Prophylactic post-op abx  Maintain normothermia  Tylenol PRN for fevers  Heme: Check STAT post-op H/H and platelets  Monitor incision site, invasive lines, and chest tube outputs for bleeding  Send coag panel if needed  Endo: Insulin gtt for blood sugar <180  Disposition: ICU Care    Counseling / Coordination of Care  Total Critical Care time spent 45 minutes excluding procedures, teaching and family updates        SIGNATURE: JULIÁN Sy  DATE: October 9, 2019  TIME: 2:58 PM

## 2019-10-09 NOTE — ANESTHESIA PROCEDURE NOTES
Procedure Performed: BILLY Anesthesia  Start Time:  10/9/2019 8:35 AM        Preanesthesia Checklist    Patient identified, IV assessed, risks and benefits discussed, monitors and equipment assessed, procedure being performed at surgeon's request and anesthesia consent obtained  Procedure    Diagnostic Indications for IBLLY:  defect repair evaluation and hemodynamic monitoring  Type of BILLY: complete BILLY with interpretation  Images Saved: ultrasound permanent image saved  Physician Requesting Echo: Shawna Silvestre MD  Location performed: OR  Intubated  Bite block not placed  Heart visualized  Insertion of BILLY Probe:  Atraumatic  Probe Type:  Multiplane  Modalities:  3D, pulse wave Doppler, color flow mapping and continuous wave Doppler  Echocardiographic and Doppler Measurements    PREPROCEDURE    LEFT VENTRICLE:  Systolic Function: normal  Ejection Fraction: 60%  RIGHT VENTRICLE:  Systolic Function: moderately impaired  Cavity size mildly dilated  AORTIC VALVE:  Leaflets: normal and trileaflet  Stenosis: none and by cfd  Regurgitation: trace  MITRAL VALVE:  Leaflets: vegetation and perforated  Regurgitation: severe and torrential  Stenosis: none  TRICUSPID VALVE:  Leaflets: dilate annulus  Regurgitation: moderate and mild-mod central TR               ASCENDING AORTA:  Dissection not present  AORTIC ARCH:  dissection not present  DESCENDING AORTA:  Dissection not present  Grade 1: normal to mild intimal thickening  LEFT ATRIAL APPENDAGE:  No spontaneous echo contrast     OTHER ATRIAL FINDINGS:  No GUY clot  POSTPROCEDURE    LEFT VENTRICLE: Unchanged   RIGHT VENTRICLE: Unchanged   AORTIC VALVE: Unchanged   MITRAL VALVE:   Leaflets: bioprosthetic  Regurgitation: trace  Mean Gradient: 5     TRICUSPID VALVE: Unchanged   AORTA: Unchanged   Dissection: Dissection not present            ECHOCARDIOGRAM COMMENTS:  Pre-procedure:  LV: normal LV systolic function, EF 60  RV: mildly dilated, mild-moderately reduced systolic function; "D"-shape in systole, suggesting significant RV pressure overload  AV: trileaflet, no AS, trace AI  MV: Significant vegetative lesions present with torrential MR (essentailly wide open valve); lateral aspect of anterior leaflet with heaviest burden of disease, but both leaflets are involved; clear perforation in middle part of anterior leaflet; systolic flow reversal in LUPV and RUPV  TV: mild-mod TR, likely due to annular dilation  Atria: no GUY clot identified; no PFO by CFD  Aorta: no dissection, grade 1 descending aorta and aortic arch; large B/L pleural effusions    Post-procedure  Essentially unchanged biventricular systolic function (now RV no longer shows D-shape in systolic)  Bioprosthetic valve present in the mitral position, well-seated, does not rock, no PVL, trace central MR, mean gradient 5 mmHg  Other valves essentially unchanged, except for decrease in TR (difficult to fully interrogate given shadowing artifact caused by mitral bioprosthetic), likely due to reduced RV volume and pressure overload  No aortic disssection   Pleural effusions drained, trace on left, unable to see any on right

## 2019-10-10 ENCOUNTER — APPOINTMENT (INPATIENT)
Dept: RADIOLOGY | Facility: HOSPITAL | Age: 39
DRG: 162 | End: 2019-10-10
Payer: COMMERCIAL

## 2019-10-10 PROBLEM — D69.6 THROMBOCYTOPENIA (HCC): Status: ACTIVE | Noted: 2019-10-10

## 2019-10-10 LAB
ANION GAP SERPL CALCULATED.3IONS-SCNC: 7 MMOL/L (ref 4–13)
ANION GAP SERPL CALCULATED.3IONS-SCNC: 9 MMOL/L (ref 4–13)
ATRIAL RATE: 120 BPM
BASE EXCESS BLDA CALC-SCNC: -2.6 MMOL/L
BASE EXCESS BLDA CALC-SCNC: -4.7 MMOL/L
BASOPHILS # BLD AUTO: 0.03 THOUSANDS/ΜL (ref 0–0.1)
BASOPHILS NFR BLD AUTO: 0 % (ref 0–1)
BUN SERPL-MCNC: 38 MG/DL (ref 5–25)
BUN SERPL-MCNC: 43 MG/DL (ref 5–25)
CALCIUM SERPL-MCNC: 8.3 MG/DL (ref 8.3–10.1)
CALCIUM SERPL-MCNC: 8.5 MG/DL (ref 8.3–10.1)
CHLORIDE SERPL-SCNC: 108 MMOL/L (ref 100–108)
CHLORIDE SERPL-SCNC: 115 MMOL/L (ref 100–108)
CO2 SERPL-SCNC: 21 MMOL/L (ref 21–32)
CO2 SERPL-SCNC: 23 MMOL/L (ref 21–32)
CREAT SERPL-MCNC: 1.81 MG/DL (ref 0.6–1.3)
CREAT SERPL-MCNC: 2.03 MG/DL (ref 0.6–1.3)
EOSINOPHIL # BLD AUTO: 0.01 THOUSAND/ΜL (ref 0–0.61)
EOSINOPHIL NFR BLD AUTO: 0 % (ref 0–6)
ERYTHROCYTE [DISTWIDTH] IN BLOOD BY AUTOMATED COUNT: 18.4 % (ref 11.6–15.1)
GFR SERPL CREATININE-BSD FRML MDRD: 40 ML/MIN/1.73SQ M
GFR SERPL CREATININE-BSD FRML MDRD: 46 ML/MIN/1.73SQ M
GLUCOSE SERPL-MCNC: 116 MG/DL (ref 65–140)
GLUCOSE SERPL-MCNC: 118 MG/DL (ref 65–140)
GLUCOSE SERPL-MCNC: 122 MG/DL (ref 65–140)
GLUCOSE SERPL-MCNC: 123 MG/DL (ref 65–140)
GLUCOSE SERPL-MCNC: 127 MG/DL (ref 65–140)
GLUCOSE SERPL-MCNC: 128 MG/DL (ref 65–140)
GLUCOSE SERPL-MCNC: 141 MG/DL (ref 65–140)
GLUCOSE SERPL-MCNC: 166 MG/DL (ref 65–140)
GLUCOSE SERPL-MCNC: 171 MG/DL (ref 65–140)
HCO3 BLDA-SCNC: 19.8 MMOL/L (ref 22–28)
HCO3 BLDA-SCNC: 22.2 MMOL/L (ref 22–28)
HCT VFR BLD AUTO: 30.3 % (ref 36.5–49.3)
HGB BLD-MCNC: 9.5 G/DL (ref 12–17)
HOROWITZ INDEX BLDA+IHG-RTO: 35 MM[HG]
IMM GRANULOCYTES # BLD AUTO: 0.1 THOUSAND/UL (ref 0–0.2)
IMM GRANULOCYTES NFR BLD AUTO: 1 % (ref 0–2)
INR PPP: 1.46 (ref 0.84–1.19)
LYMPHOCYTES # BLD AUTO: 0.77 THOUSANDS/ΜL (ref 0.6–4.47)
LYMPHOCYTES NFR BLD AUTO: 6 % (ref 14–44)
MAGNESIUM SERPL-MCNC: 2.6 MG/DL (ref 1.6–2.6)
MCH RBC QN AUTO: 28.8 PG (ref 26.8–34.3)
MCHC RBC AUTO-ENTMCNC: 31.4 G/DL (ref 31.4–37.4)
MCV RBC AUTO: 92 FL (ref 82–98)
MONOCYTES # BLD AUTO: 0.78 THOUSAND/ΜL (ref 0.17–1.22)
MONOCYTES NFR BLD AUTO: 6 % (ref 4–12)
NEUTROPHILS # BLD AUTO: 11.28 THOUSANDS/ΜL (ref 1.85–7.62)
NEUTS SEG NFR BLD AUTO: 87 % (ref 43–75)
NRBC BLD AUTO-RTO: 0 /100 WBCS
O2 CT BLDA-SCNC: 14.7 ML/DL (ref 16–23)
O2 CT BLDA-SCNC: 15.1 ML/DL (ref 16–23)
OXYHGB MFR BLDA: 97.8 % (ref 94–97)
OXYHGB MFR BLDA: 97.8 % (ref 94–97)
PCO2 BLDA: 34.2 MM HG (ref 36–44)
PCO2 BLDA: 38.2 MM HG (ref 36–44)
PEEP RESPIRATORY: 5 CM[H2O]
PH BLDA: 7.38 [PH] (ref 7.35–7.45)
PH BLDA: 7.38 [PH] (ref 7.35–7.45)
PLATELET # BLD AUTO: 140 THOUSANDS/UL (ref 149–390)
PMV BLD AUTO: 10.1 FL (ref 8.9–12.7)
PO2 BLDA: 142.3 MM HG (ref 75–129)
PO2 BLDA: 144 MM HG (ref 75–129)
POTASSIUM SERPL-SCNC: 4.7 MMOL/L (ref 3.5–5.3)
POTASSIUM SERPL-SCNC: 5.1 MMOL/L (ref 3.5–5.3)
PROTHROMBIN TIME: 17.3 SECONDS (ref 11.6–14.5)
PS VENT FIO2: 35
PS VENT PEEP: 5
QRS AXIS: 114 DEGREES
QRSD INTERVAL: 83 MS
QT INTERVAL: 371 MS
QTC INTERVAL: 525 MS
RBC # BLD AUTO: 3.3 MILLION/UL (ref 3.88–5.62)
SODIUM SERPL-SCNC: 140 MMOL/L (ref 136–145)
SODIUM SERPL-SCNC: 143 MMOL/L (ref 136–145)
SPECIMEN SOURCE: ABNORMAL
SPECIMEN SOURCE: ABNORMAL
T WAVE AXIS: 26 DEGREES
VENT - PS: ABNORMAL
VENT AC: 20
VENT- AC: AC
VENTRICULAR RATE: 120 BPM
VT SETTING VENT: 400 ML
WBC # BLD AUTO: 12.97 THOUSAND/UL (ref 4.31–10.16)

## 2019-10-10 PROCEDURE — 94003 VENT MGMT INPAT SUBQ DAY: CPT

## 2019-10-10 PROCEDURE — 83735 ASSAY OF MAGNESIUM: CPT | Performed by: PHYSICIAN ASSISTANT

## 2019-10-10 PROCEDURE — 80048 BASIC METABOLIC PNL TOTAL CA: CPT | Performed by: INTERNAL MEDICINE

## 2019-10-10 PROCEDURE — 99233 SBSQ HOSP IP/OBS HIGH 50: CPT | Performed by: INTERNAL MEDICINE

## 2019-10-10 PROCEDURE — 82805 BLOOD GASES W/O2 SATURATION: CPT | Performed by: PHYSICIAN ASSISTANT

## 2019-10-10 PROCEDURE — 93005 ELECTROCARDIOGRAM TRACING: CPT

## 2019-10-10 PROCEDURE — 99024 POSTOP FOLLOW-UP VISIT: CPT | Performed by: THORACIC SURGERY (CARDIOTHORACIC VASCULAR SURGERY)

## 2019-10-10 PROCEDURE — 93010 ELECTROCARDIOGRAM REPORT: CPT | Performed by: INTERNAL MEDICINE

## 2019-10-10 PROCEDURE — 71045 X-RAY EXAM CHEST 1 VIEW: CPT

## 2019-10-10 PROCEDURE — 94760 N-INVAS EAR/PLS OXIMETRY 1: CPT

## 2019-10-10 PROCEDURE — 85025 COMPLETE CBC W/AUTO DIFF WBC: CPT | Performed by: NURSE PRACTITIONER

## 2019-10-10 PROCEDURE — 80048 BASIC METABOLIC PNL TOTAL CA: CPT | Performed by: NURSE PRACTITIONER

## 2019-10-10 PROCEDURE — 99232 SBSQ HOSP IP/OBS MODERATE 35: CPT | Performed by: INTERNAL MEDICINE

## 2019-10-10 PROCEDURE — 85610 PROTHROMBIN TIME: CPT | Performed by: NURSE PRACTITIONER

## 2019-10-10 PROCEDURE — 99233 SBSQ HOSP IP/OBS HIGH 50: CPT | Performed by: EMERGENCY MEDICINE

## 2019-10-10 PROCEDURE — 82948 REAGENT STRIP/BLOOD GLUCOSE: CPT

## 2019-10-10 RX ORDER — FUROSEMIDE 10 MG/ML
40 INJECTION INTRAMUSCULAR; INTRAVENOUS ONCE
Status: COMPLETED | OUTPATIENT
Start: 2019-10-10 | End: 2019-10-10

## 2019-10-10 RX ORDER — ALBUMIN, HUMAN INJ 5% 5 %
12.5 SOLUTION INTRAVENOUS ONCE
Status: COMPLETED | OUTPATIENT
Start: 2019-10-10 | End: 2019-10-10

## 2019-10-10 RX ORDER — HYDROMORPHONE HCL/PF 1 MG/ML
1 SYRINGE (ML) INJECTION
Status: DISCONTINUED | OUTPATIENT
Start: 2019-10-10 | End: 2019-10-11

## 2019-10-10 RX ORDER — HYDROMORPHONE HCL/PF 1 MG/ML
0.5 SYRINGE (ML) INJECTION
Status: DISCONTINUED | OUTPATIENT
Start: 2019-10-10 | End: 2019-10-11

## 2019-10-10 RX ORDER — ALBUMIN, HUMAN INJ 5% 5 %
SOLUTION INTRAVENOUS
Status: COMPLETED
Start: 2019-10-10 | End: 2019-10-10

## 2019-10-10 RX ORDER — LEVOFLOXACIN 750 MG/1
750 TABLET ORAL
Status: DISCONTINUED | OUTPATIENT
Start: 2019-10-12 | End: 2019-10-12

## 2019-10-10 RX ADMIN — ATORVASTATIN CALCIUM 80 MG: 80 TABLET, FILM COATED ORAL at 16:52

## 2019-10-10 RX ADMIN — AMIODARONE HYDROCHLORIDE 200 MG: 200 TABLET ORAL at 22:31

## 2019-10-10 RX ADMIN — Medication 1 APPLICATION: at 22:31

## 2019-10-10 RX ADMIN — CEFTAZIDIME 2000 MG: 1 INJECTION, POWDER, FOR SOLUTION INTRAMUSCULAR; INTRAVENOUS at 18:41

## 2019-10-10 RX ADMIN — CEFTAZIDIME 2000 MG: 1 INJECTION, POWDER, FOR SOLUTION INTRAMUSCULAR; INTRAVENOUS at 12:43

## 2019-10-10 RX ADMIN — LEVOFLOXACIN 750 MG: 750 TABLET, FILM COATED ORAL at 04:14

## 2019-10-10 RX ADMIN — CHLORHEXIDINE GLUCONATE 0.12% ORAL RINSE 15 ML: 1.2 LIQUID ORAL at 09:38

## 2019-10-10 RX ADMIN — MILRINONE LACTATE IN DEXTROSE 0.25 MCG/KG/MIN: 200 INJECTION, SOLUTION INTRAVENOUS at 03:36

## 2019-10-10 RX ADMIN — FENTANYL CITRATE 25 MCG: 50 INJECTION INTRAMUSCULAR; INTRAVENOUS at 09:25

## 2019-10-10 RX ADMIN — Medication 1 APPLICATION: at 09:39

## 2019-10-10 RX ADMIN — INSULIN LISPRO 1 UNITS: 100 INJECTION, SOLUTION INTRAVENOUS; SUBCUTANEOUS at 22:32

## 2019-10-10 RX ADMIN — ESCITALOPRAM OXALATE 10 MG: 10 TABLET ORAL at 09:59

## 2019-10-10 RX ADMIN — HYDROMORPHONE HYDROCHLORIDE 0.5 MG: 1 INJECTION, SOLUTION INTRAMUSCULAR; INTRAVENOUS; SUBCUTANEOUS at 22:31

## 2019-10-10 RX ADMIN — AMIODARONE HYDROCHLORIDE 200 MG: 200 TABLET ORAL at 14:49

## 2019-10-10 RX ADMIN — FUROSEMIDE 40 MG: 10 INJECTION, SOLUTION INTRAMUSCULAR; INTRAVENOUS at 23:34

## 2019-10-10 RX ADMIN — NOREPINEPHRINE BITARTRATE 3 MCG/MIN: 1 INJECTION INTRAVENOUS at 04:07

## 2019-10-10 RX ADMIN — FONDAPARINUX SODIUM 2.5 MG: 2.5 INJECTION, SOLUTION SUBCUTANEOUS at 09:38

## 2019-10-10 RX ADMIN — Medication 50 MEQ: at 08:53

## 2019-10-10 RX ADMIN — FUROSEMIDE 40 MG: 10 INJECTION, SOLUTION INTRAMUSCULAR; INTRAVENOUS at 14:57

## 2019-10-10 RX ADMIN — METOPROLOL TARTRATE 25 MG: 25 TABLET, FILM COATED ORAL at 16:52

## 2019-10-10 RX ADMIN — OXYCODONE HYDROCHLORIDE AND ACETAMINOPHEN 2 TABLET: 5; 325 TABLET ORAL at 20:24

## 2019-10-10 RX ADMIN — AMIODARONE HYDROCHLORIDE 200 MG: 200 TABLET ORAL at 05:00

## 2019-10-10 RX ADMIN — OXYCODONE HYDROCHLORIDE AND ACETAMINOPHEN 2 TABLET: 5; 325 TABLET ORAL at 05:17

## 2019-10-10 RX ADMIN — OXYCODONE HYDROCHLORIDE AND ACETAMINOPHEN 2 TABLET: 5; 325 TABLET ORAL at 14:49

## 2019-10-10 RX ADMIN — CEFTAZIDIME 2000 MG: 1 INJECTION, POWDER, FOR SOLUTION INTRAMUSCULAR; INTRAVENOUS at 04:13

## 2019-10-10 RX ADMIN — FUROSEMIDE 40 MG: 10 INJECTION, SOLUTION INTRAMUSCULAR; INTRAVENOUS at 05:17

## 2019-10-10 RX ADMIN — INSULIN LISPRO 1 UNITS: 100 INJECTION, SOLUTION INTRAVENOUS; SUBCUTANEOUS at 17:33

## 2019-10-10 RX ADMIN — CHLORHEXIDINE GLUCONATE 0.12% ORAL RINSE 15 ML: 1.2 LIQUID ORAL at 17:33

## 2019-10-10 RX ADMIN — HYDROMORPHONE HYDROCHLORIDE 0.5 MG: 1 INJECTION, SOLUTION INTRAMUSCULAR; INTRAVENOUS; SUBCUTANEOUS at 17:34

## 2019-10-10 RX ADMIN — ALBUMIN, HUMAN INJ 5% 12.5 G: 5 SOLUTION at 02:27

## 2019-10-10 RX ADMIN — ASPIRIN 325 MG ORAL TABLET 325 MG: 325 PILL ORAL at 09:39

## 2019-10-10 RX ADMIN — PANTOPRAZOLE SODIUM 40 MG: 40 TABLET, DELAYED RELEASE ORAL at 09:38

## 2019-10-10 RX ADMIN — POLYETHYLENE GLYCOL 3350 17 G: 17 POWDER, FOR SOLUTION ORAL at 09:38

## 2019-10-10 RX ADMIN — ALBUMIN (HUMAN) 12.5 G: 12.5 SOLUTION INTRAVENOUS at 02:27

## 2019-10-10 NOTE — PROGRESS NOTES
Progress Note - Critical Care   Nadir Amos 44 y o  male MRN: [id number]  Unit/Bed#: [facility] 416-01 Encounter: [id number]      Attending Physician: Shawna Silvestre MD    24 Hour Events: POD # 1 s/p bioprosthetic MVR, B/L myocutaneous pectoralis major flap  Patient was weaned off of vasopressor support overnight  Primacor was weaned to 0 13, however ultimately increased to 0 25 2/2 low CI  The patient failed SBT overnight 2/2 tachypnea, low VTE  Given 40 lasix this AM 2/2 low UOP with adequate response       Allergies: No Known Allergies    Medications:   Scheduled Meds:  Current Facility-Administered Medications:  acetaminophen 650 mg Rectal Q4H PRN Gaynelle Rist, PA-C    acetaminophen 650 mg Oral Q4H PRN Gaynelle Rist, PA-C    amiodarone 200 mg Oral Q8H Lino Lynn PA-C    aspirin 325 mg Oral Daily Carinae Rist, PA-C    atorvastatin 80 mg Oral Daily With Dinner Niharika Carney PA-C    bisacodyl 10 mg Rectal Daily PRN Sweetienelle Rist, PA-C    calcium chloride 1 g Intravenous Once Carinae Rommel, PA-C    cefTAZidime 2,000 mg Intravenous Q8H Niharika Carney, PA-C Last Rate: 2,000 mg (10/10/19 0413)   chlorhexidine 15 mL Swish & Spit BID Niharika Carney, PA-C    dexmedetomidine 0 1-0 7 mcg/kg/hr Intravenous Titrated Aleida Spironello V, CRNP Last Rate: Stopped (10/10/19 0443)   epinephrine 1-10 mcg/min Intravenous Titrated Carinae Rommel, PA-C Last Rate: Stopped (10/09/19 2111)   escitalopram 10 mg Oral Daily Carinae Risronnie, PA-C    fentanyl citrate (PF) 50 mcg Intravenous Q1H PRN Sweetienelle Rist, PA-C    fondaparinux 2 5 mg Subcutaneous Daily Sweetienelle Rist, PA-C    HYDROmorphone 0 5 mg Intravenous Q1H PRN Gaynelle Rist, PA-C    HYDROmorphone 1 mg Intravenous Q1H PRN Gaynelle Rist, PA-C    hydrOXYzine HCL 25 mg Oral Q8H PRN Gaynelle Rist, PA-C    insulin regular (HumuLIN R,NovoLIN R) infusion 0 3-21 Units/hr Intravenous Titrated Niharika Carney PA-C Last Rate: Stopped (10/10/19 8777)   levofloxacin 750 mg Oral Q24H Olam Bryan, PA-C    lidocaine (cardiac) 100 mg Intravenous Q30 Min PRN Olam Bryan, PA-C    milrinone [facility]) infusion 0 25 mcg/kg/min Intravenous Continuous [place], CRNP Last Rate: 0 25 mcg/kg/min (10/10/19 0336)   mupirocin 1 application Nasal K70H [address] Olam Bryan, PA-C    niCARdipine 2 5-15 mg/hr Intravenous Titrated Olam Bryan, PA-C    norepinephrine 1-30 mcg/min Intravenous Titrated Olam Bryan, PA-C Last Rate: Stopped (10/10/19 0513)   ondansetron 4 mg Intravenous Q6H PRN Olam Bryan, PA-C    oxyCODONE-acetaminophen 1 tablet Oral Q4H PRN Olam Bryan, PA-C    oxyCODONE-acetaminophen 2 tablet Oral Q6H PRN Olam Bryan, PA-C    pantoprazole 40 mg Oral Daily Olam Bryan, PA-C    polyethylene glycol 17 g Oral Daily Olam Bryan, PA-C    potassium chloride 20 mEq Intravenous Once PRN Olam Bryan, PA-C    potassium chloride 20 mEq Intravenous Q1H PRN Olam Bryan, PA-C    potassium chloride 20 mEq Intravenous Q30 Min PRN Olam Bryan, PA-C    sodium chloride 20 mL/hr Intravenous Continuous Aleida Champagne V, CRNP Last Rate: 20 mL/hr (10/09/19 1654)       VTE Pharmacologic Prophylaxis: Fondaparinux (Arixtra)  VTE Mechanical Prophylaxis: sequential compression device    Continuous Infusions:  dexmedetomidine 0 1-0 7 mcg/kg/hr Last Rate: Stopped (10/10/19 0443)   epinephrine 1-10 mcg/min Last Rate: Stopped (10/09/19 2111)   insulin regular (HumuLIN R,NovoLIN R) infusion 0 3-21 Units/hr Last Rate: Stopped (10/10/19 0546)   milrinone (PRIMACOR) infusion 0 25 mcg/kg/min Last Rate: 0 25 mcg/kg/min (10/10/19 0336)   niCARdipine 2 5-15 mg/hr    norepinephrine 1-30 mcg/min Last Rate: Stopped (10/10/19 0513)   sodium chloride 20 mL/hr Last Rate: 20 mL/hr (10/09/19 1543)     PRN Meds:    acetaminophen 650 mg Q4H PRN   acetaminophen 650 mg Q4H PRN   bisacodyl 10 mg Daily PRN   fentanyl citrate (PF) 50 mcg Q1H PRN HYDROmorphone 0 5 mg Q1H PRN   HYDROmorphone 1 mg Q1H PRN   hydrOXYzine HCL 25 mg Q8H PRN   lidocaine (cardiac) 100 mg Q30 Min PRN   ondansetron 4 mg Q6H PRN   oxyCODONE-acetaminophen 1 tablet Q4H PRN   oxyCODONE-acetaminophen 2 tablet Q6H PRN   potassium chloride 20 mEq Once PRN   potassium chloride 20 mEq Q1H PRN   potassium chloride 20 mEq Q30 Min PRN       Home Medications:   Prior to Admission medications    Not on File       Vitals:   Vitals:    10/10/19 0400 10/10/19 0500 10/10/19 0546 10/10/19 0600   BP:       BP Location:       Pulse: 82 86  88   Resp: (!) 23 (!) 26  (!) 29   Temp: 99 7 °F (37 6 °C) 99 5 °F (37 5 °C)  99 3 °F (37 4 °C)   TempSrc: Probe      SpO2: 100% 100%  100%   Weight:   78 6 kg (173 lb 4 5 oz)    Height:           Arterial Line:  Arterial Line BP: 106/62  Arterial Line MAP (mmHg): 78 mmHg    Tele Rhythm: NSR This was personally reviewed by myself  Respiratory:  SpO2: SpO2: 100 %  O2 Flow Rate (L/min): 2 L/min    Temperature: Temp (24hrs), Av 7 °F (37 6 °C), Min:98 6 °F (37 °C), Max:100 4 °F (38 °C)  Current: Temperature: 99 3 °F (37 4 °C)    Weights:   Weight (last 2 days)     Date/Time   Weight    10/10/19 0546   78 6 (173 28)    10/09/19 0547   74 5 (164 24)    10/08/19 0600   74 (163 14)            IBW: 56 9 kg  Body mass index is 30 7 kg/m²  Hemodynamic Monitoring:  N/A  PAP: (29-51)/(12-28) 34/17  CO:  [0 3 L/min-4 7 L/min] 0 3 L/min  CI:  [1 8 L/min/m2-2 9 L/min/m2] 2 9 L/min/m2    Intake and Outputs:    Intake/Output Summary (Last 24 hours) at 10/10/2019 0653  Last data filed at 10/10/2019 0600  Gross per 24 hour   Intake 7983 93 ml   Output 2480 ml   Net 5503 93 ml     I/O last 24 hours: In: 9024 9 [P O :221; I V :5003 9;  Blood:700; NG/GT:180; IV Piggyback:2170]  Out: 2480 [Urine:1165; Drains:95; Blood:750; Chest Tube:470]    UOP: 30cc/hour     Chest tube Output:  Mediastinal tubes: 40 mL/8 hours  210 mL/24 hours   Pleural tubes: 60 mL/8 hours  260 mL/24 hours Labs:  Results from last 7 days   Lab Units 10/10/19  0430 10/09/19  2033 10/09/19  1410 10/09/19  1409  10/09/19  1024  10/09/19  0537 10/08/19  0229  10/04/19  0507   WBC Thousand/uL 12 97*  --   --   --   --   --   --  12 23* 11 05*   < > 9 88   HEMOGLOBIN g/dL 9 5* 9 8*  --  8 8*  --   --   --  8 5* 8 2*   < > 7 1*   I STAT HEMOGLOBIN g/dl  --   --  8 2*  --    < >  --    < >  --   --   --   --    HEMATOCRIT % 30 3* 30 7*  --  27 2*  --   --   --  27 7* 26 2*   < > 22 8*   HEMATOCRIT, ISTAT %  --   --  24*  --    < >  --    < >  --   --   --   --    PLATELETS Thousands/uL 140*  --   --  139*  --  187  --  271 220   < > 226   NEUTROS PCT % 87*  --   --   --   --   --   --  80*  --   --  85*   MONOS PCT % 6  --   --   --   --   --   --  5  --   --  4    < > = values in this interval not displayed  Results from last 7 days   Lab Units 10/10/19  0401 10/09/19  2033 10/09/19  1746 10/09/19  1410 10/09/19  1409 10/09/19  1255 10/09/19  1120  10/04/19  0507   SODIUM mmol/L 143 146*  --   --  148*  --   --    < > 141   POTASSIUM mmol/L 5 1 4 9 4 4  --  4 0  --   --    < > 3 6   CHLORIDE mmol/L 115* 115*  --   --  115*  --   --    < > 108   CO2 mmol/L 21 20*  --   --  21  --   --    < > 25   CO2, I-STAT mmol/L  --   --   --  23  --  24 26   < >  --    BUN mg/dL 38* 32*  --   --  33*  --   --    < > 29*   CREATININE mg/dL 1 81* 1 74*  --   --  1 69*  --   --    < > 1 51*   CALCIUM mg/dL 8 3 8 2*  --   --  7 8*  --   --    < > 8 4   ALK PHOS U/L  --   --   --   --   --   --   --   --  89   ALT U/L  --   --   --   --   --   --   --   --  12   AST U/L  --   --   --   --   --   --   --   --  8   GLUCOSE, ISTAT mg/dl  --   --   --  103  --  126 189*   < >  --     < > = values in this interval not displayed       Baseline creat 1 12    Results from last 7 days   Lab Units 10/10/19  0400 10/08/19  0229   MAGNESIUM mg/dL 2 6 1 9     Results from last 7 days   Lab Units 10/10/19  0446 10/09/19  1409   INR  1 46* 1 70*   PTT seconds  --  44*         AM Studies:  AM CXR: lines and tubes in appropriate position, no PTX This was personally reviewed by myself in PACS  AM EKG: NSR This was personally reviewed by myself  Physical Exam:    General Appearance:  WNWD, NAD  HENT: Atraumatic without obvious abnormality  Neck: NO JVD  RIJ CVC in place  Eyes: no icterus  Cardiac: regular rate and regular rhythm, no murmur, no rub  Pulmonary: clear to auscultation bilaterally  Gastrointestinal: soft, no distention,  Non-tender  : Santiago present: yes   Musculoskeletal: 1+ edema bilaterally  Neuro:  Screening neurologic exam reveals no deficits  following commands  Psych: Mood and affect unassesable   Skin: warm, dry  Incisions: Sternum is stable  Incision dressed with Acticoat  No erythema or drainage  B/L SHERICE in place, wound vac      Invasive lines and devices: Invasive Devices     Central Venous Catheter Line            CVC Central Lines 10/09/19 Triple less than 1 day    Introducer 10/09/19 less than 1 day          Peripheral Intravenous Line            Peripheral IV 10/08/19 Proximal;Right;Ventral (anterior) Forearm 2 days          Arterial Line            Arterial Line 10/09/19 Right Brachial less than 1 day          Line            Pacer Wires less than 1 day    Pacer Wires less than 1 day          Drain            Chest Tube 1 Left Pleural 28 Fr  less than 1 day    Chest Tube 2 Anterior Mediastinal 32 Fr  less than 1 day    Chest Tube 3 Posterior Mediastinal 32 Fr  less than 1 day    Chest Tube 4 Right Pleural 28 Fr  less than 1 day    Closed/Suction Drain Left Chest Bulb 15 Fr  less than 1 day    Closed/Suction Drain Right Chest Bulb 15 Fr  less than 1 day    NG/OG/Enteral Tube Orogastric less than 1 day    Negative Pressure Wound Therapy (V A C ) Chest Medial less than 1 day    Urethral Catheter Non-latex; Temperature probe 16 Fr  less than 1 day          Airway            ETT Hi-Lo;Oral;Cuffed 8 5 mm less than 1 day                Assessment:  Principal Problem:    Endocarditis  Active Problems:    Severe mitral regurgitation    Bacteremia due to Pseudomonas    S/P MVR (mitral valve replacement)    History of intravenous drug abuse ([facility] [address] )    Acute kidney injury ([facility] [address] )    Acute postoperative pulmonary insufficiency (HCC)    Bilateral pleural effusion    Right parietal lobe lesion    Acute blood loss as cause of postoperative anemia    Solitary left kidney    Anxiety    Insomnia    Hypotension      Plan:    Neuro:   · Pain controlled with: percocet and tylenol PRN  · Regulate sleep/wake cycle  · Restoril QHS PRN  · Trend neuro exam  · Parietal CVA w/ hemorrhagic conversion: appreciate neurology input  · Likely 2/2 septic emboli  · Patient follows commands, if unable to ascertain neuro exam low threshold for CTH  CV:   · Cardiac infusions: Primacor, 0 25 mcg/kg/min  · Wean primacor to off as able  · MAP goal > 65 and CI >2 2  · Maintain [place] catheter and cordis  · maintain Arterial line  · Rhythm: NSR  · Follow rhythm on telemetry  · Start lopressor 12 5 mg PO Q12  · Maintain epicardial pacing wires for POD 1  · Continue PO amio, statin, and ASA therapy  · Consult cardiology for post-op management  · Continue DVT prophylaxis  · Sternotomy s/p flap: continue SHERICE and vac per plastic Sx  Lung:   · Acute post-op pulmonary insufficiency; Requiring Intubation with ventilator support, secondary to lethargy/altered mental status  Continue incentive spirometry/coughing/deep breathing exercises  Wean supplemental oxygen as tolerated for saturation > 90%  · Wean ventilator as tolerated  · SpO2 goal >92%  · On SBT this AM, goal extubation  · Continue IS, deep breathing and coughing exercises    · Chest tube output remains persistently high; Continue chest tubes to suction today  GI:   · Continue PPI for stress ulcer prophylaxis  · Continue bowel regimen  FEN:   · Diuretic plan: Lasix 40 mg IV q 12  · K-dur 20 mEQ PO q 12  · Goal 24 hour fluid balance: negative   · Nutrition/diet plan: Initiate cardiac diet with 1800 mL fluid restriction   · Replete electrolytes with goals: K >4 0, Mag >2 0, and Phos >3 0  :   · Indwelling Santiago present: yes   · Maintain  Santiago  · Trend UOP and BUN/creat  · Strict I and O  ID:   · Trend temps and WBC count  · Maintain normothermia  · Tylenol PRN for fevers  Heme:   · Trend hgb and plts  Endo:   · Glycemic control plan: No history of diabetes: Glucose well-controlled  Discontinue continuous insulin infusion and add Insulin sliding scale coverage  MSK/Skin:  · Mobility goal: OOB and ambulating as able  · PT consult: yes  · OT consult: yes  · Frequent turning and pressure off-loading  · Local wound care as needed    Disposition:  ICU level care    Collaborative bedside rounds performed with cardiac surgery attending and bedside RN      SIGNATURE: Jah Smith PA-C  DATE: October 10, 2019  TIME: 6:53 AM

## 2019-10-10 NOTE — ADDENDUM NOTE
Addendum  created 10/10/19 9444 by Hildreth Kawasaki, MD    Intraprocedure Blocks edited, Sign clinical note

## 2019-10-10 NOTE — UTILIZATION REVIEW
Continued Stay Review    Date: 10/10/2019                        POD # 1 s/p MVR, Bilateral Pectoralis Advancement flap  Current Patient Class: Inpatient  Current Level of Care: Critical Care    HPI:39 y o  male initially admitted on 10/01/2019  10/09/2019 Surgery Date 10/09/2019   PROCEDURES: 1  Mitral valve replacement with a 27 mm [facility] Mitral Ease bioprosthetic; 2  Plastic surgery: BILATERAL MYOCUTANEOUS PECTORALIS MAJOR ADVANCEMENT FLAPS (Bilateral)  APPLICATION NEGATIVE PRESSURE VAC DRESSING (N/A)   (Dr Tyler Dia)  BILLY  ANESTHESIA: General endotracheal anesthesia with transesophageal echocardiogram guidance    Assessment/Plan: Cont Introducer / Triple CVC Line / A  Line right Brachial   epicardial pacing wires A/V  Chest Tubes #1,2,3,4  (-20cm suction),  VAC dsg,  Intubated and vented wean to extubation  Aggressive diuresis  IS    Pertinent Labs/Diagnostic Results:   Results from last 7 days   Lab Units 10/10/19  0430 10/09/19  2033 10/09/19  1410 10/09/19  1409 10/09/19  1255  10/09/19  1024  10/09/19  0537 10/08/19  0229  10/04/19  0507   WBC Thousand/uL 12 97*  --   --   --   --   --   --   --  12 23* 11 05*   < > 9 88   HEMOGLOBIN g/dL 9 5* 9 8*  --  8 8*  --   --   --   --  8 5* 8 2*   < > 7 1*   I STAT HEMOGLOBIN g/dl  --   --  8 2*  --  9 2*   < >  --    < >  --   --   --   --    HEMATOCRIT % 30 3* 30 7*  --  27 2*  --   --   --   --  27 7* 26 2*   < > 22 8*   HEMATOCRIT, ISTAT %  --   --  24*  --  27*   < >  --    < >  --   --   --   --    PLATELETS Thousands/uL 140*  --   --  139*  --   --  187  --  271 220   < > 226   NEUTROS ABS Thousands/µL 11 28*  --   --   --   --   --   --   --  9 93*  --   --  8 34*    < > = values in this interval not displayed       Results from last 7 days   Lab Units 10/10/19  0401 10/10/19  0400 10/09/19  2033 10/09/19  1746 10/09/19  1410 10/09/19  1409 10/09/19  1255 10/09/19  1120 10/09/19  1046 10/09/19  1017  10/09/19  0537 10/08/19  0229   SODIUM mmol/L 143  --  146*  --   --  148*  --   --   --   --   --  140 142   POTASSIUM mmol/L 5 1  --  4 9 4 4  --  4 0  --   --   --   --   --  4 4 3 4*   CHLORIDE mmol/L 115*  --  115*  --   --  115*  --   --   --   --   --  108 109*   CO2 mmol/L 21  --  20*  --   --  21  --   --   --   --   --  24 24   CO2, I-STAT mmol/L  --   --   --   --  23  --  24 26 28 31   < >  --   --    ANION GAP mmol/L 7  --  11  --   --  12  --   --   --   --   --  8 9   BUN mg/dL 38*  --  32*  --   --  33*  --   --   --   --   --  35* 31*   CREATININE mg/dL 1 81*  --  1 74*  --   --  1 69*  --   --   --   --   --  1 68* 1 51*   EGFR ml/min/1 73sq m 46  --  48  --   --  50  --   --   --   --   --  50 57   CALCIUM mg/dL 8 3  --  8 2*  --   --  7 8*  --   --   --   --   --  9 2 8 7   CALCIUM, IONIZED, ISTAT mmol/L  --   --   --   --  1 18  --  1 19 1 40* 0 99* 0 96*   < >  --   --    MAGNESIUM mg/dL  --  2 6  --   --   --   --   --   --   --   --   --   --  1 9   PHOSPHORUS mg/dL  --   --   --   --   --   --   --   --   --   --   --   --  3 4    < > = values in this interval not displayed       Results from last 7 days   Lab Units 10/04/19  0507   AST U/L 8   ALT U/L 12   ALK PHOS U/L 89   TOTAL PROTEIN g/dL 5 8*   ALBUMIN g/dL 2 0*   TOTAL BILIRUBIN mg/dL 0 51     Results from last 7 days   Lab Units 10/10/19  1059 10/10/19  0544 10/10/19  0358 10/10/19  0212 10/10/19  0003 10/09/19  2204 10/09/19  1958 10/09/19  1745 10/09/19  1554 10/09/19  1514   POC GLUCOSE mg/dl 127 118 116 123 128 145* 156* 147* 130 120     Results from last 7 days   Lab Units 10/10/19  0401 10/09/19  2033 10/09/19  1409 10/09/19  0537 10/08/19  0229 10/06/19  0613 10/05/19  0513 10/04/19  0507   GLUCOSE RANDOM mg/dL 122 149* 100 131 130 103 180* 108      Results from last 7 days   Lab Units 10/10/19  1133 10/10/19  0359 10/09/19  2322   PH ART  7 382 7 380 7 363   PCO2 ART mm Hg 38 2 34 2* 34 8*   PO2 ART mm Hg 144 0* 142 3* 119 3   HCO3 ART mmol/L 22 2 19 8* 19 4* BASE EXC ART mmol/L -2 6 -4 7 -5 3   O2 CONTENT ART mL/dL 15 1* 14 7* 15 4*   O2 HGB, ARTERIAL % 97 8* 97 8* 96 7   ABG SOURCE  Line, Arterial Line, Arterial Line, Arterial     Results from last 7 days   Lab Units 10/09/19  1410 10/09/19  1255 10/09/19  1120 10/09/19  1046  10/09/19  0952   PH, SALAZAR I-STAT  7 363  --   --   --   --  7 233*   PCO2, SALAZAR ISTAT mm HG 38 4*  --   --   --   --  52 7*   PO2, SALAZAR ISTAT mm  0*  --   --   --   --  48 0*   HCO3, SALAZAR ISTAT mmol/L 21 8*  --   --   --   --  22 2*   I STAT BASE EXC mmol/L -3* -4* -2 0   < > -5*   I STAT O2 SAT % 100* 100*  --  100*   < > 76*   ISTAT PH ART   --  7 284* 7 287* 7 310*   < >  --    I STAT ART PCO2 mm HG  --  47 7* 50 7* 51 9*   < >  --    I STAT ART PO2 mm HG  --  195 0* >400 0* 352 0*   < >  --    I STAT ART HCO3 mmol/L  --  22 6 24 2 26 1   < >  --     < > = values in this interval not displayed  Results from last 7 days   Lab Units 10/10/19  0446 10/09/19  1409   PROTIME seconds 17 3* 19 5*   INR  1 46* 1 70*   PTT seconds  --  44*     Results from last 7 days   Lab Units 10/09/19  0952 10/06/19  7738   BLOOD CULTURE   --  No Growth After 4 Days  No Growth After 4 Days     GRAM STAIN RESULT  No Polys or Bacteria seen  --      Vital Signs: /82 (BP Location: Right arm)   Pulse 96   Temp 99 1 °F (37 3 °C) (Axillary)   Resp (!) 34   Ht 5' 3" (1 6 m)   Wt 78 6 kg (173 lb 4 5 oz)   SpO2 100%   BMI 30 70 kg/m²     Medications:   Medications:  amiodarone 200 mg Oral Q8H [address]   aspirin 325 mg Oral Daily   atorvastatin 80 mg Oral Daily With Dinner   cefTAZidime 2,000 mg Intravenous Q8H   chlorhexidine 15 mL Swish & Spit BID   escitalopram 10 mg Oral Daily   fondaparinux 2 5 mg Subcutaneous Daily   insulin lispro 1-6 Units Subcutaneous Q6H [address]   [START ON 10/12/2019] levofloxacin 750 mg Oral Q48H   metoprolol tartrate 25 mg Oral Q12H [address]   mupirocin 1 application Nasal [id number] [id number]   pantoprazole 40 mg Oral Daily   polyethylene glycol 17 g Oral Daily       milrinone (PRIMACOR) infusion 0 25 mcg/kg/min Intravenous Continuous       acetaminophen 650 mg Rectal Q4H PRN   acetaminophen 650 mg Oral Q4H PRN   bisacodyl 10 mg Rectal Daily PRN   fentanyl citrate (PF) 50 mcg Intravenous Q1H PRN   HYDROmorphone 0 5 mg Intravenous Q1H PRN   HYDROmorphone 1 mg Intravenous Q1H PRN   hydrOXYzine HCL 25 mg Oral Q8H PRN   lidocaine (cardiac) 100 mg Intravenous Q30 Min PRN   ondansetron 4 mg Intravenous Q6H PRN   oxyCODONE-acetaminophen 1 tablet Oral Q4H PRN   oxyCODONE-acetaminophen 2 tablet Oral Q6H PRN   potassium chloride 20 mEq Intravenous Once PRN   potassium chloride 20 mEq Intravenous Q1H PRN   potassium chloride 20 mEq Intravenous Q30 Min PRN       Discharge Plan: D    Network Utilization Review Department  Phone: [phone]; Fax [phone]  [email or web address]  ATTENTION: Please call with any questions or concerns to [phone]  and carefully listen to the prompts so that you are directed to the right person  Send all requests for admission clinical reviews, approved or denied determinations and any other requests to fax [phone]   All voicemails are confidential

## 2019-10-10 NOTE — NUTRITION
10/10/19 1254   Recommendations/Interventions   Nutrition Recommendations Other (specify)  (change diet order to NPO while patient is intubated  If not able to safely extubate and advance diet rec TF with Jevity 1 2 @10ml/hr advance to goal 63ml/hr to provide 1512ml, 1814kcal, 84g pro, 1225ml free water   Monitor weight and electrolytes )

## 2019-10-10 NOTE — PROGRESS NOTES
Progress Note - Cardiothoracic Surgery   Harriett Salmeron 44 y o  male MRN: [id number]  Unit/Bed#: [facility] 416-01 Encounter: [id number]      POD # 1 s/p MVR, Bilateral Pectoralis Advancement flap    Pt seen/examined  Interval history and data reviewed with critical care team   Pt doing well  No specific complaints      Milrinone gtt 0 25  Vasopressin off  Levophed off  Epi off      Medications:   Scheduled Meds:  Current Facility-Administered Medications:  acetaminophen 650 mg Rectal Q4H PRN Halima Hopper, PA-C    acetaminophen 650 mg Oral Q4H PRN Halima Hopper, PA-C    amiodarone 200 mg Oral Q8H Arvind Shires, PA-C    aspirin 325 mg Oral Daily Halima Hopper, PA-C    atorvastatin 80 mg Oral Daily With Dinner Halima Hopper, PA-C    bisacodyl 10 mg Rectal Daily PRN Halima Hopper, PA-C    calcium chloride 1 g Intravenous Once Halima Hopper, PA-C    cefTAZidime 2,000 mg Intravenous Q8H Halima Hopper, PA-C Last Rate: 2,000 mg (10/10/19 0413)   chlorhexidine 15 mL Swish & Spit BID Halima Hopper, PA-C    epinephrine 1-10 mcg/min Intravenous Titrated Halima Hopper, PA-C Last Rate: Stopped (10/09/19 2111)   escitalopram 10 mg Oral Daily Halima Hopper, PA-C    fentanyl citrate (PF) 50 mcg Intravenous Q1H PRN Halima Hopper, PA-C    fondaparinux 2 5 mg Subcutaneous Daily Halima Hopper, PA-C    HYDROmorphone 0 5 mg Intravenous Q1H PRN Halima Hopper, PA-C    HYDROmorphone 1 mg Intravenous Q1H PRN Halima Hopper, PA-C    hydrOXYzine HCL 25 mg Oral Q8H PRN Halima Hopper, PA-C    insulin lispro 1-6 Units Subcutaneous Q6H [address] Chitra R Shelli, PA-C    levofloxacin 750 mg Oral Q24H Halima Hopper, PA-C    lidocaine (cardiac) 100 mg Intravenous Q30 Min PRN Halima Hopper, PA-C    milrinone [facility]) infusion 0 25 mcg/kg/min Intravenous Continuous [place] Pick, CRNP Last Rate: 0 25 mcg/kg/min (10/10/19 0336)   mupirocin 1 application Nasal H74J [address] Princess Gaona PA-C    ondansetron 4 mg Intravenous Q6H PRN Pecola Peals, PA-C    oxyCODONE-acetaminophen 1 tablet Oral Q4H PRN Pecola Peals, PA-C    oxyCODONE-acetaminophen 2 tablet Oral Q6H PRN Pecola Peals, PA-C    pantoprazole 40 mg Oral Daily Pecola Peals, PA-C    polyethylene glycol 17 g Oral Daily Pecola Peals, PA-C    potassium chloride 20 mEq Intravenous Once PRN Pecola Peals, PA-C    potassium chloride 20 mEq Intravenous Q1H PRN Pecola Peals, PA-C    potassium chloride 20 mEq Intravenous Q30 Min PRN Pecola Peals, PA-C      Continuous Infusions:  epinephrine 1-10 mcg/min Last Rate: Stopped (10/09/19 2111)   milrinone (PRIMACOR) infusion 0 25 mcg/kg/min Last Rate: 0 25 mcg/kg/min (10/10/19 0336)     PRN Meds:   acetaminophen    acetaminophen    bisacodyl    fentanyl citrate (PF)    HYDROmorphone    HYDROmorphone    hydrOXYzine HCL    lidocaine (cardiac)    ondansetron    oxyCODONE-acetaminophen    oxyCODONE-acetaminophen    potassium chloride    potassium chloride    potassium chloride    Vitals: Blood pressure 95/62, pulse 88, temperature 99 3 °F (37 4 °C), resp  rate (!) 29, height 5' 3" (1 6 m), weight 78 6 kg (173 lb 4 5 oz), SpO2 100 %  ,Body mass index is 30 7 kg/m²  I/O last 24 hours: In: 7983 9 [I V :5003 9;  Blood:700; NG/GT:180; IV Piggyback:1350]  Out: 2480 [Urine:1165; Drains:95; Blood:750; Chest Tube:470]  Invasive Devices     Central Venous Catheter Line            CVC Central Lines 10/09/19 Triple less than 1 day    Introducer 10/09/19 less than 1 day          Peripheral Intravenous Line            Peripheral IV 10/08/19 Proximal;Right;Ventral (anterior) Forearm 2 days          Arterial Line            Arterial Line 10/09/19 Right Brachial less than 1 day          Line            Pacer Wires less than 1 day    Pacer Wires less than 1 day          Drain            Chest Tube 1 Left Pleural 28 Fr  less than 1 day    Chest Tube 2 Anterior Mediastinal 32 Fr  less than 1 day    Chest Tube 3 Posterior Mediastinal 32 Fr  less than 1 day    Chest Tube 4 Right Pleural 28 Fr  less than 1 day    Closed/Suction Drain Left Chest Bulb 15 Fr  less than 1 day    Closed/Suction Drain Right Chest Bulb 15 Fr  less than 1 day    NG/OG/Enteral Tube Orogastric less than 1 day    Negative Pressure Wound Therapy (V A C ) Chest Medial less than 1 day    Urethral Catheter Non-latex; Temperature probe 16 Fr  less than 1 day          Airway            ETT  Hi-Lo; Oral;Cuffed 8 5 mm less than 1 day                  Lab, Imaging and other studies:   Results from last 7 days   Lab Units 10/10/19  0430 10/09/19  2033 10/09/19  1410 10/09/19  1409  10/09/19  1024  10/09/19  0537 10/08/19  0229   WBC Thousand/uL 12 97*  --   --   --   --   --   --  12 23* 11 05*   HEMOGLOBIN g/dL 9 5* 9 8*  --  8 8*  --   --   --  8 5* 8 2*   I STAT HEMOGLOBIN g/dl  --   --  8 2*  --    < >  --    < >  --   --    HEMATOCRIT % 30 3* 30 7*  --  27 2*  --   --   --  27 7* 26 2*   HEMATOCRIT, ISTAT %  --   --  24*  --    < >  --    < >  --   --    PLATELETS Thousands/uL 140*  --   --  139*  --  187  --  271 220    < > = values in this interval not displayed  Results from last 7 days   Lab Units 10/10/19  0401 10/09/19  2033 10/09/19  1746 10/09/19  1410 10/09/19  1409   POTASSIUM mmol/L 5 1 4 9 4 4  --  4 0   CHLORIDE mmol/L 115* 115*  --   --  115*   CO2 mmol/L 21 20*  --   --  21   CO2, I-STAT mmol/L  --   --   --  23  --    BUN mg/dL 38* 32*  --   --  33*   CREATININE mg/dL 1 81* 1 74*  --   --  1 69*   GLUCOSE, ISTAT mg/dl  --   --   --  103  --    CALCIUM mg/dL 8 3 8 2*  --   --  7 8*     Results from last 7 days   Lab Units 10/10/19  0446 10/09/19  1409   INR  1 46* 1 70*   PTT seconds  --  44*     Recent Labs     10/10/19  0359   PHART 7 380   DZY2XSE 19 8*   PO2ART 142 3*   VWR0QIE 34 2*   BEART -4 7           Plan:    Aggressive diuresis  Wean vent to extubation  Cont [place]ordis/Melina/Santiago    Continue chest tubes, pacing wires   Incentive spirometry  PO ASA/Statin/B blocker          SIGNATUREUlajuanjose Pastrana MD  DATE: October 10, 2019  TIME: 7:49 AM

## 2019-10-10 NOTE — RESPIRATORY THERAPY NOTE
RT Ventilator Management Note  Sugey WallerPacheco 44 y o  male MRN: [id number]  Unit/Bed#: [facility] 416-01 Encounter: [id number]      Daily Screen     No data found in the last 10 encounters  Physical Exam:   Assessment Type: Assess only  General Appearance: Sedated  Respiratory Pattern: Assisted  Chest Assessment: Chest expansion symmetrical  O2 Device: vent      Resp Comments: pt remained on AC/VC mode/settings during the night   No issues while on vent

## 2019-10-10 NOTE — PROGRESS NOTES
Progress Note - Infectious Disease   Duenciano Jaron 44 y o  male MRN: [id number]  Unit/Bed#: [facility] 416-01 Encounter: [id number]      Impression/Plan:  1  Pseudomonal bacteremia with mitral valve endocarditis   Outside records reviewed and this is likely due to problem 3  Repeat blood cultures here remain without growth   Potential septic emboli seen on imaging of the head   No other ectopic foci seen on imaging of the chest or abdomen  Patient is now status post valve replacement  Continue ceftazidime with levofloxacin  Follow up pending blood cultures and valve cultures  Repeat labs tomorrow  Patient will require 6 weeks of antibiotic therapy after procedure, possible de-escalation to single agent if valve cultures are negative  Additional care as per primary     2  Acute kidney injury and solitary kidney   Patient's creatinine noted at 1 7 initially   Appears to be similar to recent labs performed at [facility]   Potentially developed toxicity from gentamicin   Currently stable  Continue current antibiotic doses, though creatinine is borderline  Ongoing follow-up by Nephrology  Continue monitor chemistry  Continue care as per primary     3  Active IV drug use   Patient with active IV drug use   Hep C noted to be positive, viral load negative   Hepatitis-B and HIV testing negative       4  Back pain   Patient had reported back pain again no obvious findings on exam   Previous records reviewed and bone scan at [facility] was unremarkable  Rolanda Goodpasture monitor clinically      5  History of MSSA mitral valve endocarditis         Above plan discussed in detail with the patient and his family at bedside  ID consult service will continue to follow      Antibiotics:  Ceftazidime/Levaquin 11  Postop day 1    24 hour events:  Patient remains afebrile  White blood cell count 12 9  He is status post valve replacement  Valve Cultures pending  Chest x-ray this morning pending  Patient's other vitals are stable  Creatinine 1 81, differential on CBC unremarkable    Subjective:  Patient currently intubated and awake  He is able to nod yes or no to questions  He reports that his pain seems to be under controlled at this time  He reports that they are planning to remove his ET tube shortly  Objective:  Vitals:  Temp:  [98 6 °F (37 °C)-100 4 °F (38 °C)] 99 3 °F (37 4 °C)  HR:  [82-94] 88  Resp:  [9-35] 29  SpO2:  [98 %-100 %] 100 %  Temp (24hrs), Av 7 °F (37 6 °C), Min:98 6 °F (37 °C), Max:100 4 °F (38 °C)  Current: Temperature: 99 3 °F (37 4 °C)    Physical Exam:   General Appearance:  Patient is awake but intubated  Throat: ET tube in place with thin secretions   Lungs:   Vented breath sounds appreciated anteriorly; no wheezes, rhonchi or rales; respirations unlabored on mechanical ventilation   Heart:  RRR; no rub or gallop; systolic murmur present   Abdomen:   Soft, non-tender, non-distended, positive bowel sounds  Extremities: No clubbing, cyanosis or edema   Skin: No new rashes or lesions  No New draining wounds noted  VAC dressing in place along chest wound  Labs, Imaging, & Other studies:   All pertinent labs and imaging studies were personally reviewed  Results from last 7 days   Lab Units 10/10/19  0430 10/09/19  2033 10/09/19  1410 10/09/19  1409  10/09/19  1024  10/09/19  0537 10/08/19  0229   WBC Thousand/uL 12 97*  --   --   --   --   --   --  12 23* 11 05*   HEMOGLOBIN g/dL 9 5* 9 8*  --  8 8*  --   --   --  8 5* 8 2*   I STAT HEMOGLOBIN g/dl  --   --  8 2*  --    < >  --    < >  --   --    PLATELETS Thousands/uL 140*  --   --  139*  --  187  --  271 220    < > = values in this interval not displayed       Results from last 7 days   Lab Units 10/10/19  0401  10/09/19  1410  10/04/19  0507   POTASSIUM mmol/L 5 1   < >  --    < > 3 6   CHLORIDE mmol/L 115*   < >  --    < > 108   CO2 mmol/L 21   < >  --    < > 25   CO2, I-STAT mmol/L  --   --  23   < >  --    BUN mg/dL 38*   < >  --    < > 29*   CREATININE mg/dL 1 81*   < >  --    < > 1 51*   EGFR ml/min/1 73sq m 46   < >  --    < > 57   GLUCOSE, ISTAT mg/dl  --   --  103   < >  --    CALCIUM mg/dL 8 3   < >  --    < > 8 4   AST U/L  --   --   --   --  8   ALT U/L  --   --   --   --  12   ALK PHOS U/L  --   --   --   --  89    < > = values in this interval not displayed  Results from last 7 days   Lab Units 10/09/19  0952 10/06/19  0846   BLOOD CULTURE   --  No Growth at 72 hrs  No Growth at 72 hrs     GRAM STAIN RESULT  No Polys or Bacteria seen  --

## 2019-10-10 NOTE — PHYSICAL THERAPY NOTE
Physical Therapy Cancellation Note        PT order received; chart reviewed; noted pt currently remains to be intubated; will hold PT assessment at this time; will follow and initiate PT eval/mobilization when medically cleared      Palak Hastings PT

## 2019-10-10 NOTE — PROGRESS NOTES
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 in the setting of congenital solitary kidney  -also was found to have small renal infarct from prior endocarditis  -status post contrast exposure with cardiac catheterization on 10/2/19   -admission creatinine 1 4 at [facility], peaked at 2 2    -now seems to be fluctuating in mid one range, creatinine 1 8 s/p diuretics  -monitor renal indices closely  Off pressors  On milrinone gtt    -f/u am BMP     -recent workup includes:  -urinalysis this admission shows 4 to 10 RBCs, 4 to 10 WBCs, 1+ proteinuria, very concentrated sample with 10 to 25 hyaline cast   -CT scan showed congenitally absent right kidney with left kidney showing compensatory hypertrophy, one small likely infarct in the lower pole of left kidney unchanged from the previous study and not associated with any abscess for pyelonephritis  - February 2019: positive hep C antibody with hep C RNA PCR less than 15  -complements normal, CK normal  -urine eosinophils 0%, no peripheral eosinophilia     2  Hypotension post op - improved, off pressors, on milrinone gtt  Goal MAP >65 for renal perfusion in setting of JULIENNE vs CKD     3  Anemia with thrombocytopenia  -closely monitor hemoglobin, now 9 5, transfuse p r n  For Hgb < 7   Did receive 2 u blood intraoperatively 10/9/19    -low iron stores recently although avoiding IV Venofer due to active infection issues  -recent FOBT was negative at [facility]      4  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 and vac  -fortaz/levaquin as per primary team and ID  -patient is active IV drug user and had prior history of MSSA bacteremia with endocarditis in March   -BILLY in 2019 showed echodensity on the anterior mitral leaflet      5  Pseudomonas bacteremia, blood culture from 19 showed Pseudomonas  -Repeat blood cultures negative so far     6  Bilateral pleural effusion, status post thoracentesis on 10/2/19  -now intubated  -diuretics provided if patient oliguric  Will defer to CT surgery on this  Currently pursuing more conservative diuretic management  Hopeful extubation soon       Other issues:  CT scan suggestive of pulmonary congestion versus suspicion for pneumonia, pleural effusions, splenic infarct  Septic embolus/abscess on CT scan of brain    Thrombocytopenia-monitor platelets closely, question due to sepsis  Rule out TMA if no improvement  Subjective: The patient is in a little pain  He is intubated so HPI review of systems is limited  He is on 35% FiO2  He is off pressors but on milrinone gtt  Objective:     Vitals: Blood pressure 148/77, pulse 96, temperature 99 °F (37 2 °C), temperature source Probe, resp  rate (!) 31, height 5' 3" (1 6 m), weight 78 6 kg (173 lb 4 5 oz), SpO2 99 %  ,Body mass index is 30 7 kg/m²  Temp (24hrs), Av 6 °F (37 6 °C), Min:98 6 °F (37 °C), Max:100 4 °F (38 °C)      Weight (last 2 days)     Date/Time   Weight    10/10/19 0546   78 6 (173 28)    10/09/19 0547   74 5 (164 24)    10/08/19 0600   74 (163 14)                Intake/Output Summary (Last 24 hours) at 10/10/2019 1311  Last data filed at 10/10/2019 1000  Gross per 24 hour   Intake 5406 33 ml   Output 1975 ml   Net 3431 33 ml     I/O last 24 hours: In: 8006 3 [I V :5026 3; Blood:700; NG/GT:180; IV Piggyback:1350]  Out: 1030 [Urine:1665; Drains:95; Blood:750; Chest Tube:540]        Physical Exam:   Physical Exam   Constitutional: He appears well-developed and well-nourished  No distress  HENT:   Head: Normocephalic and atraumatic  Mouth/Throat: No oropharyngeal exudate  +ETT   Eyes: Right eye exhibits no discharge   Left eye exhibits no discharge  No scleral icterus  Neck: Neck supple  Cardiovascular: Normal rate, regular rhythm and normal heart sounds  Pulmonary/Chest: Effort normal and breath sounds normal  He has no wheezes  He has no rales  +Chest tubes, +drains   Abdominal: Soft  Bowel sounds are normal  He exhibits no distension  There is no tenderness  Genitourinary:   Genitourinary Comments: +smith   Musculoskeletal: Normal range of motion  He exhibits no edema  Neurological: He is alert  awake   Skin: Skin is warm and dry  No rash noted  He is not diaphoretic  Psychiatric:   Flat affect   Vitals reviewed  Invasive Devices     Central Venous Catheter Line            CVC Central Lines 10/09/19 Triple 1 day    Introducer 10/09/19 1 day          Peripheral Intravenous Line            Peripheral IV 10/08/19 Proximal;Right;Ventral (anterior) Forearm 2 days          Arterial Line            Arterial Line 10/09/19 Right Brachial 1 day          Line            Pacer Wires 1 day    Pacer Wires 1 day          Drain            Chest Tube 1 Left Pleural 28 Fr  1 day    Chest Tube 2 Anterior Mediastinal 32 Fr  1 day    Chest Tube 3 Posterior Mediastinal 32 Fr  1 day    Chest Tube 4 Right Pleural 28 Fr  1 day    Closed/Suction Drain Left Chest Bulb 15 Fr  1 day    Closed/Suction Drain Right Chest Bulb 15 Fr  1 day    Urethral Catheter Non-latex; Temperature probe 16 Fr  1 day    NG/OG/Enteral Tube Orogastric less than 1 day    Negative Pressure Wound Therapy (V A C ) Chest Medial less than 1 day          Airway            ETT  Hi-Lo; Oral;Cuffed 8 5 mm 1 day                Medications:    Scheduled Meds:  Current Facility-Administered Medications:  acetaminophen 650 mg Rectal Q4H PRN Qi Gonzáles PA-C    acetaminophen 650 mg Oral Q4H PRN Qi Gonzáles PA-C    amiodarone 200 mg Oral Q8H [address] Princess Gomez PA-C    aspirin 325 mg Oral Daily Qi Gonzáles PA-C    atorvastatin 80 mg Oral Daily With Lucina Kunz PA-C bisacodyl 10 mg Rectal Daily PRN Violeta Barbone, PA-C    cefTAZidime 2,000 mg Intravenous Q8H Violeta Barbone, PA-C Last Rate: 2,000 mg (10/10/19 1243)   chlorhexidine 15 mL Swish & Spit BID Violeta Barbone, PA-C    escitalopram 10 mg Oral Daily Violeta Barbone, PA-C    fentanyl citrate (PF) 50 mcg Intravenous Q1H PRN Violeta Barbone, PA-C    fondaparinux 2 5 mg Subcutaneous Daily Violeta Barbone, PA-C    HYDROmorphone 0 5 mg Intravenous Q1H PRN Violeta Barbone, PA-C    HYDROmorphone 1 mg Intravenous Q1H PRN Violeta Barbone, PA-C    hydrOXYzine HCL 25 mg Oral Q8H PRN Violeta Barbone, PA-C    insulin lispro 1-6 Units Subcutaneous Q6H [facility] Chitra Marques, PA-C    levofloxacin 750 mg Oral Q24H Violeta Barbone, PA-C    lidocaine (cardiac) 100 mg Intravenous Q30 Min PRN Violeta Barbone, PA-C    milrinone (PRIMACOR) infusion 0 25 mcg/kg/min Intravenous Continuous JULIÁN Yan Last Rate: 0 13 mcg/kg/min (10/10/19 1105)   mupirocin 1 application Nasal V29I [facility] Princess Nicole, PA-C    ondansetron 4 mg Intravenous Q6H PRN Violeta Barbone, PA-C    oxyCODONE-acetaminophen 1 tablet Oral Q4H PRN Violeta Barbone, PA-C    oxyCODONE-acetaminophen 2 tablet Oral Q6H PRN Violeta Barbone, PA-C    pantoprazole 40 mg Oral Daily Violeta Barbone, PA-C    polyethylene glycol 17 g Oral Daily Violeta Barbone, PA-C    potassium chloride 20 mEq Intravenous Once PRN Violeta Barbone, PA-C    potassium chloride 20 mEq Intravenous Q1H PRN Violeta Barbone, PA-C    potassium chloride 20 mEq Intravenous Q30 Min PRN Violeta Barbone, PA-C        PRN Meds:   acetaminophen    acetaminophen    bisacodyl    fentanyl citrate (PF)    HYDROmorphone    HYDROmorphone    hydrOXYzine HCL    lidocaine (cardiac)    ondansetron    oxyCODONE-acetaminophen    oxyCODONE-acetaminophen    potassium chloride    potassium chloride    potassium chloride    Continuous Infusions:  milrinone (PRIMACOR) infusion 0 25 mcg/kg/min Last Rate: 0 13 mcg/kg/min (10/10/19 1105)           LAB RESULTS:      Results from last 7 days   Lab Units 10/10/19  0430 10/10/19  0401 10/10/19  0400 10/09/19  2033 10/09/19  1746 10/09/19  1410 10/09/19  1409 10/09/19  1255 10/09/19  1120 10/09/19  1046 10/09/19  1024 10/09/19  1017 10/09/19  0952 10/09/19  0927  10/09/19  0537 10/08/19  0229 10/06/19  0613 10/05/19  0513 10/04/19  0507   WBC Thousand/uL 12 97*  --   --   --   --   --   --   --   --   --   --   --   --   --   --  12 23* 11 05*  --  8 97 9 88   HEMOGLOBIN g/dL 9 5*  --   --  9 8*  --   --  8 8*  --   --   --   --   --   --   --   --  8 5* 8 2*  --  7 9* 7 1*   I STAT HEMOGLOBIN g/dl  --   --   --   --   --  8 2*  --  9 2* 8 5* 7 5*  --  6 8* 6 1* 6 8*   < >  --   --   --   --   --    HEMATOCRIT % 30 3*  --   --  30 7*  --   --  27 2*  --   --   --   --   --   --   --   --  27 7* 26 2*  --  25 5* 22 8*   HEMATOCRIT, ISTAT %  --   --   --   --   --  24*  --  27* 25* 22*  --  20* 18* 20*   < >  --   --   --   --   --    PLATELETS Thousands/uL 140*  --   --   --   --   --  139*  --   --   --  187  --   --   --   --  271 220  --  225 226   NEUTROS PCT % 87*  --   --   --   --   --   --   --   --   --   --   --   --   --   --  80*  --   --   --  85*   LYMPHS PCT % 6*  --   --   --   --   --   --   --   --   --   --   --   --   --   --  12*  --   --   --  9*   MONOS PCT % 6  --   --   --   --   --   --   --   --   --   --   --   --   --   --  5  --   --   --  4   EOS PCT % 0  --   --   --   --   --   --   --   --   --   --   --   --   --   --  1  --   --   --  1   POTASSIUM mmol/L  --  5 1  --  4 9 4 4  --  4 0  --   --   --   --   --   --   --   --  4 4 3 4* 3 6 3 8 3 6   CHLORIDE mmol/L  --  115*  --  115*  --   --  115*  --   --   --   --   --   --   --   --  108 109* 108 105 108   CO2 mmol/L  --  21  --  20*  --   --  21  --   --   --   --   --   --   --   --  24 24 24 27 25   CO2, I-STAT mmol/L  --   --   --   --   --  23  --  24 26 28  --  31 24 25   < >  --   -- --   --   --    BUN mg/dL  --  38*  --  32*  --   --  33*  --   --   --   --   --   --   --   --  35* 31* 29* 31* 29*   CREATININE mg/dL  --  1 81*  --  1 74*  --   --  1 69*  --   --   --   --   --   --   --   --  1 68* 1 51* 1 47* 1 71* 1 51*   CALCIUM mg/dL  --  8 3  --  8 2*  --   --  7 8*  --   --   --   --   --   --   --   --  9 2 8 7 8 8 8 6 8 4   ALK PHOS U/L  --   --   --   --   --   --   --   --   --   --   --   --   --   --   --   --   --   --   --  89   ALT U/L  --   --   --   --   --   --   --   --   --   --   --   --   --   --   --   --   --   --   --  12   AST U/L  --   --   --   --   --   --   --   --   --   --   --   --   --   --   --   --   --   --   --  8   GLUCOSE, ISTAT mg/dl  --   --   --   --   --  103  --  126 189* 193*  --  206* 192* 168*   < >  --   --   --   --   --    MAGNESIUM mg/dL  --   --  2 6  --   --   --   --   --   --   --   --   --   --   --   --   --  1 9  --   --   --    PHOSPHORUS mg/dL  --   --   --   --   --   --   --   --   --   --   --   --   --   --   --   --  3 4  --   --   --     < > = values in this interval not displayed  CUTURES:  Lab Results   Component Value Date    BLOODCX No Growth After 4 Days  10/06/2019    BLOODCX No Growth After 4 Days  10/06/2019    BLOODCX No Growth After 5 Days  10/03/2019    BLOODCX No Growth After 5 Days  10/03/2019    BLOODCX No Growth After 5 Days  10/01/2019    BLOODCX No Growth After 5 Days  10/01/2019                 Portions of the record may have been created with voice recognition software  Occasional wrong word or "sound a like" substitutions may have occurred due to the inherent limitations of voice recognition software  Read the chart carefully and recognize, using context, where substitutions have occurred  If you have any questions, please contact the dictating provider

## 2019-10-10 NOTE — RESPIRATORY THERAPY NOTE
RT Ventilator Management Note  Nadir Amos 44 y o  male MRN: [id number]  Unit/Bed#: [facility] 416-01 Encounter: [id number]      Daily Screen     No data found in the last 10 encounters  Physical Exam:   Assessment Type: (P) Assess only  General Appearance: (P) Awake  Respiratory Pattern: (P) Symmetrical, Assisted  Chest Assessment: (P) Chest expansion symmetrical  Bilateral Breath Sounds: (P) Clear, Diminished  O2 Device: vent      Resp Comments: (P) Pt resting quietly on PSV settings  PSV decreased to 5cm, from 8cm  RSBI remains elevated, and occasionally >100  Plan for BiCarb, then a repeat ABG at noon, to help decide for potential extubation  No pulmonary history

## 2019-10-10 NOTE — OCCUPATIONAL THERAPY NOTE
Occupational Therapy Cancellation Note        Patient Name: Christie Tabares  GQINF'[id number] Date: 10/10/2019      OT orders received, chart reviewed  Pt is currently intubated-will hold OT assessment at this time  OT will continue to follow and see as medically appropriate       RENEE Tam, OTR/L

## 2019-10-11 PROBLEM — E83.39 HYPERPHOSPHATEMIA: Status: ACTIVE | Noted: 2019-10-11

## 2019-10-11 LAB
ABO GROUP BLD BPU: NORMAL
ANION GAP SERPL CALCULATED.3IONS-SCNC: 9 MMOL/L (ref 4–13)
ATRIAL RATE: 86 BPM
BACTERIA BLD CULT: NORMAL
BACTERIA BLD CULT: NORMAL
BASOPHILS # BLD AUTO: 0.05 THOUSANDS/ΜL (ref 0–0.1)
BASOPHILS NFR BLD AUTO: 0 % (ref 0–1)
BPU ID: NORMAL
BUN SERPL-MCNC: 42 MG/DL (ref 5–25)
CALCIUM SERPL-MCNC: 8 MG/DL (ref 8.3–10.1)
CHLORIDE SERPL-SCNC: 108 MMOL/L (ref 100–108)
CO2 SERPL-SCNC: 25 MMOL/L (ref 21–32)
CREAT SERPL-MCNC: 1.85 MG/DL (ref 0.6–1.3)
CROSSMATCH: NORMAL
EOSINOPHIL # BLD AUTO: 0.09 THOUSAND/ΜL (ref 0–0.61)
EOSINOPHIL NFR BLD AUTO: 1 % (ref 0–6)
ERYTHROCYTE [DISTWIDTH] IN BLOOD BY AUTOMATED COUNT: 18.6 % (ref 11.6–15.1)
GFR SERPL CREATININE-BSD FRML MDRD: 45 ML/MIN/1.73SQ M
GLUCOSE SERPL-MCNC: 108 MG/DL (ref 65–140)
GLUCOSE SERPL-MCNC: 111 MG/DL (ref 65–140)
GLUCOSE SERPL-MCNC: 114 MG/DL (ref 65–140)
GLUCOSE SERPL-MCNC: 125 MG/DL (ref 65–140)
GLUCOSE SERPL-MCNC: 139 MG/DL (ref 65–140)
HCT VFR BLD AUTO: 28.6 % (ref 36.5–49.3)
HGB BLD-MCNC: 8.8 G/DL (ref 12–17)
IMM GRANULOCYTES # BLD AUTO: 0.12 THOUSAND/UL (ref 0–0.2)
IMM GRANULOCYTES NFR BLD AUTO: 1 % (ref 0–2)
LYMPHOCYTES # BLD AUTO: 0.76 THOUSANDS/ΜL (ref 0.6–4.47)
LYMPHOCYTES NFR BLD AUTO: 5 % (ref 14–44)
MAGNESIUM SERPL-MCNC: 2.2 MG/DL (ref 1.6–2.6)
MCH RBC QN AUTO: 28.7 PG (ref 26.8–34.3)
MCHC RBC AUTO-ENTMCNC: 30.8 G/DL (ref 31.4–37.4)
MCV RBC AUTO: 93 FL (ref 82–98)
MONOCYTES # BLD AUTO: 0.92 THOUSAND/ΜL (ref 0.17–1.22)
MONOCYTES NFR BLD AUTO: 6 % (ref 4–12)
NEUTROPHILS # BLD AUTO: 12.39 THOUSANDS/ΜL (ref 1.85–7.62)
NEUTS SEG NFR BLD AUTO: 87 % (ref 43–75)
NRBC BLD AUTO-RTO: 0 /100 WBCS
P AXIS: 254 DEGREES
PHOSPHATE SERPL-MCNC: 4.9 MG/DL (ref 2.7–4.5)
PLATELET # BLD AUTO: 132 THOUSANDS/UL (ref 149–390)
PMV BLD AUTO: 9.6 FL (ref 8.9–12.7)
POTASSIUM SERPL-SCNC: 4.2 MMOL/L (ref 3.5–5.3)
PR INTERVAL: 146 MS
QRS AXIS: 58 DEGREES
QRSD INTERVAL: 92 MS
QT INTERVAL: 371 MS
QTC INTERVAL: 444 MS
RBC # BLD AUTO: 3.07 MILLION/UL (ref 3.88–5.62)
SODIUM SERPL-SCNC: 142 MMOL/L (ref 136–145)
T WAVE AXIS: 28 DEGREES
UNIT DISPENSE STATUS: NORMAL
UNIT PRODUCT CODE: NORMAL
UNIT RH: NORMAL
VENTRICULAR RATE: 86 BPM
WBC # BLD AUTO: 14.33 THOUSAND/UL (ref 4.31–10.16)

## 2019-10-11 PROCEDURE — 82948 REAGENT STRIP/BLOOD GLUCOSE: CPT

## 2019-10-11 PROCEDURE — G8988 SELF CARE GOAL STATUS: HCPCS

## 2019-10-11 PROCEDURE — 97535 SELF CARE MNGMENT TRAINING: CPT

## 2019-10-11 PROCEDURE — 99233 SBSQ HOSP IP/OBS HIGH 50: CPT | Performed by: INTERNAL MEDICINE

## 2019-10-11 PROCEDURE — 93010 ELECTROCARDIOGRAM REPORT: CPT | Performed by: INTERNAL MEDICINE

## 2019-10-11 PROCEDURE — 80048 BASIC METABOLIC PNL TOTAL CA: CPT | Performed by: THORACIC SURGERY (CARDIOTHORACIC VASCULAR SURGERY)

## 2019-10-11 PROCEDURE — 97110 THERAPEUTIC EXERCISES: CPT

## 2019-10-11 PROCEDURE — 94760 N-INVAS EAR/PLS OXIMETRY 1: CPT

## 2019-10-11 PROCEDURE — NS001 PR NO SIGNATURE OR ATTESTATION: Performed by: PLASTIC SURGERY

## 2019-10-11 PROCEDURE — 83735 ASSAY OF MAGNESIUM: CPT | Performed by: THORACIC SURGERY (CARDIOTHORACIC VASCULAR SURGERY)

## 2019-10-11 PROCEDURE — G8987 SELF CARE CURRENT STATUS: HCPCS

## 2019-10-11 PROCEDURE — 84100 ASSAY OF PHOSPHORUS: CPT | Performed by: THORACIC SURGERY (CARDIOTHORACIC VASCULAR SURGERY)

## 2019-10-11 PROCEDURE — 97163 PT EVAL HIGH COMPLEX 45 MIN: CPT

## 2019-10-11 PROCEDURE — 93005 ELECTROCARDIOGRAM TRACING: CPT

## 2019-10-11 PROCEDURE — 99233 SBSQ HOSP IP/OBS HIGH 50: CPT | Performed by: EMERGENCY MEDICINE

## 2019-10-11 PROCEDURE — 99024 POSTOP FOLLOW-UP VISIT: CPT | Performed by: THORACIC SURGERY (CARDIOTHORACIC VASCULAR SURGERY)

## 2019-10-11 PROCEDURE — G8979 MOBILITY GOAL STATUS: HCPCS

## 2019-10-11 PROCEDURE — 99232 SBSQ HOSP IP/OBS MODERATE 35: CPT | Performed by: INTERNAL MEDICINE

## 2019-10-11 PROCEDURE — 97167 OT EVAL HIGH COMPLEX 60 MIN: CPT

## 2019-10-11 PROCEDURE — G8978 MOBILITY CURRENT STATUS: HCPCS

## 2019-10-11 PROCEDURE — 85025 COMPLETE CBC W/AUTO DIFF WBC: CPT | Performed by: NURSE PRACTITIONER

## 2019-10-11 RX ORDER — TEMAZEPAM 15 MG/1
15 CAPSULE ORAL
Status: DISCONTINUED | OUTPATIENT
Start: 2019-10-11 | End: 2019-10-15 | Stop reason: HOSPADM

## 2019-10-11 RX ORDER — HYDRALAZINE HYDROCHLORIDE 20 MG/ML
5 INJECTION INTRAMUSCULAR; INTRAVENOUS ONCE
Status: DISCONTINUED | OUTPATIENT
Start: 2019-10-11 | End: 2019-10-12

## 2019-10-11 RX ORDER — DOCUSATE SODIUM 100 MG/1
100 CAPSULE, LIQUID FILLED ORAL 2 TIMES DAILY
Status: DISCONTINUED | OUTPATIENT
Start: 2019-10-11 | End: 2019-10-13

## 2019-10-11 RX ORDER — FUROSEMIDE 10 MG/ML
40 INJECTION INTRAMUSCULAR; INTRAVENOUS
Status: DISCONTINUED | OUTPATIENT
Start: 2019-10-11 | End: 2019-10-14

## 2019-10-11 RX ADMIN — OXYCODONE HYDROCHLORIDE AND ACETAMINOPHEN 2 TABLET: 5; 325 TABLET ORAL at 05:29

## 2019-10-11 RX ADMIN — OXYCODONE HYDROCHLORIDE AND ACETAMINOPHEN 2 TABLET: 5; 325 TABLET ORAL at 23:04

## 2019-10-11 RX ADMIN — FUROSEMIDE 40 MG: 10 INJECTION, SOLUTION INTRAMUSCULAR; INTRAVENOUS at 09:44

## 2019-10-11 RX ADMIN — Medication 1 APPLICATION: at 08:23

## 2019-10-11 RX ADMIN — ASPIRIN 325 MG ORAL TABLET 325 MG: 325 PILL ORAL at 08:23

## 2019-10-11 RX ADMIN — POLYETHYLENE GLYCOL 3350 17 G: 17 POWDER, FOR SOLUTION ORAL at 08:23

## 2019-10-11 RX ADMIN — OXYCODONE HYDROCHLORIDE AND ACETAMINOPHEN 2 TABLET: 5; 325 TABLET ORAL at 16:53

## 2019-10-11 RX ADMIN — Medication 1 APPLICATION: at 21:35

## 2019-10-11 RX ADMIN — FUROSEMIDE 40 MG: 10 INJECTION, SOLUTION INTRAMUSCULAR; INTRAVENOUS at 15:06

## 2019-10-11 RX ADMIN — AMIODARONE HYDROCHLORIDE 200 MG: 200 TABLET ORAL at 15:07

## 2019-10-11 RX ADMIN — AMIODARONE HYDROCHLORIDE 200 MG: 200 TABLET ORAL at 06:25

## 2019-10-11 RX ADMIN — AMIODARONE HYDROCHLORIDE 200 MG: 200 TABLET ORAL at 21:35

## 2019-10-11 RX ADMIN — METOPROLOL TARTRATE 37.5 MG: 25 TABLET ORAL at 09:42

## 2019-10-11 RX ADMIN — HYDROMORPHONE HYDROCHLORIDE 1 MG: 1 INJECTION, SOLUTION INTRAMUSCULAR; INTRAVENOUS; SUBCUTANEOUS at 06:25

## 2019-10-11 RX ADMIN — ESCITALOPRAM OXALATE 10 MG: 10 TABLET ORAL at 09:43

## 2019-10-11 RX ADMIN — CEFTAZIDIME 2000 MG: 1 INJECTION, POWDER, FOR SOLUTION INTRAMUSCULAR; INTRAVENOUS at 03:09

## 2019-10-11 RX ADMIN — PANTOPRAZOLE SODIUM 40 MG: 40 TABLET, DELAYED RELEASE ORAL at 08:22

## 2019-10-11 RX ADMIN — HYDROMORPHONE HYDROCHLORIDE 1 MG: 1 INJECTION, SOLUTION INTRAMUSCULAR; INTRAVENOUS; SUBCUTANEOUS at 02:34

## 2019-10-11 RX ADMIN — CEFTAZIDIME 2000 MG: 1 INJECTION, POWDER, FOR SOLUTION INTRAMUSCULAR; INTRAVENOUS at 15:08

## 2019-10-11 RX ADMIN — DOCUSATE SODIUM 100 MG: 100 CAPSULE, LIQUID FILLED ORAL at 17:49

## 2019-10-11 RX ADMIN — METOPROLOL TARTRATE 37.5 MG: 25 TABLET ORAL at 21:35

## 2019-10-11 RX ADMIN — SILVER NITRATE APPLICATORS 1 APPLICATOR: 25; 75 STICK TOPICAL at 20:08

## 2019-10-11 RX ADMIN — SODIUM CHLORIDE 2 MG/HR: 0.9 INJECTION, SOLUTION INTRAVENOUS at 01:38

## 2019-10-11 RX ADMIN — ATORVASTATIN CALCIUM 80 MG: 80 TABLET, FILM COATED ORAL at 16:50

## 2019-10-11 RX ADMIN — FONDAPARINUX SODIUM 2.5 MG: 2.5 INJECTION, SOLUTION SUBCUTANEOUS at 08:23

## 2019-10-11 NOTE — RESPIRATORY THERAPY NOTE
respiratory      10/11/19 1953   Respiratory Assessment   Resp Comments Pt refusing to perform IS  He states he has only been able to perform 250ml today  I spoke with him about the improtance of preventing atelectasis  His bs are very decreased  He is aware if he is unable to perform tomorrow we may need to change to a device better able to help him     Additional Assessments   SpO2 95 %

## 2019-10-11 NOTE — TREATMENT PLAN
Procedure: Chest Tube Removal    10/11/19    Mediastinal CT x 2 and pleural chest tubes split d/c'd in typical fashion  Patient tolerated procedure well  No immediate complications  Acticote Dressing applied  Patient's nurse was made aware  Procedure: Epicardial Wire Removal    10/11/19    Patient was returned to bed  EPW x 2 d/c'd in typical fashion  No immediate complications  Site dressed with dry sterile dressing  Patient and nurse aware of mandatory 1 hour bedrest protocol  Vital signs ordered Q 15 minutes for one hour as per protocol      Ramiro Mensah PA-C

## 2019-10-11 NOTE — PROGRESS NOTES
Progress Note - General Surgery   Nick Gonzalez 44 y o  male MRN: [id number]  Unit/Bed#: [facility] 416-01 Encounter: [id number]    Assessment:  POD2 MVR and bilateral pectoralis advancement flap  Patient is doing well  Pain controlled  Plan:  - general management per ICU and cardiothoracic surgery  - keep SHERICE  - keep Prevena wound vac until POD7    Subjective/Objective     Subjective:   No acute overnight event  Pain controlled  Extubated yesterday  Objective:       Blood pressure 110/56, pulse 80, temperature 97 9 °F (36 6 °C), temperature source Probe, resp  rate 19, height 5' 3" (1 6 m), weight 77 7 kg (171 lb 4 8 oz), SpO2 100 %  ,Body mass index is 30 34 kg/m²  Intake/Output Summary (Last 24 hours) at 10/11/2019 1237  Last data filed at 10/11/2019 1058  Gross per 24 hour   Intake 466 99 ml   Output 2473 ml   Net -2006 01 ml       Invasive Devices     Central Venous Catheter Line            CVC Central Lines 10/09/19 Triple 2 days    Introducer 10/09/19 2 days          Peripheral Intravenous Line            Peripheral IV 10/08/19 Proximal;Right;Ventral (anterior) Forearm 3 days          Arterial Line            Arterial Line 10/09/19 Right Brachial 2 days          Line            Pacer Wires 2 days    Pacer Wires 2 days          Drain            Chest Tube 1 Left Pleural 28 Fr  2 days    Chest Tube 2 Anterior Mediastinal 32 Fr  2 days    Chest Tube 3 Posterior Mediastinal 32 Fr  2 days    Chest Tube 4 Right Pleural 28 Fr  2 days    Urethral Catheter Non-latex; Temperature probe 16 Fr  2 days    Closed/Suction Drain Left Chest Bulb 15 Fr  1 day    Closed/Suction Drain Right Chest Bulb 15 Fr  1 day    Negative Pressure Wound Therapy (V A C ) Chest Medial 1 day                Physical Exam:   Sitting in chair  NAD  Prevena in place over the sternum  JPx2 serosanguinous  No palpable subcutaneous hematoma    Lab, Imaging and other studies:  CBC:   Lab Results   Component Value Date    WBC 14 33 (H) 10/11/2019    HGB 8 8 (L) 10/11/2019    HCT 28 6 (L) 10/11/2019    MCV 93 10/11/2019     (L) 10/11/2019    MCH 28 7 10/11/2019    MCHC 30 8 (L) 10/11/2019    RDW 18 6 (H) 10/11/2019    MPV 9 6 10/11/2019    NRBC 0 10/11/2019   , CMP:   Lab Results   Component Value Date    SODIUM 142 10/11/2019    K 4 2 10/11/2019     10/11/2019    CO2 25 10/11/2019    BUN 42 (H) 10/11/2019    CREATININE 1 85 (H) 10/11/2019    CALCIUM 8 0 (L) 10/11/2019    EGFR 45 10/11/2019     VTE Pharmacologic Prophylaxis: Fondaparinux (Arixtra)

## 2019-10-11 NOTE — PROGRESS NOTES
Progress Note - Critical Care   Carlynn Cabot 44 y o  male MRN: [id number]  Unit/Bed#: [facility] 416-01 Encounter: [id number]    Attending Physician: Gregorio Marcus MD    24 Hour Events: POD # 2 s/p bioprosthetic MVR, B/L myocutaneous pectoralis major flap  Patient weaned off of Milrinone yesterday afternoon  High SVR overnight, low CI started on cardene 2 with improvement       Consultants:   ID  Plastic Surgery     Allergies: No Known Allergies    Medications:   Scheduled Meds:  Current Facility-Administered Medications:  acetaminophen 650 mg Rectal Q4H PRN REY Hdz-C    acetaminophen 650 mg Oral Q4H PRN REY Hdz-PAU    amiodarone 200 mg Oral Q8H REY Barone-C    aspirin 325 mg Oral Daily REY Hdz-C    atorvastatin 80 mg Oral Daily With Dinner REY Hdz-PAU    bisacodyl 10 mg Rectal Daily PRN REY Hdz-PAU    cefTAZidime 2,000 mg Intravenous Q8H Nico Simpson PA-C Last Rate: 2,000 mg (10/11/19 0309)   chlorhexidine 15 mL Swish & Spit BID Nico Simpson PA-C    escitalopram 10 mg Oral Daily Nico Simpson PA-C    fentanyl citrate (PF) 50 mcg Intravenous Q1H PRN REY Hdz-C    fondaparinux 2 5 mg Subcutaneous Daily REY Hdz-PAU    HYDROmorphone 0 5 mg Intravenous Q1H PRN REY Hdz-PAU    HYDROmorphone 1 mg Intravenous Q1H PRN REY Hdz-C    hydrOXYzine HCL 25 mg Oral Q8H PRN REY Hdz-PAU    insulin lispro 1-5 Units Subcutaneous HS JULIÁN Hyde    insulin lispro 1-6 Units Subcutaneous TID AC JULIÁN Hyde    [START ON 10/12/2019] levofloxacin 750 mg Oral Q48H Imani Mckinney MD    lidocaine (cardiac) 100 mg Intravenous Q30 Min PRN Nico Simpson PA-C    metoprolol tartrate 25 mg Oral Q12H [facility] Chitra Marques PA-C    milrinone (PRIMACOR) infusion 0 25 mcg/kg/min Intravenous Continuous Meagan JULIÁN Quinteros Last Rate: Stopped (10/10/19 4532)   mupirocin 1 application Nasal H02W [address], PA-C    niCARdipine 1-15 mg/hr Intravenous Titrated Rise Salazar, CRKRISTOPHER Last Rate: 2 mg/hr (10/11/19 0138)   ondansetron 4 mg Intravenous Q6H PRN Estela Hammed, PA-C    oxyCODONE-acetaminophen 1 tablet Oral Q4H PRN Estela Hammed, PA-C    oxyCODONE-acetaminophen 2 tablet Oral Q6H PRN Estela Hammed, PA-C    pantoprazole 40 mg Oral Daily Estela Hammed, PA-C    polyethylene glycol 17 g Oral Daily Estela Hammed, PA-C    potassium chloride 20 mEq Intravenous Once PRN Estela Hammed, PA-C    potassium chloride 20 mEq Intravenous Q1H PRN Estela Hammed, PA-C    potassium chloride 20 mEq Intravenous Q30 Min PRN Estela Hammed, PA-C        VTE Pharmacologic Prophylaxis: Fondaparinux (Arixtra)  VTE Mechanical Prophylaxis: sequential compression device    Continuous Infusions:  milrinone (PRIMACOR) infusion 0 25 mcg/kg/min Last Rate: Stopped (10/10/19 1649)   niCARdipine 1-15 mg/hr Last Rate: 2 mg/hr (10/11/19 0138)     PRN Meds:    acetaminophen 650 mg Q4H PRN   acetaminophen 650 mg Q4H PRN   bisacodyl 10 mg Daily PRN   fentanyl citrate (PF) 50 mcg Q1H PRN   HYDROmorphone 0 5 mg Q1H PRN   HYDROmorphone 1 mg Q1H PRN   hydrOXYzine HCL 25 mg Q8H PRN   lidocaine (cardiac) 100 mg Q30 Min PRN   ondansetron 4 mg Q6H PRN   oxyCODONE-acetaminophen 1 tablet Q4H PRN   oxyCODONE-acetaminophen 2 tablet Q6H PRN   potassium chloride 20 mEq Once PRN   potassium chloride 20 mEq Q1H PRN   potassium chloride 20 mEq Q30 Min PRN       Home Medications:   Prior to Admission medications    Not on File       Vitals:   Vitals:    10/11/19 0100 10/11/19 0200 10/11/19 0300 10/11/19 0400   BP:       BP Location:       Pulse: 80 80 80 82   Resp: (!) 43 (!) 23 20 22   Temp:    97 7 °F (36 5 °C)   TempSrc:    Probe   SpO2: 100% 100% 99% 93%   Weight:       Height:           Arterial Line:  Arterial Line BP: 108/62  Arterial Line MAP (mmHg): 80 mmHg    Tele Rhythm: NSR This was personally reviewed by myself  Respiratory:  SpO2: SpO2: 93 %  O2 Flow Rate (L/min): 1 L/min    Temperature: Temp (24hrs), Av 6 °F (37 °C), Min:97 7 °F (36 5 °C), Max:99 1 °F (37 3 °C)  Current: Temperature: 97 7 °F (36 5 °C)    Weights:   Weight (last 2 days)     Date/Time   Weight    10/10/19 0546   78 6 (173 28)    10/09/19 0547   74 5 (164 24)            IBW: 56 9 kg  Body mass index is 30 7 kg/m²  Hemodynamic Monitoring:  N/A  PAP: (35-52)/(15-28) 41/18  CO:  [3 4 L/min-6 9 L/min] 5 3 L/min  CI:  [1 9 L/min/m2-3 8 L/min/m2] 2 9 L/min/m2    Intake and Outputs:    Intake/Output Summary (Last 24 hours) at 10/11/2019 0628  Last data filed at 10/11/2019 0400  Gross per 24 hour   Intake 390 06 ml   Output 2663 ml   Net -2272 94 ml     I/O last 24 hours: In:  4 [I V :1044 4; NG/GT:190; IV Piggyback:850]  Out:  [Urine:2520; Drains:93; Chest Tube:860]    UOP: 150cc/hour   BM: 1 in the last 24 hours    Chest tube Output:  Mediastinal tubes: 30 mL/8 hours  200 mL/24 hours   SHERICE Drains: R 40 L70 mL/8 hours  R40 L380 mL/24 hours       Labs:  Results from last 7 days   Lab Units 10/11/19  0447 10/10/19  0430 10/09/19  2033  10/09/19  1409  10/09/19  0537   WBC Thousand/uL 14 33* 12 97*  --   --   --   --  12 23*   HEMOGLOBIN g/dL 8 8* 9 5* 9 8*  --  8 8*  --  8 5*   I STAT HEMOGLOBIN   --   --   --    < >  --    < >  --    HEMATOCRIT % 28 6* 30 3* 30 7*  --  27 2*  --  27 7*   HEMATOCRIT, ISTAT   --   --   --    < >  --    < >  --    PLATELETS Thousands/uL 132* 140*  --   --  139*   < > 271   NEUTROS PCT % 87* 87*  --   --   --   --  80*   MONOS PCT % 6 6  --   --   --   --  5    < > = values in this interval not displayed       Results from last 7 days   Lab Units 10/11/19  0437 10/10/19  2228 10/10/19  0401  10/09/19  1410  10/09/19  1255 10/09/19  1120   SODIUM mmol/L 142 140 143   < >  --    < >  --   --    POTASSIUM mmol/L 4 2 4 7 5 1   < >  --    < >  --   --    CHLORIDE mmol/L 108 108 115*   < >  --    < >  -- --    CO2 mmol/L 25 23 21   < >  --    < >  --   --    CO2, I-STAT mmol/L  --   --   --   --  23  --  24 26   BUN mg/dL 42* 43* 38*   < >  --    < >  --   --    CREATININE mg/dL 1 85* 2 03* 1 81*   < >  --    < >  --   --    CALCIUM mg/dL 8 0* 8 5 8 3   < >  --    < >  --   --    GLUCOSE, ISTAT mg/dl  --   --   --   --  103  --  126 189*    < > = values in this interval not displayed  Baseline Creat: 1 5    Results from last 7 days   Lab Units 10/11/19  0437 10/10/19  0400 10/08/19  0229   MAGNESIUM mg/dL 2 2 2 6 1 9     Results from last 7 days   Lab Units 10/11/19  0437 10/08/19  0229   PHOSPHORUS mg/dL 4 9* 3 4      Results from last 7 days   Lab Units 10/10/19  0446 10/09/19  1409   INR  1 46* 1 70*   PTT seconds  --  44*             Results from last 7 days   Lab Units 10/10/19  1133 10/10/19  0359 10/09/19  2322 10/09/19  1746   PH ART  7 382 7 380 7 363 7 362   PCO2 ART mm Hg 38 2 34 2* 34 8* 39 1   PO2 ART mm Hg 144 0* 142 3* 119 3 165 0*   HCO3 ART mmol/L 22 2 19 8* 19 4* 21 7*   BASE EXC ART mmol/L -2 6 -4 7 -5 3 -3 4   ABG SOURCE  Line, Arterial Line, Arterial Line, Arterial Line, Arterial         No results found for: VANCNBAOUGH                 Micro:   Blood Culture:   Lab Results   Component Value Date    BLOODCX No Growth After 4 Days  10/06/2019    BLOODCX No Growth After 4 Days  10/06/2019    BLOODCX No Growth After 5 Days  10/03/2019    BLOODCX No Growth After 5 Days  10/03/2019    BLOODCX No Growth After 5 Days  10/01/2019    BLOODCX No Growth After 5 Days  10/01/2019     Urine Culture: No results found for: URINECX  Sputum Culture: No components found for: SPUTUMCX  Wound Culure: No results found for: WOUNDCULT  Results from last 7 days   Lab Units 10/09/19  0952 10/06/19  5890   BLOOD CULTURE   --  No Growth After 4 Days  No Growth After 4 Days     GRAM STAIN RESULT  No Polys or Bacteria seen  --            Nutrition:        Diet Orders   (From admission, onward)             Start Ordered    10/10/19 0000  Diet Cardiovascular; Cardiac; Fluid Restriction 1800 ML  Diet effective 0500     Question Answer Comment   Diet Type Cardiovascular    Cardiac Cardiac    Other Restriction(s): Fluid Restriction 1800 ML    RD to adjust diet per protocol? Yes        10/09/19 1359                  Physical Exam:    General Appearance:  WNWD, NAD  HENT: Atraumatic without obvious abnormality  Neck: No JVD  RIJ CVC in place  Eyes: No icterus  Cardiac: regular rate and regular rhythm, no murmur, no rub  Pulmonary: clear to auscultation bilaterally  Gastrointestinal: soft, no distention, non-tender  : Santiago present: yes   Musculoskeletal: No edema bilaterally  Neuro:  AAOx4  Psych: Mood and affect appropriate  Skin: warm, dry  Incisions: Sternal incision dressed with wound vac    Invasive lines and devices: Invasive Devices     Central Venous Catheter Line            CVC Central Lines 10/09/19 Triple 1 day    Introducer 10/09/19 1 day          Peripheral Intravenous Line            Peripheral IV 10/08/19 Proximal;Right;Ventral (anterior) Forearm 3 days          Arterial Line            Arterial Line 10/09/19 Right Brachial 1 day          Line            Pacer Wires 1 day    Pacer Wires 1 day          Drain            Chest Tube 1 Left Pleural 28 Fr  1 day    Chest Tube 2 Anterior Mediastinal 32 Fr  1 day    Chest Tube 3 Posterior Mediastinal 32 Fr  1 day    Chest Tube 4 Right Pleural 28 Fr  1 day    Closed/Suction Drain Left Chest Bulb 15 Fr  1 day    Closed/Suction Drain Right Chest Bulb 15 Fr  1 day    Negative Pressure Wound Therapy (V A C ) Chest Medial 1 day    Urethral Catheter Non-latex; Temperature probe 16 Fr  1 day                Assessment:  Principal Problem:    Endocarditis  Active Problems:    History of intravenous drug abuse ([facility] [address] )    Acute kidney injury ([facility] [address] )    Insomnia    Severe mitral regurgitation    Bacteremia due to Pseudomonas    Anxiety Solitary left kidney    Right parietal lobe lesion    Bilateral pleural effusion    Acute blood loss as cause of postoperative anemia    S/P MVR (mitral valve replacement)    Acute postoperative pulmonary insufficiency (HCC)    Hypotension    Thrombocytopenia (HCC)      Plan:    Neuro:   · Pain controlled with: Percocet  · Regulate sleep/wake cycle  · Delirium precautions  · CAM-ICU daily  · Trend neuro exam  · Parietal CVA w/ hemorrhagic conversion: Q4hr NC, neurologically intact, no focal deficit  · Hx IVDU: consider APS consult and possible suboxone  CV:   · Cardiac infusions: Cardene, 2 mg/hour  · Wean cardene as able  · MAP goal > 65 and CI >2 2  · Maintain [place] catheter  · maintain Arterial line  · Rhythm: NSR  · Follow rhythm on telemetry  · Lopressor 50 mg PO Q12  · Epicardial pacing wires no longer required  Remove today    Lung:   · Acute post-op pulmonary insufficiency; Requiring 2 Liters via nasal cannula, secondary to pulmonary vascular congestion  Continue incentive spirometry/coughing/deep breathing exercises    Wean supplemental oxygen as tolerated for saturation > 90%  · Wean NC as tolerated  · SpO2 goal >92%  · Pulmonary toileting with IS  · Chest tube output remains persistently high; Continue chest tubes to suction today    GI:   · Continue PPI for stress ulcer prophylaxis  · Continue bowel regimen  FEN:   · Diuretic plan: Lasix 40 mg IV q 12  · K-dur 20 mEQ PO q 12  · Goal 24 hour fluid balance: negative  · Nutrition/diet plan: cardiac diet  · Replete electrolytes with goals: K >4 0, Mag >2 0, and Phos >3 0  :   · Indwelling Santiago present: yes   · D/c Santiago  · Trend UOP and BUN/creat  · Strict I and O  ID:   · Pseudomonal Bacteremia/Endocarditis: continue ceftazidine, levaquin  · OR cultures pending, if negative can go with single agent  · Trend temps and WBC count  · Maintain normothermia  · Tylenol PRN for fevers  Heme:   · Trend hgb and plts  Endo:   · Glycemic control plan: SSI  MSK/Skin:  · Mobility goal: OOB to chair, ambulate as tolerated  · PT consult: yes  · OT consult: yes  · Frequent turning and pressure off-loading  · Local wound care as needed    Disposition:  Transfer to SD level 1    Code Status: Level 1 - Full Code      Collaborative bedside rounds performed with cardiac surgery attending and bedside RN      SIGNATURE: Toña Restrepo PA-C  DATE: October 11, 2019  TIME: 6:28 AM

## 2019-10-11 NOTE — PLAN OF CARE
Problem: OCCUPATIONAL THERAPY ADULT  Goal: Performs self-care activities at highest level of function for planned discharge setting  See evaluation for individualized goals  Description  Treatment Interventions: ADL retraining, Functional transfer training, Endurance training, Patient/family training, Compensatory technique education, Cardiac education, Energy conservation          See flowsheet documentation for full assessment, interventions and recommendations  Note:   Limitation: Decreased ADL status, Decreased endurance, Decreased high-level ADLs, Decreased self-care trans  Prognosis: Good  Assessment: Pt is a 44 y o  male admitted [facility] on 2019 w/ endocarditis s/p mitral valve replacement and myocutaneous pectoralis major advancement flaps on 10/9  Comorbidities include a h/o JULIENNE, IV drug abuse, anxiety, and R parietal lobe lesion  Pt with active OT orders and activity as tolerated orders  Pt resides in a 2 story home with 2 steps to enter with his mother  Pt is able to stay on the first floor if needed  Pt was I w/  ADLS and IADLS, (-) drove, & required no use of DME PTA  Currently pt is mod A for functional transfers, min A for functional mobility, mod A for UB ADLS and max A for LB ADLS  Pt is limited at this time 2*: pain, endurance, activity tolerance, functional mobility, forward functional reach, functional standing tolerance and decreased I w/ ADLS/IADLS  The following Occupational Performance Areas to address include: bathing/shower, dressing, functional mobility and clothing management  Pt scored overall  30/100 on the Barthel Index  Based on the aforementioned OT evaluation, functional performance deficits, and assessments, pt has been identified as a high complexity evaluation  From OT standpoint, anticipate d/c home with family support  Pt to continue to benefit from acute immediate OT services to address the following goals 3-5x/week to  w/in 7-10 days:        OT Discharge Recommendation: Home with family support  OT - OK to Discharge: Yes(When medically appropriate )

## 2019-10-11 NOTE — OCCUPATIONAL THERAPY NOTE
OccupationalTherapy Evaluation & Tx     Patient Name: Dariela Carbajal  OSGIX'F Date: 10/11/2019  Problem List  Principal Problem:    Endocarditis  Active Problems:    History of intravenous drug abuse ([facility] [address] )    Acute kidney injury ([facility] [address] )    Insomnia    Severe mitral regurgitation    Bacteremia due to Pseudomonas    Anxiety    Solitary left kidney    Right parietal lobe lesion    Bilateral pleural effusion    Acute blood loss as cause of postoperative anemia    S/P MVR (mitral valve replacement)    Acute postoperative pulmonary insufficiency (HCC)    Hypotension    Thrombocytopenia ([facility] [address] )    Hyperphosphatemia    Past Medical History  Past Medical History:   Diagnosis Date    Anxiety 10/1/2019    Bacteremia 10/1/2019    Endocarditis 10/1/2019    Hepatitis C     History of intravenous drug abuse ([facility] [address] ) 10/1/2019    Nonrheumatic mitral valve regurgitation 10/1/2019     Past Surgical History  Past Surgical History:   Procedure Laterality Date    CARDIAC CATHETERIZATION      CHEST WALL TUMOR EXCISION  2013    Anterior chest wall cyst removed    IR THORACENTESIS  10/2/2019    ID ECHO TRANSESOPHAG MONTR CARDIAC PUMP FUNCTJ N/A 10/9/2019    Procedure: TRANSESOPHAGEAL ECHOCARDIOGRAM (MARK);   Surgeon: Reyes Andrade MD;  [facility];  Service: Cardiac Surgery    ID MUSCLE-SKIN FLAP,TRUNK Bilateral 10/9/2019    Procedure: BILATERAL PECTORALIS MAJOR FLAP;  Surgeon: Reuben Colon MD;  [facility];  Service: Plastics    ID Luite Mark 87, < 50 CM N/A 10/9/2019    Procedure: APPLICATION VAC DRESSING;  Surgeon: Reuben Colon MD;  [facility];  Service: Plastics    ID REPLACEMENT OF MITRAL VALVE N/A 10/9/2019    Procedure: MITRAL VALVE REPLACEMENT WITH 27MM MORROW MAGNA MITRAL EASE PERICARDIAL BIOPROSTHESIS;  Surgeon: Reyes Andrade MD;  [facility];  Service: Cardiac Surgery         10/11/19 0941   Note Type   Note type Eval/Treat Restrictions/Precautions   Weight Bearing Precautions Per Order No   Other Precautions Cardiac/sternal;Pain; Fall Risk;O2;Telemetry;Multiple lines  [place] sheron catheter, a-line, JPs x2, VAC (sternal))   Pain Assessment   Pain Assessment 0-10   Pain Score 6   Pain Type Acute pain;Surgical pain   Hospital Pain Intervention(s) Repositioned; Ambulation/increased activity; Emotional support   Response to Interventions Tolerated    Home Living   Type of [address] Two level; Able to live on main level with bedroom/bathroom   Bathroom Shower/Tub Tub/shower unit   Bathroom Toilet Standard   Bathroom Accessibility Accessible   Additional Comments Pt lives with his mother in a 2 story home with 1 BRAD  Prior Function   Level of [place] Independent with ADLs and functional mobility   Lives With [facility] Help From Family   ADL Assistance Independent   IADLs Independent   Lifestyle   Autonomy I with ADLS/ IADLS PTA   Reciprocal Relationships Pt lives with his mother who is able to help as needed  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  ADL   Where Assessed Chair   Eating Assistance 7  Independent   Grooming Assistance 5  Supervision/Setup   UB Bathing Assistance 3  Moderate Assistance   LB Bathing Assistance 2  Maximal Assistance   UB Dressing Assistance 3  Moderate Assistance   LB Dressing Assistance 2  Maximal Assistance   Toileting Assistance  3  Moderate Assistance   Bed Mobility   Additional Comments Unable to assess, pt seated OOB in chair upon OT arrival  After OT session pt seated in chair with all needs within reach  Transfers   Sit to Stand 3  Moderate assistance   Additional items Assist x 1; Increased time required;Verbal cues   Stand to Sit 3  Moderate assistance   Additional items Assist x 1; Increased time required;Verbal cues   Functional Mobility   Functional Mobility 4  Minimal assistance   Additional Comments Pt still has swan sheron catheter in but was able to take a 4 steps forward and backwards with RW  Additional items Rolling walker   Balance   Static Sitting Fair -   Static Standing Poor +   Ambulatory Poor +   Activity Tolerance   Activity Tolerance Patient limited by fatigue;Patient limited by pain   Medical Staff Made Aware PT [place]   Nurse Made Aware RN confirmed okay to see pt and was present throughout session   Cognition   Overall Cognitive Status [facility]   Arousal/Participation Alert; Cooperative   Attention Attends with cues to redirect   Orientation Level Oriented to person;Oriented to place;Oriented to situation   Memory Decreased recall of precautions   Following Commands Follows one step commands without difficulty   Comments Pt presented with somewhat of a flat affect; was aprehensive to mobilize but cooperative and agreeable to try  Assessment   Limitation Decreased ADL status; Decreased endurance;Decreased high-level ADLs; Decreased self-care trans   Prognosis Good   Assessment Pt is a 44 y o  male admitted [facility] on [facility]/2019 w/ endocarditis s/p mitral valve replacement and myocutaneous pectoralis major advancement flaps on 10/9  Comorbidities include a h/o JULIENNE, IV drug abuse, anxiety, and R parietal lobe lesion  Pt with active OT orders and activity as tolerated orders  Pt resides in a 2 story home with 2 steps to enter with his mother  Pt is able to stay on the first floor if needed  Pt was I w/  ADLS and IADLS, (-) drove, & required no use of DME PTA  Currently pt is mod A for functional transfers, min A for functional mobility, mod A for UB ADLS and max A for LB ADLS  Pt is limited at this time 2*: pain, endurance, activity tolerance, functional mobility, forward functional reach, functional standing tolerance and decreased I w/ ADLS/IADLS  The following Occupational Performance Areas to address include: bathing/shower, dressing, functional mobility and clothing management  Pt scored overall  30/100 on the Barthel Index   Based on the aforementioned OT evaluation, functional performance deficits, and assessments, pt has been identified as a high complexity evaluation  From OT standpoint, anticipate d/c home with family support  Pt to continue to benefit from acute immediate OT services to address the following goals 3-5x/week to  w/in 7-10 days: [place] Goals   Patient Goals To return home    LTG Time Frame 7-10   Long Term Goal #1 See goals below    Plan   Treatment Interventions ADL retraining;Functional transfer training; Endurance training;Patient/family training; Compensatory technique education;Cardiac education; Energy conservation   Goal Expiration Date 10/21/19   OT Frequency 3-5x/wk   Additional Treatment Session   Start Time 0950   End Time 0100   Treatment Assessment Pt participated in additional OT session focusing on cardiac education  OT provided Pt w/ Recovering After Cardiac Surgery Packet and educated Pt regarding: sternal precautions, cardiac precautions, lifting restrictions, safe activity engagement, energy conservation, lifestyle modifications, stress management and cardiac rehabilitation programs  Pt's questions were addressed after discussion of the packet  Pt's family was present for education and their questions were addressed as well  Provided Pt w/ contact information regarding OT department if questions arise  Pt continues to benefit from inpatient skilled acute care OT services  Will continue to follow and address goals stated below      Additional Treatment Day 1   Recommendation   OT Discharge Recommendation Home with family support   OT - OK to Discharge Yes  (When medically appropriate )   Barthel Index   Feeding 5   Bathing 0   Grooming Score 0   Dressing Score 5   Bladder Score 0   Bowels Score 10   Toilet Use Score 5   Transfers (Bed/Chair) Score 5   Mobility (Level Surface) Score 0   Stairs Score 0   Barthel Index Score 30   Modified [place] Scale   Modified Angelica Scale 4     GOALS    1) Pt will increase activity tolerance to G for 30 min txment sessions    2) Pt will complete UB/LB dressing/self care w/ mod I using adaptive device and DME as needed    3) Pt will complete bathing w/ Mod I w/ use of AE and DME as needed    4) Pt will complete toileting w/ mod I w/ G hygiene/thoroughness using DME as needed    5) Pt will improve functional transfers to Mod I on/off all surfaces using DME as needed w/ G balance/safety     6) Pt will improve functional mobility during ADL/IADL/leisure tasks to Mod I using DME as needed w/ G balance/safety     7) Pt will participate in simulated IADL management task with DME as needed to increase independence to  w/ G safety and endurance    8) Pt will demonstrate G carryover of pt/caregiver education and training as appropriate w/ mod I w/o cues w/ good tolerance    9) Pt will demonstrate 100% carryover of energy conservation techniques w/ mod I t/o functional I/ADL/leisure tasks w/o cues s/p skilled education    10) Pt will engage in cardiac education using the Recovering After Cardiac Rehabilitation packet w/ G participation and G carryover      11) Pt will demonstrate 100% carryover of precautions s/p review w/o cues w/ mod I w/ G tolerance/participation t/o functional ADL/IADL/leisure tasks    Heladio Mendez, MOT, OTR/L

## 2019-10-11 NOTE — PHYSICAL THERAPY NOTE
Physical Therapy Evaluation     Patient's Name: Latanya Thao    Admitting Diagnosis  Endocarditis [I38]    Problem List  Patient Active Problem List   Diagnosis    Endocarditis    [facility] of [facility] [address] [facility]    Acute kidney injury ([facility] [address] )    Insomnia    Severe mitral regurgitation    Bacteremia due to Pseudomonas    Anxiety    Solitary left kidney    Right parietal lobe lesion    Bilateral pleural effusion    Acute blood loss as cause of postoperative anemia    S/P MVR (mitral valve replacement)    Acute postoperative pulmonary insufficiency (HCC)    Hypotension    Thrombocytopenia ([facility] [address] )       Past Medical History  Past Medical History:   Diagnosis Date    Anxiety 10/1/2019    Bacteremia 10/1/2019    Endocarditis 10/1/2019    Hepatitis C     History of intravenous drug abuse ([facility] [address] ) 10/1/2019    Nonrheumatic mitral valve regurgitation 10/1/2019       Past Surgical History  Past Surgical History:   Procedure Laterality Date    CARDIAC CATHETERIZATION      CHEST WALL TUMOR EXCISION  2013    Anterior chest wall cyst removed    IR THORACENTESIS  10/2/2019    MI ECHO TRANSESOPHAG MONTR CARDIAC PUMP FUNCTJ N/A 10/9/2019    Procedure: TRANSESOPHAGEAL ECHOCARDIOGRAM (MARK);   Surgeon: Alen Raymond MD;  [facility];  Service: Cardiac Surgery    MI MUSCLE-SKIN FLAP,TRUNK Bilateral 10/9/2019    Procedure: BILATERAL PECTORALIS MAJOR FLAP;  Surgeon: Carlos Neves MD;  [facility];  Service: Plastics    MI [address] 87, < 50 CM N/A 10/9/2019    Procedure: APPLICATION VAC DRESSING;  Surgeon: Carlos Neves MD;  [facility];  Service: Plastics    MI REPLACEMENT OF MITRAL VALVE N/A 10/9/2019    Procedure: MITRAL VALVE REPLACEMENT WITH 27MM MORROW MAGNA MITRAL EASE PERICARDIAL BIOPROSTHESIS;  Surgeon: Alen Raymond MD;  [facility];  Service: Cardiac Surgery          10/11/19 0940   Note Type   Note type Eval/Treat   Pain Assessment   Pain Assessment 0-10   Pain Score 6   Pain Type Acute pain;Surgical pain   Pain Location Incision; Chest   Pain Orientation Mid   Pain Descriptors Aching;Discomfort   Pain Frequency Intermittent   Pain Onset Ongoing   Clinical Progression Not changed   Effect of Pain on Daily Activities guarding   Patient's Stated Pain Goal No pain   Hospital Pain Intervention(s) Repositioned; Ambulation/increased activity; Distraction; Emotional support   Response to Interventions comfortable   Home Living   Type of [address]; Able to live on main level with bedroom/bathroom  (1st floor set-up w/ 1 small BRAD)   Prior Function   Level of [place] Independent with ADLs and functional mobility  (amb w/o AD)   Lives With Family  (mother)   Vocational   (works in a construction)   Restrictions/Precautions   Braces or Orthoses   (denies)   Other Precautions Cardiac/sternal;Multiple lines;Telemetry;O2;Fall Risk;Pain  (CT; [place] catheter; a-line; (B) JPs x 2; VAC (sternal))   General   Additional Pertinent History Cleared for assessment (spoke to dominguez); also spoke to [place], [place] w/ critical care re: clarification of additional precautions in light of (B) pectoralis flap; informed by RALPH there are no specific additional restrictions in terms of use of UE; cont w/ typical sternal precautions;    Family/Caregiver Present Yes   Cognition   Overall Cognitive Status WFL   Arousal/Participation Alert   Orientation Level Oriented to person;Oriented to place;Oriented to situation   Memory Decreased short term memory;Decreased recall of precautions   Following Commands Follows one step commands without difficulty   Comments Pt is in the chair; flat affect; somewhat apprehensive about mobilization but agreeable to try   RUE Assessment   RUE Assessment X  (shld not tested;)   LUE Assessment   LUE Assessment X  (shld not tested)   RLE Assessment   RLE Assessment WFL  (AROM)   Strength RLE   RLE Overall Strength   (fair + (grossly))   LLE Assessment   LLE Assessment WFL  (AROM)   Strength LLE   LLE Overall Strength   (fair + (grossly))   Transfers   Sit to Stand 3  Moderate assistance   Additional items Assist x 1; Increased time required;Verbal cues  (stand by (A) of 2 more staff for lines)   Stand to Sit 3  Moderate assistance   Additional items Assist x 1;Verbal cues  (stand by (A) of 2 more staff for lines)   Ambulation/Elevation   Gait pattern Excessively slow; Short stride; Inconsistent gopi   Gait Assistance 4  Minimal assist   Additional items Assist x 1;Verbal cues; Tactile cues  (stand by (A) of 2 more staff for lines)   Assistive Device Rolling walker   Distance 3 x 2 ft total distance at bedside; limited by lines and overall endurance  Balance   Static Sitting Fair -   Static Standing Poor +   Ambulatory Poor +   Activity Tolerance   Activity Tolerance Patient limited by fatigue;Patient limited by pain   Medical Staff Made Aware spoke to Robbie zavala/ critical care   Nurse Made Aware spoke to Shruthi Chi RN and Bull Beard RN   Assessment   Prognosis Good   Problem List Decreased strength;Decreased endurance; Impaired balance;Decreased mobility   Assessment Pt is 44 y o  male initially presented to  admitted with hx of [facility] on 09/06 with complaints of fever, nausea and vomiting x1 week and Dx of endocarditis; pt was transferred to [facility] for surgical evaluation; pt was also noted to have parietal occipital lesion,  "likely secondary to septic emboli, suspicious for acute infarction verses abscess in setting of patient's known Pseudomonas" as per neurology consult  During the course, pt underwent mitral valve replacement with a 27 mm Rae Magna Mitral Ease bioprosthetic and BILATERAL MYOCUTANEOUS PECTORALIS MAJOR ADVANCEMENT FLAPS, APPLICATION NEGATIVE PRESSURE VAC DRESSING on 10/9/2019; PT is consulted   Pt 's comorbidities affecting POC include: IVDA, hepatitis C, MV endocarditis MSSA, anxiety, anemia, and kidney dz and personal factors of: 1 small BRAD  Pt's clinical presentation is currently unstable/unpredictable which is evident in ongoing telemetry monitoring while in a critical care unit w/ Lavone Lot catheter and a-line in place, suppl O2 needs, abnormal lab values being monitored/trending, CT in place and inability to progress further w/ mobilization at this time  Pt presents w/ generalized weakness, incl decreased LE strength, decreased functional endurance and activity tolerance, impaired balance w/ associated gait deviations requiring use of rw at this time and fall risk  Will cont to follow pt in PT for progressive mobilization to address above functional deficits and to max level of (I), endurance, and safety  Otherwise, anticipate pt will return home w/ available family support upon D/C provided he cont improving w/ mobility skills, safety, and endurance (incl on the step) and when medically cleared (otherwise, if pt does not achieve (I) functional mobility status, rehab may need to be considered); home PT follow up is recommended at this time; will follow  Barriers to Discharge Other (Comment)  (med status)   Goals   Patient Goals to go home   LTG Expiration Date 10/25/19   Long Term Goal #1 10-14 days  Pt will amb 300 ft w/ least restrictive assistive device PRN, mod (I) in order to facilitate safe return to premorbid environment and community amb status  Pt will negotiate 1 step w/ appropriate AD PRN, mod (I) in order to navigate in and out of home environment safely  Pt will achieve (I) level w/ bed mob in order to facilitate safety with OOB and back to bed transitions in own living environment  Pt will perform transfers w/ mod (I) to assure (I) and safety w/ functional mobility/transitions w/ all aspects of mobility/locomotion  Pt will participate in LE therex and balance activities to max progression w/ mobility skills     PT Treatment Day 1  (follow up Tx session)   Plan Treatment/Interventions Functional transfer training;LE strengthening/ROM; Elevations; Therapeutic exercise; Endurance training;Equipment eval/education; Bed mobility;Gait training;Spoke to nursing;Spoke to advanced practitioner;OT;Family   PT Frequency Other (Comment)  (4-6x/wk)   Recommendation   Recommendation Home PT; Home with family support   Equipment Recommended Walker  (at this time)   Modified [place] Scale   Modified [place] Scale 4   Barthel Index   Feeding 5   Bathing 0   Grooming Score 0   Dressing Score 0   Bladder Score 0   Bowels Score 10   Toilet Use Score 5   Transfers (Bed/Chair) Score 5   Mobility (Level Surface) Score 0   Stairs Score 0   Barthel Index Score 25       Austin Nuno, PT                          PT Tx note    Time In: 09:40  Time Out: 09:50  Total Time: 10 min      S:  Pt is in the chair; agreeable to perform LE therex     O:  (B) LE therex performed in the chair as following: (B) ankle pumps 2 x 10 reps, AROM; (B) LAQ 2 x 10 reps, AROM; (B) hip abd/add 2 x 10 reps, AAROM; SCDs re-activated; pillow placed for (L) UE support; call bell placed w/in reach;     A:  Additional follow up consecutive session performed to initiate LE therex in order to max strength and to facilitate progression w/ functional mobility skills and overall level of (I)  Pt was able to complete the program in an AROM/AAROM mode w/ rest periods provided as needed; pt remained in NAD and appeared to be comfortable at the end of session; currently, cont to recommend home PT follow up upon returning home w/ family support pending progress and when medically cleared; will follow  P:  Cont to follow pt 4-6x/week for LE therex, functional mobility training, endurance training and pt education per POC to max functional (I) and safety        Foster Grain, PT

## 2019-10-11 NOTE — PROGRESS NOTES
Progress Note - Nephrology   Myranda Newton 44 y o  male MRN: [id number]  Unit/Bed#: [facility] 416-01 Encounter: [id number]      Assessment / Plan:  1  JULIENNE, unknown prior baseline creatinine  -JULIENNE suspected to be secondary to prerenal/ATN/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 all in the setting of congenital solitary kidney  -also was found to have small renal infarct from prior endocarditis  -status post contrast exposure with cardiac catheterization on 10/2/19   -admission creatinine 1 4 at [facility], peaked at 2 2    -now seems to be fluctuating in mid one range, creatinine 1 85 s/p diuretics  -monitor renal indices closely  Off pressors  On cardene gtt  BP low normal - consider stopping cardene gtt   -f/u am BMP     2  Hypotension post op - a bit low, off pressors, on cardene gtt  Goal MAP >65 for renal perfusion in setting of JULIENNE vs CKD     3  Anemia with thrombocytopenia  -closely monitor hemoglobin, now 8 8, transfuse p r n  For Hgb < 7  Did receive 2 u blood intraoperatively 10/9/19    -low iron stores recently although avoiding IV Venofer due to active infection issues  -recent FOBT was negative at [facility]      4  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  -fortaz/levaquin as per primary team and ID  -patient is active IV drug user and had prior history of MSSA bacteremia with endocarditis in March 2019  -BILLY in September 2019 showed echodensity on the anterior mitral leaflet      5  Pseudomonas bacteremia, blood culture from 9/28/19 showed Pseudomonas  -Repeat blood cultures negative so far, ID on board     6  Bilateral pleural effusion, status post thoracentesis on 10/2/19  -now extubated  -diuretics may be given if patient oliguric  Will defer to CT surgery on this  7  Hyperphosphatemia-phos 4 9 in the setting of Acute kidney injury, monitor this      8  Post op Day #2 s/p MVR - management per CT surgery     Other issues:  CT scan suggestive of pulmonary congestion versus suspicion for pneumonia, pleural effusions, splenic infarct  Septic embolus/abscess on CT scan of brain     Thrombocytopenia-monitor platelets closely, question due to sepsis  Rule out TMA if no improvement  Subjective:   Patient is on a Cardene drip  He denies any chest pain or shortness of breath  Objective:     Vitals: Blood pressure 110/56, pulse 80, temperature 97 9 °F (36 6 °C), temperature source Probe, resp  rate 19, height 5' 3" (1 6 m), weight 77 7 kg (171 lb 4 8 oz), SpO2 100 %  ,Body mass index is 30 34 kg/m²  Temp (24hrs), Av 3 °F (36 8 °C), Min:97 7 °F (36 5 °C), Max:99 1 °F (37 3 °C)      Weight (last 2 days)     Date/Time   Weight    10/11/19 0600   77 7 (171 3)    10/10/19 0546   78 6 (173 28)    10/09/19 0547   74 5 (164 24)                Intake/Output Summary (Last 24 hours) at 10/11/2019 1255  Last data filed at 10/11/2019 1058  Gross per 24 hour   Intake 466 99 ml   Output 2473 ml   Net - 01 ml     I/O last 24 hours: In: 549 4 [I V :389 4; NG/GT:60; IV Piggyback:100]  Out: 0017 [Urine:2235; Drains:83; Chest Tube:840]        Physical Exam:   Physical Exam   Constitutional: He appears well-developed and well-nourished  No distress  HENT:   Head: Normocephalic and atraumatic  Mouth/Throat: No oropharyngeal exudate  Eyes: Right eye exhibits no discharge  Left eye exhibits no discharge  No scleral icterus  Neck: Neck supple  Cardiovascular: Normal rate, regular rhythm and normal heart sounds  Pulmonary/Chest: Effort normal and breath sounds normal  He has no wheezes  He has no rales  +chest tubes, drains, chest wound vac over sternum   Abdominal: Soft  Bowel sounds are normal  He exhibits no distension  There is no tenderness  Genitourinary:   Genitourinary Comments: +smith   Musculoskeletal: He exhibits edema (+LE trace to 1+ b/l)  Neurological: He is alert     awake   Skin: Skin is warm and dry  No rash noted  He is not diaphoretic  Psychiatric: He has a normal mood and affect  His behavior is normal    Vitals reviewed  Invasive Devices     Central Venous Catheter Line            CVC Central Lines 10/09/19 Triple 2 days    Introducer 10/09/19 2 days          Peripheral Intravenous Line            Peripheral IV 10/08/19 Proximal;Right;Ventral (anterior) Forearm 3 days          Arterial Line            Arterial Line 10/09/19 Right Brachial 2 days          Line            Pacer Wires 2 days    Pacer Wires 2 days          Drain            Chest Tube 1 Left Pleural 28 Fr  2 days    Chest Tube 2 Anterior Mediastinal 32 Fr  2 days    Chest Tube 3 Posterior Mediastinal 32 Fr  2 days    Chest Tube 4 Right Pleural 28 Fr  2 days    Closed/Suction Drain Left Chest Bulb 15 Fr  2 days    Closed/Suction Drain Right Chest Bulb 15 Fr  2 days    Urethral Catheter Non-latex; Temperature probe 16 Fr  2 days    Negative Pressure Wound Therapy (V A C ) Chest Medial 1 day                Medications:    Scheduled Meds:  Current Facility-Administered Medications:  acetaminophen 650 mg Oral Q4H PRN REY Aguilar-PAU   amiodarone 200 mg Oral Q8H [address] Bhumika Montano PA-C   aspirin 325 mg Oral Daily Bhumika Montano PA-C   atorvastatin 80 mg Oral Daily With Dinner Bhumika Montano PA-C   bisacodyl 10 mg Rectal Daily PRN Bhumika Montano PA-C   cefTAZidime 2,000 mg Intravenous Q12H Jona Nj MD   docusate sodium 100 mg Oral BID Gladys Marques PA-C   escitalopram 10 mg Oral Daily Bhumika Montano PA-C   fondaparinux 2 5 mg Subcutaneous Daily Bhumika Montano PA-C   furosemide 40 mg Intravenous BID (diuretic) Gladys Marques PA-C   hydrALAZINE 5 mg Intravenous Once Gladys Marques PA-C   hydrOXYzine HCL 25 mg Oral Q8H PRN Bhumika Montano PA-C   insulin lispro 1-5 Units Subcutaneous HS JULIÁN Hyde   insulin lispro 1-6 Units Subcutaneous TID AC JULIÁN Hyde   [START ON 10/12/2019] levofloxacin 750 mg Oral Q48H Guy Gray MD   metoprolol tartrate 37 5 mg Oral Q12H [address] Jah Close, RALPH   mupirocin 1 application Nasal I54W [address] Kiana [place], PA-PAU   ondansetron 4 mg Intravenous Q6H PRN Kiana [place], PA-C   oxyCODONE-acetaminophen 1 tablet Oral Q4H PRN Kiana Kleber, PA-C   oxyCODONE-acetaminophen 2 tablet Oral Q6H PRN Kiana [place], PA-C   pantoprazole 40 mg Oral Daily Kiana Kleber, PA-C   polyethylene glycol 17 g Oral Daily Kiana Kleber, PAMirianC   temazepam 15 mg Oral HS PRN Jah Close, RALPH       PRN Meds:   acetaminophen    bisacodyl    hydrOXYzine HCL    ondansetron    oxyCODONE-acetaminophen    oxyCODONE-acetaminophen    temazepam    Continuous Infusions:         LAB RESULTS:      Results from last 7 days   Lab Units 10/11/19  0447 10/11/19  0437 10/10/19  2228 10/10/19  0430 10/10/19  0401 10/10/19  0400 10/09/19  2033 10/09/19  1746 10/09/19  1410 10/09/19  1409 10/09/19  1255 10/09/19  1120 10/09/19  1046 10/09/19  1024 10/09/19  1017 10/09/19  0952 10/09/19  0927  10/09/19  0537 10/08/19  0229  10/05/19  0513   WBC Thousand/uL 14 33*  --   --  12 97*  --   --   --   --   --   --   --   --   --   --   --   --   --   --  12 23* 11 05*  --  8 97   HEMOGLOBIN g/dL 8 8*  --   --  9 5*  --   --  9 8*  --   --  8 8*  --   --   --   --   --   --   --   --  8 5* 8 2*  --  7 9*   I STAT HEMOGLOBIN g/dl  --   --   --   --   --   --   --   --  8 2*  --  9 2* 8 5* 7 5*  --  6 8* 6 1* 6 8*   < >  --   --   --   --    HEMATOCRIT % 28 6*  --   --  30 3*  --   --  30 7*  --   --  27 2*  --   --   --   --   --   --   --   --  27 7* 26 2*  --  25 5*   HEMATOCRIT, ISTAT %  --   --   --   --   --   --   --   --  24*  --  27* 25* 22*  --  20* 18* 20*   < >  --   --   --   --    PLATELETS Thousands/uL 132*  --   --  140*  --   --   --   --   --  139*  --   --   --  187  --   --   --   --  271 220  --  225   NEUTROS PCT % 87*  --   --  87*  --   --   --   -- --   --   --   --   --   --   --   --   --   --  80*  --   --   --    LYMPHS PCT % 5*  --   --  6*  --   --   --   --   --   --   --   --   --   --   --   --   --   --  12*  --   --   --    MONOS PCT % 6  --   --  6  --   --   --   --   --   --   --   --   --   --   --   --   --   --  5  --   --   --    EOS PCT % 1  --   --  0  --   --   --   --   --   --   --   --   --   --   --   --   --   --  1  --   --   --    POTASSIUM mmol/L  --  4 2 4 7  --  5 1  --  4 9 4 4  --  4 0  --   --   --   --   --   --   --   --  4 4 3 4*   < > 3 8   CHLORIDE mmol/L  --  108 108  --  115*  --  115*  --   --  115*  --   --   --   --   --   --   --   --  108 109*   < > 105   CO2 mmol/L  --  25 23  --  21  --  20*  --   --  21  --   --   --   --   --   --   --   --  24 24   < > 27   CO2, I-STAT mmol/L  --   --   --   --   --   --   --   --  23  --  24 26 28  --  31 24 25   < >  --   --   --   --    BUN mg/dL  --  42* 43*  --  38*  --  32*  --   --  33*  --   --   --   --   --   --   --   --  35* 31*   < > 31*   CREATININE mg/dL  --  1 85* 2 03*  --  1 81*  --  1 74*  --   --  1 69*  --   --   --   --   --   --   --   --  1 68* 1 51*   < > 1 71*   CALCIUM mg/dL  --  8 0* 8 5  --  8 3  --  8 2*  --   --  7 8*  --   --   --   --   --   --   --   --  9 2 8 7   < > 8 6   GLUCOSE, ISTAT mg/dl  --   --   --   --   --   --   --   --  103  --  126 189* 193*  --  206* 192* 168*   < >  --   --   --   --    MAGNESIUM mg/dL  --  2 2  --   --   --  2 6  --   --   --   --   --   --   --   --   --   --   --   --   --  1 9  --   --    PHOSPHORUS mg/dL  --  4 9*  --   --   --   --   --   --   --   --   --   --   --   --   --   --   --   --   --  3 4  --   --     < > = values in this interval not displayed  CUTURES:  Lab Results   Component Value Date    BLOODCX No Growth After 4 Days  10/06/2019    BLOODCX No Growth After 4 Days  10/06/2019    BLOODCX No Growth After 5 Days  10/03/2019    BLOODCX No Growth After 5 Days   10/03/2019    BLOODCX No Growth After 5 Days  10/01/2019    BLOODCX No Growth After 5 Days  10/01/2019                 Portions of the record may have been created with voice recognition software  Occasional wrong word or "sound a like" substitutions may have occurred due to the inherent limitations of voice recognition software  Read the chart carefully and recognize, using context, where substitutions have occurred  If you have any questions, please contact the dictating provider

## 2019-10-11 NOTE — PROGRESS NOTES
Progress Note - Cardiothoracic Surgery   Conception Dorothy 44 y o  male MRN: [id number]  Unit/Bed#: [facility] 416-01 Encounter: [id number]      POD # 2 s/p MVR, Bilateral Pectoralis Advancement flap    Pt seen/examined  Interval history and data reviewed with critical care team   Pt doing well  No specific complaints  Cardene 2    Extubated yesterday        Medications:   Scheduled Meds:    Current Facility-Administered Medications:  acetaminophen 650 mg Rectal Q4H PRN Genie Edge, PA-C    acetaminophen 650 mg Oral Q4H PRN Genie Edge, PA-C    amiodarone 200 mg Oral Q8H [facility], PA-C    aspirin 325 mg Oral Daily Genie Edge, PA-C    atorvastatin 80 mg Oral Daily With Dinner Genie Edge, PA-C    bisacodyl 10 mg Rectal Daily PRN Genie Edge, PA-C    cefTAZidime 2,000 mg Intravenous Q8H Genie Edge, PA-C Last Rate: 2,000 mg (10/11/19 0309)   chlorhexidine 15 mL Swish & Spit BID Genie Edge, PA-C    escitalopram 10 mg Oral Daily Genie Edge, PA-C    fentanyl citrate (PF) 50 mcg Intravenous Q1H PRN Genie Edge, PA-C    fondaparinux 2 5 mg Subcutaneous Daily Genie Edge, PA-C    HYDROmorphone 0 5 mg Intravenous Q1H PRN Genie Edge, PA-C    HYDROmorphone 1 mg Intravenous Q1H PRN Genie Edge, PA-C    hydrOXYzine HCL 25 mg Oral Q8H PRN Genie Edge, PA-PAU    insulin lispro 1-5 Units Subcutaneous HS JULIÁN Hyde    insulin lispro 1-6 Units Subcutaneous TID AC JULIÁN Hyde    [START ON 10/12/2019] levofloxacin 750 mg Oral Q48H Vianey Cali MD    lidocaine (cardiac) 100 mg Intravenous Q30 Min PRN Genie Eduardo, RALPH    metoprolol tartrate 25 mg Oral Q12H [address] Chitra Marques, RALPH    milrinone [facility]) infusion 0 25 mcg/kg/min Intravenous Continuous JULIÁN Vasquez Last Rate: Stopped (10/10/19 1649)   mupirocin 1 application Nasal S83Q [address] Genie Edge, PA-C    niCARdipine 1-15 mg/hr Intravenous Titrated Alli Marker JULIÁN Junior Last Rate: 2 mg/hr (10/11/19 0138)   ondansetron 4 mg Intravenous Q6H PRN Pecola Peals, PA-C    oxyCODONE-acetaminophen 1 tablet Oral Q4H PRN Pecola Peals, PA-C    oxyCODONE-acetaminophen 2 tablet Oral Q6H PRN Pecola Peals, PA-C    pantoprazole 40 mg Oral Daily Pecola Peals, PA-C    polyethylene glycol 17 g Oral Daily Pecola Peals, PA-C    potassium chloride 20 mEq Intravenous Once PRN Pecola Peals, PA-C    potassium chloride 20 mEq Intravenous Q1H PRN Pecola Peals, PA-C    potassium chloride 20 mEq Intravenous Q30 Min PRN Pecola Peals, PA-C      Continuous Infusions:    milrinone [facility]) infusion 0 25 mcg/kg/min Last Rate: Stopped (10/10/19 1649)   niCARdipine 1-15 mg/hr Last Rate: 2 mg/hr (10/11/19 0138)     PRN Meds:   acetaminophen    acetaminophen    bisacodyl    fentanyl citrate (PF)    HYDROmorphone    HYDROmorphone    hydrOXYzine HCL    lidocaine (cardiac)    ondansetron    oxyCODONE-acetaminophen    oxyCODONE-acetaminophen    potassium chloride    potassium chloride    potassium chloride    Vitals: Blood pressure 119/68, pulse 90, temperature 97 9 °F (36 6 °C), temperature source Probe, resp  rate (!) 36, height 5' 3" (1 6 m), weight 77 7 kg (171 lb 4 8 oz), SpO2 100 %  ,Body mass index is 30 34 kg/m²  I/O last 24 hours:   In: 450 1 [I V :290 1; NG/GT:60; IV Piggyback:100]  Out: 2918 [Urine:2135; Drains:83; Chest Tube:700]  Invasive Devices     Central Venous Catheter Line            CVC Central Lines 10/09/19 Triple 1 day    Introducer 10/09/19 1 day          Peripheral Intravenous Line            Peripheral IV 10/08/19 Proximal;Right;Ventral (anterior) Forearm 3 days          Arterial Line            Arterial Line 10/09/19 Right Brachial 1 day          Line            Pacer Wires 1 day    Pacer Wires 1 day          Drain            Chest Tube 1 Left Pleural 28 Fr  1 day    Chest Tube 2 Anterior Mediastinal 32 Fr  1 day    Chest Tube 3 Posterior Mediastinal 32 Fr  1 day    Chest Tube 4 Right Pleural 28 Fr  1 day    Closed/Suction Drain Left Chest Bulb 15 Fr  1 day    Closed/Suction Drain Right Chest Bulb 15 Fr  1 day    Negative Pressure Wound Therapy (V A C ) Chest Medial 1 day    Urethral Catheter Non-latex; Temperature probe 16 Fr  1 day                  Lab, Imaging and other studies:   Results from last 7 days   Lab Units 10/11/19  0447 10/10/19  0430 10/09/19  2033  10/09/19  1409  10/09/19  0537   WBC Thousand/uL 14 33* 12 97*  --   --   --   --  12 23*   HEMOGLOBIN g/dL 8 8* 9 5* 9 8*  --  8 8*  --  8 5*   I STAT HEMOGLOBIN   --   --   --    < >  --    < >  --    HEMATOCRIT % 28 6* 30 3* 30 7*  --  27 2*  --  27 7*   HEMATOCRIT, ISTAT   --   --   --    < >  --    < >  --    PLATELETS Thousands/uL 132* 140*  --   --  139*   < > 271    < > = values in this interval not displayed  Results from last 7 days   Lab Units 10/11/19  0437 10/10/19  2228 10/10/19  0401  10/09/19  1410   POTASSIUM mmol/L 4 2 4 7 5 1   < >  --    CHLORIDE mmol/L 108 108 115*   < >  --    CO2 mmol/L 25 23 21   < >  --    CO2, I-STAT mmol/L  --   --   --   --  23   BUN mg/dL 42* 43* 38*   < >  --    CREATININE mg/dL 1 85* 2 03* 1 81*   < >  --    GLUCOSE, ISTAT mg/dl  --   --   --   --  103   CALCIUM mg/dL 8 0* 8 5 8 3   < >  --     < > = values in this interval not displayed  Results from last 7 days   Lab Units 10/10/19  0446 10/09/19  1409   INR  1 46* 1 70*   PTT seconds  --  44*     Recent Labs     10/10/19  1133   PHART 7 382   ZRE2WJH 22 2   PO2ART 144 0*   [id number] 38 2   BEART -2 6           Plan:    Aggressive diuresis  DC Faxon/Cordis/Melina/Santiago  Increase B Blocker  Wean nicardipine to off  DC mediastinal chest tubes, pacing wires  Continue pleural CT's  Incentive spirometry  PO ASA/Statin  ABx per ID  OR cultures pending  Tx to floor          SIGNATUREKeven Rouse MD  DATE: October 11, 2019  TIME: 7:52 AM

## 2019-10-11 NOTE — PROGRESS NOTES
Progress Note - Infectious Disease   Dariela Carbajal 44 y o  male MRN: [id number]  Unit/Bed#: [facility] 416-01 Encounter: [id number]      Impression/Plan:  1  Pseudomonal bacteremia with mitral valve endocarditis   Outside records reviewed and this is likely due to problem 3  Repeat blood cultures here remain without growth   Potential septic emboli seen on imaging of the head   No other ectopic foci seen on imaging of the chest or abdomen   Patient is now status post valve replacement  Continue ceftazidime with levofloxacin, dose reduced  Follow up pending blood cultures and valve cultures  Patient will require 6 weeks of antibiotic therapy after procedure, possible de-escalation to single agent if valve cultures are negative  Additional care as per primary     2  Acute kidney injury and solitary kidney   Patient's creatinine noted at 1 7 initially   Appears to be similar to recent labs performed at [facility]   Potentially developed toxicity from gentamicin   Currently stable  Antibiotic doses reduced based on creatinine clearance  Ongoing follow-up by Nephrology  Continue monitor chemistry  Continue care as per primary     3  Active IV drug use   Patient with active IV drug use   Hep C noted to be positive, viral load negative   Hepatitis-B and HIV testing negative       4  Back pain   Patient had reported back pain again no obvious findings on exam   Previous records reviewed and bone scan at [facility] was unremarkable  Selena Poplar monitor clinically      5  History of MSSA mitral valve endocarditis         Above plan discussed in detail with the patient and his family at bedside      ID consult service will formally re-evaluate the patient again on Monday unless there is no data in the interim  Please contact ID attending on call if any additional questions or concerns over the weekend      Antibiotics:  Ceftazidime/Levaquin 12  Postop day 2    24 hour events:  Patient was extubated yesterday  Patient is currently afebrile  White blood cell count 14 3  Valve cultures so far without growth  Chest x-ray unremarkable  Patient's other vitals are stable  Creatinine 1 85 this morning, differential on CBC unremarkable    Subjective:  Patient denied having any nausea, vomiting, shortness of breath  He continues to have a slight cough with some sputum production  He has pain along his surgical site  Tolerating antibiotics otherwise without issue  Objective:  Vitals:  Temp:  [97 7 °F (36 5 °C)-99 1 °F (37 3 °C)] 97 9 °F (36 6 °C)  HR:  [78-98] 90  Resp:  [20-43] 36  BP: (117-148)/(66-85) 119/68  SpO2:  [93 %-100 %] 100 %  Temp (24hrs), Av 5 °F (36 9 °C), Min:97 7 °F (36 5 °C), Max:99 1 °F (37 3 °C)  Current: Temperature: 97 9 °F (36 6 °C)    Physical Exam:   General Appearance:  Alert, interactive, nontoxic, no acute distress  Throat: Oropharynx moist without lesions  Lungs:   Decreased breath sounds throughout; no wheezes, rhonchi or rales; respirations unlabored on room air   Heart:  RRR; no rub or gallop; systolic murmur present   Abdomen:   Soft, non-tender, non-distended, positive bowel sounds  Extremities: No clubbing, cyanosis; nonpitting edema in all of his extremities   Skin: No new rashes or lesions  No new draining wounds noted  Labs, Imaging, & Other studies:   All pertinent labs and imaging studies were personally reviewed  Results from last 7 days   Lab Units 10/11/19  0447 10/10/19  0430 10/09/19  2033  10/09/19  1409  10/09/19  0537   WBC Thousand/uL 14 33* 12 97*  --   --   --   --  12 23*   HEMOGLOBIN g/dL 8 8* 9 5* 9 8*  --  8 8*  --  8 5*   I STAT HEMOGLOBIN   --   --   --    < >  --    < >  --    PLATELETS Thousands/uL 132* 140*  --   --  139*   < > 271    < > = values in this interval not displayed       Results from last 7 days   Lab Units 10/11/19  0437  10/09/19  1410   POTASSIUM mmol/L 4 2   < >  --    CHLORIDE mmol/L 108   < >  --    CO2 mmol/L 25   < >  --    CO2, I-STAT mmol/L  --   --  23   BUN mg/dL 42*   < >  --    CREATININE mg/dL 1 85*   < >  --    EGFR ml/min/1 73sq m 45   < >  --    GLUCOSE, ISTAT mg/dl  --   --  103   CALCIUM mg/dL 8 0*   < >  --     < > = values in this interval not displayed  Results from last 7 days   Lab Units 10/09/19  0952 10/06/19  1927   BLOOD CULTURE   --  No Growth After 4 Days  No Growth After 4 Days     GRAM STAIN RESULT  No Polys or Bacteria seen  --

## 2019-10-11 NOTE — PLAN OF CARE
Problem: PHYSICAL THERAPY ADULT  Goal: Performs mobility at highest level of function for planned discharge setting  See evaluation for individualized goals  Description  Treatment/Interventions: Functional transfer training, LE strengthening/ROM, Elevations, Therapeutic exercise, Endurance training, Equipment eval/education, Bed mobility, Gait training, Spoke to nursing, Spoke to advanced practitioner, OT, Family  Equipment Recommended: Walker(at this time)       See flowsheet documentation for full assessment, interventions and recommendations  Note:   Prognosis: Good  Problem List: Decreased strength, Decreased endurance, Impaired balance, Decreased mobility  Assessment: Pt is 44 y o  male initially presented to  admitted with hx of [facility] on 09/06 with complaints of fever, nausea and vomiting x1 week and Dx of endocarditis; pt was transferred to [facility] for surgical evaluation; pt was also noted to have parietal occipital lesion,  "likely secondary to septic emboli, suspicious for acute infarction verses abscess in setting of patient's known Pseudomonas" as per neurology consult  During the course, pt underwent mitral valve replacement with a 27 mm Rae Magna Mitral Ease bioprosthetic and BILATERAL MYOCUTANEOUS PECTORALIS MAJOR ADVANCEMENT FLAPS, APPLICATION NEGATIVE PRESSURE VAC DRESSING on 10/9/2019; PT is consulted  Pt 's comorbidities affecting POC include: IVDA, hepatitis C, MV endocarditis MSSA, anxiety, anemia, and kidney dz and personal factors of: 1 small BRAD  Pt's clinical presentation is currently unstable/unpredictable which is evident in ongoing telemetry monitoring while in a critical care unit w/ Deadra Beyer catheter and a-line in place, suppl O2 needs, abnormal lab values being monitored/trending, CT in place and inability to progress further w/ mobilization at this time   Pt presents w/ generalized weakness, incl decreased LE strength, decreased functional endurance and activity tolerance, impaired balance w/ associated gait deviations requiring use of rw at this time and fall risk  Will cont to follow pt in PT for progressive mobilization to address above functional deficits and to max level of (I), endurance, and safety  Otherwise, anticipate pt will return home w/ available family support upon D/C provided he cont improving w/ mobility skills, safety, and endurance (incl on the step) and when medically cleared (otherwise, if pt does not achieve (I) functional mobility status, rehab may need to be considered); home PT follow up is recommended at this time; will follow  Barriers to Discharge: Other (Comment)(med status)     Recommendation: Home PT, Home with family support          See flowsheet documentation for full assessment

## 2019-10-12 PROBLEM — E83.39 HYPERPHOSPHATEMIA: Status: RESOLVED | Noted: 2019-10-11 | Resolved: 2019-10-12

## 2019-10-12 LAB
ANION GAP SERPL CALCULATED.3IONS-SCNC: 6 MMOL/L (ref 4–13)
BACTERIA SPEC ANAEROBE CULT: NO GROWTH
BACTERIA TISS AEROBE CULT: NO GROWTH
BUN SERPL-MCNC: 45 MG/DL (ref 5–25)
CALCIUM SERPL-MCNC: 8.2 MG/DL (ref 8.3–10.1)
CHLORIDE SERPL-SCNC: 106 MMOL/L (ref 100–108)
CO2 SERPL-SCNC: 28 MMOL/L (ref 21–32)
CREAT SERPL-MCNC: 1.64 MG/DL (ref 0.6–1.3)
ERYTHROCYTE [DISTWIDTH] IN BLOOD BY AUTOMATED COUNT: 18.7 % (ref 11.6–15.1)
GFR SERPL CREATININE-BSD FRML MDRD: 52 ML/MIN/1.73SQ M
GLUCOSE SERPL-MCNC: 116 MG/DL (ref 65–140)
GLUCOSE SERPL-MCNC: 121 MG/DL (ref 65–140)
GLUCOSE SERPL-MCNC: 123 MG/DL (ref 65–140)
GLUCOSE SERPL-MCNC: 91 MG/DL (ref 65–140)
GLUCOSE SERPL-MCNC: 94 MG/DL (ref 65–140)
GRAM STN SPEC: NORMAL
HCT VFR BLD AUTO: 26.3 % (ref 36.5–49.3)
HGB BLD-MCNC: 8 G/DL (ref 12–17)
MAGNESIUM SERPL-MCNC: 2.4 MG/DL (ref 1.6–2.6)
MCH RBC QN AUTO: 29.2 PG (ref 26.8–34.3)
MCHC RBC AUTO-ENTMCNC: 30.4 G/DL (ref 31.4–37.4)
MCV RBC AUTO: 96 FL (ref 82–98)
PHOSPHATE SERPL-MCNC: 3.2 MG/DL (ref 2.7–4.5)
PLATELET # BLD AUTO: 124 THOUSANDS/UL (ref 149–390)
PMV BLD AUTO: 10.1 FL (ref 8.9–12.7)
POTASSIUM SERPL-SCNC: 3.7 MMOL/L (ref 3.5–5.3)
RBC # BLD AUTO: 2.74 MILLION/UL (ref 3.88–5.62)
SODIUM SERPL-SCNC: 140 MMOL/L (ref 136–145)
WBC # BLD AUTO: 8.51 THOUSAND/UL (ref 4.31–10.16)

## 2019-10-12 PROCEDURE — 99232 SBSQ HOSP IP/OBS MODERATE 35: CPT | Performed by: INTERNAL MEDICINE

## 2019-10-12 PROCEDURE — 80048 BASIC METABOLIC PNL TOTAL CA: CPT | Performed by: PHYSICIAN ASSISTANT

## 2019-10-12 PROCEDURE — 85027 COMPLETE CBC AUTOMATED: CPT | Performed by: PHYSICIAN ASSISTANT

## 2019-10-12 PROCEDURE — 82948 REAGENT STRIP/BLOOD GLUCOSE: CPT

## 2019-10-12 PROCEDURE — 83735 ASSAY OF MAGNESIUM: CPT | Performed by: PHYSICIAN ASSISTANT

## 2019-10-12 PROCEDURE — 99024 POSTOP FOLLOW-UP VISIT: CPT | Performed by: THORACIC SURGERY (CARDIOTHORACIC VASCULAR SURGERY)

## 2019-10-12 PROCEDURE — 84100 ASSAY OF PHOSPHORUS: CPT | Performed by: PHYSICIAN ASSISTANT

## 2019-10-12 PROCEDURE — 94760 N-INVAS EAR/PLS OXIMETRY 1: CPT

## 2019-10-12 RX ORDER — POTASSIUM CHLORIDE 20 MEQ/1
20 TABLET, EXTENDED RELEASE ORAL ONCE
Status: COMPLETED | OUTPATIENT
Start: 2019-10-12 | End: 2019-10-12

## 2019-10-12 RX ORDER — COLCHICINE 0.6 MG/1
0.6 TABLET ORAL 2 TIMES DAILY
Status: DISCONTINUED | OUTPATIENT
Start: 2019-10-12 | End: 2019-10-13

## 2019-10-12 RX ORDER — LEVOFLOXACIN 750 MG/1
750 TABLET ORAL EVERY 24 HOURS
Status: DISCONTINUED | OUTPATIENT
Start: 2019-10-13 | End: 2019-10-15 | Stop reason: HOSPADM

## 2019-10-12 RX ADMIN — LEVOFLOXACIN 750 MG: 750 TABLET, FILM COATED ORAL at 04:55

## 2019-10-12 RX ADMIN — POTASSIUM CHLORIDE 20 MEQ: 1500 TABLET, EXTENDED RELEASE ORAL at 11:15

## 2019-10-12 RX ADMIN — PANTOPRAZOLE SODIUM 40 MG: 40 TABLET, DELAYED RELEASE ORAL at 08:32

## 2019-10-12 RX ADMIN — OXYCODONE HYDROCHLORIDE AND ACETAMINOPHEN 2 TABLET: 5; 325 TABLET ORAL at 05:03

## 2019-10-12 RX ADMIN — CEFTAZIDIME 2000 MG: 1 INJECTION, POWDER, FOR SOLUTION INTRAMUSCULAR; INTRAVENOUS at 19:40

## 2019-10-12 RX ADMIN — METOPROLOL TARTRATE 37.5 MG: 25 TABLET ORAL at 08:31

## 2019-10-12 RX ADMIN — CEFTAZIDIME 2000 MG: 1 INJECTION, POWDER, FOR SOLUTION INTRAMUSCULAR; INTRAVENOUS at 03:15

## 2019-10-12 RX ADMIN — CEFTAZIDIME 2000 MG: 1 INJECTION, POWDER, FOR SOLUTION INTRAMUSCULAR; INTRAVENOUS at 11:16

## 2019-10-12 RX ADMIN — FONDAPARINUX SODIUM 2.5 MG: 2.5 INJECTION, SOLUTION SUBCUTANEOUS at 08:31

## 2019-10-12 RX ADMIN — Medication 1 APPLICATION: at 20:19

## 2019-10-12 RX ADMIN — POLYETHYLENE GLYCOL 3350 17 G: 17 POWDER, FOR SOLUTION ORAL at 08:32

## 2019-10-12 RX ADMIN — DOCUSATE SODIUM 100 MG: 100 CAPSULE, LIQUID FILLED ORAL at 08:32

## 2019-10-12 RX ADMIN — OXYCODONE HYDROCHLORIDE AND ACETAMINOPHEN 2 TABLET: 5; 325 TABLET ORAL at 13:22

## 2019-10-12 RX ADMIN — ESCITALOPRAM OXALATE 10 MG: 10 TABLET ORAL at 08:32

## 2019-10-12 RX ADMIN — Medication 1 APPLICATION: at 08:32

## 2019-10-12 RX ADMIN — AMIODARONE HYDROCHLORIDE 200 MG: 200 TABLET ORAL at 13:22

## 2019-10-12 RX ADMIN — COLCHICINE 0.6 MG: 0.6 TABLET, FILM COATED ORAL at 11:15

## 2019-10-12 RX ADMIN — AMIODARONE HYDROCHLORIDE 200 MG: 200 TABLET ORAL at 05:04

## 2019-10-12 RX ADMIN — FUROSEMIDE 40 MG: 10 INJECTION, SOLUTION INTRAMUSCULAR; INTRAVENOUS at 08:31

## 2019-10-12 RX ADMIN — FUROSEMIDE 40 MG: 10 INJECTION, SOLUTION INTRAMUSCULAR; INTRAVENOUS at 17:14

## 2019-10-12 RX ADMIN — METOPROLOL TARTRATE 37.5 MG: 25 TABLET ORAL at 20:19

## 2019-10-12 RX ADMIN — ASPIRIN 325 MG ORAL TABLET 325 MG: 325 PILL ORAL at 08:32

## 2019-10-12 RX ADMIN — COLCHICINE 0.6 MG: 0.6 TABLET, FILM COATED ORAL at 17:14

## 2019-10-12 RX ADMIN — OXYCODONE HYDROCHLORIDE AND ACETAMINOPHEN 2 TABLET: 5; 325 TABLET ORAL at 21:17

## 2019-10-12 RX ADMIN — DOCUSATE SODIUM 100 MG: 100 CAPSULE, LIQUID FILLED ORAL at 17:14

## 2019-10-12 RX ADMIN — AMIODARONE HYDROCHLORIDE 200 MG: 200 TABLET ORAL at 21:15

## 2019-10-12 RX ADMIN — ATORVASTATIN CALCIUM 80 MG: 80 TABLET, FILM COATED ORAL at 17:14

## 2019-10-12 NOTE — PROGRESS NOTES
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 in the setting of congenital solitary kidney  -also was found to have small renal infarct from prior endocarditis  -status post contrast exposure with cardiac catheterization on 10/2/19   -admission creatinine 1 4 at [facility], peaked at 2 2    -now seems to be fluctuating in mid one range, creatinine 1 6 s/p diuretics  -monitor renal indices closely  Off pressors  Off cardene gtt  BP low normal   -f/u am CMP/UOP    2  Hypotension post op - a bit low, off pressors, off cardene gtt  Goal MAP >65 for renal perfusion in setting of JULIENNE vs CKD     3  Anemia with thrombocytopenia  -closely monitor hemoglobin, now 8, transfuse p r n  For Hgb < 7  Did receive 2 u blood intraoperatively 10/9/19    -low iron stores recently although avoiding IV Venofer due to active infection issues  -recent FOBT was negative at [facility]      4  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  -fortaz/levaquin as per primary team and ID  -patient is active IV drug user and had prior history of MSSA bacteremia with endocarditis in March 2019  -BILLY in September 2019 showed echodensity on the anterior mitral leaflet      5  Pseudomonas bacteremia, blood culture from 9/28/19 showed Pseudomonas  -Repeat blood cultures negative so far, ID on board     6  Bilateral pleural effusion, status post thoracentesis on 10/2/19  -now extubated  -diuretics may be given if patient oliguric  Will defer to CT surgery on this      7  Hyperphosphatemia- improved     8   Post op Day #3 s/p MVR - management per CT surgery     Other issues:  CT scan suggestive of pulmonary congestion versus suspicion for pneumonia, pleural effusions, splenic infarct  Septic embolus/abscess on CT scan of brain     Thrombocytopenia-monitor platelets closely, question due to sepsis  Subjective:   He has no complaints  Denies chest pain or shortness of breath  Objective:     Vitals: Blood pressure 90/53, pulse 73, temperature 97 7 °F (36 5 °C), temperature source Oral, resp  rate 18, height 5' 3" (1 6 m), weight 78 4 kg (172 lb 13 5 oz), SpO2 99 %  ,Body mass index is 30 62 kg/m²  Temp (24hrs), Av °F (36 7 °C), Min:97 7 °F (36 5 °C), Max:98 3 °F (36 8 °C)      Weight (last 2 days)     Date/Time   Weight    10/12/19 0600   78 4 (172 84)    10/11/19 0600   77 7 (171 3)    10/10/19 0546   78 6 (173 28)                Intake/Output Summary (Last 24 hours) at 10/12/2019 1406  Last data filed at 10/12/2019 1324  Gross per 24 hour   Intake 895 ml   Output 1980 ml   Net -1085 ml     I/O last 24 hours: In: 994 3 [P O :845; I V :99 3; IV Piggyback:50]  Out: 26 [Urine:1900; Drains:20; Chest Tube:300]        Physical Exam:   Physical Exam   Constitutional: He appears well-developed and well-nourished  No distress  HENT:   Head: Normocephalic and atraumatic  Mouth/Throat: No oropharyngeal exudate  Eyes: Right eye exhibits no discharge  Left eye exhibits no discharge  No scleral icterus  Neck: Neck supple  Cardiovascular: Normal rate, regular rhythm and normal heart sounds  Pulmonary/Chest: Effort normal and breath sounds normal  He has no wheezes  He has no rales  +wound vac, +chest tube with drains   Abdominal: Soft  Bowel sounds are normal  He exhibits no distension  There is no tenderness  Musculoskeletal: He exhibits edema (trace b/l ankles)  Neurological: He is alert  awake   Skin: Skin is warm and dry  No rash noted  He is not diaphoretic  Psychiatric:   Flat affect   Vitals reviewed        Invasive Devices     Central Venous Catheter Line            CVC Central Lines 10/09/19 Triple 3 days Peripheral Intravenous Line            Peripheral IV 10/08/19 Proximal;Right;Ventral (anterior) Forearm 4 days          Drain            Chest Tube 1 Left Pleural 28 Fr  3 days    Chest Tube 3 Posterior Mediastinal 32 Fr  3 days    Closed/Suction Drain Left Chest Bulb 15 Fr  3 days    Closed/Suction Drain Right Chest Bulb 15 Fr  3 days    Negative Pressure Wound Therapy (V A C ) Chest Medial 3 days                Medications:    Scheduled Meds:  Current Facility-Administered Medications:  acetaminophen 650 mg Oral Q4H PRN Marysol Rivero PA-C    amiodarone 200 mg Oral [facility] Paul Marques PA-C    aspirin 325 mg Oral Daily Marysol Rivero PA-C    atorvastatin 80 mg Oral Daily With [employer]    bisacodyl 10 mg Rectal Daily PRN Paul Marques PA-C    cefTAZidime 2,000 mg Intravenous Q8H Ashley Griffiths MD Last Rate: 2,000 mg (10/12/19 1116)   colchicine 0 6 mg Oral BID [facility] [place]    docusate sodium 100 mg Oral BID Paul Marques PA-C    escitalopram 10 mg Oral Daily Chitra Marques PA-C    fondaparinux 2 5 mg Subcutaneous Daily Chitra Marques PA-C    furosemide 40 mg Intravenous BID (diuretic) Paul Marques PA-C    hydrOXYzine HCL 25 mg Oral Q8H PRN Paul Marques PA-C    insulin lispro 1-5 Units Subcutaneous HS Chitra Marques PA-C    insulin lispro 1-6 Units Subcutaneous TID AC Chitra Marques PA-C    [START ON 10/13/2019] levofloxacin 750 mg Oral Q24H Ashley Griffiths MD    metoprolol tartrate 37 5 mg Oral Q12H [facility] Paul Marques PA-C    mupirocin 1 application Nasal F14F [facility] Chitra Marques PA-C    ondansetron 4 mg Intravenous Q6H PRN Clevester LemRALPH king    oxyCODONE-acetaminophen 1 tablet Oral Q4H PRN Clevester LemonRALPH    oxyCODONE-acetaminophen 2 tablet Oral Q6H PRN Clevester LemRALPH king    pantoprazole 40 mg Oral Daily Chitra Marques PA-C    polyethylene glycol 17 g Oral Daily Marysol Rivero, RALPH    temazepam 15 mg Oral HS PRN Zarina King PA-C        PRN Meds:   acetaminophen    bisacodyl    hydrOXYzine HCL    ondansetron    oxyCODONE-acetaminophen    oxyCODONE-acetaminophen    temazepam    Continuous Infusions:         LAB RESULTS:      Results from last 7 days   Lab Units 10/12/19  0457 10/11/19  0447 10/11/19  0437 10/10/19  2228 10/10/19  0430 10/10/19  0401 10/10/19  0400 10/09/19  2033 10/09/19  1746 10/09/19  1410 10/09/19  1409 10/09/19  1255 10/09/19  1120 10/09/19  1046 10/09/19  1024 10/09/19  1017 10/09/19  0952 10/09/19  0927  10/09/19  0537 10/08/19  0229   WBC Thousand/uL 8 51 14 33*  --   --  12 97*  --   --   --   --   --   --   --   --   --   --   --   --   --   --  12 23* 11 05*   HEMOGLOBIN g/dL 8 0* 8 8*  --   --  9 5*  --   --  9 8*  --   --  8 8*  --   --   --   --   --   --   --   --  8 5* 8 2*   I STAT HEMOGLOBIN g/dl  --   --   --   --   --   --   --   --   --  8 2*  --  9 2* 8 5* 7 5*  --  6 8* 6 1* 6 8*   < >  --   --    HEMATOCRIT % 26 3* 28 6*  --   --  30 3*  --   --  30 7*  --   --  27 2*  --   --   --   --   --   --   --   --  27 7* 26 2*   HEMATOCRIT, ISTAT %  --   --   --   --   --   --   --   --   --  24*  --  27* 25* 22*  --  20* 18* 20*   < >  --   --    PLATELETS Thousands/uL 124* 132*  --   --  140*  --   --   --   --   --  139*  --   --   --  187  --   --   --   --  271 220   NEUTROS PCT %  --  87*  --   --  87*  --   --   --   --   --   --   --   --   --   --   --   --   --   --  80*  --    LYMPHS PCT %  --  5*  --   --  6*  --   --   --   --   --   --   --   --   --   --   --   --   --   --  12*  --    MONOS PCT %  --  6  --   --  6  --   --   --   --   --   --   --   --   --   --   --   --   --   --  5  --    EOS PCT %  --  1  --   --  0  --   --   --   --   --   --   --   --   --   --   --   --   --   --  1  --    POTASSIUM mmol/L 3 7  --  4 2 4 7  --  5 1  --  4 9 4 4  --  4 0  --   --   --   --   --   --   --   --  4 4 3 4*   CHLORIDE mmol/L 106  --  108 108  --  115*  --  115*  --   --  115*  --   --   --   --   --   --   --   --  108 109*   CO2 mmol/L 28  --  25 23  --  21  --  20*  --   --  21  --   --   --   --   --   --   --   --  24 24   CO2, I-STAT mmol/L  --   --   --   --   --   --   --   --   --  23  --  24 26 28  --  31 24 25   < >  --   --    BUN mg/dL 45*  --  42* 43*  --  38*  --  32*  --   --  33*  --   --   --   --   --   --   --   --  35* 31*   CREATININE mg/dL 1 64*  --  1 85* 2 03*  --  1 81*  --  1 74*  --   --  1 69*  --   --   --   --   --   --   --   --  1 68* 1 51*   CALCIUM mg/dL 8 2*  --  8 0* 8 5  --  8 3  --  8 2*  --   --  7 8*  --   --   --   --   --   --   --   --  9 2 8 7   GLUCOSE, ISTAT mg/dl  --   --   --   --   --   --   --   --   --  103  --  126 189* 193*  --  206* 192* 168*   < >  --   --    MAGNESIUM mg/dL 2 4  --  2 2  --   --   --  2 6  --   --   --   --   --   --   --   --   --   --   --   --   --  1 9   PHOSPHORUS mg/dL 3 2  --  4 9*  --   --   --   --   --   --   --   --   --   --   --   --   --   --   --   --   --  3 4    < > = values in this interval not displayed  CUTURES:  Lab Results   Component Value Date    BLOODCX No Growth After 5 Days  10/06/2019    BLOODCX No Growth After 5 Days  10/06/2019    BLOODCX No Growth After 5 Days  10/03/2019    BLOODCX No Growth After 5 Days  10/03/2019    BLOODCX No Growth After 5 Days  10/01/2019    BLOODCX No Growth After 5 Days  10/01/2019                 Portions of the record may have been created with voice recognition software  Occasional wrong word or "sound a like" substitutions may have occurred due to the inherent limitations of voice recognition software  Read the chart carefully and recognize, using context, where substitutions have occurred  If you have any questions, please contact the dictating provider

## 2019-10-12 NOTE — PROGRESS NOTES
Progress Note - Cardiothoracic Surgery   Cris Lafleur 44 y o  male MRN: [id number]  Unit/Bed#: [facility] 408-01 Encounter: [id number]  POD # 3 s/p MVR, Bilateral Pectoralis Advancement flap    24 Hour Events: straight cath overnight, this morning voided 300 ml independently  No complaints this morning  Passing flatus  Tolerating diet  Pain controlled       Medications:   Scheduled Meds:  Current Facility-Administered Medications:  acetaminophen 650 mg Oral Q4H PRN Gonzalez Mansion del SolRALPH    amiodarone 200 mg Oral [facility] Navarro Marques PA-C    aspirin 325 mg Oral Daily [facility] [place], RALPH    atorvastatin 80 mg Oral Daily With [email or web address]    bisacodyl 10 mg Rectal Daily PRN Gonzalez [place], RALPH    cefTAZidime 2,000 mg Intravenous Q12H Gonzalez Mansion del SolRALPH Last Rate: 2,000 mg (10/12/19 0315)   docusate sodium 100 mg Oral BID Navarro Marques PA-C    escitalopram 10 mg Oral Daily Navarro Marques PA-C    fondaparinux 2 5 mg Subcutaneous Daily Chitra Marques PA-C    furosemide 40 mg Intravenous BID (diuretic) Navarro Marques PA-C    hydrALAZINE 5 mg Intravenous Once Navarro Marques PA-C    hydrOXYzine HCL 25 mg Oral Q8H PRN Navarro Marques PA-C    insulin lispro 1-5 Units Subcutaneous HS Chitrasen Marques PA-C    insulin lispro 1-6 Units Subcutaneous TID AC Chitrasen Marques PA-C    levofloxacin 750 mg Oral Q48H Chitra Marques PA-C    metoprolol tartrate 37 5 mg Oral Q12H [address] Navarro Marques PA-C    mupirocin 1 application Nasal [address] Chitrasen Marques PA-C    ondansetron 4 mg Intravenous Q6H PRN Gonzalez Mansion del SolRALPH bianchi    oxyCODONE-acetaminophen 1 tablet Oral Q4H PRN Gonzalez [facility]ansion [facility] SolRALPH    oxyCODONE-acetaminophen 2 tablet Oral Q6H PRN Gonzalez Mancera PA-C    pantoprazole 40 mg Oral Daily Navarro Marques PA-C    polyethylene glycol 17 g Oral Daily Chitra Marques PA-C    temazepam 15 mg Oral HS PRN Gonzalez Mancera, RALPH      Continuous Infusions:   PRN Meds:   acetaminophen    bisacodyl    hydrOXYzine HCL    ondansetron    oxyCODONE-acetaminophen    oxyCODONE-acetaminophen    temazepam    Vitals:   Vitals:    10/11/19 2354 10/12/19 0100 10/12/19 0318 10/12/19 0600   BP: 102/60  105/61    BP Location: Left arm  Left arm    Pulse: 80  76    Resp: 18  18    Temp: 98 3 °F (36 8 °C)  98 2 °F (36 8 °C)    TempSrc: Oral  Oral    SpO2: 99% 98% 97%    Weight:    78 4 kg (172 lb 13 5 oz)   Height:           Telemetry: NSR; Heart Rate: 77  Diffuse ST elevations on EKG, serous drainage from chest tubes    Respiratory:   SpO2: SpO2: 97 %; Room Air    Intake/Output:   I/O       10/10 0701 - 10/11 0700 10/11 0701 - 10/12 0700 10/12 0701 - 10/13 0700    P  O   605     I V  (mL/kg) 290 1 (3 7) 99 3 (1 3)     Blood       NG/GT 60      IV Piggyback 100 50     Cell Saver       Total Intake(mL/kg) 450 1 (5 8) 754 3 (9 6)     Urine (mL/kg/hr) 2135 (1 1) 1400 (0 7)     Emesis/NG output       Drains 83 20     Blood       Chest Tube 700 300     Total Output 2918 1720     Net -2467 9 -965 7                  Chest tube Output:    Mediastinal tubes: 20 mL/8 hours  40 mL/24 hours   Pleural tubes: 140 mL/8 hours  260 mL/24 hours     Weights:   Weight (last 2 days)     Date/Time   Weight    10/12/19 0600   78 4 (172 84)    10/11/19 0600   77 7 (171 3)    10/10/19 0546   78 6 (173 28)            Results:   Results from last 7 days   Lab Units 10/12/19  0457 10/11/19  0447 10/10/19  0430   WBC Thousand/uL 8 51 14 33* 12 97*   HEMOGLOBIN g/dL 8 0* 8 8* 9 5*   HEMATOCRIT % 26 3* 28 6* 30 3*   PLATELETS Thousands/uL 124* 132* 140*     Results from last 7 days   Lab Units 10/12/19  0457 10/11/19  0437 10/10/19  2228  10/09/19  1410   SODIUM mmol/L 140 142 140   < >  --    POTASSIUM mmol/L 3 7 4 2 4 7   < >  --    CHLORIDE mmol/L 106 108 108   < >  --    CO2 mmol/L 28 25 23   < >  --    CO2, I-STAT mmol/L  --   --   --   --  23   BUN mg/dL 45* 42* 43*   < > --    CREATININE mg/dL 1 64* 1 85* 2 03*   < >  --    GLUCOSE, ISTAT mg/dl  --   --   --   --  103   CALCIUM mg/dL 8 2* 8 0* 8 5   < >  --     < > = values in this interval not displayed  Results from last 7 days   Lab Units 10/10/19  0446 10/09/19  1409   INR  1 46* 1 70*   PTT seconds  --  44*     Point of care glucose: 114-116  Valve cultures from the OR: negative to date    Studies:  No new studies     Invasive Lines/Tubes:  Invasive Devices     Central Venous Catheter Line            CVC Central Lines 10/09/19 Triple 2 days          Peripheral Intravenous Line            Peripheral IV 10/08/19 Proximal;Right;Ventral (anterior) Forearm 4 days          Drain            Chest Tube 1 Left Pleural 28 Fr  2 days    Chest Tube 3 Posterior Mediastinal 32 Fr  2 days    Closed/Suction Drain Left Chest Bulb 15 Fr  2 days    Closed/Suction Drain Right Chest Bulb 15 Fr  2 days    Negative Pressure Wound Therapy (V A C ) Chest Medial 2 days                Physical Exam:    HEENT/NECK:  PERRLA  No jugular venous distention  Cardiac: Regular rate and rhythm  Pulmonary:  Breath sounds clear bilaterally  Abdomen:  Non-tender, Non-distended and Normal bowel sounds  Incisions: Sternum is stable  Incision is clean, dry, and intact  Extremities: Extremities warm/dry and Trace edema B/L  Neuro: Alert and oriented X 3  Skin: Warm/Dry, without rashes or lesions      Assessment:  Principal Problem:    Endocarditis  Active Problems:    History of intravenous drug abuse ([facility] [address] )    Acute kidney injury ([facility] [address] )    Insomnia    Severe mitral regurgitation    Bacteremia due to Pseudomonas    Anxiety    Solitary left kidney    Right parietal lobe lesion    Bilateral pleural effusion    Acute blood loss as cause of postoperative anemia    S/P MVR (mitral valve replacement)    Acute postoperative pulmonary insufficiency (HCC)    Hypotension    Thrombocytopenia (HCC)    Hyperphosphatemia       POD # 3 s/p MVR, Bilateral Pectoralis Advancement flap    Plan:    1  Cardiac:   NSR; HR/BP well-controlled  Lopressor 37 5 mg PO BID  Continue ASA and Statin therapy  Antibiotics for endocarditis per ID  Epicardial pacing wires out  Maintain central IV access today for iv medications  Continue DVT prophylaxis    2  Pulmonary:   Good Room air oxygen saturation; Continue incentive spirometry/Coughing/Deep breathing exercises  Chest tube output remains persistently high; Continue chest tubes to suction today    3  Renal:   Intake/Output net: -965 mL/24 hours  Continue diuresis   Lasix 40 mg IV BID  Post op Creatinine  Cr 1 64    4  Neuro:  Neurologically intact; No active issues  Incisional pain well-controlled; Continue prn Percocet    5  GI:  Tolerating TLC 2 3 gm sodium diet  Maintain 1800 mL daily fluid restriction   Continue stool softeners and prn suppository  Continue GI prophylaxis    6  Endo:   No history of diabetes; Glucose well-controlled with sliding scale coverage    7    Hematology:    Post-operative acute blood loss anemia; Hemoglobin 8 0; trend prn    8  Disposition:      Pending antibiotics needs will determine disposition    VTE Pharmacologic Prophylaxis: Fondaparinux (Arixtra)  VTE Mechanical Prophylaxis: sequential compression device    Collaborative rounds completed with KEVIN Shaw    Plan of care discussed with Sandie Rodriguez RN    SIGNATURE: Selvin Erazo  DATE: October 12, 2019  TIME: 7:11 AM

## 2019-10-13 LAB
ALBUMIN SERPL BCP-MCNC: 2.3 G/DL (ref 3.5–5)
ALP SERPL-CCNC: 93 U/L (ref 46–116)
ALT SERPL W P-5'-P-CCNC: 141 U/L (ref 12–78)
ANION GAP SERPL CALCULATED.3IONS-SCNC: 7 MMOL/L (ref 4–13)
AST SERPL W P-5'-P-CCNC: 59 U/L (ref 5–45)
BILIRUB SERPL-MCNC: 0.89 MG/DL (ref 0.2–1)
BUN SERPL-MCNC: 37 MG/DL (ref 5–25)
CALCIUM SERPL-MCNC: 8.3 MG/DL (ref 8.3–10.1)
CHLORIDE SERPL-SCNC: 105 MMOL/L (ref 100–108)
CO2 SERPL-SCNC: 31 MMOL/L (ref 21–32)
CREAT SERPL-MCNC: 1.35 MG/DL (ref 0.6–1.3)
ERYTHROCYTE [DISTWIDTH] IN BLOOD BY AUTOMATED COUNT: 18.6 % (ref 11.6–15.1)
GFR SERPL CREATININE-BSD FRML MDRD: 66 ML/MIN/1.73SQ M
GLUCOSE SERPL-MCNC: 104 MG/DL (ref 65–140)
GLUCOSE SERPL-MCNC: 114 MG/DL (ref 65–140)
GLUCOSE SERPL-MCNC: 86 MG/DL (ref 65–140)
GLUCOSE SERPL-MCNC: 94 MG/DL (ref 65–140)
GLUCOSE SERPL-MCNC: 98 MG/DL (ref 65–140)
HCT VFR BLD AUTO: 25.6 % (ref 36.5–49.3)
HGB BLD-MCNC: 7.7 G/DL (ref 12–17)
MCH RBC QN AUTO: 29.1 PG (ref 26.8–34.3)
MCHC RBC AUTO-ENTMCNC: 30.1 G/DL (ref 31.4–37.4)
MCV RBC AUTO: 97 FL (ref 82–98)
PLATELET # BLD AUTO: 127 THOUSANDS/UL (ref 149–390)
PMV BLD AUTO: 9.8 FL (ref 8.9–12.7)
POTASSIUM SERPL-SCNC: 3.8 MMOL/L (ref 3.5–5.3)
PROT SERPL-MCNC: 5.6 G/DL (ref 6.4–8.2)
RBC # BLD AUTO: 2.65 MILLION/UL (ref 3.88–5.62)
SODIUM SERPL-SCNC: 143 MMOL/L (ref 136–145)
WBC # BLD AUTO: 6.97 THOUSAND/UL (ref 4.31–10.16)

## 2019-10-13 PROCEDURE — 80053 COMPREHEN METABOLIC PANEL: CPT | Performed by: INTERNAL MEDICINE

## 2019-10-13 PROCEDURE — 99024 POSTOP FOLLOW-UP VISIT: CPT | Performed by: THORACIC SURGERY (CARDIOTHORACIC VASCULAR SURGERY)

## 2019-10-13 PROCEDURE — 99232 SBSQ HOSP IP/OBS MODERATE 35: CPT | Performed by: INTERNAL MEDICINE

## 2019-10-13 PROCEDURE — 85027 COMPLETE CBC AUTOMATED: CPT | Performed by: PHYSICIAN ASSISTANT

## 2019-10-13 PROCEDURE — 82948 REAGENT STRIP/BLOOD GLUCOSE: CPT

## 2019-10-13 RX ADMIN — CEFTAZIDIME 2000 MG: 1 INJECTION, POWDER, FOR SOLUTION INTRAMUSCULAR; INTRAVENOUS at 19:49

## 2019-10-13 RX ADMIN — Medication 1 APPLICATION: at 08:10

## 2019-10-13 RX ADMIN — OXYCODONE HYDROCHLORIDE AND ACETAMINOPHEN 2 TABLET: 5; 325 TABLET ORAL at 19:58

## 2019-10-13 RX ADMIN — AMIODARONE HYDROCHLORIDE 200 MG: 200 TABLET ORAL at 21:15

## 2019-10-13 RX ADMIN — ATORVASTATIN CALCIUM 80 MG: 80 TABLET, FILM COATED ORAL at 17:12

## 2019-10-13 RX ADMIN — FUROSEMIDE 40 MG: 10 INJECTION, SOLUTION INTRAMUSCULAR; INTRAVENOUS at 08:10

## 2019-10-13 RX ADMIN — COLCHICINE 0.6 MG: 0.6 TABLET, FILM COATED ORAL at 08:10

## 2019-10-13 RX ADMIN — FONDAPARINUX SODIUM 2.5 MG: 2.5 INJECTION, SOLUTION SUBCUTANEOUS at 08:10

## 2019-10-13 RX ADMIN — ESCITALOPRAM OXALATE 10 MG: 10 TABLET ORAL at 08:10

## 2019-10-13 RX ADMIN — LEVOFLOXACIN 750 MG: 750 TABLET, FILM COATED ORAL at 04:52

## 2019-10-13 RX ADMIN — CEFTAZIDIME 2000 MG: 1 INJECTION, POWDER, FOR SOLUTION INTRAMUSCULAR; INTRAVENOUS at 04:15

## 2019-10-13 RX ADMIN — PANTOPRAZOLE SODIUM 40 MG: 40 TABLET, DELAYED RELEASE ORAL at 08:10

## 2019-10-13 RX ADMIN — METOPROLOL TARTRATE 37.5 MG: 25 TABLET ORAL at 21:16

## 2019-10-13 RX ADMIN — POLYETHYLENE GLYCOL 3350 17 G: 17 POWDER, FOR SOLUTION ORAL at 08:09

## 2019-10-13 RX ADMIN — COLCHICINE 0.6 MG: 0.6 TABLET, FILM COATED ORAL at 17:12

## 2019-10-13 RX ADMIN — OXYCODONE HYDROCHLORIDE AND ACETAMINOPHEN 2 TABLET: 5; 325 TABLET ORAL at 05:06

## 2019-10-13 RX ADMIN — METOPROLOL TARTRATE 37.5 MG: 25 TABLET ORAL at 08:10

## 2019-10-13 RX ADMIN — ASPIRIN 325 MG ORAL TABLET 325 MG: 325 PILL ORAL at 08:10

## 2019-10-13 RX ADMIN — AMIODARONE HYDROCHLORIDE 200 MG: 200 TABLET ORAL at 13:59

## 2019-10-13 RX ADMIN — Medication 1 APPLICATION: at 21:15

## 2019-10-13 RX ADMIN — DOCUSATE SODIUM 100 MG: 100 CAPSULE, LIQUID FILLED ORAL at 17:12

## 2019-10-13 RX ADMIN — FUROSEMIDE 40 MG: 10 INJECTION, SOLUTION INTRAMUSCULAR; INTRAVENOUS at 17:12

## 2019-10-13 RX ADMIN — CEFTAZIDIME 2000 MG: 1 INJECTION, POWDER, FOR SOLUTION INTRAMUSCULAR; INTRAVENOUS at 11:29

## 2019-10-13 RX ADMIN — DOCUSATE SODIUM 100 MG: 100 CAPSULE, LIQUID FILLED ORAL at 08:10

## 2019-10-13 RX ADMIN — AMIODARONE HYDROCHLORIDE 200 MG: 200 TABLET ORAL at 05:01

## 2019-10-13 NOTE — PROGRESS NOTES
Progress Note - Nephrology   Savanna Sanderson 44 y o  male MRN: [id number]  Unit/Bed#: [facility] 408-01 Encounter: [id number]      Assessment / Plan:  1  JULIENNE, unknown prior baseline creatinine  -JULIENNE suspected to be secondary to prerenal/ATN/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 all in the setting of congenital solitary kidney  -also was found to have small renal infarct from prior endocarditis  -status post contrast exposure with cardiac catheterization on 10/2/19   -admission creatinine 1 4 at [facility], peaked at 2 2    -now seems to be fluctuating in mid one range, creatinine 1 35 now  -monitor renal indices closely  Off pressors  Off cardene gtt  BP low normal   -f/u am BMP/UOP     2  Hypotension post op - a bit low, off pressors, off cardene gtt  Goal MAP >65 for renal perfusion in setting of JULIENNE vs CKD     3  Anemia with thrombocytopenia  -closely monitor hemoglobin, now 7 7, transfuse p r n  For Hgb < 7  Did receive 2 u blood intraoperatively 10/9/19    -low iron stores recently although avoiding IV Venofer due to active infection issues  -recent FOBT was negative at [facility]      4  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  -fortaz/levaquin as per primary team and ID  -patient is active IV drug user and had prior history of MSSA bacteremia with endocarditis in March 2019  -BILLY in September 2019 showed echodensity on the anterior mitral leaflet      5  Pseudomonas bacteremia, blood culture from 9/28/19 showed Pseudomonas  -Repeat blood cultures negative so far, ID on board     6  Bilateral pleural effusion, status post thoracentesis on 10/2/19  -on lasix 40mg IV BID with good response per CT surgery team     7  Hyperphosphatemia- resolved     8   Post op Day #4 s/p MVR - management per CT surgery     Other issues:  CT scan suggestive of pulmonary congestion versus suspicion for pneumonia, pleural effusions, splenic infarct  Septic embolus/abscess on CT scan of brain         Subjective:   He denies any shortness of breath and states his chest feels lot better after chest tube has been removed  No complaints  Objective:     Vitals: Blood pressure 99/55, pulse 73, temperature 98 1 °F (36 7 °C), temperature source Oral, resp  rate 16, height 5' 3" (1 6 m), weight 77 6 kg (171 lb 1 2 oz), SpO2 98 %  ,Body mass index is 30 3 kg/m²  Temp (24hrs), Av 2 °F (36 8 °C), Min:97 9 °F (36 6 °C), Max:98 6 °F (37 °C)      Weight (last 2 days)     Date/Time   Weight    10/13/19 0600   77 6 (171 08)    10/12/19 0600   78 4 (172 84)    10/11/19 0600   77 7 (171 3)                Intake/Output Summary (Last 24 hours) at 10/13/2019 1431  Last data filed at 10/13/2019 1404  Gross per 24 hour   Intake 1240 ml   Output 1230 ml   Net 10 ml     I/O last 24 hours: In: 6661 [P O :1560; I V :60; IV Piggyback:100]  Out: 5553 [Urine:1500; Chest Tube:230]        Physical Exam:   Physical Exam   Constitutional: He appears well-developed and well-nourished  No distress  HENT:   Head: Normocephalic and atraumatic  Mouth/Throat: No oropharyngeal exudate  Eyes: Right eye exhibits no discharge  Left eye exhibits no discharge  No scleral icterus  Neck: Neck supple  Cardiovascular: Normal rate, regular rhythm and normal heart sounds  Pulmonary/Chest: Effort normal  He has no wheezes  He has no rales  +chest vac, +drains  +coarse BS b/l   Abdominal: Soft  Bowel sounds are normal  He exhibits no distension  There is no tenderness  Musculoskeletal: Normal range of motion  He exhibits edema (b/l LE)  Neurological: He is alert  awake   Skin: Skin is warm and dry  No rash noted  He is not diaphoretic  Psychiatric: He has a normal mood and affect  His behavior is normal    Vitals reviewed        Invasive Devices     Central Venous Catheter Line            CVC Central Lines 10/09/19 Triple 4 days          Drain            Chest Tube 1 Left Pleural 28 Fr  4 days    Chest Tube 3 Posterior Mediastinal 32 Fr  4 days    Closed/Suction Drain Left Chest Bulb 15 Fr  4 days    Closed/Suction Drain Right Chest Bulb 15 Fr  4 days    Negative Pressure Wound Therapy (V A C ) Chest Medial 4 days                Medications:    Scheduled Meds:  Current Facility-Administered Medications:  acetaminophen 650 mg Oral Q4H PRN Ravi RALPH Gutierrez    amiodarone 200 mg Oral [facility] Dewitte Expose RALPH Marques    aspirin 325 mg Oral Daily Ravi RALPH Gutierrez    atorvastatin 80 mg Oral Daily With [employer] International[employer]ALPH    bisacodyl 10 mg Rectal Daily PRN Ravi Gutierrez PA-C    cefTAZidime 2,000 mg Intravenous Q8H Vianey Cali MD Last Rate: 2,000 mg (10/13/19 1129)   colchicine 0 6 mg Oral BID Les Pantoja PA-C    docusate sodium 100 mg Oral BID [place] Chaitanya Marques PA-C    escitalopram 10 mg Oral Daily Chitrasen Marques PA-C    fondaparinux 2 5 mg Subcutaneous Daily Chitrasen Marques PA-C    furosemide 40 mg Intravenous BID (diuretic) Dewitte Expose RALPH Marques    hydrOXYzine HCL 25 mg Oral Q8H PRN [place] Chaitanya Marques PA-C    insulin lispro 1-5 Units Subcutaneous HS Chitra SALLY Marques PA-C    insulin lispro 1-6 Units Subcutaneous TID AC Chitra SALLY Marques PA-C    levofloxacin 750 mg Oral Q24H Vianey Cali MD    metoprolol tartrate 37 5 mg Oral Q12H [address] Dewitte Expose RALPH Marques    mupirocin 1 application Nasal O02A [address] Chitra SALLY Marques PA-C    ondansetron 4 mg Intravenous Q6H PRN Ravi RALPH Gutierrez    oxyCODONE-acetaminophen 1 tablet Oral Q4H PRN Ravi CasRALPH mabry    oxyCODONE-acetaminophen 2 tablet Oral Q6H PRN Ravi RALPH Gutierrez    pantoprazole 40 mg Oral Daily [place] Expose RALPH Marques    polyethylene glycol 17 g Oral Daily Chitra SALLY Victoron, PA-C    temazepam 15 mg Oral HS PRN Ravi Gutierrez PA-C        PRN Meds:   acetaminophen    bisacodyl    hydrOXYzine HCL    ondansetron    oxyCODONE-acetaminophen   oxyCODONE-acetaminophen    temazepam    Continuous Infusions:         LAB RESULTS:      Results from last 7 days   Lab Units 10/13/19  0501 10/12/19  0457 10/11/19  0447 10/11/19  0437 10/10/19  2228 10/10/19  0430 10/10/19  0401 10/10/19  0400 10/09/19  2033 10/09/19  1746 10/09/19  1410 10/09/19  1409 10/09/19  1255 10/09/19  1120 10/09/19  1046 10/09/19  1024 10/09/19  1017 10/09/19  0952 10/09/19  0927  10/09/19  0537 10/08/19  0229   WBC Thousand/uL 6 97 8 51 14 33*  --   --  12 97*  --   --   --   --   --   --   --   --   --   --   --   --   --   --  12 23* 11 05*   HEMOGLOBIN g/dL 7 7* 8 0* 8 8*  --   --  9 5*  --   --  9 8*  --   --  8 8*  --   --   --   --   --   --   --   --  8 5* 8 2*   I STAT HEMOGLOBIN g/dl  --   --   --   --   --   --   --   --   --   --  8 2*  --  9 2* 8 5* 7 5*  --  6 8* 6 1* 6 8*   < >  --   --    HEMATOCRIT % 25 6* 26 3* 28 6*  --   --  30 3*  --   --  30 7*  --   --  27 2*  --   --   --   --   --   --   --   --  27 7* 26 2*   HEMATOCRIT, ISTAT %  --   --   --   --   --   --   --   --   --   --  24*  --  27* 25* 22*  --  20* 18* 20*   < >  --   --    PLATELETS Thousands/uL 127* 124* 132*  --   --  140*  --   --   --   --   --  139*  --   --   --  187  --   --   --   --  271 220   NEUTROS PCT %  --   --  87*  --   --  87*  --   --   --   --   --   --   --   --   --   --   --   --   --   --  80*  --    LYMPHS PCT %  --   --  5*  --   --  6*  --   --   --   --   --   --   --   --   --   --   --   --   --   --  12*  --    MONOS PCT %  --   --  6  --   --  6  --   --   --   --   --   --   --   --   --   --   --   --   --   --  5  --    EOS PCT %  --   --  1  --   --  0  --   --   --   --   --   --   --   --   --   --   --   --   --   --  1  --    POTASSIUM mmol/L 3 8 3 7  --  4 2 4 7  --  5 1  --  4 9 4 4  --  4 0  --   --   --   --   --   --   --   --  4 4 3 4*   CHLORIDE mmol/L 105 106  --  108 108  --  115*  --  115*  --   --  115*  --   --   --   --   --   --   --   --  108 109* CO2 mmol/L 31 28  --  25 23  --  21  --  20*  --   --  21  --   --   --   --   --   --   --   --  24 24   CO2, I-STAT mmol/L  --   --   --   --   --   --   --   --   --   --  23  --  24 26 28  --  31 24 25   < >  --   --    BUN mg/dL 37* 45*  --  42* 43*  --  38*  --  32*  --   --  33*  --   --   --   --   --   --   --   --  35* 31*   CREATININE mg/dL 1 35* 1 64*  --  1 85* 2 03*  --  1 81*  --  1 74*  --   --  1 69*  --   --   --   --   --   --   --   --  1 68* 1 51*   CALCIUM mg/dL 8 3 8 2*  --  8 0* 8 5  --  8 3  --  8 2*  --   --  7 8*  --   --   --   --   --   --   --   --  9 2 8 7   ALK PHOS U/L 93  --   --   --   --   --   --   --   --   --   --   --   --   --   --   --   --   --   --   --   --   --    ALT U/L 141*  --   --   --   --   --   --   --   --   --   --   --   --   --   --   --   --   --   --   --   --   --    AST U/L 59*  --   --   --   --   --   --   --   --   --   --   --   --   --   --   --   --   --   --   --   --   --    GLUCOSE, ISTAT mg/dl  --   --   --   --   --   --   --   --   --   --  103  --  126 189* 193*  --  206* 192* 168*   < >  --   --    MAGNESIUM mg/dL  --  2 4  --  2 2  --   --   --  2 6  --   --   --   --   --   --   --   --   --   --   --   --   --  1 9   PHOSPHORUS mg/dL  --  3 2  --  4 9*  --   --   --   --   --   --   --   --   --   --   --   --   --   --   --   --   --  3 4    < > = values in this interval not displayed  CUTURES:  Lab Results   Component Value Date    BLOODCX No Growth After 5 Days  10/06/2019    BLOODCX No Growth After 5 Days  10/06/2019    BLOODCX No Growth After 5 Days  10/03/2019    BLOODCX No Growth After 5 Days  10/03/2019    BLOODCX No Growth After 5 Days  10/01/2019    BLOODCX No Growth After 5 Days  10/01/2019                 Portions of the record may have been created with voice recognition software  Occasional wrong word or "sound a like" substitutions may have occurred due to the inherent limitations of voice recognition software   Read the chart carefully and recognize, using context, where substitutions have occurred  If you have any questions, please contact the dictating provider

## 2019-10-13 NOTE — PROGRESS NOTES
Progress Note - Cardiothoracic Surgery   Danny Sanchez 44 y o  male MRN: [id number]  Unit/Bed#: [facility] 408-01 Encounter: [id number]  POD # 4 s/p MVR, Bilateral Pectoralis Advancement flap    24 Hour Events: No events overnight  No complaints this morning  Ambulating with assistance  Tolerating diet  Pain controlled  Medications:   Scheduled Meds:  Current Facility-Administered Medications:  acetaminophen 650 mg Oral Q4H PRN Geni Wilder PA-C    amiodarone 200 mg Oral [facility] Dominga Marques PA-C    aspirin 325 mg Oral Daily Geni Wilder PA-C    atorvastatin 80 mg Oral Daily With [employer]ALPH    bisacodyl 10 mg Rectal Daily PRN Geni Wilder PA-C    cefTAZidime 2,000 mg Intravenous Q8H Carlos Finney MD Last Rate: 2,000 mg (10/13/19 0415)   colchicine 0 6 mg Oral BID Lesia Pantoja PA-C    docusate sodium 100 mg Oral BID Dominga Marques PA-C    escitalopram 10 mg Oral Daily Dominga Marques PA-C    fondaparinux 2 5 mg Subcutaneous Daily Cihtra Marques PA-C    furosemide 40 mg Intravenous BID (diuretic) Dominga Marques PA-C    hydrOXYzine HCL 25 mg Oral Q8H PRN Dominga Marques PA-C    insulin lispro 1-5 Units Subcutaneous HS Chitra Marques PA-C    insulin lispro 1-6 Units Subcutaneous TID AC Chitra Marques PA-C    levofloxacin 750 mg Oral Q24H Carlos Finney MD    metoprolol tartrate 37 5 mg Oral Q12H [address] Dominga Marques PA-C    mupirocin 1 application Nasal [address] Chitra Marques PA-C    ondansetron 4 mg Intravenous Q6H PRN Geni Wilder PA-C    oxyCODONE-acetaminophen 1 tablet Oral Q4H PRN Geni Wilder PA-C    oxyCODONE-acetaminophen 2 tablet Oral Q6H PRN Geni Wilder PA-C    pantoprazole 40 mg Oral Daily Dominga Marques PA-C    polyethylene glycol 17 g Oral Daily Chitrasen Marques PA-C    temazepam 15 mg Oral HS PRN Dominga aMrques PA-C      Continuous Infusions:   PRN Meds:   acetaminophen    bisacodyl    hydrOXYzine HCL    ondansetron    oxyCODONE-acetaminophen    oxyCODONE-acetaminophen    temazepam    Vitals:   Vitals:    10/12/19 2021 10/12/19 2301 10/13/19 0337 10/13/19 0600   BP:  111/55 119/71    BP Location:  Left arm Left arm    Pulse:  77 76    Resp:  18 18    Temp:  98 5 °F (36 9 °C) 97 9 °F (36 6 °C)    TempSrc:  Oral Oral    SpO2: 95% 94% 93%    Weight:    77 6 kg (171 lb 1 2 oz)   Height:           Telemetry: NSR; Heart Rate: 76    Respiratory:   SpO2: SpO2: 93 %; Room Air    Intake/Output:   I/O       10/11 0701 - 10/12 0700 10/12 0701 - 10/13 0700 10/13 0701 - 10/14 0700    P  O  605 1320     I V  (mL/kg) 99 3 (1 3) 60 (0 8)     NG/GT  0     IV Piggyback 50 100     Total Intake(mL/kg) 754 3 (9 6) 1480 (19 1)     Urine (mL/kg/hr) 1400 (0 7) 1500 (0 8)     Drains 20 0     Chest Tube 300 230     Total Output 1720 1730     Net -965 7 -250                  Chest tube Output:    Mediastinal tubes: 30 mL/8 hours  130 mL/24 hours   Pleural tubes: 60 mL/8 hours  100 mL/24 hours     Weights:   Weight (last 2 days)     Date/Time   Weight    10/13/19 0600   77 6 (171 08)    10/12/19 0600   78 4 (172 84)    10/11/19 0600   77 7 (171 3)            Results:   Results from last 7 days   Lab Units 10/13/19  0501 10/12/19  0457 10/11/19  0447   WBC Thousand/uL 6 97 8 51 14 33*   HEMOGLOBIN g/dL 7 7* 8 0* 8 8*   HEMATOCRIT % 25 6* 26 3* 28 6*   PLATELETS Thousands/uL 127* 124* 132*     Results from last 7 days   Lab Units 10/13/19  0501 10/12/19  0457 10/11/19  0437  10/09/19  1410   SODIUM mmol/L 143 140 142   < >  --    POTASSIUM mmol/L 3 8 3 7 4 2   < >  --    CHLORIDE mmol/L 105 106 108   < >  --    CO2 mmol/L 31 28 25   < >  --    CO2, I-STAT mmol/L  --   --   --   --  23   BUN mg/dL 37* 45* 42*   < >  --    CREATININE mg/dL 1 35* 1 64* 1 85*   < >  --    GLUCOSE, ISTAT mg/dl  --   --   --   --  103   CALCIUM mg/dL 8 3 8 2* 8 0*   < >  --     < > = values in this interval not displayed  Results from last 7 days   Lab Units 10/10/19  0446 10/09/19  1409   INR  1 46* 1 70*   PTT seconds  --  44*     Invasive Lines/Tubes:  Invasive Devices     Central Venous Catheter Line            CVC Central Lines 10/09/19 Triple 3 days          Drain            Chest Tube 1 Left Pleural 28 Fr  3 days    Chest Tube 3 Posterior Mediastinal 32 Fr  3 days    Closed/Suction Drain Left Chest Bulb 15 Fr  3 days    Closed/Suction Drain Right Chest Bulb 15 Fr  3 days    Negative Pressure Wound Therapy (V A C ) Chest Medial 3 days                Physical Exam:    HEENT/NECK:  PERRLA  No jugular venous distention  Cardiac: Regular rate and rhythm  Pulmonary:  Breath sounds clear bilaterally  Abdomen:  Non-tender, Non-distended and Normal bowel sounds  Incisions: Sternum is stable  Incision dressed with Acticoat  No erythema or drainage VAC in place across chest incison  Extremities: Extremities warm/dry and Trace edema B/L  Neuro: Alert and oriented X 3  Skin: Warm/Dry, without rashes or lesions  Assessment:  Principal Problem:    Endocarditis  Active Problems:    History of intravenous drug abuse ([facility] [address] )    Acute kidney injury ([facility] [address] )    Insomnia    Severe mitral regurgitation    Bacteremia due to Pseudomonas    Anxiety    Solitary left kidney    Right parietal lobe lesion    Bilateral pleural effusion    Acute blood loss as cause of postoperative anemia    S/P MVR (mitral valve replacement)    Acute postoperative pulmonary insufficiency (HCC)    Hypotension    Thrombocytopenia (HCC)       POD # 4 s/p MVR, Bilateral Pectoralis Advancement flap    Plan:    1  Cardiac:   NSR; HR/BP well-controlled  Lopressor 37 5 mg PO BID   Endocarditis: antibiotics per ID   OR cultures negative to date  Continue ASA and Statin therapy  Epicardial pacing wires no longer required  Remove today  Maintain central IV access today for access  Continue DVT prophylaxis    2   Pulmonary:   Good Room air oxygen saturation; Continue incentive spirometry/Coughing/Deep breathing exercises  Chest tube drainage diminished; D/C today    3  Renal:   Intake/Output net: -250 mL/24 hours  Continue diuresis   Lasix 40 mg IV BID  Potassium Chloride 20 mEq PO BID  Post op Creatinine stable; Follow up labs prn    4  Neuro:  Neurologically intact; No active issues  Incisional pain well-controlled; Continue prn Percocet    5  GI:  Tolerating TLC 2 3 gm sodium diet  Maintain 1800 mL daily fluid restriction   Continue stool softeners and prn suppository  Continue GI prophylaxis    6  Endo:   No history of diabetes; Glucose well-controlled with sliding scale coverage    7    Hematology:    Post-operative acute blood loss anemia; Hemoglobin 7 7; trend prn    8  Disposition:      Pending final abx needs will determine discharge planning    VTE Pharmacologic Prophylaxis: Fondaparinux (Arixtra)  VTE Mechanical Prophylaxis: sequential compression device    Collaborative rounds completed with KEVIN March    Plan of care discussed with Grisel Jacobson RN    SIGNATURE: Selvin Flores  DATE: October 13, 2019  TIME: 7:14 AM

## 2019-10-13 NOTE — QUICK NOTE
10/13/19    Procedure: Chest tube removal    Chest tubes removed in routine fashion without incident  Insertion site dressed with Acticoat  Orly Lawson tolerated the procedure well  Nurse notified      SIGNATURE: Selvin Martinez  DATE: October 13, 2019  TIME: 9:58 AM

## 2019-10-14 LAB
ANION GAP SERPL CALCULATED.3IONS-SCNC: 6 MMOL/L (ref 4–13)
BUN SERPL-MCNC: 32 MG/DL (ref 5–25)
CALCIUM SERPL-MCNC: 8.5 MG/DL (ref 8.3–10.1)
CHLORIDE SERPL-SCNC: 106 MMOL/L (ref 100–108)
CO2 SERPL-SCNC: 32 MMOL/L (ref 21–32)
CREAT SERPL-MCNC: 1.25 MG/DL (ref 0.6–1.3)
GFR SERPL CREATININE-BSD FRML MDRD: 72 ML/MIN/1.73SQ M
GLUCOSE SERPL-MCNC: 102 MG/DL (ref 65–140)
GLUCOSE SERPL-MCNC: 122 MG/DL (ref 65–140)
GLUCOSE SERPL-MCNC: 90 MG/DL (ref 65–140)
GLUCOSE SERPL-MCNC: 90 MG/DL (ref 65–140)
GLUCOSE SERPL-MCNC: 92 MG/DL (ref 65–140)
POTASSIUM SERPL-SCNC: 3.2 MMOL/L (ref 3.5–5.3)
SODIUM SERPL-SCNC: 144 MMOL/L (ref 136–145)

## 2019-10-14 PROCEDURE — 99233 SBSQ HOSP IP/OBS HIGH 50: CPT | Performed by: INTERNAL MEDICINE

## 2019-10-14 PROCEDURE — 99231 SBSQ HOSP IP/OBS SF/LOW 25: CPT | Performed by: INTERNAL MEDICINE

## 2019-10-14 PROCEDURE — 80048 BASIC METABOLIC PNL TOTAL CA: CPT | Performed by: PHYSICIAN ASSISTANT

## 2019-10-14 PROCEDURE — 99024 POSTOP FOLLOW-UP VISIT: CPT | Performed by: THORACIC SURGERY (CARDIOTHORACIC VASCULAR SURGERY)

## 2019-10-14 PROCEDURE — 82948 REAGENT STRIP/BLOOD GLUCOSE: CPT

## 2019-10-14 PROCEDURE — 99024 POSTOP FOLLOW-UP VISIT: CPT | Performed by: PLASTIC SURGERY

## 2019-10-14 RX ORDER — SACCHAROMYCES BOULARDII 250 MG
250 CAPSULE ORAL 2 TIMES DAILY
Status: DISCONTINUED | OUTPATIENT
Start: 2019-10-14 | End: 2019-10-15 | Stop reason: HOSPADM

## 2019-10-14 RX ORDER — POTASSIUM CHLORIDE 20 MEQ/1
20 TABLET, EXTENDED RELEASE ORAL 2 TIMES DAILY
Status: DISCONTINUED | OUTPATIENT
Start: 2019-10-14 | End: 2019-10-14

## 2019-10-14 RX ORDER — POTASSIUM CHLORIDE 20 MEQ/1
20 TABLET, EXTENDED RELEASE ORAL DAILY
Status: DISCONTINUED | OUTPATIENT
Start: 2019-10-15 | End: 2019-10-15 | Stop reason: HOSPADM

## 2019-10-14 RX ORDER — FUROSEMIDE 40 MG/1
40 TABLET ORAL DAILY
Status: DISCONTINUED | OUTPATIENT
Start: 2019-10-14 | End: 2019-10-15 | Stop reason: HOSPADM

## 2019-10-14 RX ORDER — LOPERAMIDE HYDROCHLORIDE 2 MG/1
2 CAPSULE ORAL 3 TIMES DAILY PRN
Status: DISCONTINUED | OUTPATIENT
Start: 2019-10-14 | End: 2019-10-15 | Stop reason: HOSPADM

## 2019-10-14 RX ORDER — POTASSIUM CHLORIDE 14.9 MG/ML
20 INJECTION INTRAVENOUS ONCE
Status: COMPLETED | OUTPATIENT
Start: 2019-10-14 | End: 2019-10-14

## 2019-10-14 RX ADMIN — ONDANSETRON 4 MG: 2 INJECTION INTRAMUSCULAR; INTRAVENOUS at 06:22

## 2019-10-14 RX ADMIN — LOPERAMIDE HYDROCHLORIDE 2 MG: 2 CAPSULE ORAL at 17:21

## 2019-10-14 RX ADMIN — ATORVASTATIN CALCIUM 80 MG: 80 TABLET, FILM COATED ORAL at 17:19

## 2019-10-14 RX ADMIN — POTASSIUM CHLORIDE 20 MEQ: 14.9 INJECTION, SOLUTION INTRAVENOUS at 09:18

## 2019-10-14 RX ADMIN — METOPROLOL TARTRATE 37.5 MG: 25 TABLET ORAL at 09:17

## 2019-10-14 RX ADMIN — ASPIRIN 325 MG ORAL TABLET 325 MG: 325 PILL ORAL at 09:16

## 2019-10-14 RX ADMIN — FUROSEMIDE 40 MG: 10 INJECTION, SOLUTION INTRAMUSCULAR; INTRAVENOUS at 09:16

## 2019-10-14 RX ADMIN — AMIODARONE HYDROCHLORIDE 200 MG: 200 TABLET ORAL at 13:16

## 2019-10-14 RX ADMIN — PANTOPRAZOLE SODIUM 40 MG: 40 TABLET, DELAYED RELEASE ORAL at 09:16

## 2019-10-14 RX ADMIN — FONDAPARINUX SODIUM 2.5 MG: 2.5 INJECTION, SOLUTION SUBCUTANEOUS at 09:16

## 2019-10-14 RX ADMIN — ESCITALOPRAM OXALATE 10 MG: 10 TABLET ORAL at 09:16

## 2019-10-14 RX ADMIN — ACETAMINOPHEN 650 MG: 325 TABLET ORAL at 17:22

## 2019-10-14 RX ADMIN — Medication 1 APPLICATION: at 09:16

## 2019-10-14 RX ADMIN — OXYCODONE HYDROCHLORIDE AND ACETAMINOPHEN 2 TABLET: 5; 325 TABLET ORAL at 06:28

## 2019-10-14 RX ADMIN — AMIODARONE HYDROCHLORIDE 200 MG: 200 TABLET ORAL at 05:18

## 2019-10-14 RX ADMIN — Medication 250 MG: at 17:19

## 2019-10-14 RX ADMIN — CEFTAZIDIME 2000 MG: 1 INJECTION, POWDER, FOR SOLUTION INTRAMUSCULAR; INTRAVENOUS at 03:42

## 2019-10-14 RX ADMIN — METOPROLOL TARTRATE 37.5 MG: 25 TABLET ORAL at 21:57

## 2019-10-14 RX ADMIN — FUROSEMIDE 40 MG: 40 TABLET ORAL at 13:16

## 2019-10-14 RX ADMIN — LEVOFLOXACIN 750 MG: 750 TABLET, FILM COATED ORAL at 05:18

## 2019-10-14 RX ADMIN — LOPERAMIDE HYDROCHLORIDE 2 MG: 2 CAPSULE ORAL at 10:29

## 2019-10-14 RX ADMIN — AMIODARONE HYDROCHLORIDE 200 MG: 200 TABLET ORAL at 21:57

## 2019-10-14 RX ADMIN — Medication 250 MG: at 10:29

## 2019-10-14 RX ADMIN — POTASSIUM CHLORIDE 20 MEQ: 1500 TABLET, EXTENDED RELEASE ORAL at 09:24

## 2019-10-14 NOTE — PROGRESS NOTES
PLASTIC SURGERY ATTENDING     Pt seen and examined  Had some nausea and small emesis overnight but otherwise doing well, pain well controlled  Doing well on IS and ambulation  Feels he wants to go home  /70 (BP Location: Right arm)   Pulse 77   Temp 98 3 °F (36 8 °C) (Oral)   Resp 18   Ht 5' 3" (1 6 m)   Wt 74 7 kg (164 lb 10 9 oz)   SpO2 97%   BMI 29 17 kg/m²     Chest: VAC in place, SHERICE SS ( Unclear about output recording)   Abdomen: soft, ND   LE: SCDs in place     A/P POD 5 MVR and bilateral myocutaneous pec flaps, doing well     - Continue  VAC until Wed or remove earlier if discharged  - Need more accurate recording of drains   - Encouraged use of IS   - cardiac and High protein diet was encouraged  - Partial weight bearing to bilateral upper extremities for 6 weeks   - Dispo:  As per primary team  Possible Rehab, possible need of IV abx vs oral      [address]   [address]   [facility] [place], [place], [address]   Office: [phone]

## 2019-10-14 NOTE — PROGRESS NOTES
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 (27mm [facility] Mitral Ease Pericardial Bioprosthesis)  - abx per ID  - metop tartrate 37 5mg BID  Tele showing HR 70's now compared to 120's pre-op  - ultimately will need outpatient follow up with cardiology for clearance off abx  - post-op BILLY showing improvement in RV   - will discuss obtaining formal TTE with attending to assess baseline post-op gradients of valve as volume status seems improved as well  - I/OL: +430mL past 24 hours  - on amio 200mg q8H, aspirin 325mg daily, metop tartrate 37 5mg q12H    2  Hypokalemia  - K 3 2  Replete to >4  Keep Mg >2    3  Acute kidney injury  - Cr improved from 2 03 peak on 10/10/19 to 1 64 on 10/12  Today 1 25  Lasix 40mg IV BID as per CT surgery showing improvement  Net positive 400mL past 24 hours  Each day back has been net neg 250mL, 965mL, 2 5L  - please replace electrolytes    Please await final attending attestation    So Carr MD  - PGY-4 Cardiology Fellow  - Tiger text enabled    ---  Prior Cardiac Imaging  - LHC (10/2/19): no CAD  - BILLY (10/2/19): EF 60%, no RWMA, eccentric LVH, RV size ULN, LA mild to mod dilated, large/highly mobile vegetations at atrial aspect of both MV leaflets w/ at least 2 perforations at base of anterior leaflet & loss of coaptation of leaflets at middle section due to tissue destruction, wide-open MR, mild to mod TR  - carotid artery duplex: no ICA stenosis b/l, antegrade flow b/l, no subcalvain disease BL     Subjective:    Today feels improvement in his SOB compared to admission  Feels tired overall  Denies active chest pain  Tele showing HR in 70's compared to 120's pre-op  Objective  GEN: NAD, alert and oriented x 3, pleasant and cooperative   Cardiac: S1, S2 regular  2/6 decrescendo murmur appreciated RUSB  Chest wound vac present  JVP appreciated at 45 degrees HOB  Pulm: CTABL without significant crackles but patient did not want to sit up fully  Abd: +Bs, NT, ND  MSK: 5/5 strength UE, LE  Neuro: CN grossly intact, sensation intact UE, LE        ----            VS: Blood pressure 116/70, pulse 77, temperature 98 3 °F (36 8 °C), temperature source Oral, resp  rate 18, height 5' 3" (1 6 m), weight 74 7 kg (164 lb 10 9 oz), SpO2 97 %  , Body mass index is 29 17 kg/m² ,   Orthostatic Blood Pressures      Most Recent Value   Blood Pressure  116/70 filed at 10/14/2019 0801   Patient Position - Orthostatic VS  Lying filed at 10/14/2019 0801          Active Meds    Current Facility-Administered Medications:     acetaminophen (TYLENOL) tablet 650 mg, 650 mg, Oral, Q4H PRN, Marysol Rivero PA-C    amiodarone tablet 200 mg, 200 mg, Oral, Q8H [facility], Chitra Marques PA-C, 200 mg at 10/14/19 0518    aspirin tablet 325 mg, 325 mg, Oral, Daily, Paul Marques PA-C, 325 mg at 10/13/19 0810    atorvastatin (LIPITOR) tablet 80 mg, 80 mg, Oral, Daily With [facility], RALPH, 80 mg at 10/13/19 1712    bisacodyl (DULCOLAX) rectal suppository 10 mg, 10 mg, Rectal, Daily PRN, Paul Marques PA-C    cefTAZidime (FORTAZ) 2,000 mg in sodium chloride 0 9 % 50 mL IVPB, 2,000 mg, Intravenous, Q8H, Ashley Griffiths MD, Last Rate: 100 mL/hr at 10/14/19 0342, 2,000 mg at 10/14/19 0342    escitalopram (LEXAPRO) tablet 10 mg, 10 mg, Oral, Daily, Paul Marques PA-C, 10 mg at 10/13/19 0810    fondaparinux (ARIXTRA) subcutaneous injection 2 5 mg, 2 5 mg, Subcutaneous, Daily, Paul Marques PA-C, 2 5 mg at 10/13/19 0810   furosemide (LASIX) injection 40 mg, 40 mg, Intravenous, BID (diuretic), Gladys Marques PA-C, 40 mg at 10/13/19 1712    hydrOXYzine HCL (ATARAX) tablet 25 mg, 25 mg, Oral, Q8H PRN, Gladys Marques PA-C, 25 mg at 10/08/19 2123    insulin lispro (HumaLOG) 100 units/mL subcutaneous injection 1-5 Units, 1-5 Units, Subcutaneous, HS, Narciso Rivas PA-C, 1 Units at 10/10/19 2232    insulin lispro (HumaLOG) 100 units/mL subcutaneous injection 1-6 Units, 1-6 Units, Subcutaneous, TID AC **AND** Fingerstick Glucose (POCT), , , TID AC, Chitra Marques PA-C    levofloxacin (LEVAQUIN) tablet 750 mg, 750 mg, Oral, Q24H, Jona Nj MD, 750 mg at 10/14/19 0518    metoprolol tartrate (LOPRESSOR) partial tablet 37 5 mg, 37 5 mg, Oral, Q12H [address], Gladys Marques PA-C, 37 5 mg at 10/13/19 2116    mupirocin (BACTROBAN) 2 % nasal ointment 1 application, 1 application, Nasal, X03O [address], Gladys Marques PA-C, 1 application at 44/30/97 2115    ondansetron (ZOFRAN) injection 4 mg, 4 mg, Intravenous, Q6H PRN, Gladys Marques PA-C, 4 mg at 10/14/19 0622    oxyCODONE-acetaminophen (PERCOCET) 5-325 mg per tablet 1 tablet, 1 tablet, Oral, Q4H PRN, Narciso Rivas PA-C    oxyCODONE-acetaminophen (PERCOCET) 5-325 mg per tablet 2 tablet, 2 tablet, Oral, Q6H PRN, Narciso Rivas PA-C, 2 tablet at 10/14/19 0628    pantoprazole (PROTONIX) EC tablet 40 mg, 40 mg, Oral, Daily, Gladys Marques PA-C, 40 mg at 10/13/19 0810    potassium chloride (K-DUR,KLOR-CON) CR tablet 20 mEq, 20 mEq, Oral, BID, Kevin Chin PA-C    potassium chloride 20 mEq IVPB (premix), 20 mEq, Intravenous, Once, Kevin Chin PA-C    temazepam (RESTORIL) capsule 15 mg, 15 mg, Oral, HS PRN, Narciso Rivas PA-C    Labs & Results  No results found for: CKTOTAL, CKMB, CKMBINDEX, TROPONINI  Lab Results   Component Value Date    GLUCOSE 103 10/09/2019    CALCIUM 8 5 10/14/2019    K 3 2 (L) 10/14/2019    CO2 32 10/14/2019    CL 106 10/14/2019    BUN 32 (H) 10/14/2019    CREATININE 1 25 10/14/2019     Lab Results   Component Value Date    WBC 6 97 10/13/2019    HGB 7 7 (L) 10/13/2019    HCT 25 6 (L) 10/13/2019    MCV 97 10/13/2019     (L) 10/13/2019     Results from last 7 days   Lab Units 10/10/19  0446   INR  1 46*     No results found for: CHOL  Lab Results   Component Value Date    HDL 27 (L) 10/03/2019     Lab Results   Component Value Date    LDLCALC 76 10/03/2019     Lab Results   Component Value Date    TRIG 101 10/03/2019     Lab Results   Component Value Date     (H) 10/13/2019    AST 59 (H) 10/13/2019

## 2019-10-14 NOTE — RESTORATIVE TECHNICIAN NOTE
Restorative Specialist Mobility Note       Activity: Ambulate in ruiz, Chair     Assistive Device: None

## 2019-10-14 NOTE — PROGRESS NOTES
Follow up Consultation    Nephrology   Darrell Bangura 44 y o  male MRN: [id number]  Unit/Bed#: [facility] 408-01 Encounter: [id number]      Physician Requesting Consult: Susan Stanton MD  Reason for Consult:  Acute kidney injury      ASSESSMENT/PLAN:    1)Acute kidney injury (POA):  - JULIENNE most likely secondary to prerenal azotemia/aminoglycoside nephrotoxicity/questionable immune complex GN/ATN  - After review of records it appears that the patient's baseline creatinine is not known  - patient was admitted with a creatinine of 1 4 mg/dL a [facility]   - peak creatinine during this admission thus far was 2 2 mg/dL  - patient's creatinine today is at 1 25 mg/dL  Creatinine stable and continues to trend down  - Avoid nephrotoxins, adjust meds to appropriate GFR   - Acid base and lytes stable except hypokalemia and alkalosis  - clinically patient appears to be euvolemic to minimally hypervolemic,  - Optimize hemodynamic status to avoid delay in renal recovery  - check BMP, magnesium, phosphorus in a m   - patient will need follow-up post hospitalization to establish care for his CKD  - sent message to the office to set outpatient follow-up in 1 week post hospital discharge her blood work prior to the visit  - CT abdomen from 10/03/2019 shows right kidney absent  Left kidney with some degree of compensatory hypertrophy  - Await renal recovery  - Place on a renal diet when allowed diet order    - Strict I/O  2)Blood pressure/hypertension:  - Optimize hemodynamics   - Maintain MAP > 65mmHg  - Avoid BP fluctuations  - on metoprolol 37 5 mg p o  B i d     3)H/H/anemia:  - most recent hemoglobin at 7 7 g per dL  - maintain hemoglobin greater than 8 grams/deciliter    4)Volume status:  - Clinically patient appears to be euvolemic to minimally hypervolemic   -recommend changing to Lasix 40 mg p o  Q day    5) Hypokalemia:  - getting supplements  - likely secondary to diuretic use    - Continue to monitor for now    6)CKD:  - Most likely secondary to solitary kidney  - will arrange for follow up post hospitalization  7) Mitral valve endocarditis:  - management as per Primary team  - status post MVR  - follow-up with CT surgery    8) Pseudomonas bacteremia/endocarditis status post MVR with bioprosthesis:  - Management as per primary team  - follow-up with ID  - patient is active IV drug user prior history of MSSA bacteremia  Thanks for the consult  Will sign off  Please call with questions/ concerns  Above-mentioned orders and Plan in terms of acute kidney injury was discussed with the team in [address] White[address] MD[facility], 10/14/2019, 12:16 PM              Objective :   Patient seen and examined in his room no overnight events hemodynamically stable remains afebrile, urine output not adequately documented more than 200 cc plus  Patient reports he feels well  Has not seen a nephrologist in the past this was the 1st time was made aware single kidney  PHYSICAL EXAM  /65 (BP Location: Right arm)   Pulse 72   Temp 99 °F (37 2 °C) (Oral)   Resp 18   Ht 5' 3" (1 6 m)   Wt 74 7 kg (164 lb 10 9 oz)   SpO2 99%   BMI 29 17 kg/m²   Temp (24hrs), Av 2 °F (36 8 °C), Min:97 6 °F (36 4 °C), Max:99 °F (37 2 °C)        Intake/Output Summary (Last 24 hours) at 10/14/2019 1216  Last data filed at 10/14/2019 0523  Gross per 24 hour   Intake 480 ml   Output 200 ml   Net 280 ml       I/O last 24 hours: In: 600 [P O :600]  Out: 200 [Urine:200]      Current Weight: Weight - Scale: 74 7 kg (164 lb 10 9 oz)  First Weight: Weight - Scale: 72 9 kg (160 lb 12 8 oz)  Physical Exam   Constitutional: He is oriented to person, place, and time  He appears well-developed and well-nourished  No distress  HENT:   Head: Normocephalic and atraumatic  Mouth/Throat: Oropharynx is clear and moist  No oropharyngeal exudate  Eyes: Conjunctivae are normal  No scleral icterus  Neck: Neck supple  No JVD present  No tracheal deviation present  Cardiovascular: Normal heart sounds  Exam reveals no friction rub  Pulmonary/Chest: Effort normal  He has no wheezes  He has no rales  Abdominal: Soft  He exhibits no mass  There is no tenderness  Musculoskeletal: He exhibits no edema  Neurological: He is alert and oriented to person, place, and time  Skin: Skin is warm  He is not diaphoretic  No pallor  Psychiatric: He has a normal mood and affect  His behavior is normal    Nursing note and vitals reviewed  Review of Systems   Constitutional: Negative for appetite change, fatigue and fever  HENT: Negative for congestion and sore throat  Respiratory: Negative for cough, shortness of breath and wheezing  Cardiovascular: Negative for chest pain, palpitations and leg swelling  Gastrointestinal: Negative for abdominal pain, constipation, diarrhea, nausea and vomiting  Genitourinary: Negative for flank pain  Musculoskeletal: Negative for back pain  Skin: Negative for rash  Neurological: Negative for dizziness and headaches  Psychiatric/Behavioral: Negative for confusion  All other systems reviewed and are negative        Scheduled Meds:  Current Facility-Administered Medications:  acetaminophen 650 mg Oral Q4H PRN Jose Larios PA-C    amiodarone 200 mg Oral [facility] Iram Marques PA-C    aspirin 325 mg Oral Daily Jose Larios PA-C    atorvastatin 80 mg Oral Daily With Savanna Radha Marques PA-C    bisacodyl 10 mg Rectal Daily PRN Iram Marques PA-C    cefTAZidime 2,000 mg Intravenous Q8H Darryll Boeck, MD Last Rate: 2,000 mg (10/14/19 0342)   escitalopram 10 mg Oral Daily Iram Marques PA-C    fondaparinux 2 5 mg Subcutaneous Daily Chitra SALLY Marques PA-C    furosemide 40 mg Intravenous BID (diuretic) Iram Marques PA-C    hydrOXYzine HCL 25 mg Oral Q8H PRN Iram Marques PA-C    insulin lispro 1-5 Units Subcutaneous HS Jose Larios PA-C insulin lispro 1-6 Units Subcutaneous TID AC Chitra SALLY Marques, RALPH    levofloxacin 750 mg Oral Q24H Emily Martinez MD    loperamide 2 mg Oral TID PRN Daralene Le, RALPH    metoprolol tartrate 37 5 mg Oral Q12H [facility] Chitra SALLY Marques, RALPH    ondansetron 4 mg Intravenous Q6H PRN Rockne Batter, PA-PAU    oxyCODONE-acetaminophen 1 tablet Oral Q4H PRN Rockne Batter, PA-PAU    oxyCODONE-acetaminophen 2 tablet Oral Q6H PRN Rockne Batter, PA-PAU    pantoprazole 40 mg Oral Daily Chitra Marques PA-C    potassium chloride 20 mEq Oral BID Daralene Le, RALPH    saccharomyces boulardii 250 mg Oral BID Daralene Le, RALPH    temazepam 15 mg Oral HS PRN Rockne Batter, PADHARMESH        PRN Meds:   acetaminophen    bisacodyl    hydrOXYzine HCL    loperamide    ondansetron    oxyCODONE-acetaminophen    oxyCODONE-acetaminophen    temazepam    Continuous Infusions:       Invasive Devices:      Invasive Devices     Central Venous Catheter Line            CVC Central Lines 10/09/19 Triple 5 days          Drain            Chest Tube 1 Left Pleural 28 Fr  5 days    Chest Tube 3 Posterior Mediastinal 32 Fr  5 days    Closed/Suction Drain Left Chest Bulb 15 Fr  4 days    Closed/Suction Drain Right Chest Bulb 15 Fr  4 days    Negative Pressure Wound Therapy (V A C ) Chest Medial 4 days                  LABORATORY:    Results from last 7 days   Lab Units 10/14/19  0522 10/13/19  0501 10/12/19  0457 10/11/19  0447 10/11/19  0437 10/10/19  2228 10/10/19  0430 10/10/19  0401 10/10/19  0400 10/09/19  2033  10/09/19  1410 10/09/19  1409 10/09/19  1255 10/09/19  1120 10/09/19  1046 10/09/19  1024 10/09/19  1017 10/09/19  0952 10/09/19  0927  10/09/19  0537 10/08/19  0229   WBC Thousand/uL  --  6 97 8 51 14 33*  --   --  12 97*  --   --   --   --   --   --   --   --   --   --   --   --   --   --  12 23* 11 05*   HEMOGLOBIN g/dL  --  7 7* 8 0* 8 8*  --   --  9 5*  --   --  9 8*  --   --  8 8*  --   --   --   --   --   --   -- --  8 5* 8 2*   I STAT HEMOGLOBIN g/dl  --   --   --   --   --   --   --   --   --   --   --  8 2*  --  9 2* 8 5* 7 5*  --  6 8* 6 1* 6 8*   < >  --   --    HEMATOCRIT %  --  25 6* 26 3* 28 6*  --   --  30 3*  --   --  30 7*  --   --  27 2*  --   --   --   --   --   --   --   --  27 7* 26 2*   HEMATOCRIT, ISTAT %  --   --   --   --   --   --   --   --   --   --   --  24*  --  27* 25* 22*  --  20* 18* 20*   < >  --   --    PLATELETS Thousands/uL  --  127* 124* 132*  --   --  140*  --   --   --   --   --  139*  --   --   --  187  --   --   --   --  271 220   POTASSIUM mmol/L 3 2* 3 8 3 7  --  4 2 4 7  --  5 1  --  4 9   < >  --  4 0  --   --   --   --   --   --   --   --  4 4 3 4*   CHLORIDE mmol/L 106 105 106  --  108 108  --  115*  --  115*  --   --  115*  --   --   --   --   --   --   --   --  108 109*   CO2 mmol/L 32 31 28  --  25 23  --  21  --  20*  --   --  21  --   --   --   --   --   --   --   --  24 24   CO2, I-STAT mmol/L  --   --   --   --   --   --   --   --   --   --   --  23  --  24 26 28  --  31 24 25   < >  --   --    BUN mg/dL 32* 37* 45*  --  42* 43*  --  38*  --  32*  --   --  33*  --   --   --   --   --   --   --   --  35* 31*   CREATININE mg/dL 1 25 1 35* 1 64*  --  1 85* 2 03*  --  1 81*  --  1 74*  --   --  1 69*  --   --   --   --   --   --   --   --  1 68* 1 51*   CALCIUM mg/dL 8 5 8 3 8 2*  --  8 0* 8 5  --  8 3  --  8 2*  --   --  7 8*  --   --   --   --   --   --   --   --  9 2 8 7   MAGNESIUM mg/dL  --   --  2 4  --  2 2  --   --   --  2 6  --   --   --   --   --   --   --   --   --   --   --   --   --  1 9   PHOSPHORUS mg/dL  --   --  3 2  --  4 9*  --   --   --   --   --   --   --   --   --   --   --   --   --   --   --   --   --  3 4   GLUCOSE, ISTAT mg/dl  --   --   --   --   --   --   --   --   --   --   --  103  --  126 189* 193*  --  206* 192* 168*   < >  --   --     < > = values in this interval not displayed        rest all reviewed    RADIOLOGY:  XR chest portable   Final Result by Enzo Park MD (10/10 1398)      Postoperative changes  Workstation performed: [id number]         XR chest portable ICU   Final Result by Katie Godwin MD (10/09 1526)      New lines and tubes in satisfactory location  No pneumothorax  Interval improvement in parahilar congestive changes  Workstation performed: [id number]         CT head wo contrast   Final Result by Alexsandra Bryant MD (10/08 1106)   Slightly diminished vasogenic edema associated with approximately 1 8 cm ill-defined previously acute hemorrhage involving the medial right temporal occipital junction  Lesion is suspicious for sequela from septic embolism  MRI performed with IV    contrast could be considered for improved diagnostic specificity  Workstation performed: [id number]         CT abdomen pelvis wo contrast   Final Result by Aurelio Israel MD (10/03 1935)      Mild to moderate pleural effusions are seen at the bases  There is a unilateral left kidney without hydronephrosis  No ascites or obvious intra-abdominal collection      Immediate finding was noted on the Epic system  Workstation performed: [id number]         XR chest portable   Final Result by Vaibhav Lewis DO (10/04 0756)      Stable chest with moderate pulmonary vascular congestion  Workstation performed: [id number]         US right upper quadrant with liver dopplers   Final Result by Johana Menchaca DO (10/04 0040)   1  Normal sonographic appearance of the liver  2   Absent right kidney  3   Mild splenomegaly  4   Incompletely visualized bilateral pleural effusions  Workstation performed: [id number]         MRA carotids wo contrast   Final Result by Vaibhav Lewis DO (10/03 3061)      Unremarkable MR angiogram of the cervical vasculature              Workstation performed: [id number]         MRA head wo contrast   Final Result by Jens Ruiz MD (10/02 2138) 1   No evidence of mycotic aneurysm; however, exam sensitivity is limited by resolution and incomplete imaging of the distal branches  Catheter angiography may be helpful if there is persistent concern for mycotic aneurysm  2   No stenosis or occlusion of the major vessels of the [place]  Workstation performed: [id number]         MRI brain wo contrast   Final Result by Luis Aguila MD (10/02 2133)      Subacute 1 8 cm hemorrhage in the right posterior mesial temporal occipital region with surrounding vasogenic edema, stable  Few punctate foci of chronic microhemorrhage in the right frontoparietal region and in the cerebellar hemispheres  Findings may    be secondary to subacute and chronic septic emboli and/or mycotic aneurysms  No evidence of abscess  Workstation performed: [id number]         IR thoracentesis   Final Result by Doc Cervantes DO (10/02 1547)   Successful bilateral thoracentesis  _________________________________________________________________   COMPARISON: None      PROCEDURE DETAILS:    Operators: Dr Adrián Garza, [address] [place] [address] attending, performed the procedure  Anesthesia: 1% lidocaine was injected in the skin and subcutaneous tissues overlying the access site  Medications: 1% lidocaine   Contrast: None   Fluoroscopy time: None      COMMENTS:   Following the discussion of the risks, benefits and alternatives to the procedure, written informed consent was obtained from the patient  The patient was placed in a sitting position  A preprocedure timeout was performed per [facility] protocol  The    right back was prepped and draped in the usual sterile fashion  After local anesthesia was administered, a 5-Romansh LessonFaceeh catheter was advanced under continuous ultrasound guidance into the right pleural space  1000 mL of vidya fluid was obtained  After the procedure, the catheter was removed, the skin was cleansed    and sterile dressings were applied  Aforementioned procedure was then performed for left-sided thoracentesis  The patient tolerated the procedure well and there were no immediate postprocedure complications  Workstation performed: [id number]         XR mandible panorex [facility] only)   Final Result by Shahla Corrales MD (10/02 2340)      No periapical lucencies identified  Workstation performed: [id number]         CT chest wo contrast   Final Result by Sherryle Rote, MD (10/01 1703)      1  Diffuse groundglass opacities consistent with pulmonary edema   2  More focal patchy opacities in left upper lobe, while this may  still represent edema, pneumonia would have a similar appearance   3  Moderate pleural effusions   4  Subcarinal lymphadenopathy   5  Wedge-shaped hypodensity within the spleen, likely infarct      The study was marked in EPIC for immediate notification  Workstation performed: [id number]         CT head wo contrast   Final Result by  (10/14 1216)   Addendum 1 of 1 by Sherryle Rote, MD (10/01 1951)   ADDENDUM:      This addendum is for the purpose of clarification:   Abnormality described represents either a septic embolus, or abscess  MRI    is recommended for differentiation  I personally discussed this addendum with Bridgette Lamont on 10/1/2019 at    7:50 PM       Final      VAS carotid complete study   Final Result by Traci Patel MD (10/01 1801)      XR chest portable   Final Result by Mohan Hu MD (10/01 1324)      New airspace opacity suggests pulmonary edema or diffuse bronchopneumonia  Immediate report was noted on the Epic system  Workstation performed: [id number]           Rest all reviewed    Portions of the record may have been created with voice recognition software  Occasional wrong word or "sound a like" substitutions may have occurred due to the inherent limitations of voice recognition software   Read the chart carefully and recognize, using context, where substitutions have occurred  If you have any questions, please contact the dictating provider

## 2019-10-14 NOTE — DISCHARGE INSTRUCTIONS
Infectious Disease discharge instructions:  Continue levofloxacin 750 mg every 24 hour through 11/20/2019  Will be provided with a 2 week supply of the medication and are expected to call our office for refills  Do not take this medication with other pills  Please only take this medication with water  Please keep your stomach empty for at least an hour after taking the medication or do not take this medication until 2 hours after you have had a meal  Do not start any new medications unless you contact our office  Please take the medication at the same time every day  Do not drink any alcohol while on this medication  Do expected to obtain lab work every week--our office will provide instruction  Your expected to be seen in the office in 2 weeks--our office will call you with follow-up date and time  Please monitor for the development of any joint symptoms (pain or swelling or limited mobility)--call our office if you have these issues  Your also recommended to call our office should you develop any other side effects to medication  Our office numbers provided above  You are also expected to follow-up with cardiothoracic surgery and Plastic surgery  Drug information provided below    Levofloxacin (By mouth)   Levofloxacin ([id number]-a-[id number])  Treats infections  This medicine is a quinolone antibiotic  Brand Name(s): Levaquin   There may be other brand names for this medicine  When This Medicine Should Not Be Used: This medicine is not right for everyone  Do not use it if you had an allergic reaction to levofloxacin or to similar medicines  How to Use This Medicine:   Liquid, Tablet  · Your doctor will tell you how much medicine to use  Do not use more than directed  Take your medicine at the same time each day  · Tablet: Take it with or without food  · Liquid: Take it 1 hour before or 2 hours after you eat   Measure the oral liquid medicine with a marked measuring spoon, oral syringe, or medicine cup   · Take all of the medicine in your prescription to clear up your infection, even if you feel better after the first few doses  · Drink extra fluids so you will urinate more often and help prevent kidney problems  · This medicine should come with a Medication Guide  Ask your pharmacist for a copy if you do not have one  · Missed dose: Take a dose as soon as you remember  If it is almost time for your next dose, wait until then and take a regular dose  Do not take extra medicine to make up for a missed dose  · Store the medicine in a closed container at room temperature, away from heat, moisture, and direct light  Drugs and Foods to Avoid:   Ask your doctor or pharmacist before using any other medicine, including over-the-counter medicines, vitamins, and herbal products  · Some foods and medicines can affect how levofloxacin works  Tell your doctor if you are using any of the following:  ¨ Theophylline  ¨ Blood thinner (including warfarin)  ¨ Diabetes medicine  ¨ Medicine for heart rhythm problems (including amiodarone, procainamide, quinidine, sotalol)  ¨ NSAID pain or arthritis medicine (including aspirin, celecoxib, diclofenac, ibuprofen, naproxen)  ¨ Steroid medicine (including hydrocortisone, methylprednisolone, prednisone)  · Take levofloxacin at least 2 hours before or 2 hours after you take antacids that contain magnesium or aluminum, zinc, or iron supplements, sucralfate, or didanosine  Warnings While Using This Medicine:   · Tell your doctor if you are pregnant or breastfeeding, or if you have kidney disease, liver disease, diabetes, heart disease, myasthenia gravis, or a history of heart rhythm problems (such as QT prolongation) or seizures  Tell your doctor if you have ever had tendon or joint problems, including rheumatoid arthritis, or if you have received a transplant    · This medicine may cause the following problems:  ¨ Tendinitis and tendon rupture (may happen after treatment ends)  ¨ Liver damage  ¨ Nerve damage in the arms or legs  ¨ Heart rhythm changes  ¨ Changes in blood sugar levels  · This medicine may make you feel dizzy or lightheaded  Do not drive or do anything else that could be dangerous until you know how this medicine affects you  · This medicine can cause diarrhea  Call your doctor if the diarrhea becomes severe, does not stop, or is bloody  Do not take any medicine to stop diarrhea until you have talked to your doctor  Diarrhea can occur 2 months or more after you stop taking this medicine  · Tell any doctor or dentist who treats you that you are using this medicine  This medicine may affect certain medical test results  · This medicine may make your skin more sensitive to sunlight  Wear sunscreen  Do not use sunlamps or tanning beds  · Call your doctor if your symptoms do not improve or if they get worse  · Keep all medicine out of the reach of children  Never share your medicine with anyone    Possible Side Effects While Using This Medicine:   Call your doctor right away if you notice any of these side effects:  · Allergic reaction: Itching or hives, swelling in your face or hands, swelling or tingling in your mouth or throat, chest tightness, trouble breathing  · Blistering, peeling, red skin rash  · Change in how much or how often you urinate  · Dark urine or pale stools, nausea, vomiting, loss of appetite, stomach pain, yellow skin or eyes  · Diarrhea that may contain blood  · Fainting, dizziness, or lightheadedness  · Fast, slow, or uneven heartbeat, chest pain  · Numbness, tingling, or burning pain in your hands, arms, legs, or feet  · Pain, stiffness, swelling, or bruises around your ankle, leg, shoulder, or other joint  · Seizures, severe headache, unusual thoughts or behaviors, trouble sleeping, feeling anxious, confused, or depressed, seeing, hearing, or feeling things that are not there  · Unusual bleeding, bruising, or weakness  If you notice these less serious side effects, talk with your doctor:   · Mild headache or nausea  If you notice other side effects that you think are caused by this medicine, tell your doctor  Call your doctor for medical advice about side effects  You may report side effects to FDA at 1-999-FDA-6681  © 2017 [employer] Information is for End User's use only and may not be sold, redistributed or otherwise used for commercial purposes  The above information is an  only  It is not intended as medical advice for individual conditions or treatments  Talk to your doctor, nurse or pharmacist before following any medical regimen to see if it is safe and effective for you       ________________________________________________________________________________________________  Thoracentesis   WHAT YOU NEED TO KNOW:   A thoracentesis is a procedure to remove extra fluid or air from between your lungs and your inner chest wall  Air or fluid buildup may make it hard for you to breathe  A thoracentesis allows your lungs to expand fully so you can breathe more easily  DISCHARGE INSTRUCTIONS:   Medicines:   · Pain medicine: You may be given a prescription medicine to decrease pain  Do not wait until the pain is severe before you take your medicine  · Antibiotics: This medicine helps fight or prevent an infection  · Take your medicine as directed  Call your healthcare provider if you think your medicine is not helping or if you have side effects  Tell him if you are allergic to any medicine  Keep a list of the medicines, vitamins, and herbs you take  Include the amounts, and when and why you take them  Bring the list or the pill bottles to follow-up visits  Carry your medicine list with you in case of an emergency  Follow up with your healthcare provider as directed:  Write down your questions so you remember to ask them during your visits  Rest:  Rest when you feel it is needed   Slowly start to do more each day  Return to your daily activities as directed  Do not smoke: If you smoke, it is never too late to quit  Ask for information about how to stop smoking if you need help  Contact your healthcare provider if:   · You have a fever  · Your puncture site is red, warm, swollen, or draining pus  · You have questions or concerns about your procedure, medicine, or care  · If you have any questions regarding, call the IR department @ [phone]  Seek care immediately or call 911 if:   · Blood soaks through your bandage  · There is blood in your spit

## 2019-10-14 NOTE — PROGRESS NOTES
Progress Note - Cardiothoracic Surgery   El Gilbert 44 y o  male MRN: [id number]  Unit/Bed#: [facility] 408-01 Encounter: [id number]  mitral Valve Endocarditis  S/P mitral valve replacement and Pectoral flap closure; POD # 5      24 Hour Events: Remains afebrile  Cultures from OR NGTD (Final)       Medications:   Scheduled Meds:  Current Facility-Administered Medications:  acetaminophen 650 mg Oral Q4H PRN Jah Close, PA-PAU    amiodarone 200 mg Oral [facility] Marianela Masker RALPH Marques    aspirin 325 mg Oral Daily Jah Close, RALPH    atorvastatin 80 mg Oral Daily With [employer]    bisacodyl 10 mg Rectal Daily PRN Marianela Margot Mraques PA-C    cefTAZidime 2,000 mg Intravenous Q8H Guy Gray MD Last Rate: 2,000 mg (10/14/19 0342)   escitalopram 10 mg Oral Daily Marianela Maskelder Marques PA-C    fondaparinux 2 5 mg Subcutaneous Daily Chitra SALLY Marques PA-C    furosemide 40 mg Intravenous BID (diuretic) Marianela Maskelder Marques PA-C    hydrOXYzine HCL 25 mg Oral Q8H PRN Marianela Margot Marques PA-C    insulin lispro 1-5 Units Subcutaneous HS Chitra SALLY Marques PA-C    insulin lispro 1-6 Units Subcutaneous TID AC Chitra SALLY Marques PA-C    levofloxacin 750 mg Oral Q24H Guy Gray MD    metoprolol tartrate 37 5 mg Oral Q12H [facility] Marianela Marques PA-C    mupirocin 1 application Nasal V47S [facility] Chitra R RALPH Marques    ondansetron 4 mg Intravenous Q6H PRN Jah CloseRALPH    oxyCODONE-acetaminophen 1 tablet Oral Q4H PRN Jah CloseRALPH    oxyCODONE-acetaminophen 2 tablet Oral Q6H PRN Jah CloseRALPH    pantoprazole 40 mg Oral Daily Chitra R RALPH Marques    temazepam 15 mg Oral HS PRN Marianela Maskelder Marques PA-C      Continuous Infusions:   PRN Meds:   acetaminophen    bisacodyl    hydrOXYzine HCL    ondansetron    oxyCODONE-acetaminophen    oxyCODONE-acetaminophen    temazepam    Vitals:   Vitals:    10/13/19 2123 10/13/19 2323 10/14/19 0308 10/14/19 0600 BP: 120/58 112/58 116/65    BP Location: Left arm Left arm Left arm    Pulse: 75 73 71    Resp: 19 18 18    Temp: 98 4 °F (36 9 °C) 97 6 °F (36 4 °C) 98 1 °F (36 7 °C)    TempSrc: Oral Oral Oral    SpO2: 96% 96% 98%    Weight:    74 7 kg (164 lb 10 9 oz)   Height:           Telemetry: NSR; Heart Rate: 75    Respiratory:   SpO2: SpO2: 98 %; Room Air    Intake/Output:     Intake/Output Summary (Last 24 hours) at 10/14/2019 0722  Last data filed at 10/14/2019 0523  Gross per 24 hour   Intake 600 ml   Output 200 ml   Net 400 ml     Weights:   Weight (last 2 days)     Date/Time   Weight    10/14/19 0600   74 7 (164 68)    10/13/19 0600   77 6 (171 08)    10/12/19 0600   78 4 (172 84)            Admit weight: 72 9    Results:   Results from last 7 days   Lab Units 10/13/19  0501 10/12/19  0457 10/11/19  0447   WBC Thousand/uL 6 97 8 51 14 33*   HEMOGLOBIN g/dL 7 7* 8 0* 8 8*   HEMATOCRIT % 25 6* 26 3* 28 6*   PLATELETS Thousands/uL 127* 124* 132*     Results from last 7 days   Lab Units 10/14/19  0522 10/13/19  0501 10/12/19  0457  10/09/19  1410   POTASSIUM mmol/L 3 2* 3 8 3 7   < >  --    CHLORIDE mmol/L 106 105 106   < >  --    CO2 mmol/L 32 31 28   < >  --    CO2, I-STAT mmol/L  --   --   --   --  23   BUN mg/dL 32* 37* 45*   < >  --    CREATININE mg/dL 1 25 1 35* 1 64*   < >  --    GLUCOSE, ISTAT mg/dl  --   --   --   --  103   CALCIUM mg/dL 8 5 8 3 8 2*   < >  --     < > = values in this interval not displayed       Results from last 7 days   Lab Units 10/10/19  0446 10/09/19  1409   INR  1 46* 1 70*   PTT seconds  --  44*     Point of care glucose: 104-114    Studies:  No new studies    Invasive Lines/Tubes:  Invasive Devices     Central Venous Catheter Line            CVC Central Lines 10/09/19 Triple 4 days          Drain            Chest Tube 1 Left Pleural 28 Fr  4 days    Chest Tube 3 Posterior Mediastinal 32 Fr  4 days    Closed/Suction Drain Left Chest Bulb 15 Fr  4 days    Closed/Suction Drain Right Chest Bulb 15 Fr  4 days    Negative Pressure Wound Therapy (V A C ) Chest Medial 4 days                Physical Exam:    HEENT/NECK:  PERRLA  No jugular venous distention  Cardiac: Regular rate and rhythm  No rubs/murmurs/gallops  Pulmonary:  Breath sounds slightly diminished at the bases bilaterally  Abdomen:  Non-tender, Non-distended  Positive bowel sounds  Incisions: Sternum is stable  Incision is clean, dry, and intact  Lower extremities: Extremities warm/dry  Radial/PT/DP pulses 2+ bilaterally  Trace edema B/L  Neuro: Alert and oriented X 3  Sensation is grossly intact  No focal deficits  Skin: Warm/Dry, without rashes or lesions  Assessment:  Patient Active Problem List   Diagnosis    Endocarditis    History of intravenous drug abuse ([facility] [address] )    Acute kidney injury ([facility] [address] )    Insomnia    Severe mitral regurgitation    Bacteremia due to Pseudomonas    Anxiety    Solitary left kidney    Right parietal lobe lesion    Bilateral pleural effusion    Acute blood loss as cause of postoperative anemia    S/P MVR (mitral valve replacement)    Acute postoperative pulmonary insufficiency (HCC)    Hypotension    Thrombocytopenia (HCC)       mitral Valve Endocarditis  S/P mitral valve replacement and Pectoral flap closure; POD # 5    Plan:    1  Cardiac:   NSR; HR/BP well-controlled  Lopressor 37 5 mg PO BID  Continue ASA and Statin therapy  LVEF- 60  Epicardial pacing wires out  Plan to D/C TLC today, will discuss with ID need for PICC or if oral Abx will suffice  Continue DVT prophylaxis   Maintain flap SHERICE drains per plastics (5/7)    2  Pulmonary:   Good Room air oxygen saturation; Continue incentive spirometry/Coughing/Deep breathing exercises  Chest tubes have been discontinued    3  Renal:   Intake/Output net: +400 mL/24 hours  Continue diuresis   Lasix 40 mg IV BID  Potassium Chloride 20 mEq PO BID  Post op Creatinine stable; Follow up labs prn    4  Neuro:  Neurologically intact;  No active issues  Incisional pain well-controlled; Continue prn Percocet    5  GI:  Tolerating clear liquid diet, without evidence of dysphagia; Initiate TLC 2 3 gm sodium diet with consistent carbohydrate modifier  Maintain 1800 mL daily fluid restriction   Continue stool softeners and prn suppository  Continue GI prophylaxis    6  Endo:    No history of diabetes; Glucose well-controlled with sliding scale coverage    7  Hematology:   Post-operative acute blood loss anemia; Hemoglobin and hematocrit stable; trend prn    8  Disposition:  Home v  Rehab depending on need of IV antibiotics and recent IVDA       VTE Pharmacologic Prophylaxis: Fondaparinux (Arixtra)  VTE Mechanical Prophylaxis: sequential compression device    Collaborative rounds completed with BLAYNE Cowart , and P4 RN    SIGNATURE: Shaina Izaguirre PA-C  DATE: October 14, 2019  TIME: 7:22 AM

## 2019-10-14 NOTE — PROGRESS NOTES
Progress Note - Infectious Disease   Kelly Sebastian 44 y o  male MRN: [id number]  Unit/Bed#: [facility] 408-01 Encounter: [id number]      Impression/Plan:  1  Pseudomonal bacteremia with mitral valve endocarditis   Outside records reviewed and this is likely due to problem 3  Repeat blood cultures here remain without growth   Potential septic emboli seen on imaging of the head   No other ectopic foci seen on imaging of the chest or abdomen   Patient is now status post valve replacement  Valve cultures without growth  Detailed discussion about medication plan with the patient below  Will narrow antibiotic therapy to levofloxacin  Discontinue ceftazidime  Repeat chemistry tomorrow  Monitor for tolerance over next 24 hours  Patient will require 6 weeks of antibiotic therapy through 11/20  Patient will require weekly labs including CBC with differential and creatinine  He will need follow-up in our office in 2 weeks  Antibiotic prescription should be for no more than 2 weeks, which we will follow up and refill as outpatient  Office staff messaged about the above  Detailed discharge instructions provided  Additional care/discharge planning as per primary  If patient is tolerating antibiotic over the next 24 hours then he is cleared from an ID standpoint for discharge     2  Acute kidney injury and solitary kidney   Patient's creatinine noted at 1 7 initially   Appears to be similar to recent labs performed at [facility]   Potentially developed toxicity from gentamicin   Creatinine further down trended  Levofloxacin at full dose  Ongoing follow-up by Nephrology  Continue monitor chemistry  Continue care as per primary      3   Active IV drug use   Patient with active IV drug use   Hep C noted to be positive, viral load negative   Hepatitis-B and HIV testing negative       4  Back pain   Patient had reported back pain again no obvious findings on exam   Previous records reviewed and bone scan at [facility] was unremarkable   Will monitor clinically      5  History of MSSA mitral valve endocarditis         Above plan discussed in detail with the patient and with primary service  ID consult service will continue to follow  Antibiotics:  Ceftazidime/Levaquin 15  Postop day 5    24 hour events:  No acute events noted overnight on chart review  Patient's surgical site appears to be healing well as per plastics  Patient is currently afebrile  He is without leukocytosis  Valve cultures are without growth  No new imaging overnight  Patient's other vitals are stable  Creatinine has improved    Subjective:  Patient denies having any further vomiting this morning  He believes that it was from what he had eaten the previous night  He denies having any issues with the oral antibiotic previously  I reviewed in great detail with the patient about his cultures being negative at this time and switching him to monotherapy with levofloxacin  I let him know that I would like to see continue tolerance to the medication over the next 24 hours given his episode of vomiting this morning  He again is very decided on not going to rehab and does not wish to stay here in the hospital   I explained to him that there are no other oral antibiotic options based on the bacteria he has grown  We discussed that he would need to be on antibiotics for 6 weeks total   I discussed how to take the medications along with significant side effects including joint related side effects and kidney dysfunction  I stressed to him that should he not tolerate his antibiotics the recommendation will be to return to the hospital to completed IV antibiotic course  We also discussed how to take the medication in terms of avoidance of food and to take this medication without other medications  The patient expressed understanding to this  He is aware of the need for follow-up in our office as well as follow-up labs    He will only be provided 2 week supply of antibiotics as he is expected to follow-up as well as to contact our office for refills  Lastly was discussed with the patient that should he start to use drugs again he is at a higher rate of relapse regardless of the antibiotic  We also discussed that there is limited data about the type of infection that he has as an gram-negative endocarditis and again the rate of relapse is not well-defined  Patient's additional questions were answered  Objective:  Vitals:  Temp:  [97 6 °F (36 4 °C)-98 4 °F (36 9 °C)] 98 3 °F (36 8 °C)  HR:  [71-77] 77  Resp:  [16-19] 18  BP: ()/(55-70) 116/70  SpO2:  [96 %-100 %] 97 %  Temp (24hrs), Av °F (36 7 °C), Min:97 6 °F (36 4 °C), Max:98 4 °F (36 9 °C)  Current: Temperature: 98 3 °F (36 8 °C)    Physical Exam:   General Appearance:  Alert, interactive, nontoxic, no acute distress  Throat: Oropharynx moist without lesions  Lungs:   Clear to auscultation bilaterally; no wheezes, rhonchi or rales; respirations unlabored on room air   Heart:  RRR; no rub or gallop; systolic murmur   Abdomen:   Soft, non-tender, non-distended, positive bowel sounds  Extremities: No clubbing, cyanosis or edema   Skin: No new rashes or lesions  No New draining wounds noted  Surgical site remains under VAC dressing         Labs, Imaging, & Other studies:   All pertinent labs and imaging studies were personally reviewed  Results from last 7 days   Lab Units 10/13/19  0501 10/12/19  0457 10/11/19  0447   WBC Thousand/uL 6 97 8 51 14 33*   HEMOGLOBIN g/dL 7 7* 8 0* 8 8*   PLATELETS Thousands/uL 127* 124* 132*     Results from last 7 days   Lab Units 10/14/19  0522 10/13/19  0501  10/09/19  1410   POTASSIUM mmol/L 3 2* 3 8   < >  --    CHLORIDE mmol/L 106 105   < >  --    CO2 mmol/L 32 31   < >  --    CO2, I-STAT mmol/L  --   --   --  23   BUN mg/dL 32* 37*   < >  --    CREATININE mg/dL 1 25 1 35*   < >  --    EGFR ml/min/1 73sq m 72 66   < >  --    GLUCOSE, ISTAT mg/dl  --   --   --  103 CALCIUM mg/dL 8 5 8 3   < >  --    AST U/L  --  59*  --   --    ALT U/L  --  141*  --   --    ALK PHOS U/L  --  93  --   --     < > = values in this interval not displayed       Results from last 7 days   Lab Units 10/09/19  0952   GRAM STAIN RESULT  No Polys or Bacteria seen

## 2019-10-15 ENCOUNTER — TELEPHONE (OUTPATIENT)
Dept: NEPHROLOGY | Facility: CLINIC | Age: 39
End: 2019-10-15

## 2019-10-15 ENCOUNTER — TELEPHONE (OUTPATIENT)
Dept: INFECTIOUS DISEASES | Facility: CLINIC | Age: 39
End: 2019-10-15

## 2019-10-15 VITALS
HEART RATE: 70 BPM | BODY MASS INDEX: 29.53 KG/M2 | WEIGHT: 166.67 LBS | TEMPERATURE: 98 F | DIASTOLIC BLOOD PRESSURE: 53 MMHG | RESPIRATION RATE: 20 BRPM | SYSTOLIC BLOOD PRESSURE: 105 MMHG | OXYGEN SATURATION: 97 % | HEIGHT: 63 IN

## 2019-10-15 DIAGNOSIS — R78.81 BACTEREMIA DUE TO PSEUDOMONAS: Primary | ICD-10-CM

## 2019-10-15 DIAGNOSIS — B96.5 BACTEREMIA DUE TO PSEUDOMONAS: Primary | ICD-10-CM

## 2019-10-15 LAB
ANION GAP SERPL CALCULATED.3IONS-SCNC: 6 MMOL/L (ref 4–13)
BUN SERPL-MCNC: 27 MG/DL (ref 5–25)
CALCIUM SERPL-MCNC: 8.2 MG/DL (ref 8.3–10.1)
CHLORIDE SERPL-SCNC: 105 MMOL/L (ref 100–108)
CO2 SERPL-SCNC: 30 MMOL/L (ref 21–32)
CREAT SERPL-MCNC: 1.36 MG/DL (ref 0.6–1.3)
GFR SERPL CREATININE-BSD FRML MDRD: 65 ML/MIN/1.73SQ M
GLUCOSE SERPL-MCNC: 124 MG/DL (ref 65–140)
GLUCOSE SERPL-MCNC: 79 MG/DL (ref 65–140)
GLUCOSE SERPL-MCNC: 97 MG/DL (ref 65–140)
POTASSIUM SERPL-SCNC: 3.4 MMOL/L (ref 3.5–5.3)
SODIUM SERPL-SCNC: 141 MMOL/L (ref 136–145)

## 2019-10-15 PROCEDURE — 82948 REAGENT STRIP/BLOOD GLUCOSE: CPT

## 2019-10-15 PROCEDURE — 99024 POSTOP FOLLOW-UP VISIT: CPT | Performed by: PHYSICIAN ASSISTANT

## 2019-10-15 PROCEDURE — 80048 BASIC METABOLIC PNL TOTAL CA: CPT | Performed by: PHYSICIAN ASSISTANT

## 2019-10-15 PROCEDURE — 99231 SBSQ HOSP IP/OBS SF/LOW 25: CPT | Performed by: INTERNAL MEDICINE

## 2019-10-15 RX ORDER — POTASSIUM CHLORIDE 20 MEQ/1
40 TABLET, EXTENDED RELEASE ORAL DAILY
Qty: 60 TABLET | Refills: 0 | Status: SHIPPED | OUTPATIENT
Start: 2019-10-16 | End: 2019-11-22 | Stop reason: SDUPTHER

## 2019-10-15 RX ORDER — METOPROLOL TARTRATE 37.5 MG/1
37.5 TABLET, FILM COATED ORAL EVERY 12 HOURS SCHEDULED
Qty: 60 TABLET | Refills: 0 | Status: SHIPPED | OUTPATIENT
Start: 2019-10-15 | End: 2019-11-22 | Stop reason: SDUPTHER

## 2019-10-15 RX ORDER — ASPIRIN 325 MG
325 TABLET ORAL DAILY
Qty: 30 TABLET | Refills: 0 | Status: SHIPPED | OUTPATIENT
Start: 2019-10-16 | End: 2019-10-30 | Stop reason: ALTCHOICE

## 2019-10-15 RX ORDER — OXYCODONE HYDROCHLORIDE AND ACETAMINOPHEN 5; 325 MG/1; MG/1
TABLET ORAL
Qty: 30 TABLET | Refills: 0 | Status: SHIPPED | OUTPATIENT
Start: 2019-10-15 | End: 2019-11-22

## 2019-10-15 RX ORDER — ASPIRIN 325 MG
325 TABLET ORAL DAILY
Qty: 30 TABLET | Refills: 2 | Status: SHIPPED | OUTPATIENT
Start: 2019-10-16 | End: 2019-10-15

## 2019-10-15 RX ORDER — PANTOPRAZOLE SODIUM 40 MG/1
40 TABLET, DELAYED RELEASE ORAL DAILY
Qty: 30 TABLET | Refills: 0 | Status: SHIPPED | OUTPATIENT
Start: 2019-10-16 | End: 2019-10-15

## 2019-10-15 RX ORDER — OXYCODONE HYDROCHLORIDE AND ACETAMINOPHEN 5; 325 MG/1; MG/1
TABLET ORAL
Qty: 30 TABLET | Refills: 0 | Status: SHIPPED | OUTPATIENT
Start: 2019-10-15 | End: 2019-10-15

## 2019-10-15 RX ORDER — LEVOFLOXACIN 750 MG/1
750 TABLET ORAL EVERY 24 HOURS
Qty: 35 TABLET | Refills: 0 | Status: SHIPPED | OUTPATIENT
Start: 2019-10-16 | End: 2019-10-15

## 2019-10-15 RX ORDER — POTASSIUM CHLORIDE 20 MEQ/1
40 TABLET, EXTENDED RELEASE ORAL DAILY
Qty: 60 TABLET | Refills: 0 | Status: SHIPPED | OUTPATIENT
Start: 2019-10-16 | End: 2019-10-15

## 2019-10-15 RX ORDER — ATORVASTATIN CALCIUM 40 MG/1
40 TABLET, FILM COATED ORAL
Qty: 30 TABLET | Refills: 2 | Status: SHIPPED | OUTPATIENT
Start: 2019-10-15 | End: 2019-10-15

## 2019-10-15 RX ORDER — METOPROLOL TARTRATE 37.5 MG/1
37.5 TABLET, FILM COATED ORAL EVERY 12 HOURS SCHEDULED
Qty: 60 TABLET | Refills: 2 | Status: SHIPPED | OUTPATIENT
Start: 2019-10-15 | End: 2019-10-15

## 2019-10-15 RX ORDER — LEVOFLOXACIN 750 MG/1
750 TABLET ORAL EVERY 24 HOURS
Qty: 35 TABLET | Refills: 0 | Status: SHIPPED | OUTPATIENT
Start: 2019-10-16 | End: 2019-11-14 | Stop reason: SDUPTHER

## 2019-10-15 RX ORDER — FUROSEMIDE 40 MG/1
40 TABLET ORAL DAILY
Qty: 30 TABLET | Refills: 0 | Status: SHIPPED | OUTPATIENT
Start: 2019-10-16 | End: 2019-10-15

## 2019-10-15 RX ORDER — PANTOPRAZOLE SODIUM 40 MG/1
40 TABLET, DELAYED RELEASE ORAL DAILY
Qty: 30 TABLET | Refills: 0 | Status: SHIPPED | OUTPATIENT
Start: 2019-10-16 | End: 2020-01-07

## 2019-10-15 RX ORDER — FUROSEMIDE 40 MG/1
40 TABLET ORAL DAILY
Qty: 30 TABLET | Refills: 0 | Status: SHIPPED | OUTPATIENT
Start: 2019-10-16 | End: 2019-11-22 | Stop reason: SDUPTHER

## 2019-10-15 RX ORDER — ESCITALOPRAM OXALATE 10 MG/1
10 TABLET ORAL DAILY
Qty: 30 TABLET | Refills: 0 | Status: SHIPPED | OUTPATIENT
Start: 2019-10-16 | End: 2020-03-20

## 2019-10-15 RX ORDER — ATORVASTATIN CALCIUM 40 MG/1
40 TABLET, FILM COATED ORAL
Qty: 30 TABLET | Refills: 0 | Status: SHIPPED | OUTPATIENT
Start: 2019-10-15 | End: 2019-11-22 | Stop reason: SDUPTHER

## 2019-10-15 RX ORDER — ACETAMINOPHEN 325 MG/1
TABLET ORAL
Qty: 30 TABLET | Refills: 0 | COMMUNITY
Start: 2019-10-15 | End: 2019-10-15

## 2019-10-15 RX ORDER — ESCITALOPRAM OXALATE 10 MG/1
10 TABLET ORAL DAILY
Qty: 30 TABLET | Refills: 2 | Status: SHIPPED | OUTPATIENT
Start: 2019-10-16 | End: 2019-10-15

## 2019-10-15 RX ORDER — ACETAMINOPHEN 325 MG/1
TABLET ORAL
Qty: 30 TABLET | Refills: 0 | Status: SHIPPED | OUTPATIENT
Start: 2019-10-15 | End: 2019-11-22

## 2019-10-15 RX ADMIN — Medication 250 MG: at 09:03

## 2019-10-15 RX ADMIN — ASPIRIN 325 MG ORAL TABLET 325 MG: 325 PILL ORAL at 09:04

## 2019-10-15 RX ADMIN — LOPERAMIDE HYDROCHLORIDE 2 MG: 2 CAPSULE ORAL at 09:03

## 2019-10-15 RX ADMIN — OXYCODONE HYDROCHLORIDE AND ACETAMINOPHEN 2 TABLET: 5; 325 TABLET ORAL at 03:06

## 2019-10-15 RX ADMIN — LEVOFLOXACIN 750 MG: 750 TABLET, FILM COATED ORAL at 05:01

## 2019-10-15 RX ADMIN — AMIODARONE HYDROCHLORIDE 200 MG: 200 TABLET ORAL at 05:01

## 2019-10-15 RX ADMIN — POTASSIUM CHLORIDE 20 MEQ: 1500 TABLET, EXTENDED RELEASE ORAL at 09:04

## 2019-10-15 RX ADMIN — ESCITALOPRAM OXALATE 10 MG: 10 TABLET ORAL at 09:04

## 2019-10-15 RX ADMIN — FONDAPARINUX SODIUM 2.5 MG: 2.5 INJECTION, SOLUTION SUBCUTANEOUS at 09:05

## 2019-10-15 RX ADMIN — PANTOPRAZOLE SODIUM 40 MG: 40 TABLET, DELAYED RELEASE ORAL at 09:03

## 2019-10-15 NOTE — DISCHARGE SUMMARY
Discharge Summary - Cardiothoracic Surgery   Dariela Carbajal 44 y o  male MRN: [id number]  Unit/Bed#: [facility] 408-01 Encounter: [id number]    Admission Date: 9/30/2019     Discharge Date: 10/15/19    Admitting Diagnosis: Endocarditis [I38]    Primary Discharge Diagnosis:   Mitral Valve Endocarditis  S/P mitral valve replacement;    Secondary Discharge Diagnosis:   1  Long term IVDA (heroine)  2  Hepatits C (undetectable viral load)  3  Congential absent right kidney    Attending: KEVIN Ortega  Consulting Physician(s):   Cardiology  Medical/Critical Care  Occupational Therapy  Physical Therapy  Plastic Surgery  Infectious Disease  Gastroenterology  Neurology    Procedures Performed:   Orders Placed This Encounter   Procedures    [facility] c/[facility]       Hospital Course:   10/1: IVDA w  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  Treated at [facility] with IV Abx x 6 weeks  F/U Echo on 6/20 with MV posterior leaflet echodensity, severe MR and mild TR  Patient again using heroine  Presented to [facility] on 9/6 with several days of insomnia and malaise  + Bld Cx for Pseudomonas, started on double coverage ceftazidime/levofloxacin  Transferred to [facility] for surgical evaluation  GI consulted for hepatits C management periop  10/2: Seen by GI, U/S ordered, likely not cirrhosis per prelim notes  NPO for BILLY/cardiac cath today  CTH w/ emboli vs abscess, neuro consulted  Afebrile QHS, blood cx pending  Panorex complete w/o periapical leucencies  CT chest w/ b/l pleural effusion, IR consulted for thoracentesis  Plastic sx consulted for concern for need for muscle flap for closure at time of sx  Mupirocin & Chlorhexidine ordered  10/3: Called plastic surgery Ofilia Loss) for consult  RUQ scheduled for today  R thoracentesis 1 L removed, L thoracentesis 700 ml  Neurology to document risk of anticoagulation during surgery, will reimage next Tuesday prior to surgery  10/4: No events overnight  Denies CP/SOB    RUQ U/S completed and acceptable  Surgical consent signed  10/5: SCr rising (1 7 today from 1 4)  Probable contrast v  Abx  Renal following  Blood cx continue negative  Afebrile, no leukocytosis  Hepatitis Viral RNA (-)     10/6: Patient without complaints this AM  Had runs of VT around noon, cards contacted  Still asymptomatic  Anticipate transfer to ICU with Amio/Lidocaine  10/7: No further arrhythmias  No complaints  10/8: No events  Pre-op orders done  Ready for OR tomorrow  10/9: MVR (#27mm [facility] Ease), b/l pectoral flap closure w/ plastic surgery  Tolerated procedure well  Transferred to ICU hemodynamically stable on milrinoe 0 13, epi 2, levo 10 & vaso 0 14  Not bleeding  NSR  Neurovascular intact; Wean to extubate  10/11: High SVR w/ low CI QHS, started on cardene at 2 w/ improvement, increase Lopressor to 37 5mg BID, wean cardene to off, disconitue a line  CI good off milrinone, last CI 2 9, discontinue swan/cordis  On 1LNC, wean as tolerated  Cr down to 1 8 from 2 0, start Lasix 40mg IV BID, disconuitnue smith catheter  Discontiune mediastinal CTs & EPWs, split pleural CTs  SHERICE mary kate & VAC per plastics  WBC up to 14 33 from 12 97, afebrile, final OR cx pending, prelim w/ no polys/bacteria on tissue/gram stain & no AFB seen  (+) pericardial rub  Transfer to telem  10/12: Straight cath overnight, voided independently this morning, Diffuse ST elevations in all leads with serous CT output, started colchicine bid  Cr 1 64  continue bid Lasix dosing  10/13: No events overnight  OR cultures negative to date  Hgb 7 7 (did not add Fe supplements due to decrease in efficacy with Abx)  Cr 1 35  D/C CT     10/14: No issues overnight  SCr continues to improve  Cx from OR (-)  K+ repleted  Will discuss with ID final Abx plan and possible PICC  D/C TLC  PM: anticipate only Levaquin for Abx  No PICC per ID  If no further GI upset can discharge tomorrow   PM: Lasix changed to 40mg PO QDay per renal     10/15: No events  Cr up to 1 36 from 1 25, good UOP  Discontinue TLC & VAC  Stable for discharge to home  Condition at Discharge:   good     Discharge Physical Exam:  DISCHARGE PHYSICAL EXAM PERFORMED BY JODY Dickerson PA-C  HEENT/NECK:  PERRLA  No jugular venous distention  Cardiac: Regular rate and rhythm and No murmurs/rubs/gallops  Pulmonary:  Breath sounds clear bilaterally, Breath sounds diminished in the bases bilaterally  and No rales/rhonchi/wheezes  Abdomen:  Non-tender, Non-distended and Normal bowel sounds  Incisions: Sternum is stable  Incision is clean, dry, and intact  and VAC dressing in place  Extremities: Extremities warm/dry, Radial pulses 2+ bilaterally, DP pulses 2+ bilaterally and No edema B/L  Neuro: Alert and oriented X 3, Sensation is grossly intact and No focal deficits  Skin: Warm/Dry, without rashes or lesions  Discharge Data:  Results from last 7 days   Lab Units 10/13/19  0501 10/12/19  0457 10/11/19  0447   WBC Thousand/uL 6 97 8 51 14 33*   HEMOGLOBIN g/dL 7 7* 8 0* 8 8*   HEMATOCRIT % 25 6* 26 3* 28 6*   PLATELETS Thousands/uL 127* 124* 132*     Results from last 7 days   Lab Units 10/15/19  0459 10/14/19  0522 10/13/19  0501  10/09/19  1410   POTASSIUM mmol/L 3 4* 3 2* 3 8   < >  --    CHLORIDE mmol/L 105 106 105   < >  --    CO2 mmol/L 30 32 31   < >  --    CO2, I-STAT mmol/L  --   --   --   --  23   BUN mg/dL 27* 32* 37*   < >  --    CREATININE mg/dL 1 36* 1 25 1 35*   < >  --    GLUCOSE, ISTAT mg/dl  --   --   --   --  103   CALCIUM mg/dL 8 2* 8 5 8 3   < >  --     < > = values in this interval not displayed  Results from last 7 days   Lab Units 10/10/19  0446 10/09/19  1409   INR  1 46* 1 70*   PTT seconds  --  44*       Discharge instructions/Information to patient and family:   See after visit summary for information provided to patient and family        Joselito Valenciaz was educated on restrictions regarding driving and lifting, and techniques of proper incisional care  They were specifically counselled on signs and symptoms of an incisional infection, and advised to contact our service immediately should they develop fevers, sweats, chill, redness or drainage at the site of any incisions  Provisions for Follow-Up Care:  See after visit summary for information related to follow-up care and any pertinent home health orders  Disposition:  Home w/ [facility]    Planned Readmission:   No    Discharge Medications:  See after visit summary for reconciled discharge medications provided to patient and family  Elsa Santiago was provided contact information and scheduled a follow up appointment with KEVIN Reeves  Additionally, follow up appointments have been scheduled for their primary care physician and primary cardiologist   Contact information was provided  F/u appt timings suggested for 1 week w/ plastic surgery for wound check & drain removal, 2 weeks w/ infectious disease w/ weekly labs in place & 1 week w/ nephrology  [facility] set up PTD for drain management  Elsa Santiago was counseled on the importance of avoiding tobacco products  As with all patients whom have undergone open heart surgery, tobacco cessation medication was contraindicated at the time of discharge  ACE/ARB was Contraindicated secondary to EF > 40% and no history of heart failure      The patient was discharged on ongoing diuretic therapy with Furosemide 40 mg, PO QD and Potassium Chloride 40 mEq, PO QD per nephrology recommendations  They were advised to continue these medications daily until follow-up w/ nephrology or otherwise directed  Given 30 tablet supply  Narcotic pain medication was prescribed in the form of Percocet    Prior to prescribing, their prescription profile was reviewed on the [facility] of health prescription drug monitoring program     The patient was informed that following their postoperative surgical evaluation, they will be referred to outpatient cardiac rehabilitation  They were counseled that this program is run by specialists who will help them safely strengthen their heart and prevent more heart disease  Cardiac rehabilitation will include exercise, relaxation, stress management, and heart-healthy nutrition  Caregivers will also check to make sure their medication regimen is working  I spent 30 minutes discharging the patient  This time was spent on the day of discharge  I had direct contact with the patient on the day of discharge  Additional documentation is required if more than 30 minutes were spent on discharge       SIGNATURE: Kevin Chauhan PA-C  DATE: October 15, 2019  TIME: 11:46 AM

## 2019-10-15 NOTE — SOCIAL WORK
Pt is S/P MVR and is recommended to have in-home post-op skilled nursing care via [facility] services for his aftercare plan  CM met w/ pt to discuss recommendation  Pt is agreeable  CM provided pt w/ freedom of choice for [facility] providers  Pt requested referral to [facility]  CM made Ecin referral to [facility]  CM to follow

## 2019-10-15 NOTE — SOCIAL WORK
Pt is cleared for d/c by Cardiothoracic Surgery RALPH Solano was notified of d/c order  Pt is accepted for services by [facility] for his aftercare plan  The pt and his family were informed of d/c  Family will transport pt home later this day, pickup time TBD  No IMM required  No chart copy required  CM to follow

## 2019-10-15 NOTE — PROGRESS NOTES
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 s/p MV replacement (27mm [facility] Mitral Ease Pericardial Bioprosthesis)  - abx per ID  - metop tartrate 37 5mg BID  - on amio 200mg q8H, aspirin 325mg daily, metop tartrate 37 5mg q12H  - I/OL: +736mL past 24 hours     2  Hypokalemia  - K 3 2 yesterday  K 3 4 today  Please replete to >4  Keep Mg >2     3  Acute kidney injury and history of solitary kidney  - Cr 1 36 today from 1 25  Appears to be around his baseline  - net +736 past 24 hours  Would aim for net even  Please await final attending attestation    Renny Singh MD  - PGY-4 Cardiology Fellow  - Tiger text enabled    ---  Prior Cardiac Imaging  -KYLEE RETREAT (10/2/19): no CAD  - BILLY (10/2/19): EF 60%, no RWMA, eccentric LVH, RV size ULN, LA mild to mod dilated, large/highly mobile vegetations at atrial aspect of both MV leaflets w/ at least 2 perforations at base of anterior leaflet & loss of coaptation of leaflets at middle section due to tissue destruction, wide-open MR, mild to mod TR  - carotid artery duplex: no ICA stenosis b/l, antegrade flow b/l, no subcalvain disease BL    Subjective: Today did not want to speak  Kept his eyes closed during conversation  Denies SOB  Complains of some diarrhea  Objective  GEN: NAD, alert and oriented x 3, pleasant and cooperative   Cardiac: S1, S2, appreciated  Murmur present  +2 radial pulses  Pulm: CTABL  Abd: +Bs, NT, ND    ----            VS: Blood pressure 105/61, pulse 65, temperature 98 °F (36 7 °C), temperature source Oral, resp   rate 20, height 5' 3" (1 6 m), weight 75 6 kg (166 lb 10 7 oz), SpO2 98 %  , Body mass index is 29 52 kg/m² ,   Orthostatic Blood Pressures      Most Recent Value   Blood Pressure  105/61 filed at 10/15/2019 0745   Patient Position - Orthostatic VS  Lying filed at 10/15/2019 3913          Active Meds    Current Facility-Administered Medications:     acetaminophen (TYLENOL) tablet 650 mg, 650 mg, Oral, Q4H PRN, REY Lorenzana-C, 650 mg at 10/14/19 1722    amiodarone tablet 200 mg, 200 mg, Oral, Q8H [address], Chitra R Shelli PA-C, 200 mg at 10/15/19 0501    aspirin tablet 325 mg, 325 mg, Oral, Daily, REY Lorenzana-C, 325 mg at 10/15/19 7675    atorvastatin (LIPITOR) tablet 80 mg, 80 mg, Oral, Daily With ELVER PedrazaC, 80 mg at 10/14/19 1719    bisacodyl (DULCOLAX) rectal suppository 10 mg, 10 mg, Rectal, Daily PRN, Shaun Marques PA-C    escitalopram (LEXAPRO) tablet 10 mg, 10 mg, Oral, Daily, Shaun Marques PA-C, 10 mg at 10/15/19 0904    fondaparinux (ARIXTRA) subcutaneous injection 2 5 mg, 2 5 mg, Subcutaneous, Daily, Shaun Marques PA-C, 2 5 mg at 10/15/19 0905    furosemide (LASIX) tablet 40 mg, 40 mg, Oral, Daily, Violeta Cid PA-C, 40 mg at 10/14/19 1316    hydrOXYzine HCL (ATARAX) tablet 25 mg, 25 mg, Oral, Q8H PRN, Shaun Marques PA-C, 25 mg at 10/08/19 2123    insulin lispro (HumaLOG) 100 units/mL subcutaneous injection 1-5 Units, 1-5 Units, Subcutaneous, HS, Shaun Marques PA-C, 1 Units at 10/10/19 2232    insulin lispro (HumaLOG) 100 units/mL subcutaneous injection 1-6 Units, 1-6 Units, Subcutaneous, TID AC **AND** Fingerstick Glucose (POCT), , , TID AC, Chitra Marques PA-C    levofloxacin (LEVAQUIN) tablet 750 mg, 750 mg, Oral, Q24H, Stacey Griffiths MD, 750 mg at 10/15/19 0501    loperamide (IMODIUM) capsule 2 mg, 2 mg, Oral, TID PRN, Miles Cage PA-C, 2 mg at 10/15/19 0903    metoprolol tartrate (LOPRESSOR) partial tablet 37 5 mg, 37 5 mg, Oral, Q12H [address], Nash Alvarez Rasmuson, PA-C, 37 5 mg at 10/14/19 2157    ondansetron (ZOFRAN) injection 4 mg, 4 mg, Intravenous, Q6H PRN, Nash Tabaresmuson, PA-C, 4 mg at 10/14/19 0622    oxyCODONE-acetaminophen (PERCOCET) 5-325 mg per tablet 1 tablet, 1 tablet, Oral, Q4H PRN, Lawyer Yamel PA-C    oxyCODONE-acetaminophen (PERCOCET) 5-325 mg per tablet 2 tablet, 2 tablet, Oral, Q6H PRN, Lawyer Yamel PA-C, 2 tablet at 10/15/19 0306    pantoprazole (PROTONIX) EC tablet 40 mg, 40 mg, Oral, Daily, Nash Tabaresmuson, PA-C, 40 mg at 10/15/19 0903    potassium chloride (K-DUR,KLOR-CON) CR tablet 20 mEq, 20 mEq, Oral, Daily, Linh Rogers, PA-C, 20 mEq at 10/15/19 6545    saccharomyces boulardii (FLORASTOR) capsule 250 mg, 250 mg, Oral, BID, Kate Sterling, PA-PAU, 250 mg at 10/15/19 0903    temazepam (RESTORIL) capsule 15 mg, 15 mg, Oral, HS PRN, Lawyer Yamel PA-C    Labs & Results  No results found for: CKTOTAL, CKMB, CKMBINDEX, TROPONINI  Lab Results   Component Value Date    GLUCOSE 103 10/09/2019    CALCIUM 8 2 (L) 10/15/2019    K 3 4 (L) 10/15/2019    CO2 30 10/15/2019     10/15/2019    BUN 27 (H) 10/15/2019    CREATININE 1 36 (H) 10/15/2019     Lab Results   Component Value Date    WBC 6 97 10/13/2019    HGB 7 7 (L) 10/13/2019    HCT 25 6 (L) 10/13/2019    MCV 97 10/13/2019     (L) 10/13/2019     Results from last 7 days   Lab Units 10/10/19  0446   INR  1 46*     No results found for: CHOL  Lab Results   Component Value Date    HDL 27 (L) 10/03/2019     Lab Results   Component Value Date    LDLCALC 76 10/03/2019     Lab Results   Component Value Date    TRIG 101 10/03/2019     Lab Results   Component Value Date     (H) 10/13/2019    AST 59 (H) 10/13/2019

## 2019-10-15 NOTE — PROGRESS NOTES
Progress Note - Cardiothoracic Surgery   Darline Boys 44 y o  male MRN: [id number]  Unit/Bed#: [facility] 408-01 Encounter: [id number]    MItral Valve Endocarditis  S/P mitral valve replacement and pectoral flap closure; POD # 6      24 Hour Events: Pt complains of sore "bumps" on his tongue present since intubation  Denies sore throat  Denies CP or SOB       Medications:   Scheduled Meds:  Current Facility-Administered Medications:  acetaminophen 650 mg Oral Q4H PRN Suzy Hou PA-C   amiodarone 200 mg Oral [facility] Nancy RALPH Marques   aspirin 325 mg Oral Daily Suzy Hou PA-C   atorvastatin 80 mg Oral Daily With [employer]ALPH   bisacodyl 10 mg Rectal Daily PRN Suzy Hou PA-C   escitalopram 10 mg Oral Daily Lalita Marques PA-C   fondaparinux 2 5 mg Subcutaneous Daily Chitra Marques PA-C   furosemide 40 mg Oral Daily Harsh Almendarez PA-C   hydrOXYzine HCL 25 mg Oral Q8H PRN Lalita Marques PA-C   insulin lispro 1-5 Units Subcutaneous HS Chitra Marques PA-C   insulin lispro 1-6 Units Subcutaneous TID AC Chitra Marques PA-C   levofloxacin 750 mg Oral Q24H Madhu Thacker MD   loperamide 2 mg Oral TID PRN Makayla Rodgers PA-C   metoprolol tartrate 37 5 mg Oral Q12H [address] Chitrasen Marques PA-C   ondansetron 4 mg Intravenous Q6H PRN Suzy Hou PA-C   oxyCODONE-acetaminophen 1 tablet Oral Q4H PRN Suzy Hou PA-C   oxyCODONE-acetaminophen 2 tablet Oral Q6H PRN Suzy Hou PA-C   pantoprazole 40 mg Oral Daily Chitra R RALPH Marques   potassium chloride 20 mEq Oral Daily Harsh Almendarez PA-C   saccharomyces boulardii 250 mg Oral BID Makayla Rodgers PA-C   temazepam 15 mg Oral HS PRN Lalita Marques PA-C     Continuous Infusions:   PRN Meds:   acetaminophen    bisacodyl    hydrOXYzine HCL    loperamide    ondansetron    oxyCODONE-acetaminophen    oxyCODONE-acetaminophen    temazepam    Vitals:   Vitals:    10/14/19 2300 10/15/19 0253 10/15/19 0300 10/15/19 0533   BP: 116/57 (!) 114/46 120/50    BP Location: Left arm Left arm Left arm    Pulse: 77 78     Resp: 18 20     Temp: 98 6 °F (37 °C) 98 5 °F (36 9 °C)     TempSrc: Oral Oral     SpO2: 99% 98%     Weight:    75 6 kg (166 lb 10 7 oz)   Height:           Telemetry: NSR; Heart Rate: 65    Respiratory:   SpO2: SpO2: 98 %; Room Air    Intake/Output:   I/O       10/13 0701 - 10/14 0700 10/14 0701 - 10/15 0700 10/15 0701 - 10/16 0700    P  O  600 761     I V  (mL/kg)       NG/GT 0 0     IV Piggyback  100     Total Intake(mL/kg) 600 (8) 861 (11 4)     Urine (mL/kg/hr) 200 (0 1) 125 (0 1)     Drains 0 0     Stool 0 0     Chest Tube 0      Total Output 200 125     Net +400 +736            Unmeasured Stool Occurrence 1 x 1 x             Weights:   Weight (last 2 days)     Date/Time   Weight    10/15/19 0533   75 6 (166 67)    10/14/19 0600   74 7 (164 68)    10/13/19 0600   77 6 (171 08)            Admit weight: 72 1 kg    Results:   Results from last 7 days   Lab Units 10/13/19  0501 10/12/19  0457 10/11/19  0447   WBC Thousand/uL 6 97 8 51 14 33*   HEMOGLOBIN g/dL 7 7* 8 0* 8 8*   HEMATOCRIT % 25 6* 26 3* 28 6*   PLATELETS Thousands/uL 127* 124* 132*     Results from last 7 days   Lab Units 10/15/19  0459 10/14/19  0522 10/13/19  0501  10/09/19  1410   SODIUM mmol/L 141 144 143   < >  --    POTASSIUM mmol/L 3 4* 3 2* 3 8   < >  --    CHLORIDE mmol/L 105 106 105   < >  --    CO2 mmol/L 30 32 31   < >  --    CO2, I-STAT mmol/L  --   --   --   --  23   BUN mg/dL 27* 32* 37*   < >  --    CREATININE mg/dL 1 36* 1 25 1 35*   < >  --    GLUCOSE, ISTAT mg/dl  --   --   --   --  103   CALCIUM mg/dL 8 2* 8 5 8 3   < >  --     < > = values in this interval not displayed       Results from last 7 days   Lab Units 10/10/19  0446 10/09/19  1409   INR  1 46* 1 70*   PTT seconds  --  44*     Point of care glucose: BS   No SSIC required    Studies:  No studies past 24 hrs      Invasive Lines/Tubes:  Invasive Devices     Central Venous Catheter Line            CVC Central Lines 10/09/19 Triple 5 days          Drain            Closed/Suction Drain Left Chest Bulb 15 Fr  5 days    Closed/Suction Drain Right Chest Bulb 15 Fr  5 days    Negative Pressure Wound Therapy (V A C ) Chest Medial 5 days                Physical Exam:    HEENT/NECK:  PERRLA  No jugular venous distention  Cardiac: Regular rate and rhythm and No murmurs/rubs/gallops  Pulmonary:  Breath sounds clear bilaterally, Breath sounds diminished in the bases bilaterally  and No rales/rhonchi/wheezes  Abdomen:  Non-tender, Non-distended and Normal bowel sounds  Incisions: Sternum is stable  Incision is clean, dry, and intact  and VAC dressing in place  Extremities: Extremities warm/dry, Radial pulses 2+ bilaterally, DP pulses 2+ bilaterally and No edema B/L  Neuro: Alert and oriented X 3, Sensation is grossly intact and No focal deficits  Skin: Warm/Dry, without rashes or lesions  Assessment:  Principal Problem:    Endocarditis  Active Problems:    History of intravenous drug abuse ([facility] [address] )    Acute kidney injury ([facility] [address] )    Insomnia    Severe mitral regurgitation    Bacteremia due to Pseudomonas    Anxiety    Solitary left kidney    Right parietal lobe lesion    Bilateral pleural effusion    Acute blood loss as cause of postoperative anemia    S/P MVR (mitral valve replacement)    Acute postoperative pulmonary insufficiency (HCC)    Hypotension    Thrombocytopenia (HCC)       MItral Valve Endocarditis  S/P mitral valve replacement and pectoral flap closure; POD # 6    Plan:    1  Cardiac:   NSR; HR/BP well-controlled  Lopressor, 37 5 mg PO BID  Continue ASA and Statin therapy  Epicardial pacing wires out  Central IV access no longer required; Remove central venous catheter today  Continue DVT prophylaxis    2   Pulmonary:   Good Room air oxygen saturation; Continue incentive spirometry/Coughing/Deep breathing exercises  Chest tubes have been discontinued    3  Renal:   Intake/Output net: +736 mL/24 hours  Continue diuresis   Lasix 40 mg IV QD  Potassium Chloride 20 mEq PO QD  JULIENNE - Post op Creatinine stable; Follow up labs prn  Renal following  4  Neuro:  Neurologically intact; No active issues  Incisional pain well-controlled; Continue prn Percocet    5  GI:  Tolerating TLC 2 3 gm sodium diet  Maintain 1800 mL daily fluid restriction   Continue stool softeners and prn suppository  Continue GI prophylaxis    6  Endo:   No history of diabetes; Glucose well-controlled with sliding scale coverage    7    Hematology:    Post-operative acute blood loss anemia; Hemoglobin was 7 7, continue to monitor; trend prn     ID:  PSeudomonas bacteremia with MV Endocarditis - ID following  Tolerating Levaquin; to continue through 11/20  Cleared for discharge from ID standpoint  S/P Pectoralis flap - no drainage per documentation  Consider removal  Plastic surgery following  8    Disposition:      Ambulating independently, Anticipate discharge to home today     VTE Pharmacologic Prophylaxis: Fondaparinux (Arixtra)  VTE Mechanical Prophylaxis: sequential compression device    Collaborative rounds completed with KEVIN Holden    Plan of care discussed with RN    SIGNATURE: Fozia Hargrove PA-C  DATE: October 15, 2019  TIME: 7:25 AM

## 2019-10-15 NOTE — TELEPHONE ENCOUNTER
Called and spoke to pt reiterating weekly labs and office f/u scheduled for 10/28 w/ Dr Myke Francisco     Informed pt that I would send an appt card to his house along with lab work scripts  Pt expressed understanding

## 2019-10-15 NOTE — TELEPHONE ENCOUNTER
----- Message from Derek Saez sent at 10/15/2019 10:46 AM EDT -----  Regarding: FW: Liz Band girl this patient lives over by [address] office  You know your providers schedule better than I do           ----- Message -----  From: Carmina Jordan MD  Sent: 10/14/2019   2:16 PM EDT  To: Georgie Gonzalez, Nephrology THE [facility] AT [place]  Subject: emmanuelle                                              Please schedule the patient for hospital discharge follow-up for acute kidney injury  Please send the patient orders for BMP, magnesium, phosphorus prior to the visit   He lives near [facility]

## 2019-10-15 NOTE — TELEPHONE ENCOUNTER
Patient is still in hospital, I will call and schedule hospital follow up once patient is discharged

## 2019-10-15 NOTE — PROCEDURES
10/15/19    Procedure: VAC dressing removal    VAC dressing removed in routine fashion without incident  Orly Lawson tolerated the procedure well  Nurse notified      SIGNATURE: Violeta Cid PA-C  DATE: October 15, 2019  TIME: 11:18 AM

## 2019-10-16 NOTE — UTILIZATION REVIEW
Notification of Discharge  This is a Notification of Discharge from our facility [address]  Please be advised that this patient has been discharge from our facility  Below you will find the admission and discharge date and time including the patients disposition  PRESENTATION DATE: 9/30/2019 11:26 PM    IP ADMISSION DATE: 9/30/19 2326   DISCHARGE DATE: 10/15/2019  2:49 PM  DISPOSITION: Home with Brandt [facility]rd with [address] [facility]   Admission Orders listed below:  Admission Orders (From admission, onward)     Ordered        10/01/19 0948  Inpatient Admission  Once         10/01/19 0026  Place in Observation  Once                   Please contact the UR Department if additional information is required to close this patient's authorization/case  [address]  Utilization Review Department  Phone: [phone]; Fax [phone]  [email or web address]  ATTENTION: Please call with any questions or concerns to [phone]  and carefully listen to the prompts so that you are directed to the right person  Send all requests for admission clinical reviews, approved or denied determinations and any other requests to fax [phone]   All voicemails are confidential

## 2019-10-16 NOTE — UTILIZATION REVIEW
Notification of Discharge  This is a Notification of Discharge from our facility [address]  Please be advised that this patient has been discharge from our facility  Below you will find the admission and discharge date and time including the patients disposition  PRESENTATION DATE: 9/30/2019 11:26 PM  OBS ADMISSION DATE:   IP ADMISSION DATE: 9/30/19 2326   DISCHARGE DATE: 10/15/2019  2:49 PM  DISPOSITION: Home with [facility] with [address] [facility]   Admission Orders listed below:  Admission Orders (From admission, onward)     Ordered        10/01/19 0948  Inpatient Admission  Once         10/01/19 0026  Place in Observation  Once                   Please contact the UR Department if additional information is required to close this patient's authorization/case  [address]  Utilization Review Department  Phone: [phone]; Fax [phone]  [email or web address]  ATTENTION: Please call with any questions or concerns to [phone]  and carefully listen to the prompts so that you are directed to the right person  Send all requests for admission clinical reviews, approved or denied determinations and any other requests to fax [phone]   All voicemails are confidential

## 2019-10-21 ENCOUNTER — OFFICE VISIT (OUTPATIENT)
Dept: GASTROENTEROLOGY | Facility: CLINIC | Age: 39
End: 2019-10-21
Payer: COMMERCIAL

## 2019-10-21 ENCOUNTER — TELEPHONE (OUTPATIENT)
Dept: NEUROLOGY | Facility: CLINIC | Age: 39
End: 2019-10-21

## 2019-10-21 ENCOUNTER — TELEPHONE (OUTPATIENT)
Dept: INFECTIOUS DISEASES | Facility: CLINIC | Age: 39
End: 2019-10-21

## 2019-10-21 VITALS
BODY MASS INDEX: 30.12 KG/M2 | HEART RATE: 86 BPM | HEIGHT: 63 IN | WEIGHT: 170 LBS | TEMPERATURE: 98.1 F | DIASTOLIC BLOOD PRESSURE: 73 MMHG | SYSTOLIC BLOOD PRESSURE: 115 MMHG

## 2019-10-21 DIAGNOSIS — R76.8 HEPATITIS C ANTIBODY POSITIVE IN BLOOD: ICD-10-CM

## 2019-10-21 DIAGNOSIS — R79.89 ELEVATED LFTS: Primary | ICD-10-CM

## 2019-10-21 PROCEDURE — 99214 OFFICE O/P EST MOD 30 MIN: CPT | Performed by: PHYSICIAN ASSISTANT

## 2019-10-21 NOTE — PROGRESS NOTES
[facility] - Outpatient Follow-up Note  El [place] 44 y o  male MRN: [id number]  Encounter: [id number]      ASSESSMENT AND PLAN:    1  Hepatitis-C positive antibody  2  Elevated LFTs  3  Status post mitral valve replacement   -he had an ultrasound, no evidence of cirrhosis was seen    -his platelet count has been slightly low since 10/09/2019, this coincides with when he had heart surgery  His platelets were normal prior to this  His albumin was also low however this could be due to his other illnesses as well  His LFTs were within normal limits, after surgery on 10/13/2019 his transaminases were mildly elevated with AST 59, ALT 41, alk-phos 93 and total bilirubin 0 89    -repeat CMP 1 month from previous, will also check INR  -hepatitis-C virus was not detected, he appears to have cleared the infection  He does not require treatment  We did discuss that he could contract hepatitis C again in the future and we discussed the importance of avoiding any blood to blood contact including through sharing needles, unprotected sex, tattoos, sharing razors or toothbrushes  -hepatitis B antibody and surface antigen negative, recommend hepatitis B vaccine, I asked him to follow up with his PCP for this  He is HIV negative     -recommend ultrasound with elastography to assess for fibrosis/cirrhosis, I asked him to schedule this once he has completely healed from his surgery as he does have drains placed in his abdomen    -follow-up in the office in a few months to see how he is doing and review the results of the above  ____________________________________________________________    SUBJECTIVE:    27-year-old male with past medical history of hypertension, IV heroin drug abuse, hepatitis-C, solitary kidney, M SSA bacteremia with residual vegetation of posterior mitral valve leaflet who was recently admitted to the hospital for heart valve replacement presents to the office for follow-up accompanied by his mother who helps provide some of the history  He reports that he has been recovering fairly well, he does still have 2 drains placed but hopes to have these removed tomorrow  He denies any pain in his abdomen  He denies any reflux symptoms, he takes Protonix 40 mg daily  He is eager to discontinue the potassium when he is able because he does not like the size of the pill  He denies any fevers, chills, change in appetite, unintended weight loss, difficulty swallowing, hematochezia, melena, change in bowel habits or jaundice  REVIEW OF SYSTEMS IS OTHERWISE NEGATIVE  Historical Information   Past Medical History:   Diagnosis Date    Absent kidney, congenital     Anxiety 10/1/2019    Hepatitis C     History of endocarditis     mitral valve    History of intravenous drug abuse ([facility] [place] [address] ) 10/01/2019    heroin    Nonrheumatic mitral valve regurgitation 10/01/2019    from endocarditis     Past Surgical History:   Procedure Laterality Date    CARDIAC CATHETERIZATION      CHEST WALL TUMOR EXCISION  2013    Anterior chest wall cyst removed    IR THORACENTESIS  10/2/2019    IN ECHO TRANSESOPHAG MONTR CARDIAC PUMP FUNCTJ N/A 10/9/2019    Procedure: TRANSESOPHAGEAL ECHOCARDIOGRAM (MARK);   Surgeon: Bartlett Seip, MD;  [facility];  Service: Cardiac Surgery    IN MUSCLE-SKIN FLAP,TRUNK Bilateral 10/9/2019    Procedure: BILATERAL PECTORALIS MAJOR FLAP;  Surgeon: Nora Ramsey MD;  [facility];  Service: Plastics    IN [address], < 50 CM N/A 10/9/2019    Procedure: APPLICATION VAC DRESSING;  Surgeon: Nora Ramsey MD;  [facility];  Service: Plastics    IN REPLACEMENT OF MITRAL VALVE N/A 10/9/2019    Procedure: MITRAL VALVE REPLACEMENT WITH 27MM MORROW MAGNA MITRAL EASE PERICARDIAL BIOPROSTHESIS;  Surgeon: Bartlett Seip, MD;  [facility];  Service: Cardiac Surgery     Social History   Social History Substance and Sexual Activity   Alcohol Use Never    Frequency: Never    Binge frequency: Never     Social History     Substance and Sexual Activity   Drug Use Yes     Social History     Tobacco Use   Smoking Status Never Smoker   Smokeless Tobacco Never Used     Family History   Problem Relation Age of Onset    Heart disease Maternal Grandmother        Meds/Allergies       Current Outpatient Medications:     acetaminophen (TYLENOL) 325 mg tablet    aspirin 325 mg tablet    atorvastatin (LIPITOR) 40 mg tablet    escitalopram (LEXAPRO) 10 mg tablet    furosemide (LASIX) 40 mg tablet    levofloxacin (LEVAQUIN) 750 mg tablet    Metoprolol Tartrate 37 5 MG TABS    oxyCODONE-acetaminophen (PERCOCET) 5-325 mg per tablet    pantoprazole (PROTONIX) 40 mg tablet    potassium chloride (K-DUR,KLOR-CON) 20 mEq tablet    No Known Allergies        Objective     There were no vitals taken for this visit  PHYSICAL EXAM:      Physical Exam   Constitutional: He is oriented to person, place, and time  He appears well-developed  No distress  HENT:   Head: Normocephalic and atraumatic  Eyes: Right eye exhibits no discharge  Left eye exhibits no discharge  No scleral icterus  Neck: Neck supple  No tracheal deviation present  Cardiovascular: Normal rate, regular rhythm, normal heart sounds and intact distal pulses  Exam reveals no gallop and no friction rub  No murmur heard  Pulmonary/Chest: Effort normal and breath sounds normal  No respiratory distress  He has no wheezes  He has no rales  Abdominal: Soft  Bowel sounds are normal  He exhibits no distension  There is tenderness (Mild, expected)  There is no rebound and no guarding  Two drains in place in the abdomen, small amount of serosanguineous fluid in bulbs  Incisions appear to be healing well, no induration erythema or drainage  Neurological: He is alert and oriented to person, place, and time  Skin: Skin is warm and dry     Psychiatric: He has a normal mood and affect  Lab Results:   No visits with results within 1 Day(s) from this visit  Latest known visit with results is:   No results displayed because visit has over 200 results  Radiology Results:   Ct Abdomen Pelvis Wo Contrast    Result Date: 10/3/2019  Narrative: CT ABDOMEN AND PELVIS WITHOUT IV CONTRAST INDICATION:   r/o abscess  Pseudomonas bacteremia COMPARISON:  Ultrasound from today TECHNIQUE:  CT examination of the abdomen and pelvis was performed without intravenous contrast   Axial, sagittal, and coronal 2D reformatted images were created from the source data and submitted for interpretation  Some respiratory artifacts limit the  examination  Patient declined oral contrast  Radiation dose length product (DLP) for this visit:  710 mGy-cm   This examination, like all CT scans performed in the [facility], was performed utilizing techniques to minimize radiation dose exposure, including the use of iterative reconstruction and automated exposure control  Limited enteric contrast was administered  FINDINGS: ABDOMEN LOWER CHEST:  Mild to moderate effusions are seen at the bases with minimal adjacent atelectasis  LIVER/BILIARY TREE:  Unremarkable  GALLBLADDER:  No calcified gallstones  No pericholecystic inflammatory change  SPLEEN:  Unremarkable  PANCREAS:  Unremarkable  ADRENAL GLANDS:  Unremarkable  KIDNEYS/URETERS:  The right kidney appears absent  No surgical sutures are seen  The left kidney is prominent probably related to compensatory hypertrophy  No hydronephrosis  STOMACH AND BOWEL:  Slight increased density is seen with debris within the gastric lumen which is distended and may be related to a small amount of oral contrast   Some oral contrast appears to be within a few loops of small bowel in the pelvis as well as the cecum  No small bowel dilatation is seen  A few sigmoid diverticula are noted   APPENDIX:  No findings to suggest appendicitis  ABDOMINOPELVIC CAVITY:  No ascites or free intraperitoneal air  No lymphadenopathy  No obvious intra-abdominal collection can be seen on this unenhanced examination  VESSELS:  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 which merges with a right-sided IVC which is continuous with the right iliac  PELVIS REPRODUCTIVE ORGANS:  Unremarkable for patient's age  URINARY BLADDER:  Unremarkable  ABDOMINAL WALL/INGUINAL REGIONS:  Unremarkable  OSSEOUS STRUCTURES:  No acute fracture or destructive osseous lesion  The vertebral endplates appear unremarkable  Impression: Mild to moderate pleural effusions are seen at the bases  There is a unilateral left kidney without hydronephrosis  No ascites or obvious intra-abdominal collection Immediate finding was noted on the Epic system  Workstation performed: [id number]     Xr Mandible Panorex ([place] Only)    Result Date: 10/2/2019  Narrative: MANDIBLE - PANOREX INDICATION:   pre op MV replacement  COMPARISON:  None VIEWS:  XR MANDIBLE PANOREX ([place] ONLY) FINDINGS: Tooth 16 is impacted  Tooth 29 is missing  No periapical lucencies identified  There is no fracture or pathologic bone lesion  The temporomandibular joints appear grossly intact  Impression: No periapical lucencies identified  Workstation performed: [id number]     Xr Chest Portable    Result Date: 10/10/2019  Narrative: CHEST  PORTABLE INDICATION: Status post cardiac surgery  COMPARISON:  None VIEWS:   AP frontal; 1 image FINDINGS: Median sternotomy wires have been placed  Cardiac valve prosthesis is present  Lines and tubes are unchanged in position  Mediastinal and pleural drains noted  The cardiomediastinal silhouette is stable  Lungs are clear  Osseous structures are age appropriate  Impression: Postoperative changes    Workstation performed: [id number]     Xr Chest Portable    Result Date: 10/4/2019  Narrative: CHEST INDICATION:   SOB  COMPARISON:  October 1, 2019 EXAM PERFORMED/VIEWS:  XR CHEST PORTABLE FINDINGS: Cardiomediastinal silhouette appears unremarkable  Moderate pulmonary vascular congestion  Osseous structures appear within normal limits for patient age  Impression: Stable chest with moderate pulmonary vascular congestion  Workstation performed: [id number]     Xr Chest Portable    Result Date: 10/1/2019  Narrative: CHEST INDICATION:   Shortness of breath  COMPARISON:  6/25/2013 EXAM PERFORMED/VIEWS:  XR CHEST PORTABLE  11:23 AM FINDINGS: Cardiomediastinal silhouette appears unremarkable  The lungs are clear  No pneumothorax or pleural effusion  There is no pneumothorax  The costophrenic angles are clear  There is hypoventilation present  Osseous structures appear within normal limits for patient age  Impression: New airspace opacity suggests pulmonary edema or diffuse bronchopneumonia  Immediate report was noted on the Epic system  Workstation performed: [id number]     Ct Head Wo Contrast    Result Date: 10/8/2019  Narrative: CT BRAIN - WITHOUT CONTRAST INDICATION:   Stroke, follow up  COMPARISON:  10/2/2019 MRI; 10/1/2019 CT TECHNIQUE:  CT examination of the brain was performed  In addition to axial images, coronal 2D reformatted images were created and submitted for interpretation  Radiation dose length product (DLP) for this visit:  987 mGy-cm   This examination, like all CT scans performed in the [facility], was performed utilizing techniques to minimize radiation dose exposure, including the use of iterative reconstruction and automated exposure control  IMAGE QUALITY:  Diagnostic  FINDINGS: PARENCHYMA: Diminished vasogenic edema associated with approximately 1 8 cm ill-defined previously acute hemorrhage involving the medial right temporal occipital junction  This may represent sequela from septic embolism    More diagnostic specificity may  be achieved by IV contrast-enhanced MRI  VENTRICLES AND EXTRA-AXIAL SPACES:  Normal for the patient's age  VISUALIZED ORBITS AND PARANASAL SINUSES:  Unremarkable  CALVARIUM AND EXTRACRANIAL SOFT TISSUES:  Normal      Impression: Slightly diminished vasogenic edema associated with approximately 1 8 cm ill-defined previously acute hemorrhage involving the medial right temporal occipital junction  Lesion is suspicious for sequela from septic embolism  MRI performed with IV contrast could be considered for improved diagnostic specificity  Workstation performed: [id number]     Ct Head Wo Contrast    Addendum Date: 10/1/2019 Addendum:   ADDENDUM: This addendum is for the purpose of clarification: Abnormality described represents either a septic embolus, or abscess  MRI is recommended for differentiation  I personally discussed this addendum with Ursula Galeano on 10/1/2019 at 7:50 PM     Result Date: 10/1/2019  Narrative: CT BRAIN - WITHOUT CONTRAST INDICATION:   mv endocarditis  COMPARISON:  None  TECHNIQUE:  CT examination of the brain was performed  In addition to axial images, coronal 2D reformatted images were created and submitted for interpretation  Radiation dose length product (DLP) for this visit:  1028 mGy-cm   This examination, like all CT scans performed in the [place], was performed utilizing techniques to minimize radiation dose exposure, including the use of iterative reconstruction and automated exposure control  IMAGE QUALITY:  Diagnostic  FINDINGS: PARENCHYMA:  There is a 1 8 cm x 0 9 x 0 8 lesion in the right parieto-occipital region with surrounding edema as seen on series 2/18 Otherwise gray-white differentiation is maintained VENTRICLES AND EXTRA-AXIAL SPACES:  Posterior fossa midline cystic structure VISUALIZED ORBITS AND PARANASAL SINUSES:  Unremarkable   CALVARIUM AND EXTRACRANIAL SOFT TISSUES:  Normal      Impression: Right parieto-occipital lesion, given history this likely represents a septic embolus/abscess  I personally discussed this study  with Madelin Ramires on 10/1/2019 at 5:24 PM   Workstation performed: [id number]     Ct Chest Wo Contrast    Result Date: 10/1/2019  Narrative: CT CHEST WITHOUT IV CONTRAST INDICATION:   pre op cardiac surgery  Mitral valve endocarditis COMPARISON:  X-ray from today TECHNIQUE: CT examination of the chest was performed without intravenous contrast   Axial, sagittal, and coronal 2D reformatted images were created from the source data and submitted for interpretation  Radiation dose length product (DLP) for this visit:  343 mGy-cm   This examination, like all CT scans performed in the [facility], was performed utilizing techniques to minimize radiation dose exposure, including the use of iterative reconstruction and automated exposure control  FINDINGS: LUNGS:  There are diffuse groundglass opacities seen throughout, however predominantly involving the perihilar regions  There are more dense and confluent opacities seen in the left upper lobe as seen on series 3/37 PLEURA:  Moderate bilateral pleural effusions HEART/GREAT VESSELS:  Density difference between the intracardiac blood and intraventricular septum  MEDIASTINUM AND MARCOS:  1 3 cm subcarinal lymph node CHEST WALL AND LOWER NECK:   Unremarkable  VISUALIZED STRUCTURES IN THE UPPER ABDOMEN:  Wedge-shaped ill-defined hypodensity noted in the spleen on series 2/56 OSSEOUS STRUCTURES:  No acute fracture or destructive osseous lesion  Impression: 1  Diffuse groundglass opacities consistent with pulmonary edema 2  More focal patchy opacities in left upper lobe, while this may  still represent edema, pneumonia would have a similar appearance 3  Moderate pleural effusions 4  Subcarinal lymphadenopathy 5  Wedge-shaped hypodensity within the spleen, likely infarct The study was marked in EPIC for immediate notification   Workstation performed: [id number]     Mra Head Wo Contrast    Result Date: 10/2/2019  Narrative: MRA BRAIN WITHOUT CONTRAST INDICATION:  eval for mycotic aneurysm 's COMPARISON:  None  TECHNIQUE:  Axial 3-D time-of-flight imaging with 3-D reconstructions performed without contrast    IV Contrast:  Not administered  FINDINGS: IMAGE QUALITY:  Diagnostic  ANATOMY INTERNAL CAROTID ARTERIES:  Normal flow related enhancement of the distal cervical, petrous and cavernous segments of the internal carotid arteries  Normal ICA terminus  ANTERIOR CIRCULATION:  Right A1 segment is likely severely hypoplastic or absent  Anterior communicating artery identified  No stenosis in the proximal intracerebral arteries  MIDDLE CEREBRAL ARTERY CIRCULATION:  The M1 segment and middle cerebral artery branches demonstrate normal flow-related enhancement  DISTAL VERTEBRAL ARTERIES:  No stenosis in the intradural segments of the vertebral arteries  Limited visualization of the posterior inferior cerebral arteries  BASILAR ARTERY:  Normal  POSTERIOR CEREBRAL ARTERIES: No stenosis in the posterior cerebral arteries  Impression: 1  No evidence of mycotic aneurysm; however, exam sensitivity is limited by resolution and incomplete imaging of the distal branches  Catheter angiography may be helpful if there is persistent concern for mycotic aneurysm  2   No stenosis or occlusion of the major vessels of the [place]  Workstation performed: [id number]     Mra Carotids Wo Contrast    Result Date: 10/3/2019  Narrative: MRA NECK WITHOUT CONTRAST INDICATION: eval for mycotic aneurysm COMPARISON:  None  TECHNIQUE:  2D and 3D Time of Flight angiography with 3D reconstructions  IMAGE QUALITY:  Diagnostic  FINDINGS: AORTIC ARCH:  Normal  VISUALIZED SUBCLAVIAN ARTERIES:  The subclavian arteries demonstrate normal flow-related enhancement with no stenosis  VERTEBRAL ARTERIES:  Normal vertebral artery origins  The vertebral arteries are bilaterally symmetric with normal enhancement and no evidence of stenosis  RIGHT CAROTID ARTERY:  Normal common carotid artery, cervical internal carotid artery and external carotid artery  No stenosis at the bifurcation  LEFT CAROTID ARTERY:  Normal common carotid artery, cervical internal carotid artery and external carotid artery  No stenosis at the bifurcation  VISUALIZED INTRACRANIAL VASCULATURE:  Limited visualization of the intracranial vasculature is grossly unremarkable  NASCET criteria was used to determine the degree of internal carotid artery diameter stenosis  Impression: Unremarkable MR angiogram of the cervical vasculature  Workstation performed: [id number]     Mri Brain Wo Contrast    Result Date: 10/2/2019  Narrative: MRI BRAIN WITHOUT CONTRAST INDICATION: Right posterior temporal occipital hemorrhage  Mitral valve endocarditis and sepsis  COMPARISON:   10/1/2019  TECHNIQUE:  Sagittal T1, axial T2, axial FLAIR, axial T1, axial Capron and axial diffusion imaging  IMAGE QUALITY:  Diagnostic  FINDINGS: BRAIN PARENCHYMA: Susceptibility artifact consistent with hemorrhage in the right posterior mesial temporal occipital region measures 1 8 x 1 6 x 0 7 cm  Minimal T1 hyperintensity along the margins of the hemorrhage suggesting a subacute blood products  Mild surrounding vasogenic edema  Findings are stable when compared to the prior CT  Punctate foci of susceptibility artifact in the right frontoparietal subcortical white matter without other signal abnormality, consistent with chronic microhemorrhage  Similar lesions in the cerebellar hemispheres  Punctate focus of mildly restricted diffusion in the right posterior frontoparietal region  VENTRICLES:  No hydrocephalus or extra-axial collection  SELLA AND PITUITARY GLAND:  Normal  ORBITS:  No retro-orbital mass or inflammation  PARANASAL SINUSES:  Normal  VASCULATURE:  Evaluation of the major intracranial vasculature demonstrates appropriate flow voids  CALVARIUM AND SKULL BASE:  No osseous lesion   EXTRACRANIAL SOFT TISSUES:  No soft tissue mass  Impression: Subacute 1 8 cm hemorrhage in the right posterior mesial temporal occipital region with surrounding vasogenic edema, stable  Few punctate foci of chronic microhemorrhage in the right frontoparietal region and in the cerebellar hemispheres  Findings may  be secondary to subacute and chronic septic emboli and/or mycotic aneurysms  No evidence of abscess  Workstation performed: [id number]     Ir Thoracentesis    Result Date: 10/2/2019  Narrative: INDICATION: Bilateral pleural effusions, shortness of breath  PROCEDURE: 1  Ultrasound-guided bilateral thoracentesis FINDINGS: 1   1000 mL fluid removed from the right pleural space  2   700 mL fluid removed from the left pleural space  Impression: Successful bilateral thoracentesis  _________________________________________________________________ COMPARISON: None PROCEDURE DETAILS: Operators: Dr Marko Arreaga, [address] [place] [address] attending, performed the procedure  Anesthesia: 1% lidocaine was injected in the skin and subcutaneous tissues overlying the access site  Medications: 1% lidocaine Contrast: None Fluoroscopy time: None COMMENTS: Following the discussion of the risks, benefits and alternatives to the procedure, written informed consent was obtained from the patient  The patient was placed in a sitting position  A preprocedure timeout was performed per [facility] protocol  The right back was prepped and draped in the usual sterile fashion  After local anesthesia was administered, a 5-[employer] catheter was advanced under continuous ultrasound guidance into the right pleural space  1000 mL of vidya fluid was obtained  After the procedure, the catheter was removed, the skin was cleansed and sterile dressings were applied  Aforementioned procedure was then performed for left-sided thoracentesis  The patient tolerated the procedure well and there were no immediate postprocedure complications   Workstation performed: [id number]     Kely Carotid Complete Study    Result Date: 10/1/2019  Narrative:  [facility] REPORT CLINICAL: Indications: Patient presents for a general health evaluation secondary to future open heart surgery  Patient is asymptomatic at this time  Operative History: 2013-01-01 Chest Wall Tumor excision Risk Factors The patient has history of HTN and Non-rheumatic Mitral valve regurgitation  Clinical Right Pressure:  120/70 mm Hg  FINDINGS:  Right        Impression  PSV  EDV (cm/s)  Direction of Flow  Ratio  Dist  ICA                 55          26                      0 51  Mid  ICA                  62          19                      0 56  Prox  ICA    Normal       69          23                      0 63  Dist CCA                  91          26                            Mid CCA                  109          27                      1 20  Prox CCA                  91          14                            Ext Carotid               57           9                      0 52  Prox Vert                 53          26  Antegrade                 Subclavian               126           0                             Left         Impression  PSV  EDV (cm/s)  Direction of Flow  Ratio  Dist  ICA                 71          39                      0 76  Mid  ICA                  59          41                      0 63  Prox  ICA    Normal       63          34                      0 68  Dist CCA                  86          24                            Mid CCA                   93          33                      0 91  Prox CCA                 103          37                            Ext Carotid               61          18                      0 66  Prox Vert                 72          28  Antegrade                 Subclavian               161          16                               CONCLUSION: Impression RIGHT: There is no evidence of disease throughout the extracranial carotid arteries  Vertebral artery flow is antegrade  There is no significant subclavian artery disease  LEFT: There is no evidence of disease throughout the extracranial carotid arteries  Vertebral artery flow is antegrade  There is no significant subclavian artery disease  SIGNATURE: Electronically Signed by: Ricardo Cruz on 2019-10-01 06:01:19 PM    Xr Chest Portable Icu    Result Date: 10/9/2019  Narrative: CHEST INDICATION:   S/P open heart  COMPARISON:  10/3/2019 EXAM PERFORMED/VIEWS:  XR CHEST PORTABLE ICU FINDINGS:  Endotracheal tube is present with its tip in satisfactory position above the level of the kateryna  An enteric tube is present with its tip in satisfactory position below the left hemidiaphragm  Right internal jugular central venous catheter is noted  Also noted is a [place] catheter from a right internal jugular approach with its tip overlying the right main pulmonary artery  Mediastinal and pleural drains are present  New sternotomy and valve prosthesis present  Cardiac silhouette within expected norm for recent surgery  No acute consolidation  Interval improvement in bilateral parahilar congestive changes  No pneumothorax or pleural effusion  Osseous structures appear within normal limits for patient age  Impression: New lines and tubes in satisfactory location  No pneumothorax  Interval improvement in parahilar congestive changes  Workstation performed: [id number]     Us Right Upper Quadrant With Liver Dopplers    Result Date: 10/4/2019  Narrative: RIGHT UPPER QUADRANT ULTRASOUND WITH LIVER DOPPLER INDICATION:     Evaluation of Liver Function  COMPARISON:  None  TECHNIQUE:   Real-time ultrasound of the right upper quadrant was performed with a curvilinear transducer with both volumetric sweeps and still imaging techniques  FINDINGS: PANCREAS:  Visualized portions of the pancreas are within normal limits  AORTA AND IVC:  Visualized portions are normal for patient age  LIVER: Size:  Within normal range    The liver measures 14 2 cm in the midclavicular line  Contour:  Surface contour is smooth  Parenchyma:  Echogenicity and echotexture are within normal limits  No evidence of suspicious mass  LIVER DOPPLER: The main portal vein and primary branch segments are patent and hepatopetal with normal spectral waveform  Hepatic veins are patent  Spectral waveforms within normal limits  Main hepatic artery appears normal size, patent with normal spectral waveform  BILIARY: The gallbladder is normal in caliber  No wall thickening or pericholecystic fluid  No stones or sludge identified  No sonographic Will's sign  No intrahepatic biliary dilatation  CBD measures 3 mm  No choledocholithiasis  KIDNEYS: RIGHT KIDNEY: Absent  LEFT KIDNEY: 13 8 x 6 8 cm  Within normal limits  SPLEEN: 13 5 x 13 9 x 5 7 cm Mildly enlarged  ASCITES:   None  Incompletely visualized bilateral pleural effusions  Impression: 1  Normal sonographic appearance of the liver  2   Absent right kidney  3   Mild splenomegaly  4   Incompletely visualized bilateral pleural effusions   Workstation performed: [id number]

## 2019-10-21 NOTE — TELEPHONE ENCOUNTER
Contacted pt regarding labs due weekly  Pt states he was unaware the labs were weekly  He states he will go have labs drawn tomorrow after his appt with his surgeon

## 2019-10-21 NOTE — TELEPHONE ENCOUNTER
Called patient's home number on file, went straight to voicemail  [facility] [employer] number on file, since discharge, he has not experienced any new or worsening stroke symptoms  Denies any residual symptoms following hospitalization  States he has returned to baseline  Patient ambulates independently, preforms his own ADLs, also manages his own medications, appointments, and affairs  Patient followed up with his PCP today  Scheduled stroke hospital follow [facility] 12[facility]19 [facility]  New patient packet mailed to home address on file  I reviewed medications with him  There have not been any changes since discharge  he had no difficulties obtaining medications  Reports taking all as prescribed with no missed doses or medication side effects  Patient denies signs of bleeding  I reviewed stroke symptoms and risk factor management with him  he verbalizes understanding of information  Patient does not monitor his BP at home, as he does not have a BP cuff  he is a non smoker  No specific diet  Patient requesting additional information  Mailed "what you need to know about stroke" binder and my card to home address on file with new patient packet  Answered all his questions  He has no further qestions or concerns at this time

## 2019-10-22 ENCOUNTER — OFFICE VISIT (OUTPATIENT)
Dept: PLASTIC SURGERY | Facility: CLINIC | Age: 39
End: 2019-10-22

## 2019-10-22 DIAGNOSIS — I34.0 SEVERE MITRAL REGURGITATION: Primary | ICD-10-CM

## 2019-10-22 PROCEDURE — 99024 POSTOP FOLLOW-UP VISIT: CPT | Performed by: PHYSICIAN ASSISTANT

## 2019-10-22 NOTE — LETTER
October 22, 2019     Aspen Hernandez MD  [address] [facility] [address]    Patient: Isaak Sepulveda   YOB: 1980   Date of Visit: 10/22/2019       Dear Dr Anika Taylor:    Thank you for referring Vish Adames to me for evaluation  Below are my notes for this consultation  If you have questions, please do not hesitate to call me  I look forward to following your patient along with you  Sincerely,        Susannah De La Garza PA-C        CC: MD Susannah Acosta PA-C  10/22/2019 10:21 AM  Sign at close encounter  Assessment/Plan:   Bret Espitia is a 44year old male who is 2 weeks status post pectoralis flap with Dr Irina Mackey  Please see HPI  He is healing well  Right chest drain was removed  Follow up in 1 week to assess the left side drain  Diagnoses and all orders for this visit:    Severe mitral regurgitation          Subjective:     Patient ID: Isaak Sepulveda is a 44 y o  male  HPI   He feels well and is anxious to have his drains removed  Review of Systems   Skin:        As per HPI  Objective:     Physical Exam   Skin:   Incision is clean, dry and intact  Right chest drain removed  Left drain is serosanguinous

## 2019-10-22 NOTE — PROGRESS NOTES
NEPHROLOGY OFFICE PROGRESS NOTE   Cris Lafleur 44 y o  male MRN: [id number]  DATE: 10/22/19  Reason for visit: Continued evaluation of JULIENNE    ASSESSMENT & PLAN:  1  Acute kidney injury:  · Baseline creatinine is 0 8 to 1 0    · Peak creatinine was 2 2 on 9/25/19 at [facility] and discharged from [facility] with SCr of 1 36    · Most recent SCr from 10/22/19 was down to 1 20  · Etiology felt to be ATN vs endocarditis related GN vs AIN  Personally, I suspect that this is ATN  · Will repeat labs in November 2019  · Check UA, UPC ratio and complement levels  2  Chronic kidney disease, stage I/II:  · Has CKD on the basis of a solitary kidney  · Baseline creatinine is normal - 0 8 to 1 0      3  BP/volume status:  · BP is at goal with Metoprolol 37 5 mg BID  · He is hypervolemic on my exam - continue Furosemide 40 mg daily  4  Hypokalemia:  · Due to diuretic use  · Continue Kdur 20 meq daily  5  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  6  Anemia: Recheck CBC  May need oral iron supplementation if remains low  7  Bilateral pleural effusions  8  Septic emboli  9  Hx of L renal infarct  10  Hx of heroin use    Disposition:  · Improving renal function  · Will reevaluate in 4 weeks for continued need for Furosemide  · If Furosemide is stopped, then KDur can be stopped  · Consider stopping PPI with next visit  · May need oral iron if no improvement in Hgb  Patient Instructions   No change to medications  Call us if you feel dizzy or lightheaded  Follow up in 4 weeks  Check blood and urine tests a few days to a week before the follow up visit  SUBJECTIVE / INTERVAL HISTORY:  Cris Lafleur is a 72-year-old gentleman who I am seeing for the 1st time in the office for continued evaluation of acute kidney injury   He was recently admitted to [facility] between 9/6 and 9/30 with Pseudomonas endocarditis and was transferred to [facility] for CT surgery evaluation  He was admitted to [facility] between 9/30 and 10/15  We were consulted for JULIENNE  The suspicion was he had ATN vs endocarditis related GN vs AIN  His peak creatinine was 2 20 at [facility] on 9/25/19  His creatinine in [facility] was 1 76 on 10/1 and peaked at 2 03 on 10/10/19 but was down to 1 36 on 10/15  Apparently, his complements were normal at [facility]  He had a mitral valve replacement with a bioprosthetic valve on 10/09/2019 at [facility]  Since discharge, he is feeling well  He still has some edema but reports improvement with the Lasix that he is taking  Appetite is good  I reviewed [facility] records - baseline creatinine is 0 8 to 1 0 based no labs from Feb to June 2019  PMH/PSH:   1  HTN: Diagnosed in early 35s  Prior to his admission to [facility], he reports taking 2 BP meds that he cannot recall  2  Heroin use  3  Pseudomonas bacteremia with endocarditis s/p MV replacement  4  Hep C:   5  Solitary kidney: Incidentally found on CT imaging during admission to [facility]      No DM, HLP, CAD, CVA, malignancy  Social Hx: Non smoker, no alcohol abuse, (+) heroin use  Family Hx: Negative for CKD or ESRD  Previous work up:   10/3/19 CT abd, pelvis: Solitary L kidney  ALLERGIES: No Known Allergies    REVIEW OF SYSTEMS:  Review of Systems   Constitutional: Negative for appetite change, chills, fatigue and fever  Respiratory: Positive for cough  Negative for shortness of breath  Cardiovascular: Positive for leg swelling  Negative for chest pain  Gastrointestinal: Negative for abdominal pain, diarrhea, nausea and vomiting  Genitourinary: Negative for dysuria and hematuria  Musculoskeletal: Negative for arthralgias and back pain  Neurological: Negative for dizziness and light-headedness       OBJECTIVE:  /62   Ht 5' 3" (1 6 m)   Wt 76 7 kg (169 lb)   BMI 29 94 kg/m²   Current Weight:   Body mass index is 29 94 kg/m²  Physical Exam   Constitutional: He appears well-developed and well-nourished  No distress  HENT:   Head: Normocephalic and atraumatic  Eyes: No scleral icterus  Neck: No JVD present  Cardiovascular: Normal rate, regular rhythm and normal heart sounds  Pulmonary/Chest: Effort normal and breath sounds normal  No respiratory distress  He has no wheezes  He has no rales  Abdominal: Soft  Bowel sounds are normal    Musculoskeletal: He exhibits edema (mild)  Skin: Skin is warm and dry  Psychiatric: He has a normal mood and affect  His behavior is normal  Judgment normal      Medications:  Current Outpatient Medications:     acetaminophen (TYLENOL) 325 mg tablet, Take 1-2 tablets Q4-6 hours PRN pain  Do not take with Percocet , Disp: 30 tablet, Rfl: 0    aspirin 325 mg tablet, Take 1 tablet (325 mg total) by mouth daily, Disp: 30 tablet, Rfl: 0    atorvastatin (LIPITOR) 40 mg tablet, Take 1 tablet (40 mg total) by mouth daily with dinner, Disp: 30 tablet, Rfl: 0    escitalopram (LEXAPRO) 10 mg tablet, Take 1 tablet (10 mg total) by mouth daily, Disp: 30 tablet, Rfl: 0    furosemide (LASIX) 40 mg tablet, Take 1 tablet (40 mg total) by mouth daily Unless otherwise directed , Disp: 30 tablet, Rfl: 0    levofloxacin (LEVAQUIN) 750 mg tablet, Take 1 tablet (750 mg total) by mouth every 24 hours Unless otherwise directed , Disp: 35 tablet, Rfl: 0    Metoprolol Tartrate 37 5 MG TABS, Take 1 tablet (37 5 mg total) by mouth every 12 (twelve) hours, Disp: 60 tablet, Rfl: 0    oxyCODONE-acetaminophen (PERCOCET) 5-325 mg per tablet, Take 1-2 tablets Q4-6 hours PRN pain   Do not take with Tylenol , Disp: 30 tablet, Rfl: 0    pantoprazole (PROTONIX) 40 mg tablet, Take 1 tablet (40 mg total) by mouth daily, Disp: 30 tablet, Rfl: 0    potassium chloride (K-DUR,KLOR-CON) 20 mEq tablet, Take 2 tablets (40 mEq total) by mouth daily Unless otherwise directed , Disp: 60 tablet, Rfl: 0    Laboratory Results:  Results for orders placed or performed in visit on 74/08/17   Basic metabolic panel   Result Value Ref Range    Glucose, Random 138 65 - 139 mg/dL    BUN 17 7 - 25 mg/dL    Creatinine 1 20 0 60 - 1 35 mg/dL    eGFR Non  76 > OR = 60 mL/min/1 73m2    eGFR  88 > OR = 60 mL/min/1 73m2    SL AMB BUN/CREATININE RATIO NOT APPLICABLE 6 - 22 (calc)    Sodium 141 135 - 146 mmol/L    Potassium 4 0 3 5 - 5 3 mmol/L    Chloride 104 98 - 110 mmol/L    CO2 28 20 - 32 mmol/L    SL AMB CALCIUM 8 5 (L) 8 6 - 10 3 mg/dL   CBC   Result Value Ref Range    White Blood Cell Count 5 6 3 8 - 10 8 Thousand/uL    Red Blood Cell Count 3 13 (L) 4 20 - 5 80 Million/uL    Hemoglobin 8 7 (L) 13 2 - 17 1 g/dL    HCT 27 7 (L) 38 5 - 50 0 %    MCV 88 5 80 0 - 100 0 fL    MCH 27 8 27 0 - 33 0 pg    MCHC 31 4 (L) 32 0 - 36 0 g/dL    RDW 15 6 (H) 11 0 - 15 0 %    Platelet Count 810 030 - 400 Thousand/uL    SL AMB MPV 9 3 7 5 - 12 5 fL

## 2019-10-23 ENCOUNTER — OFFICE VISIT (OUTPATIENT)
Dept: NEPHROLOGY | Facility: CLINIC | Age: 39
End: 2019-10-23
Payer: COMMERCIAL

## 2019-10-23 VITALS
HEIGHT: 63 IN | SYSTOLIC BLOOD PRESSURE: 124 MMHG | DIASTOLIC BLOOD PRESSURE: 62 MMHG | WEIGHT: 169 LBS | BODY MASS INDEX: 29.95 KG/M2

## 2019-10-23 DIAGNOSIS — N18.2 BENIGN HYPERTENSION WITH CHRONIC KIDNEY DISEASE, STAGE II: ICD-10-CM

## 2019-10-23 DIAGNOSIS — N17.9 ACUTE KIDNEY INJURY (HCC): Primary | ICD-10-CM

## 2019-10-23 DIAGNOSIS — I12.9 BENIGN HYPERTENSION WITH CHRONIC KIDNEY DISEASE, STAGE II: ICD-10-CM

## 2019-10-23 DIAGNOSIS — IMO0002 SOLITARY LEFT KIDNEY: ICD-10-CM

## 2019-10-23 DIAGNOSIS — D62 ACUTE BLOOD LOSS AS CAUSE OF POSTOPERATIVE ANEMIA: ICD-10-CM

## 2019-10-23 DIAGNOSIS — E87.6 HYPOKALEMIA: ICD-10-CM

## 2019-10-23 LAB
BUN SERPL-MCNC: 17 MG/DL (ref 7–25)
BUN/CREAT SERPL: ABNORMAL (CALC) (ref 6–22)
CALCIUM SERPL-MCNC: 8.5 MG/DL (ref 8.6–10.3)
CHLORIDE SERPL-SCNC: 104 MMOL/L (ref 98–110)
CO2 SERPL-SCNC: 28 MMOL/L (ref 20–32)
CREAT SERPL-MCNC: 1.2 MG/DL (ref 0.6–1.35)
ERYTHROCYTE [DISTWIDTH] IN BLOOD BY AUTOMATED COUNT: 15.6 % (ref 11–15)
GLUCOSE SERPL-MCNC: 138 MG/DL (ref 65–139)
HCT VFR BLD AUTO: 27.7 % (ref 38.5–50)
HGB BLD-MCNC: 8.7 G/DL (ref 13.2–17.1)
MCH RBC QN AUTO: 27.8 PG (ref 27–33)
MCHC RBC AUTO-ENTMCNC: 31.4 G/DL (ref 32–36)
MCV RBC AUTO: 88.5 FL (ref 80–100)
PLATELET # BLD AUTO: 250 THOUSAND/UL (ref 140–400)
PMV BLD REES-ECKER: 9.3 FL (ref 7.5–12.5)
POTASSIUM SERPL-SCNC: 4 MMOL/L (ref 3.5–5.3)
RBC # BLD AUTO: 3.13 MILLION/UL (ref 4.2–5.8)
SL AMB EGFR AFRICAN AMERICAN: 88 ML/MIN/1.73M2
SL AMB EGFR NON AFRICAN AMERICAN: 76 ML/MIN/1.73M2
SODIUM SERPL-SCNC: 141 MMOL/L (ref 135–146)
WBC # BLD AUTO: 5.6 THOUSAND/UL (ref 3.8–10.8)

## 2019-10-23 PROCEDURE — 99214 OFFICE O/P EST MOD 30 MIN: CPT | Performed by: INTERNAL MEDICINE

## 2019-10-23 NOTE — PATIENT INSTRUCTIONS
No change to medications  Call us if you feel dizzy or lightheaded  Follow up in 4 weeks  Check blood and urine tests a few days to a week before the follow up visit

## 2019-10-23 NOTE — LETTER
October 23, 2019     Rizwana Sagastume MD  [address] [address]    Patient: Fabricio Chavira   YOB: 1980   Date of Visit: 10/23/2019       Dear Dr Mayte Wu:    Thank you for referring Sampson Kendrick to me for evaluation  Below are my notes for this consultation  If you have questions, please do not hesitate to call me  I look forward to following your patient along with you  Sincerely,        Linda Bello MD        CC: No Recipients  Linda Bello MD  10/23/2019 12:38 PM  Sign at close encounter  NEPHROLOGY OFFICE PROGRESS NOTE   Fabricio Chavira 44 y o  male MRN: [id number]  DATE: 10/22/19  Reason for visit: Continued evaluation of JULIENNE    ASSESSMENT & PLAN:  1  Acute kidney injury:  · Baseline creatinine is 0 8 to 1 0    · Peak creatinine was 2 2 on 9/25/19 at [facility] and discharged from [facility] with SCr of 1 36    · Most recent SCr from 10/22/19 was down to 1 20  · Etiology felt to be ATN vs endocarditis related GN vs AIN  Personally, I suspect that this is ATN  · Will repeat labs in November 2019  · Check UA, UPC ratio and complement levels  2  Chronic kidney disease, stage I/II:  · Has CKD on the basis of a solitary kidney  · Baseline creatinine is normal - 0 8 to 1 0      3  BP/volume status:  · BP is at goal with Metoprolol 37 5 mg BID  · He is hypervolemic on my exam - continue Furosemide 40 mg daily  4  Hypokalemia:  · Due to diuretic use  · Continue Kdur 20 meq daily  5  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  6  Anemia: Recheck CBC  May need oral iron supplementation if remains low  7  Bilateral pleural effusions  8  Septic emboli  9  Hx of L renal infarct  10  Hx of heroin use    Disposition:  · Improving renal function  · Will reevaluate in 4 weeks for continued need for Furosemide  · If Furosemide is stopped, then KDur can be stopped  · Consider stopping PPI with next visit     · May need oral iron if no improvement in Hgb  Patient Instructions   No change to medications  Call us if you feel dizzy or lightheaded  Follow up in 4 weeks  Check blood and urine tests a few days to a week before the follow up visit  SUBJECTIVE / INTERVAL HISTORY:  Johann Madrigal is a 79-year-old gentleman who I am seeing for the 1st time in the office for continued evaluation of acute kidney injury  He was recently admitted to [facility] between 9/6 and 9/30 with Pseudomonas endocarditis and was transferred to [facility] for CT surgery evaluation  He was admitted to [facility] between 9/30 and 10/15  We were consulted for JULIENNE  The suspicion was he had ATN vs endocarditis related GN vs AIN  His peak creatinine was 2 20 at [facility] on 9/25/19  His creatinine in [facility] was 1 76 on 10/1 and peaked at 2 03 on 10/10/19 but was down to 1 36 on 10/15  Apparently, his complements were normal at [facility]  He had a mitral valve replacement with a bioprosthetic valve on 10/09/2019 at [facility]  Since discharge, he is feeling well  He still has some edema but reports improvement with the Lasix that he is taking  Appetite is good  I reviewed [facility] records - baseline creatinine is 0 8 to 1 0 based no labs from Feb to June 2019  PMH/PSH:   1  HTN: Diagnosed in early 35s  Prior to his admission to [facility], he reports taking 2 BP meds that he cannot recall  2  Heroin use  3  Pseudomonas bacteremia with endocarditis s/p MV replacement  4  Hep C:   5  Solitary kidney: Incidentally found on CT imaging during admission to [facility]      No DM, HLP, CAD, CVA, malignancy  Social Hx: Non smoker, no alcohol abuse, (+) heroin use  Family Hx: Negative for CKD or ESRD  Previous work up:   10/3/19 CT abd, pelvis: Solitary L kidney       ALLERGIES: No Known Allergies    REVIEW OF SYSTEMS:  Review of Systems   Constitutional: Negative for appetite change, chills, fatigue and fever  Respiratory: Positive for cough  Negative for shortness of breath  Cardiovascular: Positive for leg swelling  Negative for chest pain  Gastrointestinal: Negative for abdominal pain, diarrhea, nausea and vomiting  Genitourinary: Negative for dysuria and hematuria  Musculoskeletal: Negative for arthralgias and back pain  Neurological: Negative for dizziness and light-headedness  OBJECTIVE:  /62   Ht 5' 3" (1 6 m)   Wt 76 7 kg (169 lb)   BMI 29 94 kg/m²    Current Weight:   Body mass index is 29 94 kg/m²  Physical Exam   Constitutional: He appears well-developed and well-nourished  No distress  HENT:   Head: Normocephalic and atraumatic  Eyes: No scleral icterus  Neck: No JVD present  Cardiovascular: Normal rate, regular rhythm and normal heart sounds  Pulmonary/Chest: Effort normal and breath sounds normal  No respiratory distress  He has no wheezes  He has no rales  Abdominal: Soft  Bowel sounds are normal    Musculoskeletal: He exhibits edema (mild)  Skin: Skin is warm and dry  Psychiatric: He has a normal mood and affect  His behavior is normal  Judgment normal      Medications:  Current Outpatient Medications:     acetaminophen (TYLENOL) 325 mg tablet, Take 1-2 tablets Q4-6 hours PRN pain   Do not take with Percocet , Disp: 30 tablet, Rfl: 0    aspirin 325 mg tablet, Take 1 tablet (325 mg total) by mouth daily, Disp: 30 tablet, Rfl: 0    atorvastatin (LIPITOR) 40 mg tablet, Take 1 tablet (40 mg total) by mouth daily with dinner, Disp: 30 tablet, Rfl: 0    escitalopram (LEXAPRO) 10 mg tablet, Take 1 tablet (10 mg total) by mouth daily, Disp: 30 tablet, Rfl: 0    furosemide (LASIX) 40 mg tablet, Take 1 tablet (40 mg total) by mouth daily Unless otherwise directed , Disp: 30 tablet, Rfl: 0    levofloxacin (LEVAQUIN) 750 mg tablet, Take 1 tablet (750 mg total) by mouth every 24 hours Unless otherwise directed , Disp: 35 tablet, Rfl: 0    Metoprolol Tartrate 37 5 MG TABS, Take 1 tablet (37 5 mg total) by mouth every 12 (twelve) hours, Disp: 60 tablet, Rfl: 0    oxyCODONE-acetaminophen (PERCOCET) 5-325 mg per tablet, Take 1-2 tablets Q4-6 hours PRN pain   Do not take with Tylenol , Disp: 30 tablet, Rfl: 0    pantoprazole (PROTONIX) 40 mg tablet, Take 1 tablet (40 mg total) by mouth daily, Disp: 30 tablet, Rfl: 0    potassium chloride (K-DUR,KLOR-CON) 20 mEq tablet, Take 2 tablets (40 mEq total) by mouth daily Unless otherwise directed , Disp: 60 tablet, Rfl: 0    Laboratory Results:  Results for orders placed or performed in visit on 24/83/00   Basic metabolic panel   Result Value Ref Range    Glucose, Random 138 65 - 139 mg/dL    BUN 17 7 - 25 mg/dL    Creatinine 1 20 0 60 - 1 35 mg/dL    eGFR Non  76 > OR = 60 mL/min/1 73m2    eGFR  88 > OR = 60 mL/min/1 73m2    SL AMB BUN/CREATININE RATIO NOT APPLICABLE 6 - 22 (calc)    Sodium 141 135 - 146 mmol/L    Potassium 4 0 3 5 - 5 3 mmol/L    Chloride 104 98 - 110 mmol/L    CO2 28 20 - 32 mmol/L    SL AMB CALCIUM 8 5 (L) 8 6 - 10 3 mg/dL   CBC   Result Value Ref Range    White Blood Cell Count 5 6 3 8 - 10 8 Thousand/uL    Red Blood Cell Count 3 13 (L) 4 20 - 5 80 Million/uL    Hemoglobin 8 7 (L) 13 2 - 17 1 g/dL    HCT 27 7 (L) 38 5 - 50 0 %    MCV 88 5 80 0 - 100 0 fL    MCH 27 8 27 0 - 33 0 pg    MCHC 31 4 (L) 32 0 - 36 0 g/dL    RDW 15 6 (H) 11 0 - 15 0 %    Platelet Count 554 610 - 400 Thousand/uL    SL AMB MPV 9 3 7 5 - 12 5 fL

## 2019-10-25 ENCOUNTER — TELEPHONE (OUTPATIENT)
Dept: CARDIAC SURGERY | Facility: CLINIC | Age: 39
End: 2019-10-25

## 2019-10-25 NOTE — TELEPHONE ENCOUNTER
Call placed to patient to follow-up after discharge from hospital, post-op  Spoke to: Patient   Procedure & Date: MVR/b/l pec flap: 10/9/19  Surgeon: Bryanna Reid     Pre-op wt: 164 lb(10/9) D/C wt: 166 lb(10/15)  Current wt: 169 lb(10/23)    Fever/chills: No     Lightheadedness/dizziness: No     Angina: No     SOB: No     Pain: No    Edema: Resolved since d/c    Incision: Clean/dry/intact, no s/s of infection     GI: BM stable, no GI issues     Activity: Walking with no complaints     Follow-up APPTs: PCP: needs to schedule, Cardiology: no appt, Nephro: 10/23/19, Plastic sx: 10/22/19, GI: 10/21/19, CT sx: 11/15/19    Comments: Patient states he is doing well at this time, has no questions/concerns  Education:  1  Daily AM weight, call for weight gain of 2 lbs or more in 24 hours  2  Take frequent short walks, increase activity as tolerated  3  Maintain sternal precautions: No strenuous activity; no lifting more than 10 lbs;  no driving  4  Continue to use Incentive Spirometer frequently throughout the day  5  Shower daily; Cleanse incisions with antibacterial soap and water  6  No ointments, powder or lotions on surgical incisions  7   Call [facility] office with questions or concerns

## 2019-10-28 ENCOUNTER — OFFICE VISIT (OUTPATIENT)
Dept: INFECTIOUS DISEASES | Facility: CLINIC | Age: 39
End: 2019-10-28
Payer: COMMERCIAL

## 2019-10-28 VITALS
WEIGHT: 164.6 LBS | SYSTOLIC BLOOD PRESSURE: 112 MMHG | HEIGHT: 63 IN | TEMPERATURE: 97.8 F | DIASTOLIC BLOOD PRESSURE: 64 MMHG | BODY MASS INDEX: 29.16 KG/M2

## 2019-10-28 DIAGNOSIS — I05.9 ENDOCARDITIS OF MITRAL VALVE: Primary | ICD-10-CM

## 2019-10-28 DIAGNOSIS — Z95.2 S/P MVR (MITRAL VALVE REPLACEMENT): ICD-10-CM

## 2019-10-28 DIAGNOSIS — B96.5 BACTEREMIA DUE TO PSEUDOMONAS: ICD-10-CM

## 2019-10-28 DIAGNOSIS — IMO0002 SOLITARY KIDNEY: ICD-10-CM

## 2019-10-28 DIAGNOSIS — R78.81 BACTEREMIA DUE TO PSEUDOMONAS: ICD-10-CM

## 2019-10-28 DIAGNOSIS — F19.11 HISTORY OF INTRAVENOUS DRUG ABUSE (HCC): ICD-10-CM

## 2019-10-28 DIAGNOSIS — N18.9 CHRONIC KIDNEY DISEASE, UNSPECIFIED CKD STAGE: ICD-10-CM

## 2019-10-28 PROCEDURE — 99214 OFFICE O/P EST MOD 30 MIN: CPT | Performed by: INTERNAL MEDICINE

## 2019-10-28 NOTE — PROGRESS NOTES
Progress Note - Infectious Disease   Dariela Carbajal 44 y o  male MRN: [id number]  Unit/Bed#:  Encounter: [id number]      Impression/Plan:  1  Pseudomonal bacteremia with mitral valve endocarditis  Patient remains on antibiotic therapy after valve replacement on 10/09  Clinically he appears to be doing well  He is tolerating antibiotic without acute issue  We reviewed medication adherence as well as administration as below  Lab data reviewed and no acute finding  Continue levofloxacin 750 mg Q 24 hours through 11/20/2019  Continue weekly CBC with differential and creatinine  Continue to monitor for joint symptoms  Continue to monitor stool output  Contact our office should any refills be required  Contact our office if there are any additional questions  Patient is plan for follow-up with myself on 11/19  Continue to follow up with other providers as recommended  Detailed discharge instructions provided  Patient is aware that should he developed side effects he will need to be readmitted to complete antibiotic course  2  Chronic kidney disease and solitary kidney  Creatinine noted to be stable on labs from 10/23  Will continue to monitor creatinine on antibiotics as above  3  IV drug abuse and prior endocarditis  Patient reports ongoing drug abstinence  Encouraged him to seek out additional rehab resources  Additional care otherwise as above  Above plan discussed in detail with the patient  Will forward office visit to patient's primary care provider  Antibiotics:  Levofloxacin through 11/20  Valve replacement 10/09/2019    Subjective:  Briefly the patient is a 40-year-old gentleman with past medical history significant for active drug use  He has a previous history of MSSA endocarditis  He was seen in our institution as he developed pseudomonal bacteremia with worsening vegetations and seemed that his cultures were not clearing  Cultures at our institution remained negative  His cultures initially cleared at [facility] and were negative as of 9/14  But then his cultures on 09/28 started to isolate Pseudomonas again  Cultures from that time forward our institution remained negative  Patient went on to have valve repair on 10/19  He was on about 12 days of antibiotic prior to valve surgery  Valve cultures had no growth and repeat cultures after surgery were negative  At that point based on detailed conversation with the patient he was transitioned to single therapy with fluoroquinolones  He remains compliant with his medications and follow-up visits at this time  He denies any ongoing or active drug use since his admission  He is aware now the implications of his actions  He is hopeful that he will stay clean  He denies having any nausea, vomiting, chest pain, shortness of breath, joint pains, joint swelling or limitations in mobility  He remains with a SHERICE drain in place and is being followed by Plastic surgery for his chest wound  He does report having some loose and more frequent stools but denies having any abdominal cramping, fevers or urgency/tenesmus  Denies having any bloody stools  He also denies having any significant smell to his bowel movements  He does not believe that he has C diff  At this time he is willing to monitor his stool output and inquire about further testing should change  He does not believe he rate needs any refills to his medications but I encouraged him to contact our office should he need further medication  Also encouraged patient call our office should he have any additional questions  Finally we reviewed how to take his medications  Patient unfortunately has been taking Levaquin with his other pills though after eating  This time we discussed taking his medication at least 1-2 hours after a meal and refraining from any food intake for at least 1 hour after taking the pill    We also discussed taking the medication with water alone and without any other pills  Detailed instructions provided in after visit summary  ROS: A complete 12 point ROS is negative other than that noted in the HPI    Followup portions patient history reviewed and updated as: Allergies, current medications, past medical history, past social history, past surgical history, and the problem list    Objective:  Vitals:  Vitals:    10/28/19 0929   BP: 112/64   Temp: 97 8 °F (36 6 °C)   Weight: 74 7 kg (164 lb 9 6 oz)   Height: 5' 3" (1 6 m)       Physical Exam:   General Appearance:  Alert, interactive, appearing well, nontoxic, no acute distress  Patient appears stated age   Throat: Oropharynx moist without lesions  Lungs:   Clear to auscultation bilaterally; no audible wheezes, rhonchi or rales; respirations unlabored on room air   Heart:  RRR; no murmur, rub or gallop; chest wall wound is healing well without any signs of erythema or drainage or induration  Abdomen:   Soft, non-tender, non-distended, positive bowel sounds  Extremities: No clubbing, cyanosis or edema   Skin: No new rashes or lesions  No new draining wounds         Labs, Imaging, & Other studies:   All pertinent labs and imaging studies were personally reviewed    Lab Results   Component Value Date    K 4 0 10/22/2019     10/22/2019    CO2 28 10/22/2019    BUN 17 10/22/2019    CREATININE 1 20 10/22/2019    GLUCOSE 103 10/09/2019    CALCIUM 8 5 (L) 10/22/2019    AST 59 (H) 10/13/2019     (H) 10/13/2019    ALKPHOS 93 10/13/2019    EGFR 65 10/15/2019     Lab Results   Component Value Date    WBC 5 6 10/22/2019    HGB 8 7 (L) 10/22/2019    HCT 27 7 (L) 10/22/2019    MCV 88 5 10/22/2019     10/22/2019   No results found for: Rafa Queen

## 2019-10-28 NOTE — LETTER
October 28, 2019     Sohail Schwartz MD  [address] [address]    Patient: Savanna Sanderson   YOB: 1980   Date of Visit: 10/28/2019       Dear   Jennifer Son:    Thank you for referring Yaryparamjit Robles to me for evaluation  Below are my notes for this consultation  If you have questions, please do not hesitate to call me  I look forward to following your patient along with you  Sincerely,        Demetria Gardner MD        CC: No Recipients  Demetria Gardner MD  10/28/2019 10:25 AM  Sign at close encounter  Progress Note - Infectious Disease   Savanna Sanderson 44 y o  male MRN: [id number]  Unit/Bed#:  Encounter: [id number]      Impression/Plan:  1  Pseudomonal bacteremia with mitral valve endocarditis  Patient remains on antibiotic therapy after valve replacement on 10/09  Clinically he appears to be doing well  He is tolerating antibiotic without acute issue  We reviewed medication adherence as well as administration as below  Lab data reviewed and no acute finding  Continue levofloxacin 750 mg Q 24 hours through 11/20/2019  Continue weekly CBC with differential and creatinine  Continue to monitor for joint symptoms  Continue to monitor stool output  Contact our office should any refills be required  Contact our office if there are any additional questions  Patient is plan for follow-up with myself on 11/19  Continue to follow up with other providers as recommended  Detailed discharge instructions provided  Patient is aware that should he developed side effects he will need to be readmitted to complete antibiotic course  2  Chronic kidney disease and solitary kidney  Creatinine noted to be stable on labs from 10/23  Will continue to monitor creatinine on antibiotics as above  3  IV drug abuse and prior endocarditis  Patient reports ongoing drug abstinence  Encouraged him to seek out additional rehab resources  Additional care otherwise as above      Above plan discussed in detail with the patient  Will forward office visit to patient's primary care provider  Antibiotics:  Levofloxacin through 11/20  Valve replacement 10/09/2019    Subjective:  Briefly the patient is a 66-year-old gentleman with past medical history significant for active drug use  He has a previous history of MSSA endocarditis  He was seen in our institution as he developed pseudomonal bacteremia with worsening vegetations and seemed that his cultures were not clearing  Cultures at our institution remained negative  His cultures initially cleared at [facility] and were negative as of 9/14  But then his cultures on 09/28 started to isolate Pseudomonas again  Cultures from that time forward our institution remained negative  Patient went on to have valve repair on 10/19  He was on about 12 days of antibiotic prior to valve surgery  Valve cultures had no growth and repeat cultures after surgery were negative  At that point based on detailed conversation with the patient he was transitioned to single therapy with fluoroquinolones  He remains compliant with his medications and follow-up visits at this time  He denies any ongoing or active drug use since his admission  He is aware now the implications of his actions  He is hopeful that he will stay clean  He denies having any nausea, vomiting, chest pain, shortness of breath, joint pains, joint swelling or limitations in mobility  He remains with a SHERICE drain in place and is being followed by Plastic surgery for his chest wound  He does report having some loose and more frequent stools but denies having any abdominal cramping, fevers or urgency/tenesmus  Denies having any bloody stools  He also denies having any significant smell to his bowel movements  He does not believe that he has C diff  At this time he is willing to monitor his stool output and inquire about further testing should change    He does not believe he rate needs any refills to his medications but I encouraged him to contact our office should he need further medication  Also encouraged patient call our office should he have any additional questions  Finally we reviewed how to take his medications  Patient unfortunately has been taking Levaquin with his other pills though after eating  This time we discussed taking his medication at least 1-2 hours after a meal and refraining from any food intake for at least 1 hour after taking the pill  We also discussed taking the medication with water alone and without any other pills  Detailed instructions provided in after visit summary  ROS: A complete 12 point ROS is negative other than that noted in the HPI    Followup portions patient history reviewed and updated as: Allergies, current medications, past medical history, past social history, past surgical history, and the problem list    Objective:  Vitals:  Vitals:    10/28/19 0929   BP: 112/64   Temp: 97 8 °F (36 6 °C)   Weight: 74 7 kg (164 lb 9 6 oz)   Height: 5' 3" (1 6 m)       Physical Exam:   General Appearance:  Alert, interactive, appearing well, nontoxic, no acute distress  Patient appears stated age   Throat: Oropharynx moist without lesions  Lungs:   Clear to auscultation bilaterally; no audible wheezes, rhonchi or rales; respirations unlabored on room air   Heart:  RRR; no murmur, rub or gallop; chest wall wound is healing well without any signs of erythema or drainage or induration  Abdomen:   Soft, non-tender, non-distended, positive bowel sounds  Extremities: No clubbing, cyanosis or edema   Skin: No new rashes or lesions  No new draining wounds         Labs, Imaging, & Other studies:   All pertinent labs and imaging studies were personally reviewed    Lab Results   Component Value Date    K 4 0 10/22/2019     10/22/2019    CO2 28 10/22/2019    BUN 17 10/22/2019    CREATININE 1 20 10/22/2019    GLUCOSE 103 10/09/2019    CALCIUM 8 5 (L) 10/22/2019 AST 59 (H) 10/13/2019     (H) 10/13/2019    ALKPHOS 93 10/13/2019    EGFR 65 10/15/2019     Lab Results   Component Value Date    WBC 5 6 10/22/2019    HGB 8 7 (L) 10/22/2019    HCT 27 7 (L) 10/22/2019    MCV 88 5 10/22/2019     10/22/2019   No results found for: Al Hunger

## 2019-10-28 NOTE — PATIENT INSTRUCTIONS
You are recommended to continue levofloxacin through 11/20/2019  You are expected to take your antibiotic by itself, with no other medication, with water only, you are to wait at least 2 hours after eating food before taking the medication  Do not eat any food for at least 1 hour after taking the medication  Please contact our office immediately if you need any refills of your medication  You're expected to follow-up in our office and with your other providers  Monitor your stool output and should you notice increasing abdominal cramping, more liquid stool, nausea, and possibly fever please contact our office immediately for additional stool testing  Please monitor for any joint swelling, joint pain, or limitation in movement while on this antibiotic    Please continue to get your weekly blood work  Contact our office if you have ANY questions

## 2019-10-30 ENCOUNTER — OFFICE VISIT (OUTPATIENT)
Dept: PLASTIC SURGERY | Facility: CLINIC | Age: 39
End: 2019-10-30

## 2019-10-30 VITALS — WEIGHT: 164 LBS | BODY MASS INDEX: 30.18 KG/M2 | HEIGHT: 62 IN

## 2019-10-30 DIAGNOSIS — I34.0 SEVERE MITRAL REGURGITATION: Primary | ICD-10-CM

## 2019-10-30 DIAGNOSIS — Z98.890 S/P FLAP GRAFT: ICD-10-CM

## 2019-10-30 DIAGNOSIS — Z95.2 S/P MVR (MITRAL VALVE REPLACEMENT): ICD-10-CM

## 2019-10-30 LAB
BUN SERPL-MCNC: 18 MG/DL (ref 7–25)
BUN/CREAT SERPL: NORMAL (CALC) (ref 6–22)
CALCIUM SERPL-MCNC: 9.2 MG/DL (ref 8.6–10.3)
CHLORIDE SERPL-SCNC: 103 MMOL/L (ref 98–110)
CO2 SERPL-SCNC: 25 MMOL/L (ref 20–32)
CREAT SERPL-MCNC: 1.2 MG/DL (ref 0.6–1.35)
ERYTHROCYTE [DISTWIDTH] IN BLOOD BY AUTOMATED COUNT: 14.3 % (ref 11–15)
GLUCOSE SERPL-MCNC: 117 MG/DL (ref 65–139)
HCT VFR BLD AUTO: 32.5 % (ref 38.5–50)
HGB BLD-MCNC: 10.4 G/DL (ref 13.2–17.1)
MCH RBC QN AUTO: 28.3 PG (ref 27–33)
MCHC RBC AUTO-ENTMCNC: 32 G/DL (ref 32–36)
MCV RBC AUTO: 88.6 FL (ref 80–100)
PLATELET # BLD AUTO: 246 THOUSAND/UL (ref 140–400)
PMV BLD REES-ECKER: 9.1 FL (ref 7.5–12.5)
POTASSIUM SERPL-SCNC: 4.3 MMOL/L (ref 3.5–5.3)
RBC # BLD AUTO: 3.67 MILLION/UL (ref 4.2–5.8)
SL AMB EGFR AFRICAN AMERICAN: 88 ML/MIN/1.73M2
SL AMB EGFR NON AFRICAN AMERICAN: 76 ML/MIN/1.73M2
SODIUM SERPL-SCNC: 139 MMOL/L (ref 135–146)
WBC # BLD AUTO: 6 THOUSAND/UL (ref 3.8–10.8)

## 2019-10-30 PROCEDURE — 99024 POSTOP FOLLOW-UP VISIT: CPT | Performed by: PHYSICIAN ASSISTANT

## 2019-10-30 NOTE — PROGRESS NOTES
Pt is 3 weeks post op pectoralis flap  He presents today for drain evaluation and removal     Incision is clean dry and intact  Sternum is stable  His drain is draining serous fluid; 20 cc the past two days  The drain site is red  Drain was removed today  He can use soap and water on this area and apply bacitracin  I gave him aquaphor for his incisions  Followup in 3 months

## 2019-11-06 LAB
BUN SERPL-MCNC: 25 MG/DL (ref 7–25)
BUN/CREAT SERPL: 18 (CALC) (ref 6–22)
CALCIUM SERPL-MCNC: 9.6 MG/DL (ref 8.6–10.3)
CHLORIDE SERPL-SCNC: 105 MMOL/L (ref 98–110)
CO2 SERPL-SCNC: 26 MMOL/L (ref 20–32)
CREAT SERPL-MCNC: 1.38 MG/DL (ref 0.6–1.35)
ERYTHROCYTE [DISTWIDTH] IN BLOOD BY AUTOMATED COUNT: 14 % (ref 11–15)
GLUCOSE SERPL-MCNC: 102 MG/DL (ref 65–99)
HCT VFR BLD AUTO: 35 % (ref 38.5–50)
HGB BLD-MCNC: 11.2 G/DL (ref 13.2–17.1)
MCH RBC QN AUTO: 27.9 PG (ref 27–33)
MCHC RBC AUTO-ENTMCNC: 32 G/DL (ref 32–36)
MCV RBC AUTO: 87.1 FL (ref 80–100)
PLATELET # BLD AUTO: 283 THOUSAND/UL (ref 140–400)
PMV BLD REES-ECKER: 9.4 FL (ref 7.5–12.5)
POTASSIUM SERPL-SCNC: 4.2 MMOL/L (ref 3.5–5.3)
RBC # BLD AUTO: 4.02 MILLION/UL (ref 4.2–5.8)
SL AMB EGFR AFRICAN AMERICAN: 74 ML/MIN/1.73M2
SL AMB EGFR NON AFRICAN AMERICAN: 64 ML/MIN/1.73M2
SODIUM SERPL-SCNC: 143 MMOL/L (ref 135–146)
WBC # BLD AUTO: 5.7 THOUSAND/UL (ref 3.8–10.8)

## 2019-11-11 ENCOUNTER — TELEPHONE (OUTPATIENT)
Dept: NEUROLOGY | Facility: CLINIC | Age: 39
End: 2019-11-11

## 2019-11-11 LAB — FUNGUS SPEC CULT: NORMAL

## 2019-11-11 NOTE — TELEPHONE ENCOUNTER
21/30 day post discharge follow up call  The purpose of this phone call is to assess patient's general wellbeing or for any assistance needed with follow-up care  Discharged 10/15  Please see my telephone encounter 10/21 for 7 day discharge follow up call  -     Called patient with no answer  Left message on voicemail box for call back

## 2019-11-12 DIAGNOSIS — I05.9 ENDOCARDITIS OF MITRAL VALVE: Primary | ICD-10-CM

## 2019-11-12 LAB
BUN SERPL-MCNC: 26 MG/DL (ref 7–25)
BUN/CREAT SERPL: 19 (CALC) (ref 6–22)
CALCIUM SERPL-MCNC: 9.1 MG/DL (ref 8.6–10.3)
CHLORIDE SERPL-SCNC: 104 MMOL/L (ref 98–110)
CO2 SERPL-SCNC: 29 MMOL/L (ref 20–32)
CREAT SERPL-MCNC: 1.34 MG/DL (ref 0.6–1.35)
ERYTHROCYTE [DISTWIDTH] IN BLOOD BY AUTOMATED COUNT: 13.8 % (ref 11–15)
GLUCOSE SERPL-MCNC: 81 MG/DL (ref 65–99)
HCT VFR BLD AUTO: 31.8 % (ref 38.5–50)
HGB BLD-MCNC: 9.9 G/DL (ref 13.2–17.1)
MCH RBC QN AUTO: 27.3 PG (ref 27–33)
MCHC RBC AUTO-ENTMCNC: 31.1 G/DL (ref 32–36)
MCV RBC AUTO: 87.6 FL (ref 80–100)
PLATELET # BLD AUTO: 227 THOUSAND/UL (ref 140–400)
PMV BLD REES-ECKER: 9.6 FL (ref 7.5–12.5)
POTASSIUM SERPL-SCNC: 4.3 MMOL/L (ref 3.5–5.3)
RBC # BLD AUTO: 3.63 MILLION/UL (ref 4.2–5.8)
SL AMB EGFR AFRICAN AMERICAN: 77 ML/MIN/1.73M2
SL AMB EGFR NON AFRICAN AMERICAN: 66 ML/MIN/1.73M2
SODIUM SERPL-SCNC: 141 MMOL/L (ref 135–146)
WBC # BLD AUTO: 6.4 THOUSAND/UL (ref 3.8–10.8)

## 2019-11-13 NOTE — TELEPHONE ENCOUNTER
Called patient since discharge, he has not experienced any new or worsening stroke symptoms  Denies any changes since following last follow up  Continues to be fully independent  Patient followed up with PCP 10/21  Stroke hospital follow up appointment still scheduled 12/19  No medication changes since discharge  Reports taking all as prescribed with no missed doses or medication side effects  Patient denies signs of bleeding  Patient verbalizes understanding of stroke symptoms and risk factor management  Declined review at this time  Did not follow up regarding BP cuff, does not monitor BP  he is a non smoker  No specific diet  Verified he received stroke education binder I mailed  Patient does not have any questions or concerns at this time

## 2019-11-14 DIAGNOSIS — Z95.2 S/P MVR (MITRAL VALVE REPLACEMENT): ICD-10-CM

## 2019-11-14 RX ORDER — LEVOFLOXACIN 750 MG/1
750 TABLET ORAL EVERY 24 HOURS
Qty: 3 TABLET | Refills: 0 | Status: SHIPPED | OUTPATIENT
Start: 2019-11-17 | End: 2019-11-20

## 2019-11-14 NOTE — TELEPHONE ENCOUNTER
Attempt to call pt regarding recent refill  Pt was prescribed and dispensed levaquin through 11/17  He only needs to take this through 11/20  I filled out a prior auth for the remaining three pills and sent to [employer] for review  We received the approval for the remaining three pills  This order was sent to patients preferred pharmacy  I wanted to just elaborate that it would only be three additional pills - 1 per day through 11/20  Pt follows up again in our office 11/19 for f/u with Dr Moishe Severe to discuss treatment plan at that time  Left a message for pt to call back to discuss

## 2019-11-18 ENCOUNTER — TRANSCRIBE ORDERS (OUTPATIENT)
Dept: RADIOLOGY | Facility: HOSPITAL | Age: 39
End: 2019-11-18

## 2019-11-18 ENCOUNTER — HOSPITAL ENCOUNTER (OUTPATIENT)
Dept: RADIOLOGY | Facility: HOSPITAL | Age: 39
Discharge: HOME | End: 2019-11-18
Payer: COMMERCIAL

## 2019-11-18 DIAGNOSIS — R79.89 ELEVATED LFTS: ICD-10-CM

## 2019-11-18 DIAGNOSIS — R76.8 HEPATITIS C ANTIBODY POSITIVE IN BLOOD: ICD-10-CM

## 2019-11-18 PROCEDURE — 76981 USE PARENCHYMA: CPT

## 2019-11-19 ENCOUNTER — OFFICE VISIT (OUTPATIENT)
Dept: INFECTIOUS DISEASES | Facility: CLINIC | Age: 39
End: 2019-11-19
Payer: COMMERCIAL

## 2019-11-19 VITALS
SYSTOLIC BLOOD PRESSURE: 128 MMHG | TEMPERATURE: 97.5 F | WEIGHT: 175 LBS | BODY MASS INDEX: 32.01 KG/M2 | HEART RATE: 102 BPM | DIASTOLIC BLOOD PRESSURE: 68 MMHG

## 2019-11-19 DIAGNOSIS — R78.81 BACTEREMIA DUE TO PSEUDOMONAS: ICD-10-CM

## 2019-11-19 DIAGNOSIS — IMO0002 SOLITARY KIDNEY: ICD-10-CM

## 2019-11-19 DIAGNOSIS — I05.9 ENDOCARDITIS OF MITRAL VALVE: Primary | ICD-10-CM

## 2019-11-19 DIAGNOSIS — B96.5 BACTEREMIA DUE TO PSEUDOMONAS: ICD-10-CM

## 2019-11-19 DIAGNOSIS — N18.9 CHRONIC KIDNEY DISEASE, UNSPECIFIED CKD STAGE: ICD-10-CM

## 2019-11-19 DIAGNOSIS — Z87.898 HISTORY OF DRUG USE: ICD-10-CM

## 2019-11-19 PROCEDURE — 99214 OFFICE O/P EST MOD 30 MIN: CPT | Performed by: INTERNAL MEDICINE

## 2019-11-19 NOTE — PROGRESS NOTES
Progress Note - Infectious Disease   Kelly Sebastian 44 y o  male MRN: [id number]  Unit/Bed#:  Encounter: [id number]      Impression/Plan:  1  Pseudomonal bacteremia with mitral valve endocarditis  Patient remains on antibiotic therapy after valve replacement on 10/09  Clinically he appears to be doing well  He is tolerating antibiotic without acute issue  We reviewed medication adherence as well as administration as below  Lab data reviewed and no acute findings  Continue Levaquin through 11/20/2019  No further labs required  Recommended patient monitor for joint symptoms along with diarrhea after stopping antibiotics  Will order surveillance blood cultures for 12/3--contact patient with results  Patient is recommended to follow up with both CT surgery and with plastics  No further follow-up required in our office      2  Chronic kidney disease and solitary kidney  Creatinine remains at baseline  Patient plans to follow up with his primary for this issue  No further labs recommended as above  3  IV drug abuse and prior endocarditis  Patient reports ongoing drug abstinence  Encouraged him to seek out additional rehab resources  Additional care otherwise as above      Above plan reviewed in detail with the patient  Will forward visit to primary care provider  Antibiotics:  Levofloxacin through 11/20  Valve replacement 10/09/2019    Subjective:  Patient is planned to complete antibiotics tomorrow  Filled out requested form for the patient however I recommended that he have the form also be filled out by Plastic surgery as well as CT surgery as I cannot comment on his limitations to work  He reports tolerance to antibiotics  He denies having any further loose stools  He denies having any fevers, chills, sweats, cough or shortness of breath  He does report some shortness of breath when he is walking up hills    He otherwise denies having any significant lower extremity edema, shortness of breath when he lays flat or at rest   The patient has plans to follow-up with both plastic surgery and CT surgery this month  Patient is willing to have surveillance blood cultures done in 2 weeks  He reports continued abstinence from drug use, alcohol and smoking  He has no other new complaints at this time  Most recent labs reviewed with the patient and creatinine remains at baseline in no acute findings on differential from CBC  ROS: A complete 12 point ROS is negative other than that noted in the HPI    Followup portions patient history reviewed and updated as: Allergies, current medications, past medical history, past social history, past surgical history, and the problem list    Objective:  Vitals:  Vitals:    11/19/19 1358   BP: 128/68   Pulse: 102   Temp: 97 5 °F (36 4 °C)   Weight: 79 4 kg (175 lb)       Physical Exam:   General Appearance:  Alert, interactive, appearing well, nontoxic, no acute distress  Throat: Oropharynx moist without lesions  Lungs:   Clear to auscultation bilaterally; no audible wheezes, rhonchi or rales; respirations unlabored   Heart:  RRR; no murmur, rub or gallop; surgical scar continues to heal well and there is no obvious erythema or induration  Abdomen:   Soft, non-tender, non-distended, positive bowel sounds  Extremities: No clubbing, cyanosis or edema   Skin: No new rashes or lesions  No new draining wounds         Labs, Imaging, & Other studies:   All pertinent labs and imaging studies were personally reviewed    Lab Results   Component Value Date    K 4 3 11/12/2019     11/12/2019    CO2 29 11/12/2019    BUN 26 (H) 11/12/2019    CREATININE 1 34 11/12/2019    GLUCOSE 103 10/09/2019    CALCIUM 9 1 11/12/2019    AST 59 (H) 10/13/2019     (H) 10/13/2019    ALKPHOS 93 10/13/2019    EGFR 65 10/15/2019     Lab Results   Component Value Date    WBC 6 4 11/12/2019    HGB 9 9 (L) 11/12/2019    HCT 31 8 (L) 11/12/2019    MCV 87 6 11/12/2019     11/12/2019   No results found for: Mark Manrique

## 2019-11-19 NOTE — LETTER
November 19, 2019     Eloisa Magana MD  [address] [address]    Patient: Miley Bartholomew   YOB: 1980   Date of Visit: 11/19/2019       Dear Dr Nitesh Eugene:    Thank you for referring Analy Mosquera to me for evaluation  Below are my notes for this consultation  If you have questions, please do not hesitate to call me  I look forward to following your patient along with you  Sincerely,        Leah Gregg MD        CC: No Recipients  Leah Gregg MD  11/19/2019  3:15 PM  Sign at close encounter  Progress Note - Infectious Disease   Miley Bartholomew 44 y o  male MRN: [id number]  Unit/Bed#:  Encounter: [id number]      Impression/Plan:  1  Pseudomonal bacteremia with mitral valve endocarditis  Patient remains on antibiotic therapy after valve replacement on 10/09  Clinically he appears to be doing well  He is tolerating antibiotic without acute issue  We reviewed medication adherence as well as administration as below  Lab data reviewed and no acute findings  Continue Levaquin through 11/20/2019  No further labs required  Recommended patient monitor for joint symptoms along with diarrhea after stopping antibiotics  Will order surveillance blood cultures for 12/3--contact patient with results  Patient is recommended to follow up with both CT surgery and with plastics  No further follow-up required in our office      2  Chronic kidney disease and solitary kidney  Creatinine remains at baseline  Patient plans to follow up with his primary for this issue  No further labs recommended as above  3  IV drug abuse and prior endocarditis  Patient reports ongoing drug abstinence  Encouraged him to seek out additional rehab resources  Additional care otherwise as above      Above plan reviewed in detail with the patient  Will forward visit to primary care provider      Antibiotics:  Levofloxacin through 11/20  Valve replacement 10/09/2019    Subjective:  Patient is planned to complete antibiotics tomorrow  Filled out requested form for the patient however I recommended that he have the form also be filled out by Plastic surgery as well as CT surgery as I cannot comment on his limitations to work  He reports tolerance to antibiotics  He denies having any further loose stools  He denies having any fevers, chills, sweats, cough or shortness of breath  He does report some shortness of breath when he is walking up hills  He otherwise denies having any significant lower extremity edema, shortness of breath when he lays flat or at rest   The patient has plans to follow-up with both plastic surgery and CT surgery this month  Patient is willing to have surveillance blood cultures done in 2 weeks  He reports continued abstinence from drug use, alcohol and smoking  He has no other new complaints at this time  Most recent labs reviewed with the patient and creatinine remains at baseline in no acute findings on differential from CBC  ROS: A complete 12 point ROS is negative other than that noted in the HPI    Followup portions patient history reviewed and updated as: Allergies, current medications, past medical history, past social history, past surgical history, and the problem list    Objective:  Vitals:  Vitals:    11/19/19 1358   BP: 128/68   Pulse: 102   Temp: 97 5 °F (36 4 °C)   Weight: 79 4 kg (175 lb)       Physical Exam:   General Appearance:  Alert, interactive, appearing well, nontoxic, no acute distress  Throat: Oropharynx moist without lesions  Lungs:   Clear to auscultation bilaterally; no audible wheezes, rhonchi or rales; respirations unlabored   Heart:  RRR; no murmur, rub or gallop; surgical scar continues to heal well and there is no obvious erythema or induration  Abdomen:   Soft, non-tender, non-distended, positive bowel sounds  Extremities: No clubbing, cyanosis or edema   Skin: No new rashes or lesions  No new draining wounds         Labs, Imaging, & Other studies:   All pertinent labs and imaging studies were personally reviewed    Lab Results   Component Value Date    K 4 3 11/12/2019     11/12/2019    CO2 29 11/12/2019    BUN 26 (H) 11/12/2019    CREATININE 1 34 11/12/2019    GLUCOSE 103 10/09/2019    CALCIUM 9 1 11/12/2019    AST 59 (H) 10/13/2019     (H) 10/13/2019    ALKPHOS 93 10/13/2019    EGFR 65 10/15/2019     Lab Results   Component Value Date    WBC 6 4 11/12/2019    HGB 9 9 (L) 11/12/2019    HCT 31 8 (L) 11/12/2019    MCV 87 6 11/12/2019     11/12/2019   No results found for: Theresa Wilkerson

## 2019-11-19 NOTE — PATIENT INSTRUCTIONS
Please obtain blood cultures as an outpatient in 2 weeks on 12/04  We will contact you with blood culture results    He will not require further follow-up in our office    Please follow up with other providers as planned

## 2019-11-20 LAB
ALBUMIN SERPL-MCNC: 4 G/DL (ref 3.6–5.1)
ALBUMIN/GLOB SERPL: 1.1 (CALC) (ref 1–2.5)
ALP SERPL-CCNC: 124 U/L (ref 40–115)
ALT SERPL-CCNC: 23 U/L (ref 9–46)
APPEARANCE UR: CLEAR
AST SERPL-CCNC: 25 U/L (ref 10–40)
BACTERIA UR QL AUTO: NORMAL /HPF
BILIRUB SERPL-MCNC: 0.5 MG/DL (ref 0.2–1.2)
BILIRUB UR QL STRIP: NEGATIVE
BUN SERPL-MCNC: 17 MG/DL (ref 7–25)
BUN/CREAT SERPL: ABNORMAL (CALC) (ref 6–22)
C3 SERPL-MCNC: 142 MG/DL (ref 82–185)
C4 SERPL-MCNC: 34 MG/DL (ref 15–53)
CALCIUM SERPL-MCNC: 9.3 MG/DL (ref 8.6–10.3)
CHLORIDE SERPL-SCNC: 106 MMOL/L (ref 98–110)
CO2 SERPL-SCNC: 26 MMOL/L (ref 20–32)
COLOR UR: YELLOW
CREAT SERPL-MCNC: 1.17 MG/DL (ref 0.6–1.35)
CREAT UR-MCNC: 106 MG/DL (ref 20–320)
ERYTHROCYTE [DISTWIDTH] IN BLOOD BY AUTOMATED COUNT: 13.6 % (ref 11–15)
GLOBULIN SER CALC-MCNC: 3.7 G/DL (CALC) (ref 1.9–3.7)
GLUCOSE SERPL-MCNC: 80 MG/DL (ref 65–139)
GLUCOSE UR QL STRIP: NEGATIVE
HCT VFR BLD AUTO: 36.7 % (ref 38.5–50)
HGB BLD-MCNC: 11.9 G/DL (ref 13.2–17.1)
HGB UR QL STRIP: NEGATIVE
HYALINE CASTS #/AREA URNS LPF: NORMAL /LPF
INR PPP: 1
KETONES UR QL STRIP: NEGATIVE
LEUKOCYTE ESTERASE UR QL STRIP: NEGATIVE
MAGNESIUM SERPL-MCNC: 1.9 MG/DL (ref 1.5–2.5)
MCH RBC QN AUTO: 27.6 PG (ref 27–33)
MCHC RBC AUTO-ENTMCNC: 32.4 G/DL (ref 32–36)
MCV RBC AUTO: 85.2 FL (ref 80–100)
NITRITE UR QL STRIP: NEGATIVE
PH UR STRIP: 7 [PH] (ref 5–8)
PHOSPHATE SERPL-MCNC: 2.8 MG/DL (ref 2.5–4.5)
PLATELET # BLD AUTO: 267 THOUSAND/UL (ref 140–400)
PMV BLD REES-ECKER: 9.8 FL (ref 7.5–12.5)
POTASSIUM SERPL-SCNC: 4 MMOL/L (ref 3.5–5.3)
PROT SERPL-MCNC: 7.7 G/DL (ref 6.1–8.1)
PROT UR QL STRIP: NEGATIVE
PROT UR-MCNC: 9 MG/DL (ref 5–25)
PROT/CREAT UR: 0.09 MG/MG CREAT (ref 0.02–0.13)
PROT/CREAT UR: 85 MG/G CREAT (ref 22–128)
PROTHROMBIN TIME: 10.3 SEC (ref 9–11.5)
RBC # BLD AUTO: 4.31 MILLION/UL (ref 4.2–5.8)
RBC #/AREA URNS HPF: NORMAL /HPF
SL AMB EGFR AFRICAN AMERICAN: 90 ML/MIN/1.73M2
SL AMB EGFR NON AFRICAN AMERICAN: 78 ML/MIN/1.73M2
SODIUM SERPL-SCNC: 142 MMOL/L (ref 135–146)
SP GR UR STRIP: 1.01 (ref 1–1.03)
SQUAMOUS #/AREA URNS HPF: NORMAL /HPF
WBC # BLD AUTO: 5.7 THOUSAND/UL (ref 3.8–10.8)
WBC #/AREA URNS HPF: NORMAL /HPF

## 2019-11-21 NOTE — PROGRESS NOTES
Assessment and Plan:    Radha Donaldson was seen today for follow-up  Diagnoses and all orders for this visit:    Acute kidney injury [facility])  -     Basic metabolic panel; Future  -     Basic metabolic panel; Future  -     CBC; Future    Solitary left kidney    CKD (chronic kidney disease) stage 2, GFR 60-89 ml/min    Benign hypertension with chronic kidney disease, stage II    Hypokalemia    S/P MVR (mitral valve replacement)  -     furosemide (LASIX) 20 mg tablet; Take 1 tablet (20 mg total) by mouth daily Unless otherwise directed  -     Metoprolol Tartrate 37 5 MG TABS; Take 1 tablet (37 5 mg total) by mouth every 12 (twelve) hours  -     Discontinue: potassium chloride (K-DUR,KLOR-CON) 20 mEq tablet; Take 1 tablet (20 mEq total) by mouth daily  -     potassium chloride (K-DUR,KLOR-CON) 20 mEq tablet; Take 1 tablet (20 mEq total) by mouth daily      Acute Kidney Injury- secondary to ATN and creatinine continues to improve  Will continue to monitor and trend  Chronic Kidney Disease stage 1 in the setting of a solitary kidney- baseline creatinine is 0 8-1  Hypertension- continue metoprolol 37 5mg twice a day  Volume Status- decrease lasix to 20mg daily  Hypokalemia- continue kdur 20meq daily    Anemia- Recheck CBC    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- continue to follow up with ID, off abx since 11/20/19  Follow up with Dr Aime Falcon or myself in 1-2 months  Please call the office with any questions or concerns  Colonoscopy: age not appropriate    Reason for Visit: Follow-up    HPI: Ronelle Mcardle is a 44 y o  male who is here for follow up of acute kidney injury due to ATN vs endocarditis related GN vs AIN  He saw Dr Aime Falcon one month ago  He ran out of medications a week ago and has not taken anything since then  He has a cardiology appointment today  His abx stopped on 11/20  He denies SOB  He denies LE edema        ROS: A complete review of systems was performed and was negative unless otherwise noted in the history of present illness  Allergies:   Patient has no known allergies  Medications:     Current Outpatient Medications:     acetaminophen (TYLENOL) 325 mg tablet, Take 1-2 tablets Q4-6 hours PRN pain  Do not take with Percocet , Disp: 30 tablet, Rfl: 0    atorvastatin (LIPITOR) 40 mg tablet, Take 1 tablet (40 mg total) by mouth daily with dinner, Disp: 30 tablet, Rfl: 0    escitalopram (LEXAPRO) 10 mg tablet, Take 1 tablet (10 mg total) by mouth daily, Disp: 30 tablet, Rfl: 0    furosemide (LASIX) 20 mg tablet, Take 1 tablet (20 mg total) by mouth daily Unless otherwise directed , Disp: 90 tablet, Rfl: 3    Metoprolol Tartrate 37 5 MG TABS, Take 1 tablet (37 5 mg total) by mouth every 12 (twelve) hours, Disp: 90 tablet, Rfl: 3    pantoprazole (PROTONIX) 40 mg tablet, Take 1 tablet (40 mg total) by mouth daily, Disp: 30 tablet, Rfl: 0    potassium chloride (K-DUR,KLOR-CON) 20 mEq tablet, Take 1 tablet (20 mEq total) by mouth daily, Disp: 90 tablet, Rfl: 3    oxyCODONE-acetaminophen (PERCOCET) 5-325 mg per tablet, Take 1-2 tablets Q4-6 hours PRN pain  Do not take with Tylenol  (Patient not taking: Reported on 11/22/2019), Disp: 30 tablet, Rfl: 0    Past Medical History:   Diagnosis Date    Absent kidney, congenital     Anxiety 10/1/2019    Hepatitis C     History of endocarditis     mitral valve    [facility] of [facility] 10/[facility]/2019    heroin    Nonrheumatic mitral valve regurgitation 10/01/2019    from endocarditis     Past Surgical History:   Procedure Laterality Date    CARDIAC CATHETERIZATION      CHEST WALL TUMOR EXCISION  2013    Anterior chest wall cyst removed    IR THORACENTESIS  10/2/2019    [place] ECHO TRANSESOPHAG MONTR CARDIAC PUMP FUNCTJ N/A 10/9/2019    Procedure: TRANSESOPHAGEAL ECHOCARDIOGRAM (BILLY);   Surgeon: Jayla Núñez MD;  [facility]: [facility];  Service: Cardiac Surgery    [place] MUSCLE-SKIN FLAP,TRUNK Bilateral 10/9/2019    Procedure: BILATERAL PECTORALIS MAJOR FLAP;  Surgeon: Donald Barrera MD;  [facility];  Service: Plastics    GA [address], < 50 CM N/A 10/9/2019    Procedure: APPLICATION VAC DRESSING;  Surgeon: Donald Barrera MD;  [facility];  Service: Plastics    GA REPLACEMENT OF MITRAL VALVE N/A 10/9/2019    Procedure: MITRAL VALVE REPLACEMENT WITH 27MM MORROW MAGNA MITRAL EASE PERICARDIAL BIOPROSTHESIS;  Surgeon: Iasak Ramos MD;  [facility];  Service: Cardiac Surgery     Family History   Problem Relation Age of Onset    Heart disease Maternal Grandmother       reports that he has never smoked  He has never used smokeless tobacco  He reports that he has current or past drug history  He reports that he does not drink alcohol  Physical Exam:   Vitals:    11/22/19 0933 11/22/19 1011   BP:  150/90   BP Location:  Right arm   Patient Position:  Sitting   Cuff Size:  Standard   Pulse:  (!) 120   Weight: 81 2 kg (179 lb)    Height: 5' 2" (1 575 m)      Body mass index is 32 74 kg/m²  General: NAD  Neuro: AAO  Skin: no rash  Eyes: anicteric  ENMT: mm moist  Neck: no masses  Cardiovascular: RRR  Extremities: no edema  Respiratory: CTAB  Gastrointestinal: soft nt nd    Procedure:  No results found for this or any previous visit  Labs reviewed  Lab Results   Component Value Date    GLUCOSE 103 10/09/2019    CALCIUM 9 3 11/19/2019    K 4 0 11/19/2019    CO2 26 11/19/2019     11/19/2019    BUN 17 11/19/2019    CREATININE 1 17 11/19/2019       Results from last 7 days   Lab Units 11/19/19  1137   WHITE BLOOD CELL COUNT  Thousand/uL 5 7   HEMOGLOBIN  g/dL 11 9*   HEMATOCRIT  % 36 7*   PLATELETS   Thousand/uL 267   POTASSIUM mmol/L 4 0   CHLORIDE mmol/L 106   CO2 mmol/L 26   BUN mg/dL 17   CREATININE mg/dL 1 17   SL AMB CALCIUM mg/dL 9 3   MAGNESIUM mg/dL 1 9

## 2019-11-22 ENCOUNTER — OFFICE VISIT (OUTPATIENT)
Dept: NEPHROLOGY | Facility: CLINIC | Age: 39
End: 2019-11-22
Payer: COMMERCIAL

## 2019-11-22 ENCOUNTER — OFFICE VISIT (OUTPATIENT)
Dept: CARDIAC SURGERY | Facility: CLINIC | Age: 39
End: 2019-11-22

## 2019-11-22 VITALS
OXYGEN SATURATION: 99 % | HEIGHT: 63 IN | BODY MASS INDEX: 31.89 KG/M2 | SYSTOLIC BLOOD PRESSURE: 142 MMHG | HEART RATE: 110 BPM | RESPIRATION RATE: 14 BRPM | WEIGHT: 180 LBS | DIASTOLIC BLOOD PRESSURE: 92 MMHG | TEMPERATURE: 97.7 F

## 2019-11-22 VITALS
HEART RATE: 120 BPM | DIASTOLIC BLOOD PRESSURE: 90 MMHG | WEIGHT: 179 LBS | BODY MASS INDEX: 32.94 KG/M2 | SYSTOLIC BLOOD PRESSURE: 150 MMHG | HEIGHT: 62 IN

## 2019-11-22 DIAGNOSIS — IMO0002 SOLITARY LEFT KIDNEY: ICD-10-CM

## 2019-11-22 DIAGNOSIS — F19.11 HISTORY OF INTRAVENOUS DRUG ABUSE (HCC): ICD-10-CM

## 2019-11-22 DIAGNOSIS — Z95.2 S/P MVR (MITRAL VALVE REPLACEMENT): Primary | ICD-10-CM

## 2019-11-22 DIAGNOSIS — N18.2 CKD (CHRONIC KIDNEY DISEASE) STAGE 2, GFR 60-89 ML/MIN: ICD-10-CM

## 2019-11-22 DIAGNOSIS — I34.0 SEVERE MITRAL REGURGITATION: ICD-10-CM

## 2019-11-22 DIAGNOSIS — E87.6 HYPOKALEMIA: ICD-10-CM

## 2019-11-22 DIAGNOSIS — Z95.2 S/P MVR (MITRAL VALVE REPLACEMENT): ICD-10-CM

## 2019-11-22 DIAGNOSIS — N17.9 ACUTE KIDNEY INJURY (HCC): Primary | ICD-10-CM

## 2019-11-22 DIAGNOSIS — Z86.79 HISTORY OF ENDOCARDITIS: ICD-10-CM

## 2019-11-22 DIAGNOSIS — I12.9 BENIGN HYPERTENSION WITH CHRONIC KIDNEY DISEASE, STAGE II: ICD-10-CM

## 2019-11-22 DIAGNOSIS — N18.2 BENIGN HYPERTENSION WITH CHRONIC KIDNEY DISEASE, STAGE II: ICD-10-CM

## 2019-11-22 PROCEDURE — 99024 POSTOP FOLLOW-UP VISIT: CPT | Performed by: NURSE PRACTITIONER

## 2019-11-22 PROCEDURE — 99214 OFFICE O/P EST MOD 30 MIN: CPT | Performed by: PHYSICIAN ASSISTANT

## 2019-11-22 RX ORDER — ASPIRIN 325 MG
325 TABLET, DELAYED RELEASE (ENTERIC COATED) ORAL DAILY
Qty: 30 TABLET | Refills: 3 | Status: SHIPPED | OUTPATIENT
Start: 2019-11-22 | End: 2020-01-13

## 2019-11-22 RX ORDER — POTASSIUM CHLORIDE 20 MEQ/1
20 TABLET, EXTENDED RELEASE ORAL DAILY
Qty: 90 TABLET | Refills: 3 | Status: SHIPPED | OUTPATIENT
Start: 2019-11-22 | End: 2020-01-13

## 2019-11-22 RX ORDER — FUROSEMIDE 20 MG/1
20 TABLET ORAL DAILY
Qty: 90 TABLET | Refills: 3 | Status: SHIPPED | OUTPATIENT
Start: 2019-11-22 | End: 2019-12-09 | Stop reason: ALTCHOICE

## 2019-11-22 RX ORDER — METOPROLOL TARTRATE 37.5 MG/1
37.5 TABLET, FILM COATED ORAL EVERY 12 HOURS SCHEDULED
Qty: 90 TABLET | Refills: 3 | Status: SHIPPED | OUTPATIENT
Start: 2019-11-22 | End: 2019-12-09 | Stop reason: ALTCHOICE

## 2019-11-22 RX ORDER — ATORVASTATIN CALCIUM 40 MG/1
40 TABLET, FILM COATED ORAL
Qty: 30 TABLET | Refills: 0 | Status: SHIPPED | OUTPATIENT
Start: 2019-11-22 | End: 2019-12-09 | Stop reason: SDDI

## 2019-11-22 RX ORDER — POTASSIUM CHLORIDE 20 MEQ/1
20 TABLET, EXTENDED RELEASE ORAL DAILY
Qty: 90 TABLET | Refills: 3 | Status: SHIPPED | OUTPATIENT
Start: 2019-11-22 | End: 2019-11-22 | Stop reason: SDUPTHER

## 2019-11-22 RX ORDER — METOPROLOL TARTRATE 37.5 MG/1
37.5 TABLET, FILM COATED ORAL EVERY 12 HOURS SCHEDULED
Qty: 90 TABLET | Refills: 3 | Status: SHIPPED | OUTPATIENT
Start: 2019-11-22 | End: 2019-11-22 | Stop reason: SDUPTHER

## 2019-11-22 NOTE — PROGRESS NOTES
POST OP FOLLOW UP VISIT    Procedure: 10/9/19  1  Mitral valve replacement with a 27 mm [facility] Mitral Ease bioprosthetic; 2  Plastic surgery flap coverage/closure of upper sternal soft tissue defect (Dr Rosa Elena Cheatham)    History: Fabricio Chavira is a 44y o  year old male who presents to our office today for routine follow up care from mitral valve replacement  Patient with h/o IVDA (heroin) and MSSA MVBE earlier this year, treated with 6 week course of antibiotics at [facility]   Admitted to [facility] in September after repeat heroin use  Echocardiogram with severe MR and echodensity  Patient transferred to [facility] for surgical consultation  He was treated with antibiotics and full cardiac work up completed  He underwent MVR (tissue) on 10/9/19  He tolerated his procedure well  He was discharged home on 10/15/19 on antibiotics  He has just completed his antibiotics 2 days ago  He has had f/u with ID and nephrology  He states he has been feeling well  He denies fever, chills or incisional issues  He denies drug use at this point  He has mild chest discomfort and mild exertional SOB  He states he ran out of medications a week ago and is currently not taking anything  He has not had follow up with a PCP nor with cardiology  Vital Signs:   Vitals:    11/22/19 1300 11/22/19 1349   BP: 144/90 142/92   BP Location: Left arm Right arm   Cuff Size: Adult Adult   Pulse: (!) 110    Resp: 14    Temp: 97 7 °F (36 5 °C)    TempSrc: Tympanic    SpO2: 99%    Weight: 81 6 kg (180 lb)    Height: 5' 3" (1 6 m)        Home Medications:   Prior to Admission medications    Medication Sig Start Date End Date Taking? Authorizing Provider   escitalopram (LEXAPRO) 10 mg tablet Take 1 tablet (10 mg total) by mouth daily 10/16/19  Yes Quin Turner PA-C   furosemide (LASIX) 20 mg tablet Take 1 tablet (20 mg total) by mouth daily Unless otherwise directed   11/22/19  Yes Post, [place] Metoprolol Tartrate 37 5 MG TABS Take 1 tablet (37 5 mg total) by mouth every 12 (twelve) hours 11/22/19  Yes JULIÁN Sellers   pantoprazole (PROTONIX) 40 mg tablet Take 1 tablet (40 mg total) by mouth daily 10/16/19 11/22/19 Yes Yolanda Corley PA-C   potassium chloride (K-DUR,KLOR-CON) 20 mEq tablet Take 1 tablet (20 mEq total) by mouth daily 11/22/19  Yes Perry Cotton PA-C   Metoprolol Tartrate 37 5 MG TABS Take 1 tablet (37 5 mg total) by mouth every 12 (twelve) hours 11/22/19 11/22/19 Yes Perry Cotton PA-C   aspirin (ECOTRIN) 325 mg EC tablet Take 1 tablet (325 mg total) by mouth daily 11/22/19   JULIÁN Sellers   atorvastatin (LIPITOR) 40 mg tablet Take 1 tablet (40 mg total) by mouth daily with dinner 11/22/19   JULIÁN Sellers   acetaminophen (TYLENOL) 325 mg tablet Take 1-2 tablets Q4-6 hours PRN pain  Do not take with Percocet  Patient not taking: Reported on 11/22/2019 10/15/19 11/22/19  Yolanda Corley PA-C   atorvastatin (LIPITOR) 40 mg tablet Take 1 tablet (40 mg total) by mouth daily with dinner 10/15/19 11/22/19  Yolanda Corley PA-C   furosemide (LASIX) 40 mg tablet Take 1 tablet (40 mg total) by mouth daily Unless otherwise directed  10/16/19 11/22/19  Yolanda Corley PA-C   Metoprolol Tartrate 37 5 MG TABS Take 1 tablet (37 5 mg total) by mouth every 12 (twelve) hours 10/15/19 11/22/19  Yolanda Corley PA-C   oxyCODONE-acetaminophen (PERCOCET) 5-325 mg per tablet Take 1-2 tablets Q4-6 hours PRN pain  Do not take with Tylenol  Patient not taking: Reported on 11/22/2019 10/15/19 11/22/19  Yolanda Corley PA-C   potassium chloride (K-DUR,KLOR-CON) 20 mEq tablet Take 2 tablets (40 mEq total) by mouth daily Unless otherwise directed   10/16/19 11/22/19  Yolanda Corley PA-C   potassium chloride (K-DUR,KLOR-CON) 20 mEq tablet Take 1 tablet (20 mEq total) by mouth daily 11/22/19 11/22/19  Perry Cotton PA-C       Physical Exam:  General: Alert, oriented, well developed, no acute distress  HEENT/NECK:  PERRLA  No jugular venous distention  Cardiac:Regular rhythm, Tachycardic, no murmurs rubs or gallops  Pulmonary:Breath sounds clear bilaterally  Abdomen:  Non-tender, Non-distended  Positive bowel sounds  Upper extremities: 2+ radial pulses; brisk capillary refill  Lower extremities: Extremities warm/dry  PT/DP pules 2+ bilaterally  No edema B/L  Incisions: Sternum is stable  Incision is clean, dry, and intact  Musculoskeletal: MAEE, stable gait  Neuro: Alert and oriented X 3  Sensation is grossly intact  No focal deficits  Skin: Warm/Dry, without rashes or lesions  Lab Results:     Results from last 7 days   Lab Units 11/19/19  1137   WHITE BLOOD CELL COUNT  Thousand/uL 5 7   HEMOGLOBIN  g/dL 11 9*   HEMATOCRIT  % 36 7*   PLATELETS  Thousand/uL 267     Results from last 7 days   Lab Units 11/19/19  1137   POTASSIUM mmol/L 4 0   CHLORIDE mmol/L 106   CO2 mmol/L 26   BUN mg/dL 17   CREATININE mg/dL 1 17   SL AMB CALCIUM mg/dL 9 3     Results from last 7 days   Lab Units 11/19/19  1139   INR  1 0     Assessment: Mitral Valve Endocarditis  S/P mitral valve replacement;    Plan:     Marlinda Phalen continues to make good progress in their recovery following mitral valve replacement (tissue)  He is  now 6 weeks post op  Incisions are well-healed and his sternum is stable  Weight and VS are stable  I discussed the benefits of participating in cardiac rehab and have cleared them to begin the outpatient  program  He may resume driving and I reviewed his lifting restriction of no more than 25 lbs for an additional 2 months, until he reaches 12 weeks post-op  I reviewed medications and have submitted refiles for Metoprolol, Aspirin and Atorvastatin  We have provided him information to make an appt with a PCP  We have assisted him in making an appt with cardiology      Marlinda Phalen was comfortable with our recommendations and their questions were answered to their satisfaction        JULIÁN De La Torre  11/22/19

## 2019-11-22 NOTE — PATIENT INSTRUCTIONS
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

## 2019-11-22 NOTE — LETTER
November 22, 2019     No Recipients    Patient: Nadir Amos   YOB: 1980   Date of Visit: 11/22/2019       Dear Dr Francis Fuel:    Thank you for referring Alba Morin to me for evaluation  Below are my notes for this consultation  If you have questions, please do not hesitate to call me  I look forward to following your patient along with you  Sincerely,        Yony Marte MD        CC: No Recipients  JULIÁN Mckeon  11/22/2019  2:34 PM  Attested   POST OP FOLLOW UP VISIT    Procedure: 10/9/19  1  Mitral valve replacement with a 27 mm [facility] Mitral Ease bioprosthetic; 2  Plastic surgery flap coverage/closure of upper sternal soft tissue defect (Dr Garret Carrel)    History: Nadir Amos is a 44y o  year old male who presents to our office today for routine follow up care from mitral valve replacement  Patient with h/o IVDA (heroin) and MSSA MVBE earlier this year, treated with 6 week course of antibiotics at [facility]   Admitted to [facility] in September after repeat heroin use  Echocardiogram with severe MR and echodensity  Patient transferred to [facility] for surgical consultation  He was treated with antibiotics and full cardiac work up completed  He underwent MVR (tissue) on 10/9/19  He tolerated his procedure well  He was discharged home on 10/15/19 on antibiotics  He has just completed his antibiotics 2 days ago  He has had f/u with ID and nephrology  He states he has been feeling well  He denies fever, chills or incisional issues  He denies drug use at this point  He has mild chest discomfort and mild exertional SOB  He states he ran out of medications a week ago and is currently not taking anything  He has not had follow up with a PCP nor with cardiology       Vital Signs:   Vitals:    11/22/19 1300 11/22/19 1349   BP: 144/90 142/92   BP Location: Left arm Right arm   Cuff Size: Adult Adult   Pulse: (!) 110    Resp: 14 Temp: 97 7 °F (36 5 °C)    TempSrc: Tympanic    SpO2: 99%    Weight: 81 6 kg (180 lb)    Height: 5' 3" (1 6 m)        Home Medications:   Prior to Admission medications    Medication Sig Start Date End Date Taking? Authorizing Provider   escitalopram (LEXAPRO) 10 mg tablet Take 1 tablet (10 mg total) by mouth daily 10/16/19  Yes Sidney Frances PA-C   furosemide (LASIX) 20 mg tablet Take 1 tablet (20 mg total) by mouth daily Unless otherwise directed  11/22/19  Yes Colette Becker PA-C   Metoprolol Tartrate 37 5 MG TABS Take 1 tablet (37 5 mg total) by mouth every 12 (twelve) hours 11/22/19  Yes JULIÁN De La Torre   pantoprazole (PROTONIX) 40 mg tablet Take 1 tablet (40 mg total) by mouth daily 10/16/19 11/22/19 Yes Sidney Frances PA-C   potassium chloride (K-DUR,KLOR-CON) 20 mEq tablet Take 1 tablet (20 mEq total) by mouth daily 11/22/19  Yes Colette Becker PA-C   Metoprolol Tartrate 37 5 MG TABS Take 1 tablet (37 5 mg total) by mouth every 12 (twelve) hours 11/22/19 11/22/19 [facility]   aspirin (ECOTRIN) 325 mg EC tablet Take 1 tablet (325 mg total) by mouth daily 11/22/19   JULIÁN De La Torre   atorvastatin (LIPITOR) 40 mg tablet Take 1 tablet (40 mg total) by mouth daily with dinner 11/22/19   JULIÁN De La Torre   acetaminophen (TYLENOL) 325 mg tablet Take 1-2 tablets Q4-6 hours PRN pain  Do not take with Percocet  Patient not taking: Reported on 11/22/2019 10/15/19 11/22/19  Sidney Frances PA-C   atorvastatin (LIPITOR) 40 mg tablet Take 1 tablet (40 mg total) by mouth daily with dinner 10/15/19 11/22/19  Sidney Frances PA-C   furosemide (LASIX) 40 mg tablet Take 1 tablet (40 mg total) by mouth daily Unless otherwise directed   10/16/19 11/22/19  Sidney Frances PA-C   Metoprolol Tartrate 37 5 MG TABS Take 1 tablet (37 5 mg total) by mouth every 12 (twelve) hours 10/15/19 11/22/19  Sidney Frances PA-C   oxyCODONE-acetaminophen (PERCOCET) 5-325 mg per tablet Take 1-2 tablets Q4-6 hours PRN pain  Do not take with Tylenol  Patient not taking: Reported on 11/22/2019 10/15/19 11/22/19  Scout Gold PA-C   potassium chloride (K-DUR,KLOR-CON) 20 mEq tablet Take 2 tablets (40 mEq total) by mouth daily Unless otherwise directed  10/16/19 11/22/19  Scout Gold PA-C   potassium chloride (K-DUR,KLOR-CON) 20 mEq tablet Take 1 tablet (20 mEq total) by mouth daily 11/22/19 11/22/19  [facility]       Physical Exam:  General: Alert, oriented, well developed, no acute distress  HEENT/NECK:  PERRLA  No jugular venous distention  Cardiac:Regular rhythm, Tachycardic, no murmurs rubs or gallops  Pulmonary:Breath sounds clear bilaterally  Abdomen:  Non-tender, Non-distended  Positive bowel sounds  Upper extremities: 2+ radial pulses; brisk capillary refill  Lower extremities: Extremities warm/dry  PT/DP pules 2+ bilaterally  No edema B/L  Incisions: Sternum is stable  Incision is clean, dry, and intact  Musculoskeletal: MAEE, stable gait  Neuro: Alert and oriented X 3  Sensation is grossly intact  No focal deficits  Skin: Warm/Dry, without rashes or lesions  Lab Results:     Results from last 7 days   Lab Units 11/19/19  1137   WHITE BLOOD CELL COUNT  Thousand/uL 5 7   HEMOGLOBIN  g/dL 11 9*   HEMATOCRIT  % 36 7*   PLATELETS  Thousand/uL 267     Results from last 7 days   Lab Units 11/19/19  1137   POTASSIUM mmol/L 4 0   CHLORIDE mmol/L 106   CO2 mmol/L 26   BUN mg/dL 17   CREATININE mg/dL 1 17   SL AMB CALCIUM mg/dL 9 3     Results from last 7 days   Lab Units 11/19/19  1139   INR  1 0     Assessment: Mitral Valve Endocarditis  S/P mitral valve replacement;    Plan:     Unknown Michael continues to make good progress in their recovery following mitral valve replacement (tissue)  He is  now 6 weeks post op  Incisions are well-healed and his sternum is stable  Weight and VS are stable   I discussed the benefits of participating in cardiac rehab and have cleared them to begin the outpatient  program  He may resume driving and I reviewed his lifting restriction of no more than 25 lbs for an additional 2 months, until he reaches 12 weeks post-op  I reviewed medications and have submitted refiles for Metoprolol, Aspirin and Atorvastatin  We have provided him information to make an appt with a PCP  We have assisted him in making an appt with cardiology  Juan Givens was comfortable with our recommendations and their questions were answered to their satisfaction  JULIÁN Delgado  11/22/19  Attestation signed by Catrina Hunter MD at 11/22/2019  3:10 PM:  Pt seen and examined with JULIÁN  I agree with the above assessment and plan  This is a routine postop visit s/p tissue MVR for endocarditis sustained from IVDA  He is recovering nicely  His incision is well healed  He has completed antibiotic therapy  He will be referred to a new PCP and Cardiologist at his request  He will start Cardiac Rehab in the near future  The importance of abstaining from drug abuse was stressed        Get Pastrana MD  DATE: November 22, 2019  TIME: 3:08 PM

## 2019-11-26 LAB
MYCOBACTERIUM SPEC CULT: NORMAL
RHODAMINE-AURAMINE STN SPEC: NORMAL

## 2019-11-29 ENCOUNTER — DOCUMENTATION (OUTPATIENT)
Dept: NEPHROLOGY | Facility: CLINIC | Age: 39
End: 2019-11-29

## 2019-11-29 NOTE — PROGRESS NOTES
Per [facility] Metoprolol 37 5 mg not covered by insurance but they will cover 25 mg take 1 and 1/2 tablet bid

## 2019-12-04 NOTE — PROGRESS NOTES
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, secondary to IVDA Adm 9/30-10/15/19  · s/p tissue mitral valve replacement  10/9/19 (27mm [facility] Mitral Ease Pericardial Bioprosthesis)  Adm 9/30-10/15/19  on aspirin 325 mg daily  ·  Plastic surgery flap coverage/closure of upper sternal soft tissue defect (Dr Mumtaz William)  --> advised to start taking  mg daily as he has been prescribed  He has not been taking it  Hypertension  /92, on metoprolol tartrate 37 5 mg DAILY  EKG: St 108 bpm    Reportedly diagnosed with hypertension in his 35s, on 2 medications in the past   He cannot recall with the medicines   -->stop metoprolol tartrate  -->start Toprol 25 mg b i d   -->start lisinopril 5 mg daily  -->follow-up in 3-4 weeks  CKD, 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  Baseline creatinine 0 8-1 2  Following with Nephrology  In-hospital he had JULIENNE with a creatinine of 2 2, felt likely ATN versus endocarditis  Labs December 6:  Creatinine 1 18, potassium 4 3  history of solitary kidney, congenital-found incidentally on CT at [facility]  Hyperlipidemia, on atorvastatin 40 mg daily  Lipid profile 10/3/19:  Direct LDL 76, non HDL 96  He stopped taking atorvastatin  IV drug abuse with heroin  Reports he has abstained since before his heart surgery  --> he asked me if he could smoke marijuana  I recommended he abstain  Anxiety  I recommend he try to get into his PCP      History hepatitis C with undetectable viral load  Cardiac testing  --LHC (10/2/19): no CAD  -- BILLY (10/2/19): EF 60%, no RWMA, eccentric LVH, RV size ULN, LA mild to mod dilated, large/highly mobile vegetations at atrial aspect of both MV leaflets w/ at least 2 perforations at base of anterior leaflet & loss of coaptation of leaflets at middle section due to tissue destruction, wide-open MR, mild to mod TR  -- carotid artery duplex: no ICA stenosis b/l, antegrade flow b/l, no subcalvain disease BL         Summary of recommendations  Heart healthy diet  Educational information provided  Stop metoprolol tartrate- was not taking it correctly as well ( was only on once a day)  Metoprolol succinate 25 b i d   Must take  mg/d  Start lisinopril 5 mg/d  Medication complaince strongly reinforced  Follow up will be scheduled with Dr Rizwan Aguilar 3-4 weeks  I reinforced the importance of abstinence from drugs  Advised to follow with PCP re: taylor MARCANO  Juan Watt is a 44 y o  male with a history of hypertension, IV drug abuse, and hepatitis-C ,with a solitary kidney  Earlier this year he was treated for MSSA bacteremia in March 2019 with 6 weeks of IV antibiotics  Following completion, he resumed IV drug use with heroin  A follow-up echocardiogram September 6 showed an echodensity on the posterior mitral valve leaflet representing residual vegetation  A follow-up transesophageal echocardiogram showed increasing size of the vegetation  Shortly thereafter, he presented to [facility] and was found to be septic; blood cultures were positive for Pseudomonas  He was initiated on antibiotics and transferred to [facility] 10/1/19 for CT surgery evaluation  After appropriate testing, he underwent a tissue mitral valve replacement October 9  He had an uncomplicated postop course  He was discharged home October 15th on antibiotics  He completed his course of antibiotics and followed up with Infectious Disease, Nephrology and recently CT surgery  Abstinence from drug use was reinforced  He denied recurrent drug use    Did have some mild chest discomfort and mild exertional dyspnea at his CT surgery appointment  Review reportedly ran out of medications did not have follow-up with PCP or cardiologist      12/9/19  He presents for general cardiology follow-up  Reports his mother drove him who is out in the waiting room  He feels very anxious  He tells me feels like he did when he was withdrawing  He denies any drug use since before his heart surgery  He asked if he could smoke marijuana  I recommend he did not  He review of his medications, he has not been taking aspirin  He has been taking metoprolol tartrate once daily in the morning  He has not been taking Lipitor  He is not on a diuretic anymore  His EKG shows sinus tachycardia at 108 beats per minute  On exam he appears anxious  He is euvolemic  He asked if I could prescribe something for his nerves  I referred him to his PCP  He is transitioning between doctors  I recommend he try to a get in with 1 of the 2  He did obtain blood work December 6  This showed a creatinine 1 18, potassium 4 3  Assessment  Diagnoses and all orders for this visit:    Hospital discharge follow-up    Bacterial endocarditis, unspecified chronicity  -     POCT ECG    S/P MVR (mitral valve replacement)  -     POCT ECG  -     Discontinue: metoprolol succinate (TOPROL-XL) 25 mg 24 hr tablet; Take 1 tablet (25 mg total) by mouth daily  -     lisinopril (ZESTRIL) 5 mg tablet; Take 1 tablet (5 mg total) by mouth daily  -     metoprolol succinate (TOPROL-XL) 25 mg 24 hr tablet; Take 1 tablet (25 mg total) by mouth 2 (two) times a day    Severe mitral regurgitation  -     POCT ECG    Solitary left kidney  -     POCT ECG    Essential hypertension  -     Discontinue: metoprolol succinate (TOPROL-XL) 25 mg 24 hr tablet; Take 1 tablet (25 mg total) by mouth daily  -     lisinopril (ZESTRIL) 5 mg tablet; Take 1 tablet (5 mg total) by mouth daily  -     metoprolol succinate (TOPROL-XL) 25 mg 24 hr tablet;  Take 1 tablet (25 mg total) by mouth 2 (two) times a day          Past Medical History:   Diagnosis Date    Absent kidney, congenital     Anxiety 10/1/2019    Hepatitis C     History of endocarditis     mitral valve    History of intravenous drug abuse ([facility] [address] ) 10/01/2019    heroin    Nonrheumatic mitral valve regurgitation 10/01/2019    from endocarditis       Review of Systems   Constitution: Negative for chills  Cardiovascular: Negative for chest pain, claudication, cyanosis, dyspnea on exertion, irregular heartbeat, leg swelling, near-syncope, orthopnea, palpitations, paroxysmal nocturnal dyspnea and syncope  Respiratory: Negative for cough and shortness of breath  Gastrointestinal: Negative for heartburn and nausea  Neurological: Negative for dizziness, focal weakness, headaches, light-headedness and weakness  Psychiatric/Behavioral: The patient is nervous/anxious  All other systems reviewed and are negative  No Known Allergies        Current Outpatient Medications:     aspirin (ECOTRIN) 325 mg EC tablet, Take 1 tablet (325 mg total) by mouth daily, Disp: 30 tablet, Rfl: 3    escitalopram (LEXAPRO) 10 mg tablet, Take 1 tablet (10 mg total) by mouth daily, Disp: 30 tablet, Rfl: 0    pantoprazole (PROTONIX) 40 mg tablet, Take 1 tablet (40 mg total) by mouth daily, Disp: 30 tablet, Rfl: 0    potassium chloride (K-DUR,KLOR-CON) 20 mEq tablet, Take 1 tablet (20 mEq total) by mouth daily, Disp: 90 tablet, Rfl: 3    lisinopril (ZESTRIL) 5 mg tablet, Take 1 tablet (5 mg total) by mouth daily, Disp: 30 tablet, Rfl: 1    metoprolol succinate (TOPROL-XL) 25 mg 24 hr tablet, Take 1 tablet (25 mg total) by mouth 2 (two) times a day, Disp: 60 tablet, Rfl: 1        Social History     Socioeconomic History    Marital status: Single     Spouse name: Not on file    Number of children: Not on file    Years of education: Not on file    Highest education level: Not on file   Occupational History    Occupation: [employer]    Financial resource strain: Not on file    Food insecurity:     Worry: Not on file     Inability: Not on file    Transportation needs:     Medical: Not on file     Non-medical: Not on file   Tobacco Use    Smoking status: Never Smoker    Smokeless tobacco: Never Used   Substance and Sexual Activity    Alcohol use: Never     Frequency: Never     Binge frequency: Never    Drug use: Not Currently    Sexual activity: Yes     Partners: Female   Lifestyle    Physical activity:     Days per week: Not on file     Minutes per session: Not on file    Stress: Not on file   Relationships    Social connections:     Talks on phone: Not on file     Gets together: Not on file     Attends Confucianism service: Not on file     Active member of club or organization: Not on file     Attends meetings of clubs or organizations: Not on file     Relationship status: Not on file    Intimate partner violence:     Fear of current or ex partner: Not on file     Emotionally abused: Not on file     Physically abused: Not on file     Forced sexual activity: Not on file   Other Topics Concern    Not on file   Social History Narrative    Not on file       Family History   Problem Relation Age of Onset    Heart disease Maternal Grandmother        Physical Exam   Constitutional: He is oriented to person, place, and time  No distress  HENT:   Head: Normocephalic and atraumatic  Eyes: Conjunctivae and EOM are normal    Neck: Normal range of motion  Neck supple  Cardiovascular: Regular rhythm and intact distal pulses  Tachycardia present  No murmur heard  Pulmonary/Chest: Effort normal and breath sounds normal    Abdominal: Soft  Bowel sounds are normal    Musculoskeletal: Normal range of motion  Neurological: He is alert and oriented to person, place, and time  Skin: Skin is warm and dry  Chest incision healing; no erythema or  discharge   Psychiatric: His mood appears anxious     Nursing note and vitals reviewed  Vitals: Blood pressure 140/92, pulse (!) 111, height 5' 3" (1 6 m), weight 83 2 kg (183 lb 6 4 oz), SpO2 99 %  Wt Readings from Last 3 Encounters:   19 83 2 kg (183 lb 6 4 oz)   19 81 6 kg (180 lb)   19 81 2 kg (179 lb)         Labs & Results:  Lab Results   Component Value Date    WBC 7 9 2019    HGB 14 0 2019    HCT 42 4 2019    MCV 82 5 2019     2019     No results found for: BNP  No components found for: CHEM  No results found for: CKTOTAL, TROPONINI, TROPONINT, CKMBINDEX  No results found for this or any previous visit  Results for orders placed during the hospital encounter of 19   BILLY    Narrative [address]  [facility], [address] [facility]  [phone]    Transesophageal Echocardiogram  2D, 3D, Doppler, and Color Doppler    Study date:  02-Oct-2019    Patient: Yaw Acevedo  MR number: [id number]  Account number: [de-identified]  : 1980  Age: 44 years  Gender: Male  Status: Inpatient  Location: Echo lab  Height: 63 in  Weight: 160 lb  BP: 101/ 64 mmHg    Indications: MVD  Diagnoses: I34 9 - Nonrheumatic mitral valve disorder, unspecified    Sonographer:  Nina Schmidt RDCS  Interpreting Physician:  Tavo Martinez MD  Referring Physician:  Nadine Oliveira PA-C  Group:  [facility]  Cardiology Fellow:  Simona Ellison DO  RN:  Katrin Vu RN    SUMMARY    LEFT VENTRICLE:  The ventricle was mildly dilated  Systolic function was normal  Ejection fraction was estimated to be 60 %  There were no regional wall motion abnormalities  The changes were consistent with eccentric hypertrophy  RIGHT VENTRICLE:  The size was at the upper limits of normal   Systolic function was mildly to moderately reduced  Wall thickness was increased  LEFT ATRIUM:  The atrium was mildly to moderately dilated      ATRIAL SEPTUM:  No defect or patent foramen ovale was identified  MITRAL VALVE:  Multiple large and highly mobile vegetations were present at the atrial aspect of both leaflets  There were at least two large perforations at the base of the anterior leaflet and there was loss of coaptation of the leaflets at its middle  section due to tissue destruction  There was wide open mitral regurgitation  TRICUSPID VALVE:  There was mild to moderate regurgitation  HISTORY: PRIOR HISTORY: Endocarditis  History of IV drug abuse  JULIENNE  Anxiety  Bacteremia  Tachycardia  PROCEDURE: The procedure was performed in the echo lab  This was a routine study  The risks and alternatives of the procedure were explained to the patient and informed consent was obtained  The transesophageal approach was used  The study  included complete 2D imaging, 3D imaging, complete spectral Doppler, color Doppler, and probe insertion (without interpretation)  The heart rate was 101 bpm, at the start of the study  An adult omniplane probe was inserted by the  cardiology fellow under direct supervision of the attending cardiologist  Intubated with ease  One intubation attempt(s)  There was no blood detected on the probe  There were no complications during the procedure  MEDICATIONS: Benzocaine  spray, topical to oropharynx, prior to procedure  LEFT VENTRICLE: The ventricle was mildly dilated  Systolic function was normal  Ejection fraction was estimated to be 60 %  There were no regional wall motion abnormalities  Wall thickness was normal  The changes were consistent with  eccentric hypertrophy  DOPPLER: The study was not technically sufficient to allow evaluation of LV diastolic function  RIGHT VENTRICLE: The size was at the upper limits of normal  Systolic function was mildly to moderately reduced  Wall thickness was increased  LEFT ATRIUM: The atrium was mildly to moderately dilated  No mass was present  No thrombus was identified   APPENDAGE: The size was normal  No thrombus was identified  ATRIAL SEPTUM: No defect or patent foramen ovale was identified  RIGHT ATRIUM: Size was at the upper limits of normal     MITRAL VALVE: Multiple large and highly mobile vegetations were present at the atrial aspect of both leaflets  There were at least two large perforations at the base of the anterior leaflet and there was loss of coaptation of the leaflets  at its middle section due to tissue destruction  There was wide open mitral regurgitation  AORTIC VALVE: The valve was trileaflet  Leaflets exhibited normal thickness and normal cuspal separation  There was no echocardiographic evidence of vegetation  DOPPLER: There was no regurgitation  TRICUSPID VALVE: The valve structure was normal  There was normal leaflet separation  There was no echocardiographic evidence of vegetation  DOPPLER: There was mild to moderate regurgitation  Estimated peak PA pressure was 80 mmHg  The  findings suggest severe pulmonary hypertension  PULMONIC VALVE: Leaflets exhibited normal thickness, no calcification, and normal cuspal separation  There was no echocardiographic evidence of vegetation  PERICARDIUM: There was no pericardial effusion  AORTA: The root exhibited normal size  PULMONARY VEINS: DOPPLER: There was systolic flow reversal in all pulmonary veins, indicative of significant mitral regurgitation  [address] Accredited Echocardiography Laboratory    Prepared and electronically signed by    Ayleen Roldan MD  Signed 02-Oct-2019 13:29:49         This note was completed in part utilizing prollie direct voice recognition software  Grammatical errors, random word insertion, spelling mistakes, and incomplete sentences may be an occasional consequence of the system secondary to software limitations, ambient noise and hardware issues  At the time of dictation, efforts were made to edit, clarify and /or correct errors    Please read the chart carefully and recognize, using context, where substitutions have occurred    If you have any questions or concerns about the context, text or information contained within the body of this dictation, please contact myself, the provider, for further clarification

## 2019-12-07 LAB
ALBUMIN SERPL-MCNC: 4.3 G/DL (ref 3.6–5.1)
ALBUMIN/GLOB SERPL: 1.1 (CALC) (ref 1–2.5)
ALP SERPL-CCNC: 107 U/L (ref 40–115)
ALT SERPL-CCNC: 19 U/L (ref 9–46)
APPEARANCE UR: CLEAR
AST SERPL-CCNC: 16 U/L (ref 10–40)
BASOPHILS # BLD AUTO: 63 CELLS/UL (ref 0–200)
BASOPHILS NFR BLD AUTO: 0.8 %
BILIRUB SERPL-MCNC: 0.6 MG/DL (ref 0.2–1.2)
BILIRUB UR QL STRIP: NEGATIVE
BUN SERPL-MCNC: 18 MG/DL (ref 7–25)
BUN/CREAT SERPL: ABNORMAL (CALC) (ref 6–22)
CALCIUM SERPL-MCNC: 9.5 MG/DL (ref 8.6–10.3)
CHLORIDE SERPL-SCNC: 106 MMOL/L (ref 98–110)
CO2 SERPL-SCNC: 24 MMOL/L (ref 20–32)
COLOR UR: YELLOW
CREAT SERPL-MCNC: 1.18 MG/DL (ref 0.6–1.35)
CREAT UR-MCNC: 145 MG/DL (ref 20–320)
EOSINOPHIL # BLD AUTO: 103 CELLS/UL (ref 15–500)
EOSINOPHIL NFR BLD AUTO: 1.3 %
ERYTHROCYTE [DISTWIDTH] IN BLOOD BY AUTOMATED COUNT: 13.3 % (ref 11–15)
GLOBULIN SER CALC-MCNC: 3.8 G/DL (CALC) (ref 1.9–3.7)
GLUCOSE SERPL-MCNC: 88 MG/DL (ref 65–99)
GLUCOSE UR QL STRIP: NEGATIVE
HCT VFR BLD AUTO: 42.4 % (ref 38.5–50)
HGB BLD-MCNC: 14 G/DL (ref 13.2–17.1)
HGB UR QL STRIP: NEGATIVE
KETONES UR QL STRIP: NEGATIVE
LEUKOCYTE ESTERASE UR QL STRIP: NEGATIVE
LYMPHOCYTES # BLD AUTO: 1209 CELLS/UL (ref 850–3900)
LYMPHOCYTES NFR BLD AUTO: 15.3 %
MAGNESIUM SERPL-MCNC: 2.1 MG/DL (ref 1.5–2.5)
MCH RBC QN AUTO: 27.2 PG (ref 27–33)
MCHC RBC AUTO-ENTMCNC: 33 G/DL (ref 32–36)
MCV RBC AUTO: 82.5 FL (ref 80–100)
MONOCYTES # BLD AUTO: 585 CELLS/UL (ref 200–950)
MONOCYTES NFR BLD AUTO: 7.4 %
NEUTROPHILS # BLD AUTO: 5941 CELLS/UL (ref 1500–7800)
NEUTROPHILS NFR BLD AUTO: 75.2 %
NITRITE UR QL STRIP: NEGATIVE
PH UR STRIP: NORMAL [PH] (ref 5–8)
PHOSPHATE SERPL-MCNC: 3.2 MG/DL (ref 2.5–4.5)
PLATELET # BLD AUTO: 290 THOUSAND/UL (ref 140–400)
PMV BLD REES-ECKER: 9.6 FL (ref 7.5–12.5)
POTASSIUM SERPL-SCNC: 4.3 MMOL/L (ref 3.5–5.3)
PROT SERPL-MCNC: 8.1 G/DL (ref 6.1–8.1)
PROT UR QL STRIP: NEGATIVE
PROT UR-MCNC: 20 MG/DL (ref 5–25)
PROT/CREAT UR: 0.14 MG/MG CREAT (ref 0.02–0.13)
PROT/CREAT UR: 138 MG/G CREAT (ref 22–128)
RBC # BLD AUTO: 5.14 MILLION/UL (ref 4.2–5.8)
SL AMB EGFR AFRICAN AMERICAN: 90 ML/MIN/1.73M2
SL AMB EGFR NON AFRICAN AMERICAN: 77 ML/MIN/1.73M2
SODIUM SERPL-SCNC: 139 MMOL/L (ref 135–146)
SP GR UR STRIP: 1.02 (ref 1–1.03)
WBC # BLD AUTO: 7.9 THOUSAND/UL (ref 3.8–10.8)

## 2019-12-09 ENCOUNTER — TELEPHONE (OUTPATIENT)
Dept: INFECTIOUS DISEASES | Facility: CLINIC | Age: 39
End: 2019-12-09

## 2019-12-09 ENCOUNTER — OFFICE VISIT (OUTPATIENT)
Dept: CARDIOLOGY CLINIC | Facility: CLINIC | Age: 39
End: 2019-12-09
Payer: COMMERCIAL

## 2019-12-09 VITALS
DIASTOLIC BLOOD PRESSURE: 92 MMHG | WEIGHT: 183.4 LBS | HEIGHT: 63 IN | SYSTOLIC BLOOD PRESSURE: 140 MMHG | HEART RATE: 111 BPM | OXYGEN SATURATION: 99 % | BODY MASS INDEX: 32.5 KG/M2

## 2019-12-09 DIAGNOSIS — I33.0 BACTERIAL ENDOCARDITIS, UNSPECIFIED CHRONICITY: ICD-10-CM

## 2019-12-09 DIAGNOSIS — IMO0002 SOLITARY LEFT KIDNEY: ICD-10-CM

## 2019-12-09 DIAGNOSIS — I34.0 SEVERE MITRAL REGURGITATION: ICD-10-CM

## 2019-12-09 DIAGNOSIS — Z95.2 S/P MVR (MITRAL VALVE REPLACEMENT): ICD-10-CM

## 2019-12-09 DIAGNOSIS — I10 ESSENTIAL HYPERTENSION: ICD-10-CM

## 2019-12-09 DIAGNOSIS — Z09 HOSPITAL DISCHARGE FOLLOW-UP: Primary | ICD-10-CM

## 2019-12-09 PROCEDURE — 93000 ELECTROCARDIOGRAM COMPLETE: CPT | Performed by: NURSE PRACTITIONER

## 2019-12-09 PROCEDURE — 99214 OFFICE O/P EST MOD 30 MIN: CPT | Performed by: NURSE PRACTITIONER

## 2019-12-09 RX ORDER — METOPROLOL SUCCINATE 25 MG/1
25 TABLET, EXTENDED RELEASE ORAL 2 TIMES DAILY
Qty: 60 TABLET | Refills: 1 | Status: SHIPPED | OUTPATIENT
Start: 2019-12-09 | End: 2020-01-07

## 2019-12-09 RX ORDER — METOPROLOL SUCCINATE 25 MG/1
25 TABLET, EXTENDED RELEASE ORAL DAILY
Qty: 60 TABLET | Refills: 1 | Status: SHIPPED | OUTPATIENT
Start: 2019-12-09 | End: 2019-12-09 | Stop reason: SDUPTHER

## 2019-12-09 RX ORDER — LISINOPRIL 5 MG/1
5 TABLET ORAL DAILY
Qty: 30 TABLET | Refills: 1 | Status: SHIPPED | OUTPATIENT
Start: 2019-12-09 | End: 2020-02-13 | Stop reason: SDUPTHER

## 2019-12-09 NOTE — TELEPHONE ENCOUNTER
Pt had his blood cultures drawn at [address] [place] [address] on 12/3  Will watch for results to be finalized and call the pt

## 2019-12-09 NOTE — LETTER
December 9, 2019     Aspen Hernandez MD  [facility]    Patient: Isaak Sepulveda   YOB: 1980   Date of Visit: 12/9/2019     Dear Dr Anika Taylor      Thank you for referring Vish Adames to me for evaluation  Below are the relevant portions of my assessment and plan of care  If you have questions, please do not hesitate to call me  I look forward to following Orly along with you           Sincerely,        JULIÁN Funez        CC: Cayetano Gomez MD    Progress Notes:

## 2019-12-13 ENCOUNTER — TELEPHONE (OUTPATIENT)
Dept: NEUROLOGY | Facility: CLINIC | Age: 39
End: 2019-12-13

## 2019-12-13 ENCOUNTER — TELEPHONE (OUTPATIENT)
Dept: INFECTIOUS DISEASES | Facility: CLINIC | Age: 39
End: 2019-12-13

## 2019-12-13 NOTE — TELEPHONE ENCOUNTER
Called pt to inform of negative blood cultures  He expressed understanding, and thanked us for the call

## 2019-12-18 ENCOUNTER — CLINICAL SUPPORT (OUTPATIENT)
Dept: CARDIAC REHAB | Facility: CLINIC | Age: 39
End: 2019-12-18
Payer: COMMERCIAL

## 2019-12-18 DIAGNOSIS — Z95.2 S/P MVR (MITRAL VALVE REPLACEMENT): ICD-10-CM

## 2019-12-18 PROCEDURE — 93798 PHYS/QHP OP CAR RHAB W/ECG: CPT

## 2019-12-18 NOTE — PROGRESS NOTES
Cardiac Rehabilitation Plan of Care   Care Plan       Today's date: 2019   Visits: 1  Patient name: Savanna Sanderson      : 1980  Age: 44 y o  MRN: [id number]  Referring Physician: Nakul Washburn,*  Cardiologist: Trinh Valencia MD  Provider: [address] [place] [address] Cardiopulmonary Rehab   Clinician: Jt Suero MS, CEP     Dx:   Encounter Diagnosis   Name Primary?  S/P MVR (mitral valve replacement)      Date of onset: 10/9/19      SUMMARY OF PROGRESS:  Today was Orly's initial evaluation for cardiac rehab  Orly reports that he is coming to cardiac rehab due to mitral valve replacement on 10/9/19  Orly reports that he hs a long standing history of drug abuse but is currently staying clean  Orly got sick with mitral valve endocarditis which was treated with cefazoline for 6 weeks, which started his valve problems  Orly was able to completed a sub-maximal treadmill test walking a total of 10 minutes at 5 8 METs  Orly reports to not have any cardiac complications during exercise  Orly had normal hemodynamic responses and his telemetry read NSR throughout rest and recovery  Orly reports that he eats a normal diet but would not go into much detail  Orly reports to have excellent social support from friends and family  Orly reports that since the surgery he is more anxious and irritable then ever before  He scored 11 on his NYDIA-7 anxiety screening which suggests he may have an anxiety disorder  Orly reports that he is trying to get medication to help him and was given information on Behavior Health  Orly will start cardiac rehab          Medication compliance: Yes   Comments:   Fall Risk: Low   Comments:     EKG changes: NSR       EXERCISE ASSESSMENT and PLAN    Current Exercise Program in Rehab:       Frequency: 3 days/week        Minutes: 30-40         METS: 5-7            HR:    RPE: 4-6         Modalities: Treadmill, Airdyne bike, UBE and Lifecycle      Exercise Progression 30 Day Goals :    Frequency: 4 days/week   Minutes: 35-45   METS: 5 5-7 5   HR:     RPE: 4-6   Modalities: Treadmill, Airdyne bike, Lifecycle and Elliptical    Strength training: Will be added following at least 8 weeks post surgery and 8-10 monitored sessions   Modalities: Leg Press, Chest Press, Pull Downs, Arm Curl and Seated Row    Progressing: In Progress    Home Exercise: None    Goals: 10% improvement in functional capacity, improved DASI score by 10%, Increase in peak CR METs by 40%, Resume ADLs with increased strength, Return to work unrestricted and Exercise 5 days/wk, >150mins/wk  Education: Benefit of exercise for CAD risk factors, home exercise guidelines, signs and sxs, RPE scale and class: Risk Factors for Heart Disease   Plan:home exercise 30+ mins 2 days opposite CR  Readiness to change: Preparation:  (Getting ready to change)       NUTRITION ASSESSMENT AND PLAN    Weight control:    Starting weight: 185 8 lbs   Current weight:     Waist circumference:    Startin 5 in   Current:    Diabetes: N/A  Lipid management: Discussed diet and lipid management and Last lipid profile 10/3/19  Chol 123    HDL 27  LDL 76  Goals:reduced BMI to < 25, HDL >40, decreased body fat% <25%   , Improved Rate Your Plate score  >31 and 2 5-5%  wt loss  Education: heart healthy eating  low sodium diet  hydration  diet and lipid management  wt  loss  healthy choices while dining out  portion control  class: Heart Healthy Eating  class:  Label Reading  Progressing: In Progress  Plan: Increase PUFA and MUFA, Decrease SFA, Increase whole grains, increase fruits/vegs, eliminate processed meats, reduce red meat 1x/wk, swtich to low fat dairy and Reduce added sugars <25g/day  Readiness to change: Preparation:  (Getting ready to change)       PSYCHOSOCIAL ASSESSMENT AND PLAN    Emotional:  Depression assessment:  PHQ-9 = 1-4 = Minimal Depression            Anxiety measure:  NYDIA-7 = 10-14 = Moderate anxiety  Self-reported stress level: 1   Social support: Very Good  Goals:  improved [facility] QOL < 27, PHQ-9 - reduced severity by one level, Feelings in [facility] Score < 3, Physical Fitness in [facility] Score < 3, Social Activities in [facility] Score < 3, Pain in [facility] Score < 3, Overall Health in [facility] Score < 3 and Quality of Life in [facility] Score < 3   Education: class:  Stress and Your Health  and class:  Relaxation  Progressing: In Progress  Plan: Practice relaxation techniques  Readiness to change: Preparation:  (Getting ready to change)       OTHER CORE COMPONENTS     Tobacco:   Social History     Tobacco Use   Smoking Status Never Smoker   Smokeless Tobacco Never Used       Tobacco Use Intervention: Referral to tobacco expert:   Brief counseling by cardiac rehab professional  Date: 19    Blood pressure:    Restin/90   Exercise: 160/80    Goals: consistent BP < 130/80, reduced dietary sodium <2300mg, consistent exercise >150 mins/wk and Abstain from drugs  Education:  understanding HTN and CAD, low sodium diet and HTN, Education class:  Common Heart Medications and Education class: Understanding Heart Disease  Progressing: In Progress  Plan: Class: Understanding Heart Disease, Class: Common Heart Medications and Monitor BP daily  Readiness to change: Preparation:  (Getting ready to change)

## 2019-12-18 NOTE — PROGRESS NOTES
CARDIAC REHAB ASSESSMENT    Today's date: 2019  Patient name: Neeta Hameed     : 1980       MRN: [id number]  PCP: Lissette Maddox MD  Referring Physician: Ladonna Tenorio,*  Cardiologist: Brenda Shah MD   Surgeon:   Dx:   Encounter Diagnosis   Name Primary?  S/P MVR (mitral valve replacement)        Date of onset: 10/9/19  Cultural needs:     Height:    Wt Readings from Last 1 Encounters:   19 83 2 kg (183 lb 6 4 oz)      Weight:   Ht Readings from Last 1 Encounters:   19 5' 3" (1 6 m)     Medical History:   Past Medical History:   Diagnosis Date    Absent kidney, congenital     Anxiety 10/1/2019    Hepatitis C     History of endocarditis     mitral valve    History of intravenous drug abuse ([facility] [address] ) 10/01/2019    heroin    Nonrheumatic mitral valve regurgitation 10/01/2019    from endocarditis         Physical Limitations: pain on his left side will come and go     Risk Factors   Cholesterol: No  Smoking: Never used  HTN: Yes - medications   DM: No  Obesity: Yes   Inactivity: Yes  Stress:  perceived  stress: 1/10   Stressors:no major life stress    Goals for Stress Management: Continue to live his healthy life     Family History:  Family History   Problem Relation Age of Onset    Heart disease Maternal Grandmother        Allergies: Patient has no known allergies    ETOH:   Social History     Substance and Sexual Activity   Alcohol Use Never    Frequency: Never    Binge frequency: Never         Current Medications:   Current Outpatient Medications   Medication Sig Dispense Refill    aspirin (ECOTRIN) 325 mg EC tablet Take 1 tablet (325 mg total) by mouth daily 30 tablet 3    escitalopram (LEXAPRO) 10 mg tablet Take 1 tablet (10 mg total) by mouth daily 30 tablet 0    lisinopril (ZESTRIL) 5 mg tablet Take 1 tablet (5 mg total) by mouth daily 30 tablet 1    metoprolol succinate (TOPROL-XL) 25 mg 24 hr tablet Take 1 tablet (25 mg total) by mouth 2 (two) times a day 60 tablet 1    pantoprazole (PROTONIX) 40 mg tablet Take 1 tablet (40 mg total) by mouth daily 30 tablet 0    potassium chloride (K-DUR,KLOR-CON) 20 mEq tablet Take 1 tablet (20 mEq total) by mouth daily 90 tablet 3     No current facility-administered medications for this visit  Functional Status Prior to Diagnosis for Treatment   Occupation: unemployed  Recreation: nothing   ADLs: resumed all ADLs  [place]: No limitations  Exercise: none  Other:     Current Functional Status  Occupation: unemployed  Recreation: nothing   ADLs:Capable of performing light ADLs only resumed all ADLs within sternal precautions  [place]: No limitations  Exercise: none  Other:     Patient Specific Goals:  No specific goals     Short Term Program Goals: dietary modifications increased strength improved energy/stamina with ADLs exercise 120-150 mins/wk    Long Term Goals: intial claudication time extended  increased maximal walking duration  increased intial training workload  Improved [facility] Activity Status score  Improved functional capacity  Improved Quality of Life - [facility] score reduced  Improved lipid profile  Reduced body fat%  Reduced waist circumference  improved Rate Your Plate Score    Ability to reach goals/rehabilitation potential:  Excellent    Projected return to function: 12 weeks  Objective tests: sub-max TM ETT      Nutritional   Reviewed details of Rate your Plate  Discussed key elements of heart healthy eating  Reviewed patient goals for dietary modifications and their clinical implications  Reviewed most recent lipid profile  Mom cooks, rice and beans       Goals for dietary modification: choose lean cuts of meat  poultry without the skin  low fat ground meat and poultry  eliminate processed meats  reduce portions of meat to 3 oz  increase fish intake  more meatless meals  low fat dairy   reduced fat cheese  increase whole grains  increase fruits and vegetables  eliminate butter  low sodium  improved snack choices  more nuts/seeds  reduce sweets/frozen desserts  heathier choices while dining out      Emotional/Social  Patient reports he/she is coping well with good social support and denies depression or anxiety    SOCIAL SUPPORT NETWORK    Marital status:     Rate 1-5:    Marriage: 5   Family: 5   Financial: 5   Relationships: 5   Spirituality: 5   Intellectual: 2 - irritable       Domestic Violence Screening: No    Comments: Pt reports that he had SOB, fatigue, weakness and did not feel right  Pt reports that he had his MV replacement 10/15/19  Pt reports this was happening for a year  Pt reports that he had this big mass cut ou ton his chest (2013)  Quit drugs and is currently not using  Pt will begin cardiac rehab 12/23

## 2019-12-19 ENCOUNTER — TELEPHONE (OUTPATIENT)
Dept: NEUROLOGY | Facility: CLINIC | Age: 39
End: 2019-12-19

## 2019-12-20 ENCOUNTER — TRANSCRIBE ORDERS (OUTPATIENT)
Dept: NEUROLOGY | Facility: CLINIC | Age: 39
End: 2019-12-20

## 2019-12-20 DIAGNOSIS — I38 ENDOCARDITIS, VALVE UNSPECIFIED: Primary | ICD-10-CM

## 2020-01-07 ENCOUNTER — OFFICE VISIT (OUTPATIENT)
Dept: NEPHROLOGY | Facility: CLINIC | Age: 40
End: 2020-01-07
Payer: COMMERCIAL

## 2020-01-07 VITALS
HEART RATE: 80 BPM | BODY MASS INDEX: 34.27 KG/M2 | WEIGHT: 193.4 LBS | SYSTOLIC BLOOD PRESSURE: 138 MMHG | HEIGHT: 63 IN | DIASTOLIC BLOOD PRESSURE: 98 MMHG

## 2020-01-07 DIAGNOSIS — I10 ESSENTIAL HYPERTENSION: ICD-10-CM

## 2020-01-07 DIAGNOSIS — N18.2 CKD (CHRONIC KIDNEY DISEASE) STAGE 2, GFR 60-89 ML/MIN: ICD-10-CM

## 2020-01-07 DIAGNOSIS — Z95.2 S/P MVR (MITRAL VALVE REPLACEMENT): ICD-10-CM

## 2020-01-07 DIAGNOSIS — N17.9 ACUTE KIDNEY INJURY (HCC): Primary | ICD-10-CM

## 2020-01-07 DIAGNOSIS — I12.9 BENIGN HYPERTENSION WITH CHRONIC KIDNEY DISEASE, STAGE II: ICD-10-CM

## 2020-01-07 DIAGNOSIS — N18.2 BENIGN HYPERTENSION WITH CHRONIC KIDNEY DISEASE, STAGE II: ICD-10-CM

## 2020-01-07 PROCEDURE — 99214 OFFICE O/P EST MOD 30 MIN: CPT | Performed by: PHYSICIAN ASSISTANT

## 2020-01-07 RX ORDER — METOPROLOL SUCCINATE 50 MG/1
50 TABLET, EXTENDED RELEASE ORAL 2 TIMES DAILY
Qty: 60 TABLET | Refills: 2 | Status: SHIPPED | OUTPATIENT
Start: 2020-01-07 | End: 2020-02-11 | Stop reason: SDUPTHER

## 2020-01-07 NOTE — PROGRESS NOTES
Assessment and Plan:    Domingo Ritter was seen today for follow-up and chronic kidney disease  Diagnoses and all orders for this visit:    Acute kidney injury [facility])  -     Basic metabolic panel; Future  -     Magnesium; Future  -     Phosphorus; Future  -     Urinalysis with microscopic; Future  -     CBC; Future    CKD (chronic kidney disease) stage 2, GFR 60-89 ml/min    Benign hypertension with chronic kidney disease, stage II      Acute Kidney Injury- secondary to ATN and creatinine continues to improve  Will continue to monitor and trend  Repeat blood work  Started on lisinopril by cardiology a month ago but unfortunately, he did not get his January follow up blood work completed        Chronic Kidney Disease stage 1/2 in the setting of a solitary kidney- baseline creatinine is 0 8-1      Hypertension- He takes metoprolol 25mg twice a day and lisinopril 5mg daily  Would recommend increasing metoprolol but will check with cardiology first      Volume Status- He is off of his lasix      Hypokalemia- he takes kdur 20meq daily  Get blood work today and if K normal, can stop taking potassium supplement      Anemia- Recheck CBC     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  Colonoscopy: age 44    Reason for Visit: Follow-up and Chronic Kidney Disease    HPI: Harriett Salmeron is a 44 y o  male who is here for follow up of acute kidney injury with a solitary kidney  He states he doesn't like taking so many pills but he knows he has to continue to take his BP pills  He complains of seeing black spots/dots at times in his vision unassociated with movement or with dizziness  He complains of losing his hair in some places and has a pain in the right upper chest with fast movements of his arm  He denies SOB     He denies LE edema     ROS: A complete review of systems was performed and was negative unless otherwise noted in the history of present illness  Allergies:   Patient has no known allergies  Medications:     Current Outpatient Medications:     aspirin (ECOTRIN) 325 mg EC tablet, Take 1 tablet (325 mg total) by mouth daily (Patient taking differently: Take 81 mg by mouth daily ), Disp: 30 tablet, Rfl: 3    escitalopram (LEXAPRO) 10 mg tablet, Take 1 tablet (10 mg total) by mouth daily, Disp: 30 tablet, Rfl: 0    lisinopril (ZESTRIL) 5 mg tablet, Take 1 tablet (5 mg total) by mouth daily, Disp: 30 tablet, Rfl: 1    metoprolol succinate (TOPROL-XL) 25 mg 24 hr tablet, Take 1 tablet (25 mg total) by mouth 2 (two) times a day, Disp: 60 tablet, Rfl: 1    potassium chloride (K-DUR,KLOR-CON) 20 mEq tablet, Take 1 tablet (20 mEq total) by mouth daily, Disp: 90 tablet, Rfl: 3    Past Medical History:   Diagnosis Date    Absent kidney, congenital     Anxiety 10/1/2019    Hepatitis C     History of endocarditis     mitral valve    [facility]    heroin    Nonrheumatic mitral valve regurgitation 10/01/2019    from endocarditis     Past Surgical History:   Procedure Laterality Date    CARDIAC CATHETERIZATION      CHEST WALL TUMOR EXCISION  2013    Anterior chest wall cyst removed    IR THORACENTESIS  10/2/2019    OK ECHO TRANSESOPHAG MONTR CARDIAC PUMP FUNCTJ N/A 10/9/2019    Procedure: TRANSESOPHAGEAL ECHOCARDIOGRAM (MARK);   Surgeon: Dori Baptiste MD;  [facility];  Service: Cardiac Surgery    OK MUSCLE-SKIN FLAP,TRUNK Bilateral 10/9/2019    Procedure: BILATERAL PECTORALIS MAJOR FLAP;  Surgeon: Festus Blair MD;  [facility];  Service: Plastics    OK [address], < 50 CM N/A 10/9/2019    Procedure: APPLICATION VAC DRESSING;  Surgeon: Festus Blair MD;  [facility];  Service: Plastics    OK REPLACEMENT OF MITRAL VALVE N/A 10/9/2019    Procedure: MITRAL VALVE REPLACEMENT WITH 27MM MORROW MAGNA MITRAL EASE PERICARDIAL BIOPROSTHESIS;  Surgeon: Sky Bonds MD;  [facility];  Service: Cardiac Surgery     Family History   Problem Relation Age of Onset    Heart disease Maternal Grandmother       reports that he has never smoked  He has never used smokeless tobacco  He reports that he has current or past drug history  He reports that he does not drink alcohol  Physical Exam:   Vitals:    01/07/20 1255 01/07/20 1330   BP:  138/98   BP Location:  Right arm   Patient Position:  Sitting   Cuff Size:  Standard   Pulse:  80   Weight: 87 7 kg (193 lb 6 4 oz)    Height: 5' 3" (1 6 m)      Body mass index is 34 26 kg/m²  General: NAD  Neuro: AAO  Skin: no rash  Eyes: anicteric  ENMT: mm moist  Neck: no masses  Cardiovascular: RRR  Extremities: no edema  Respiratory: CTAB  Gastrointestinal: soft nt nd    Procedure:  No results found for this or any previous visit  Labs reviewed      Lab Results   Component Value Date    GLUCOSE 103 10/09/2019    CALCIUM 9 5 12/06/2019    K 4 3 12/06/2019    CO2 24 12/06/2019     12/06/2019    BUN 18 12/06/2019    CREATININE 1 18 12/06/2019

## 2020-01-07 NOTE — PATIENT INSTRUCTIONS
Acute Kidney Injury- secondary to ATN and creatinine continues to improve  Will continue to monitor and trend  Repeat blood work  Started on lisinopril by cardiology a month ago but unfortunately, he did not get his January follow up blood work completed        Chronic Kidney Disease stage 1/2 in the setting of a solitary kidney- baseline creatinine is 0 8-1      Hypertension- He takes metoprolol 25mg twice a day and lisinopril 5mg daily  Would recommend increasing metoprolol but will check with cardiology first      Volume Status- He is off of his lasix        Hypokalemia- he takes kdur 20meq daily  Get blood work today and if K normal, can stop taking potassium supplement      Anemia- Recheck CBC     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

## 2020-01-08 ENCOUNTER — TELEPHONE (OUTPATIENT)
Dept: NEPHROLOGY | Facility: CLINIC | Age: 40
End: 2020-01-08

## 2020-01-08 ENCOUNTER — DOCUMENTATION (OUTPATIENT)
Dept: NEPHROLOGY | Facility: CLINIC | Age: 40
End: 2020-01-08

## 2020-01-08 LAB
BUN SERPL-MCNC: 24 MG/DL (ref 7–25)
BUN/CREAT SERPL: ABNORMAL (CALC) (ref 6–22)
CALCIUM SERPL-MCNC: 10.3 MG/DL (ref 8.6–10.3)
CHLORIDE SERPL-SCNC: 102 MMOL/L (ref 98–110)
CO2 SERPL-SCNC: 33 MMOL/L (ref 20–32)
CREAT SERPL-MCNC: 1.32 MG/DL (ref 0.6–1.35)
GLUCOSE SERPL-MCNC: 81 MG/DL (ref 65–99)
POTASSIUM SERPL-SCNC: 4.5 MMOL/L (ref 3.5–5.3)
SL AMB EGFR AFRICAN AMERICAN: 78 ML/MIN/1.73M2
SL AMB EGFR NON AFRICAN AMERICAN: 67 ML/MIN/1.73M2
SODIUM SERPL-SCNC: 141 MMOL/L (ref 135–146)

## 2020-01-08 NOTE — TELEPHONE ENCOUNTER
Left message for pt regarding the above, asked that he calls office to let us know that he got message and understand instructions

## 2020-01-08 NOTE — TELEPHONE ENCOUNTER
----- Message from [place], [place] sent at 1/8/2020 10:01 AM EST -----    Reviewed blood work  Patient can stop potassium supplement  Make sure he is not taking a calcium supplement  If he is taking tums or calcium, have him stop this  Creatinine/kidney function is elevated to 1 3  Will need to monitor closely and I would like him to get repeat blood work in 2 weeks (bmp & cbc)  This is due to stopping potassium supplement, borderline hypercalcemia, elevated creatinine, and previously low hgb  Increase metoprolol to 50mg twice a day as we discussed during the office visit  Please let patient know cardiology okay with increasing metoprolol so I sent in 50mg tablets to his pharmacy  [facility] should take 50mg twice a day   Call for lightheadedness or dizziness  Thank you

## 2020-01-09 ENCOUNTER — TELEPHONE (OUTPATIENT)
Dept: CARDIAC REHAB | Facility: CLINIC | Age: 40
End: 2020-01-09

## 2020-01-13 ENCOUNTER — OFFICE VISIT (OUTPATIENT)
Dept: CARDIOLOGY CLINIC | Facility: CLINIC | Age: 40
End: 2020-01-13
Payer: COMMERCIAL

## 2020-01-13 VITALS
HEIGHT: 63 IN | OXYGEN SATURATION: 98 % | DIASTOLIC BLOOD PRESSURE: 106 MMHG | HEART RATE: 90 BPM | SYSTOLIC BLOOD PRESSURE: 148 MMHG | BODY MASS INDEX: 35.3 KG/M2 | WEIGHT: 199.2 LBS

## 2020-01-13 DIAGNOSIS — N18.2 BENIGN HYPERTENSION WITH CHRONIC KIDNEY DISEASE, STAGE II: ICD-10-CM

## 2020-01-13 DIAGNOSIS — I33.0 BACTERIAL ENDOCARDITIS, UNSPECIFIED CHRONICITY: Primary | ICD-10-CM

## 2020-01-13 DIAGNOSIS — Z95.2 S/P MVR (MITRAL VALVE REPLACEMENT): ICD-10-CM

## 2020-01-13 DIAGNOSIS — I12.9 BENIGN HYPERTENSION WITH CHRONIC KIDNEY DISEASE, STAGE II: ICD-10-CM

## 2020-01-13 DIAGNOSIS — I34.0 SEVERE MITRAL REGURGITATION: ICD-10-CM

## 2020-01-13 PROBLEM — I95.9 HYPOTENSION: Status: RESOLVED | Noted: 2019-10-09 | Resolved: 2020-01-13

## 2020-01-13 PROCEDURE — 99214 OFFICE O/P EST MOD 30 MIN: CPT | Performed by: INTERNAL MEDICINE

## 2020-01-13 RX ORDER — ASPIRIN 81 MG/1
81 TABLET ORAL DAILY
COMMUNITY
End: 2020-01-22 | Stop reason: ALTCHOICE

## 2020-01-13 NOTE — PROGRESS NOTES
Cardiology Follow Up    [facility]  1980  [id number]  [facility] [place]  [address]  [address]  [place] [id number] [phone]    1  Bacterial endocarditis, unspecified chronicity  Echo complete with contrast if indicated   2  S/P MVR (mitral valve replacement)  Echo complete with contrast if indicated   3  Severe mitral regurgitation     4  Benign hypertension with chronic kidney disease, stage II         Discussion/Summary:    - Mitral valve regurgitation secondary to endocarditis, s/p  Mitral valve replacement with a bioprosthetic valve 10/9/2019  Did not do cardiac rehab  3 months out from surgery, lifting restrictions are lifted  He can return to work as of 1/9/2020  I filled out paperwork for the court indicating as such  He was admitted to the hospital on 9/30/2019 which is the first time our team was involved in his care, so paperwork indicated his date of out of work was 9/30  Needs echo to evaluate MV gradients prior to next visit with me  If dental work is done, needs antibiotics for prophylaxis     - HTN: Resume Lisinopril, continue higher dose metoprolol     - CKD: Follows with nephrology as well  History of Present Illness:     66-year-old man  I met him when he was admitted to the hospital   He was there with severe mitral regurgitation secondary to mitral valve endocarditis  He has a history of heroin use, but tells me that he has been clean recently  He also has hypertension  Chronic kidney disease with solitary kidney  Surgery was on October 9th, 2019  He did not complete cardiac rehab  Tells me he was [employer]  Walks outside with his children, but does not do any formal exercise  Recently saw Nephrology  He was ultimately told to stop his potassium supplementation, but he stopped his lisinopril  Blood pressure is elevated in the office today    His metoprolol was recently increased by them as well for the same reason  Problem List     Endocarditis    History of intravenous drug abuse ([facility] [address] )    Acute kidney injury ([facility] [address] )    Insomnia    Severe mitral regurgitation    Bacteremia due to Pseudomonas    Anxiety    Solitary left kidney    Right parietal lobe lesion    Bilateral pleural effusion    Acute blood loss as cause of postoperative anemia    S/P MVR (mitral valve replacement)    Acute postoperative pulmonary insufficiency (HCC)    Thrombocytopenia (HCC)    Hypokalemia    Benign hypertension with chronic kidney disease, stage II    S/P bilateral pec flap    CKD (chronic kidney disease) stage 2, GFR 60-89 ml/min    History of endocarditis    Overview Signed 11/22/2019  2:16 PM by JULIÁN Angulo     mitral valve             Past Medical History:   Diagnosis Date    Absent kidney, congenital     Anxiety 10/1/2019    Hepatitis C     History of endocarditis     mitral valve    History of intravenous drug abuse ([facility] [address] 10/01/2019    heroin    Nonrheumatic mitral valve regurgitation 10/01/2019    from endocarditis     Social History     Tobacco Use    Smoking status: Never Smoker    Smokeless tobacco: Never Used   Substance Use Topics    Alcohol use: Never     Frequency: Never     Binge frequency: Never    Drug use: Not Currently      Family History   Problem Relation Age of Onset    Heart disease Maternal Grandmother      Past Surgical History:   Procedure Laterality Date    CARDIAC CATHETERIZATION      CHEST WALL TUMOR EXCISION  2013    Anterior chest wall cyst removed    IR THORACENTESIS  10/2/2019    [place] ECHO TRANSESOPHAG MONTR CARDIAC PUMP FUNCTJ N/A 10/9/2019    Procedure: TRANSESOPHAGEAL ECHOCARDIOGRAM (BILLY);   Surgeon: Bill Lopez MD;  [facility];  Service: Cardiac Surgery    [place] MUSCLE-SKIN FLAP,TRUNK Bilateral 10/9/2019    Procedure: BILATERAL PECTORALIS MAJOR FLAP;  Surgeon: Wilmer Amaral MD;  [facility];  Service: Plastics    KS NEG PRESS WOUND TX, < 50 CM N/A 10/9/2019    Procedure: APPLICATION VAC DRESSING;  Surgeon: Jane Terry MD;  [facility];  Service: Plastics    [place] REPLACEMENT OF MITRAL VALVE N/A 10/9/2019    Procedure: MITRAL VALVE REPLACEMENT WITH 27MM MORROW MAGNA MITRAL EASE PERICARDIAL BIOPROSTHESIS;  Surgeon: Jordana De Santiago MD;  [facility];  Service: Cardiac Surgery       Current Outpatient Medications:     aspirin (ECOTRIN LOW STRENGTH) 81 mg EC tablet, Take 81 mg by mouth daily, Disp: , Rfl:     escitalopram (LEXAPRO) 10 mg tablet, Take 1 tablet (10 mg total) by mouth daily, Disp: 30 tablet, Rfl: 0    metoprolol succinate (TOPROL-XL) 50 mg 24 hr tablet, Take 1 tablet (50 mg total) by mouth 2 (two) times a day, Disp: 60 tablet, Rfl: 2    lisinopril (ZESTRIL) 5 mg tablet, Take 1 tablet (5 mg total) by mouth daily (Patient not taking: Reported on 1/13/2020), Disp: 30 tablet, Rfl: 1  No Known Allergies    Vitals:    01/13/20 1054 01/13/20 1102   BP: 148/100 (!) 148/106   BP Location: Left arm Right arm   Patient Position: Sitting Sitting   Cuff Size: Large Large   Pulse: 90    SpO2: 98%    Weight: 90 4 kg (199 lb 3 2 oz)    Height: 5' 3" (1 6 m)      Vitals:    01/13/20 1054   Weight: 90 4 kg (199 lb 3 2 oz)      Height: 5' 3" (160 cm)   Body mass index is 35 29 kg/m²      Physical Exam:  GENERAL: Alert, well appearing, and in no distress  HEENT:  PERRL, EOMI, no scleral icterus, no conjunctival pallor  NECK:  Supple, No elevated JVP, no thyromegaly, no carotid bruits  HEART:  Regular rate and rhythm, normal S1/S2, no S3/S4, no murmur or rub  LUNGS:  Clear to auscultation bilaterally  ABDOMEN:  Soft, non-tender, positive bowel sounds, no rebound or guarding  EXTREMITIES:  No edema  VASCULAR:  Normal pedal pulses   NEURO: No focal deficits,  SKIN: Normal without suspicious lesions on exposed skin      ROS:  Except as noted in HPI, is otherwise reviewed in detail and a 12 point review of systems is negative      Labs:  Lab Results   Component Value Date    K 4 5 01/07/2020     01/07/2020    CREATININE 1 32 01/07/2020    BUN 24 01/07/2020    CO2 33 (H) 01/07/2020    ALT 19 12/06/2019    AST 16 12/06/2019    INR 1 0 11/19/2019    WBC 7 9 12/06/2019    HGB 14 0 12/06/2019    HCT 42 4 12/06/2019     12/06/2019       No results found for: CHOL  Lab Results   Component Value Date    LDLCALC 76 10/03/2019     Lab Results   Component Value Date    HDL 27 (L) 10/03/2019     Lab Results   Component Value Date    TRIG 101 10/03/2019

## 2020-01-13 NOTE — PATIENT INSTRUCTIONS
Resume taking lisinopril 5mg once a day  Do not take potassium supplement  Continue the higher dose of metoprolol as you were instructed  Repeat an echo in 6 months prior to your follow up

## 2020-01-21 ENCOUNTER — TELEPHONE (OUTPATIENT)
Dept: CARDIAC REHAB | Facility: CLINIC | Age: 40
End: 2020-01-21

## 2020-01-21 NOTE — PROGRESS NOTES
Doc Alphonso      Dear Dr Po Lieberman,    Thank you for referring your patient to our cardiac rehabilitation program  The patient has completed 0  Visits and only came for his initial evaluation  However, rehab is being discontinued at this time due to:    Noncompliance: The patient has not regularly attended his/her rehab sessions  Last session attended was on 12/18  No reply received from patient with phone call inquiring of absence  Pt was notified today that we will be discharging him from the program and he is more then welcome to come back if he plans to attend rehab  Please contact us at [phone] if you have any questions about this patents case or if/when it is appropriate to re-start the patients rehab program at a later date  Thank you for your continued support of cardiac rehabilitation  Sincerely,      Xiomy Rodriguez MS, CEP  Exercise Physiologist

## 2020-01-22 ENCOUNTER — OFFICE VISIT (OUTPATIENT)
Dept: NEUROLOGY | Facility: CLINIC | Age: 40
End: 2020-01-22
Payer: COMMERCIAL

## 2020-01-22 VITALS
SYSTOLIC BLOOD PRESSURE: 130 MMHG | BODY MASS INDEX: 36.14 KG/M2 | WEIGHT: 204 LBS | HEIGHT: 63 IN | HEART RATE: 86 BPM | DIASTOLIC BLOOD PRESSURE: 80 MMHG

## 2020-01-22 DIAGNOSIS — F19.11 HISTORY OF INTRAVENOUS DRUG ABUSE (HCC): ICD-10-CM

## 2020-01-22 DIAGNOSIS — I66.9 CEREBRAL SEPTIC EMBOLI (HCC): Primary | ICD-10-CM

## 2020-01-22 DIAGNOSIS — I38 ENDOCARDITIS, VALVE UNSPECIFIED: ICD-10-CM

## 2020-01-22 DIAGNOSIS — I76 CEREBRAL SEPTIC EMBOLI (HCC): Primary | ICD-10-CM

## 2020-01-22 DIAGNOSIS — I38 OTHER ENDOCARDITIS, UNSPECIFIED CHRONICITY: ICD-10-CM

## 2020-01-22 DIAGNOSIS — H57.9 VISUAL SYMPTOMS: ICD-10-CM

## 2020-01-22 PROCEDURE — 99215 OFFICE O/P EST HI 40 MIN: CPT | Performed by: PSYCHIATRY & NEUROLOGY

## 2020-01-22 RX ORDER — ASPIRIN 325 MG
325 TABLET ORAL DAILY
COMMUNITY
End: 2020-04-03 | Stop reason: SDUPTHER

## 2020-01-22 NOTE — PROGRESS NOTES
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  Neurology was consulted for septic emboli  He underwent valve replacement surgery  Diagnoses and all orders for this visit:    History of intravenous drug abuse ([facility] [address] )  -states he has been clean and has not been using  Endocarditis, valve unspecified  -finished his antibiotic treatment and is now doing well  -     Ambulatory referral to Neurology    Other endocarditis, unspecified chronicity    Visual symptoms  -patient seeing dots in his right visual field along with headaches this could be on migraine with aura but I do want to rule out an occipital stroke given his underlying history as well   -will obtain MRI brain  -     MRI brain without contrast; Future    Cerebral septic emboli (HCC)  -continue with aspirin   -will get MRI brain     Other orders  -     aspirin 325 mg tablet; Take 325 mg by mouth daily       I would like to follow up in 6 months  I would be happy to see the patient sooner if any new questions/concerns arise  Patient/Guardian was advised to the call the office if they have any questions and concerns in the meantime  Patient/Guardian does understand that if they have any new stroke like symptoms such as facial droop on one side, weakness/paralysis on either side, speech trouble, numbness on one side, balance issues, any vision changes, extreme dizziness or any new headache, to call 9-1-1 immediately or to proceed to the nearest ER immediately  Subjective:    HPI    This is a 45 y/o M who is here as a hospital follow up for septic embolic stroke  He denies any new TIA/CVA like symptoms  He sees little black dots in both eyes and then comes and goes, and then sometimes on the R side  It lasts for a few days and then goes away    He normally does not have headaches but started having headaches since hospital discharge  No numbness, tingling  No residual deficits  He is not driving currently and is looking for work at this time  Denies any other symptoms at this time  Compliant with medications  He is no longer using IV drugs he states  He has been clean since he got discharge from hospital       The following portions of the patient's history were reviewed and updated as appropriate:   He  has a past medical history of Absent kidney, congenital, Anxiety (10/1/2019), Hepatitis C, History of endocarditis, History of intravenous drug abuse ([facility] [address] ) (10/01/2019), and Nonrheumatic mitral valve regurgitation (10/01/2019)  He   Patient Active Problem List    Diagnosis Date Noted    Visual symptoms 01/22/2020    Endocarditis, valve unspecified 01/22/2020    Cerebral septic emboli ([facility] [address] ) 01/22/2020    CKD (chronic kidney disease) stage 2, GFR 60-89 ml/min 11/22/2019    History of endocarditis 11/22/2019    S/P bilateral pec flap 10/30/2019    Hypokalemia 10/23/2019    Benign hypertension with chronic kidney disease, stage II 10/23/2019    Thrombocytopenia ([address] ) 10/10/2019    S/P MVR (mitral valve replacement) 10/09/2019    Acute postoperative pulmonary insufficiency ([facility] [address] ) 10/09/2019    Right parietal lobe lesion 10/07/2019    Bilateral pleural effusion 10/07/2019    Acute blood loss as cause of postoperative anemia 10/07/2019    Endocarditis 10/01/2019    History of intravenous drug abuse ([facility] [address] ) 10/01/2019    Acute kidney injury ([facility] [address] ) 10/01/2019    Insomnia 10/01/2019    Severe mitral regurgitation 10/01/2019    Bacteremia due to Pseudomonas 10/01/2019    Anxiety 10/01/2019    Solitary left kidney 10/01/2019     He  has a past surgical history that includes Chest wall tumor excision (2013); IR thoracentesis (10/2/2019);  Cardiac catheterization; pr replacement of mitral valve (N/A, 10/9/2019); pr echo transesophag montr cardiac pump functj (N/A, 10/9/2019); pr muscle-skin flap,trunk (Bilateral, 10/9/2019); and pr neg press wound tx, < 50 cm (N/A, 10/9/2019)  His family history includes Heart disease in his maternal grandmother  He  reports that he has never smoked  He has never used smokeless tobacco  He reports that he has current or past drug history  He reports that he does not drink alcohol  Current Outpatient Medications   Medication Sig Dispense Refill    aspirin 325 mg tablet Take 325 mg by mouth daily      escitalopram (LEXAPRO) 10 mg tablet Take 1 tablet (10 mg total) by mouth daily 30 tablet 0    lisinopril (ZESTRIL) 5 mg tablet Take 1 tablet (5 mg total) by mouth daily 30 tablet 1    metoprolol succinate (TOPROL-XL) 50 mg 24 hr tablet Take 1 tablet (50 mg total) by mouth 2 (two) times a day 60 tablet 2     No current facility-administered medications for this visit  Current Outpatient Medications on File Prior to Visit   Medication Sig    aspirin 325 mg tablet Take 325 mg by mouth daily    escitalopram (LEXAPRO) 10 mg tablet Take 1 tablet (10 mg total) by mouth daily    lisinopril (ZESTRIL) 5 mg tablet Take 1 tablet (5 mg total) by mouth daily    metoprolol succinate (TOPROL-XL) 50 mg 24 hr tablet Take 1 tablet (50 mg total) by mouth 2 (two) times a day    [DISCONTINUED] aspirin (ECOTRIN LOW STRENGTH) 81 mg EC tablet Take 81 mg by mouth daily     No current facility-administered medications on file prior to visit  He has No Known Allergies          Objective:    Blood pressure 130/80, pulse 86, height 5' 3" (1 6 m), weight 92 5 kg (204 lb)  Physical Exam  General - Patient is alert, awake, follows commands  Speech - no dysarthria noted, no aphasia noted  Neuro:   Cranial nerves: PERRL, EOMI, no facial droop noted, facial sensation intact, tongue midline     Motor: 5/5 throughout, normal tone  Sensory - intact to soft touch throughout  Coordination - no ataxia/dysmetria noted  Gait - normal tandem walk    ROS:  I personally reviewed ROS   Review of Systems   Constitutional: Negative  Negative for appetite change and fever  HENT: Negative  Negative for hearing loss, tinnitus, trouble swallowing and voice change  Eyes: Negative  Negative for photophobia and pain  Respiratory: Negative  Negative for shortness of breath  Cardiovascular: Negative  Negative for palpitations  Gastrointestinal: Negative  Negative for nausea and vomiting  Endocrine: Negative  Negative for cold intolerance and heat intolerance  Genitourinary: Negative  Negative for dysuria, frequency and urgency  Musculoskeletal: Negative  Negative for myalgias and neck pain  Skin: Negative  Negative for rash  Neurological: Negative  Negative for dizziness, tremors, seizures, syncope, facial asymmetry, speech difficulty, weakness, light-headedness, numbness and headaches  Hematological: Negative  Does not bruise/bleed easily  Psychiatric/Behavioral: Negative  Negative for confusion, hallucinations and sleep disturbance

## 2020-02-05 ENCOUNTER — HOSPITAL ENCOUNTER (OUTPATIENT)
Dept: MRI IMAGING | Facility: HOSPITAL | Age: 40
Discharge: HOME | End: 2020-02-05
Attending: PSYCHIATRY & NEUROLOGY
Payer: COMMERCIAL

## 2020-02-05 DIAGNOSIS — H57.9 VISUAL SYMPTOMS: ICD-10-CM

## 2020-02-05 PROCEDURE — 70551 MRI BRAIN STEM W/O DYE: CPT

## 2020-02-11 DIAGNOSIS — I10 ESSENTIAL HYPERTENSION: ICD-10-CM

## 2020-02-11 DIAGNOSIS — Z95.2 S/P MVR (MITRAL VALVE REPLACEMENT): ICD-10-CM

## 2020-02-11 RX ORDER — METOPROLOL SUCCINATE 50 MG/1
50 TABLET, EXTENDED RELEASE ORAL 2 TIMES DAILY
Qty: 60 TABLET | Refills: 4 | Status: SHIPPED | OUTPATIENT
Start: 2020-02-11 | End: 2020-04-23 | Stop reason: SDUPTHER

## 2020-02-13 DIAGNOSIS — I10 ESSENTIAL HYPERTENSION: ICD-10-CM

## 2020-02-13 DIAGNOSIS — Z95.2 S/P MVR (MITRAL VALVE REPLACEMENT): ICD-10-CM

## 2020-02-13 RX ORDER — LISINOPRIL 5 MG/1
5 TABLET ORAL DAILY
Qty: 30 TABLET | Refills: 11 | Status: SHIPPED | OUTPATIENT
Start: 2020-02-13 | End: 2020-02-13

## 2020-02-13 RX ORDER — LISINOPRIL 5 MG/1
TABLET ORAL
Qty: 90 TABLET | Refills: 3 | Status: SHIPPED | OUTPATIENT
Start: 2020-02-13 | End: 2020-04-30

## 2020-03-19 ENCOUNTER — TELEPHONE (OUTPATIENT)
Dept: NEPHROLOGY | Facility: CLINIC | Age: 40
End: 2020-03-19

## 2020-03-19 NOTE — TELEPHONE ENCOUNTER
I left a message for patient letting him know that we need to reschedule his appointment he has on 4/13 with Dr Chuy Yates

## 2020-03-20 ENCOUNTER — OFFICE VISIT (OUTPATIENT)
Dept: INTERNAL MEDICINE CLINIC | Facility: CLINIC | Age: 40
End: 2020-03-20
Payer: COMMERCIAL

## 2020-03-20 VITALS
RESPIRATION RATE: 18 BRPM | DIASTOLIC BLOOD PRESSURE: 84 MMHG | OXYGEN SATURATION: 98 % | HEIGHT: 62 IN | WEIGHT: 216.6 LBS | SYSTOLIC BLOOD PRESSURE: 136 MMHG | BODY MASS INDEX: 39.86 KG/M2 | TEMPERATURE: 98.2 F | HEART RATE: 88 BPM

## 2020-03-20 DIAGNOSIS — H57.9 VISUAL SYMPTOMS: ICD-10-CM

## 2020-03-20 DIAGNOSIS — Z86.79 HISTORY OF ENDOCARDITIS: ICD-10-CM

## 2020-03-20 DIAGNOSIS — Z95.2 S/P MVR (MITRAL VALVE REPLACEMENT): ICD-10-CM

## 2020-03-20 DIAGNOSIS — R21 RASH: ICD-10-CM

## 2020-03-20 DIAGNOSIS — F41.9 ANXIETY: ICD-10-CM

## 2020-03-20 DIAGNOSIS — I12.9 BENIGN HYPERTENSION WITH CHRONIC KIDNEY DISEASE, STAGE II: ICD-10-CM

## 2020-03-20 DIAGNOSIS — N18.2 CKD (CHRONIC KIDNEY DISEASE) STAGE 2, GFR 60-89 ML/MIN: Primary | ICD-10-CM

## 2020-03-20 DIAGNOSIS — N18.2 BENIGN HYPERTENSION WITH CHRONIC KIDNEY DISEASE, STAGE II: ICD-10-CM

## 2020-03-20 DIAGNOSIS — IMO0002 SOLITARY LEFT KIDNEY: ICD-10-CM

## 2020-03-20 PROBLEM — N17.9 ACUTE KIDNEY INJURY (HCC): Status: RESOLVED | Noted: 2019-10-01 | Resolved: 2020-03-20

## 2020-03-20 PROBLEM — I38 ENDOCARDITIS: Status: RESOLVED | Noted: 2019-10-01 | Resolved: 2020-03-20

## 2020-03-20 PROBLEM — B96.5 BACTEREMIA DUE TO PSEUDOMONAS: Status: RESOLVED | Noted: 2019-10-01 | Resolved: 2020-03-20

## 2020-03-20 PROBLEM — R78.81 BACTEREMIA DUE TO PSEUDOMONAS: Status: RESOLVED | Noted: 2019-10-01 | Resolved: 2020-03-20

## 2020-03-20 PROBLEM — I34.0 SEVERE MITRAL REGURGITATION: Status: RESOLVED | Noted: 2019-10-01 | Resolved: 2020-03-20

## 2020-03-20 PROBLEM — D69.6 THROMBOCYTOPENIA (HCC): Status: RESOLVED | Noted: 2019-10-10 | Resolved: 2020-03-20

## 2020-03-20 PROBLEM — D62 ACUTE BLOOD LOSS AS CAUSE OF POSTOPERATIVE ANEMIA: Status: RESOLVED | Noted: 2019-10-07 | Resolved: 2020-03-20

## 2020-03-20 PROBLEM — J90 BILATERAL PLEURAL EFFUSION: Status: RESOLVED | Noted: 2019-10-07 | Resolved: 2020-03-20

## 2020-03-20 PROBLEM — J95.2 ACUTE POSTOPERATIVE PULMONARY INSUFFICIENCY (HCC): Status: RESOLVED | Noted: 2019-10-09 | Resolved: 2020-03-20

## 2020-03-20 PROBLEM — G44.209 TENSION HEADACHE: Status: ACTIVE | Noted: 2020-03-20

## 2020-03-20 PROCEDURE — 3075F SYST BP GE 130 - 139MM HG: CPT | Performed by: INTERNAL MEDICINE

## 2020-03-20 PROCEDURE — 99204 OFFICE O/P NEW MOD 45 MIN: CPT | Performed by: INTERNAL MEDICINE

## 2020-03-20 PROCEDURE — 1036F TOBACCO NON-USER: CPT | Performed by: INTERNAL MEDICINE

## 2020-03-20 PROCEDURE — 3008F BODY MASS INDEX DOCD: CPT | Performed by: INTERNAL MEDICINE

## 2020-03-20 PROCEDURE — 3079F DIAST BP 80-89 MM HG: CPT | Performed by: INTERNAL MEDICINE

## 2020-03-20 RX ORDER — FUROSEMIDE 20 MG/1
TABLET ORAL
Status: CANCELLED | OUTPATIENT
Start: 2020-03-20

## 2020-03-20 RX ORDER — FUROSEMIDE 20 MG/1
20 TABLET ORAL DAILY
Qty: 90 TABLET | Refills: 0 | Status: SHIPPED | OUTPATIENT
Start: 2020-03-20 | End: 2020-04-30

## 2020-03-20 RX ORDER — ESCITALOPRAM OXALATE 20 MG/1
20 TABLET ORAL DAILY
Qty: 90 TABLET | Refills: 1 | Status: SHIPPED | OUTPATIENT
Start: 2020-03-20 | End: 2020-07-02 | Stop reason: ALTCHOICE

## 2020-03-20 RX ORDER — FUROSEMIDE 20 MG/1
TABLET ORAL
COMMUNITY
Start: 2020-02-25 | End: 2020-03-20 | Stop reason: SDUPTHER

## 2020-03-20 RX ORDER — CLOTRIMAZOLE AND BETAMETHASONE DIPROPIONATE 10; .64 MG/G; MG/G
CREAM TOPICAL 2 TIMES DAILY PRN
Qty: 45 G | Refills: 0 | Status: SHIPPED | OUTPATIENT
Start: 2020-03-20 | End: 2020-07-02 | Stop reason: SDUPTHER

## 2020-03-20 RX ORDER — ASPIRIN 325 MG
TABLET, DELAYED RELEASE (ENTERIC COATED) ORAL
COMMUNITY
Start: 2020-02-25 | End: 2020-04-03 | Stop reason: SDUPTHER

## 2020-03-20 NOTE — PROGRESS NOTES
Assessment/Plan:    S/P MVR (mitral valve replacement)  Doing well, no symptoms  Echo scheduled next month  He reports taking all his medications: furosemide 20 mg daily, lisinopril 5 mg daily and metoprolol 50 mg bid  Also on high dose ASA  CKD (chronic kidney disease) stage 2, GFR 60-89 ml/min  Labs due next month, follow up with nephrology as scheduled  Cerebral septic emboli [facility])  Reviewed recent MRI  Visual symptoms  Recommend eye exam     Tension headache  Start warm moist heat followed by stretching exercises  This was demonstrated to him  Anxiety  Increase Lexapro to 20 mg        Diagnoses and all orders for this visit:    CKD (chronic kidney disease) stage 2, GFR 60-89 ml/min  -     CBC and differential  -     Comprehensive metabolic panel    S/P MVR (mitral valve replacement)  -     escitalopram (LEXAPRO) 20 mg tablet; Take 1 tablet (20 mg total) by mouth daily  -     Lipid panel  -     furosemide (LASIX) 20 mg tablet; Take 1 tablet (20 mg total) by mouth daily    Benign hypertension with chronic kidney disease, stage II  -     TSH, 3rd generation with Free T4 reflex  -     Lipid panel    History of endocarditis    Rash  Comments:  Given cream to use prn  Orders:  -     clotrimazole-betamethasone (LOTRISONE) 1-0 05 % cream; Apply topically 2 (two) times a day as needed (rash)    Anxiety    Visual symptoms    Solitary left kidney    Other orders  -     Discontinue: furosemide (LASIX) 20 mg tablet; TK 1 T PO D UNLESS OTHERWISE DIRECTED  -     aspirin (ECOTRIN) 325 mg EC tablet; TK 1 T PO D  -     Cancel: furosemide (LASIX) 20 mg tablet      Follow up in 3 months or as needed  Subjective:      Patient ID: Latha Bynum is a 36 y o  male  Meryln Herzog is here to establish care  He reports that he continues to experience headaches, mostly coming from his neck area  He tries to do stretches  He admits his vision is not best   He has not had an eye exam recently    He occasionally experiences dizziness when the headaches occur, denies any photophobia, no nausea or vomiting  He did see neurology a few months ago, had an MRI  He denies any chest pain, shortness of breath, palpitations or other symptoms  He claims to be taking all his prescriptions as prescribed  He complains of difficulty sleeping at night, complains that his thoughts are always running thinking about coat stupid things    He has been taking his Lexapro daily  He does not take any over-the-counter medications for sleep  He tries to go to the gym regularly, tries to remain active  He has gained a lot of weight since his hospitalization last year  He reports that he does not smoke, drink alcohol, has not used drugs since his heart surgery  He complains of an occasional rash on his foot  He reports it is itchy with little red bumps at the side of his foot  The following portions of the patient's history were reviewed and updated as appropriate: allergies, current medications, past family history, past medical history, past social history, past surgical history and problem list     Past Medical History:   Diagnosis Date    Absent kidney, congenital     Anxiety 10/1/2019    Hepatitis C     History of endocarditis     mitral valve    History of intravenous drug abuse ([facility] [address] ) 10/01/2019    heroin    Nonrheumatic mitral valve regurgitation 10/01/2019    from endocarditis     Past Surgical History:   Procedure Laterality Date    CARDIAC CATHETERIZATION      CHEST WALL TUMOR EXCISION  2013    Anterior chest wall cyst removed    IR THORACENTESIS  10/2/2019    OR ECHO TRANSESOPHAG MONTR CARDIAC PUMP FUNCTJ N/A 10/9/2019    Procedure: TRANSESOPHAGEAL ECHOCARDIOGRAM (BILLY);   Surgeon: Ingrid Lawrence MD;  [facility];  Service: Cardiac Surgery    OR MUSCLE-SKIN FLAP,TRUNK Bilateral 10/9/2019    Procedure: BILATERAL PECTORALIS MAJOR FLAP;  Surgeon: Jane Terry MD;  [facility]: [facility]; Service: Plastics    ND NEG PRESS WOUND TX, < 50 CM N/A 10/9/2019    Procedure: APPLICATION VAC DRESSING;  Surgeon: Camilo Acharya MD;  [facility];  Service: Plastics    ND REPLACEMENT OF MITRAL VALVE N/A 10/9/2019    Procedure: MITRAL VALVE REPLACEMENT WITH 27MM MORROW MAGNA MITRAL EASE PERICARDIAL BIOPROSTHESIS;  Surgeon: Pilar Pearl MD;  [facility];  Service: Cardiac Surgery     Family History   Problem Relation Age of Onset    Heart disease Maternal Grandmother      Social History     Socioeconomic History    Marital status: Single     Spouse name: Not on file    Number of children: Not on file    Years of education: Not on file    Highest education level: Not on file   Occupational History    Occupation: DISABLED   Social Needs    Financial resource strain: Not on file    Food insecurity:     Worry: Not on file     Inability: Not on file    Transportation needs:     Medical: Not on file     Non-medical: Not on file   Tobacco Use    Smoking status: Never Smoker    Smokeless tobacco: Never Used   Substance and Sexual Activity    Alcohol use: Never     Frequency: Never     Binge frequency: Never    Drug use: Not Currently    Sexual activity: Yes     Partners: Female   Lifestyle    Physical activity:     Days per week: Not on file     Minutes per session: Not on file    Stress: Not on file   Relationships    Social connections:     Talks on phone: Not on file     Gets together: Not on file     Attends L[employer] service: Not on file     Active member of club or organization: Not on file     Attends meetings of clubs or organizations: Not on file     Relationship status: Not on file    Intimate partner violence:     Fear of current or ex partner: Not on file     Emotionally abused: Not on file     Physically abused: Not on file     Forced sexual activity: Not on file   Other Topics Concern    Not on file   Social History Narrative    Not on file Current Outpatient Medications:     aspirin (ECOTRIN) 325 mg EC tablet, TK 1 T PO D, Disp: , Rfl:     aspirin 325 mg tablet, Take 325 mg by mouth daily, Disp: , Rfl:     escitalopram (LEXAPRO) 20 mg tablet, Take 1 tablet (20 mg total) by mouth daily, Disp: 90 tablet, Rfl: 1    furosemide (LASIX) 20 mg tablet, Take 1 tablet (20 mg total) by mouth daily, Disp: 90 tablet, Rfl: 0    lisinopril (ZESTRIL) 5 mg tablet, TAKE 1 TABLET BY MOUTH DAILY, Disp: 90 tablet, Rfl: 3    metoprolol succinate (TOPROL-XL) 50 mg 24 hr tablet, Take 1 tablet (50 mg total) by mouth 2 (two) times a day, Disp: 60 tablet, Rfl: 4    clotrimazole-betamethasone (LOTRISONE) 1-0 05 % cream, Apply topically 2 (two) times a day as needed (rash), Disp: 45 g, Rfl: 0  No Known Allergies      Review of Systems   Constitutional: Negative for appetite change and fatigue  HENT: Negative for congestion and postnasal drip  Eyes: Positive for visual disturbance  Respiratory: Negative for cough, chest tightness and shortness of breath  Cardiovascular: Negative for chest pain, palpitations and leg swelling  Gastrointestinal: Negative for abdominal pain and constipation  Genitourinary: Negative for dysuria and frequency  Musculoskeletal: Negative for arthralgias  Skin: Negative for rash and wound  Neurological: Positive for headaches  Negative for dizziness, syncope and numbness  Hematological: Does not bruise/bleed easily  Psychiatric/Behavioral: Positive for sleep disturbance  Negative for agitation and dysphoric mood  The patient is nervous/anxious  Objective:      /84   Pulse 88   Temp 98 2 °F (36 8 °C) (Oral)   Resp 18   Ht 5' 2" (1 575 m)   Wt 98 2 kg (216 lb 9 6 oz)   SpO2 98%   BMI 39 62 kg/m²          Physical Exam   Constitutional: He is oriented to person, place, and time  Vital signs are normal  He appears well-developed and well-nourished  HENT:   Head: Normocephalic and atraumatic     Right Ear: Tympanic membrane and external ear normal    Left Ear: Tympanic membrane and external ear normal    Nose: Nose normal    Mouth/Throat: Uvula is midline, oropharynx is clear and moist and mucous membranes are normal    Eyes: Pupils are equal, round, and reactive to light  Conjunctivae are normal    Neck: Neck supple  No thyroid mass present  Cardiovascular: Normal rate, regular rhythm and normal heart sounds  Pulmonary/Chest: Effort normal and breath sounds normal  He has no wheezes  He has no rhonchi  Abdominal: Soft  Bowel sounds are normal  There is no tenderness  No hernia  Musculoskeletal: Normal range of motion  He exhibits no edema  Cervical back: He exhibits pain and spasm  He exhibits normal range of motion and no tenderness  Thoracic back: He exhibits spasm  He exhibits no tenderness and no bony tenderness  No joint pain or swelling   Lymphadenopathy:     He has no cervical adenopathy  Right: No supraclavicular adenopathy present  Left: No supraclavicular adenopathy present  Neurological: He is alert and oriented to person, place, and time  No cranial nerve deficit  Skin: Skin is warm  No rash noted  Psychiatric: He has a normal mood and affect  His speech is normal and behavior is normal  He does not exhibit a depressed mood  Nursing note and vitals reviewed  Reviewed available records  BMI Counseling: Body mass index is 39 62 kg/m²  The BMI is above normal  Exercise recommendations include exercising 3-5 times per week

## 2020-03-20 NOTE — ASSESSMENT & PLAN NOTE
Doing well, no symptoms  Echo scheduled next month  He reports taking all his medications: furosemide 20 mg daily, lisinopril 5 mg daily and metoprolol 50 mg bid  Also on high dose ASA

## 2020-03-20 NOTE — PATIENT INSTRUCTIONS
Schedule eye exam   Take Tylenol (acetaminophen), as needed for pain  Apply warm moist heat for 10 to 15 minutes, followed by back stretching exercises  Neck Exercises   WHAT YOU NEED TO KNOW:   Why is it important to do neck exercises? Neck exercises help reduce neck pain, and improve neck movement and strength  Neck exercises also help prevent long-term neck problems  What do I need to know about neck exercises? · Do the exercises every day,  or as often as directed by your healthcare provider  · Move slowly, gently, and smoothly  Avoid fast or jerky motions  · Stand and sit the way your healthcare provider shows you  Good posture may reduce your neck pain  Check your posture often, even when you are not doing your neck exercises  How do I perform neck exercises safely? · Exercise position:  You may sit or stand while you do neck exercises  Face forward  Your shoulders should be straight and relaxed, with a good posture  · Head tilts, forward and back:  Gently bow your head and try to touch your chin to your chest  Your healthcare provider may tell you to push on the back of your neck to help bow your head  Raise your chin back to the starting position  Tilt your head back as far as possible so you are looking up at the ceiling  Your healthcare provider may tell you to lift your chin to help tilt your head back  Return your head to the starting position  · Head tilts, side to side:  Tilt your head, bringing your ear toward your shoulder  Then tilt your head toward the other shoulder  · Head turns:  Turn your head to look over your shoulder  Tilt your chin down and try to touch it to your shoulder  Do not raise your shoulder to your chin  Face forward again  Do the same on the other side  When should I contact my healthcare provider? · Your pain does not get better, or gets worse      · You have questions or concerns about your condition, care, or exercise program     Upper Back Exercises   AMBULATORY CARE:   Upper back exercises  help heal and strengthen your back muscles and prevent another injury  Ask your healthcare provider if you need to see a physical therapist for more advanced exercises  · Do the exercises on a mat or firm surface  (not on a bed) to support your spine  · Move slowly and smoothly  Avoid fast or jerky motions  · Breathe normally  Do not hold your breath  · Stop if you feel pain  It is normal to feel some discomfort at first  Regular exercise will help decrease your discomfort over time  Seek care immediately if:   · You have severe pain that prevents you from moving  Contact your healthcare provider if:   · Your pain becomes worse  · You have new pain  · You have questions or concerns about your condition, care, or exercise program   Perform upper back exercises safely:  Ask your healthcare provider which of the following exercises are best for you and how often to do them  · Scapular squeeze:  Sit or stand with your arms at your sides  Squeeze your shoulder blades together and hold for 3 seconds  Relax and repeat 3 times  · Pectoralis stretch:   a doorway  Lift your hands and place them on each side of the door frame or wall slightly higher than your head  Lean forward slowly until you feel a gentle stretch  Hold for 15 seconds  Repeat 3 times, or as directed  · Cat and camel exercise:  Place your hands and knees on the floor  Arch your back upward toward the ceiling and lower your head  Round out your spine as much as you can  Hold for 5 seconds  Lift your head upward and push your chest downward toward the floor  Hold for 5 seconds  Do 3 sets or as directed  · Bird dog:  Place your hands and knees on the floor  Keep your wrists directly below your shoulders and your knees directly below your hips  Pull your belly button in toward your spine   Do not flatten or arch your back  Tighten your abdominal muscles  Raise one arm straight out so that it is aligned with your head  Next, raise the leg opposite your arm  Hold this position for 15 seconds  Lower your arm and leg slowly and change sides  Do 5 sets  © 2017 [employer] Information is for End User's use only and may not be sold, redistributed or otherwise used for commercial purposes  All illustrations and images included in CareNotes® are the copyrighted property of [employer]  or [employer]  The above information is an  only  It is not intended as medical advice for individual conditions or treatments  Talk to your doctor, nurse or pharmacist before following any medical regimen to see if it is safe and effective for you

## 2020-03-23 RX ORDER — ASPIRIN 325 MG
TABLET, DELAYED RELEASE (ENTERIC COATED) ORAL
Qty: 30 TABLET | OUTPATIENT
Start: 2020-03-23

## 2020-03-27 ENCOUNTER — TELEPHONE (OUTPATIENT)
Dept: INTERNAL MEDICINE CLINIC | Facility: CLINIC | Age: 40
End: 2020-03-27

## 2020-03-27 LAB
ALBUMIN SERPL-MCNC: 4.3 G/DL (ref 3.6–5.1)
ALBUMIN/GLOB SERPL: 1.2 (CALC) (ref 1–2.5)
ALP SERPL-CCNC: 90 U/L (ref 36–130)
ALT SERPL-CCNC: 25 U/L (ref 9–46)
APPEARANCE UR: CLEAR
AST SERPL-CCNC: 19 U/L (ref 10–40)
BACTERIA UR QL AUTO: NORMAL /HPF
BASOPHILS # BLD AUTO: 59 CELLS/UL (ref 0–200)
BASOPHILS NFR BLD AUTO: 0.9 %
BILIRUB SERPL-MCNC: 0.6 MG/DL (ref 0.2–1.2)
BILIRUB UR QL STRIP: NEGATIVE
BUN SERPL-MCNC: 18 MG/DL (ref 7–25)
BUN/CREAT SERPL: NORMAL (CALC) (ref 6–22)
CALCIUM SERPL-MCNC: 9.3 MG/DL (ref 8.6–10.3)
CHLORIDE SERPL-SCNC: 105 MMOL/L (ref 98–110)
CHOLEST SERPL-MCNC: 205 MG/DL
CHOLEST/HDLC SERPL: 3.4 (CALC)
CO2 SERPL-SCNC: 25 MMOL/L (ref 20–32)
COLOR UR: YELLOW
CREAT SERPL-MCNC: 1.25 MG/DL (ref 0.6–1.35)
EOSINOPHIL # BLD AUTO: 98 CELLS/UL (ref 15–500)
EOSINOPHIL NFR BLD AUTO: 1.5 %
ERYTHROCYTE [DISTWIDTH] IN BLOOD BY AUTOMATED COUNT: 15.9 % (ref 11–15)
GLOBULIN SER CALC-MCNC: 3.5 G/DL (CALC) (ref 1.9–3.7)
GLUCOSE SERPL-MCNC: 93 MG/DL (ref 65–99)
GLUCOSE UR QL STRIP: NEGATIVE
HCT VFR BLD AUTO: 46.5 % (ref 38.5–50)
HDLC SERPL-MCNC: 61 MG/DL
HGB BLD-MCNC: 16 G/DL (ref 13.2–17.1)
HGB UR QL STRIP: NEGATIVE
HYALINE CASTS #/AREA URNS LPF: NORMAL /LPF
KETONES UR QL STRIP: NEGATIVE
LDLC SERPL CALC-MCNC: 127 MG/DL (CALC)
LEUKOCYTE ESTERASE UR QL STRIP: NEGATIVE
LYMPHOCYTES # BLD AUTO: 1417 CELLS/UL (ref 850–3900)
LYMPHOCYTES NFR BLD AUTO: 21.8 %
MAGNESIUM SERPL-MCNC: 2 MG/DL (ref 1.5–2.5)
MCH RBC QN AUTO: 28.6 PG (ref 27–33)
MCHC RBC AUTO-ENTMCNC: 34.4 G/DL (ref 32–36)
MCV RBC AUTO: 83.2 FL (ref 80–100)
MONOCYTES # BLD AUTO: 442 CELLS/UL (ref 200–950)
MONOCYTES NFR BLD AUTO: 6.8 %
NEUTROPHILS # BLD AUTO: 4485 CELLS/UL (ref 1500–7800)
NEUTROPHILS NFR BLD AUTO: 69 %
NITRITE UR QL STRIP: NEGATIVE
NONHDLC SERPL-MCNC: 144 MG/DL (CALC)
PH UR STRIP: 5.5 [PH] (ref 5–8)
PHOSPHATE SERPL-MCNC: 3.3 MG/DL (ref 2.5–4.5)
PLATELET # BLD AUTO: 218 THOUSAND/UL (ref 140–400)
PMV BLD REES-ECKER: 9.9 FL (ref 7.5–12.5)
POTASSIUM SERPL-SCNC: 4.3 MMOL/L (ref 3.5–5.3)
PROT SERPL-MCNC: 7.8 G/DL (ref 6.1–8.1)
PROT UR QL STRIP: NEGATIVE
RBC # BLD AUTO: 5.59 MILLION/UL (ref 4.2–5.8)
RBC #/AREA URNS HPF: NORMAL /HPF
SL AMB EGFR AFRICAN AMERICAN: 83 ML/MIN/1.73M2
SL AMB EGFR NON AFRICAN AMERICAN: 72 ML/MIN/1.73M2
SODIUM SERPL-SCNC: 140 MMOL/L (ref 135–146)
SP GR UR STRIP: 1.02 (ref 1–1.03)
SQUAMOUS #/AREA URNS HPF: NORMAL /HPF
TRIGL SERPL-MCNC: 80 MG/DL
TSH SERPL-ACNC: 1.73 MIU/L (ref 0.4–4.5)
WBC # BLD AUTO: 6.5 THOUSAND/UL (ref 3.8–10.8)
WBC #/AREA URNS HPF: NORMAL /HPF

## 2020-04-02 ENCOUNTER — DOCUMENTATION (OUTPATIENT)
Dept: NEPHROLOGY | Facility: CLINIC | Age: 40
End: 2020-04-02

## 2020-04-03 DIAGNOSIS — I66.9 CEREBRAL SEPTIC EMBOLI (HCC): Primary | ICD-10-CM

## 2020-04-03 DIAGNOSIS — I76 CEREBRAL SEPTIC EMBOLI (HCC): Primary | ICD-10-CM

## 2020-04-03 RX ORDER — ASPIRIN 325 MG
325 TABLET ORAL DAILY
Qty: 90 TABLET | Refills: 1 | Status: SHIPPED | OUTPATIENT
Start: 2020-04-03 | End: 2020-09-24

## 2020-04-06 ENCOUNTER — TELEMEDICINE (OUTPATIENT)
Dept: NEUROLOGY | Facility: CLINIC | Age: 40
End: 2020-04-06
Payer: COMMERCIAL

## 2020-04-06 DIAGNOSIS — I38 ENDOCARDITIS, UNSPECIFIED CHRONICITY, UNSPECIFIED ENDOCARDITIS TYPE: ICD-10-CM

## 2020-04-06 DIAGNOSIS — F19.11 HISTORY OF INTRAVENOUS DRUG ABUSE (HCC): Primary | ICD-10-CM

## 2020-04-06 DIAGNOSIS — H57.9 VISUAL SYMPTOMS: ICD-10-CM

## 2020-04-06 DIAGNOSIS — E78.2 MIXED HYPERLIPIDEMIA: ICD-10-CM

## 2020-04-06 PROCEDURE — G2012 BRIEF CHECK IN BY MD/QHP: HCPCS | Performed by: PSYCHIATRY & NEUROLOGY

## 2020-04-06 RX ORDER — ATORVASTATIN CALCIUM 40 MG/1
40 TABLET, FILM COATED ORAL DAILY
Qty: 30 TABLET | Refills: 3 | Status: SHIPPED | OUTPATIENT
Start: 2020-04-06 | End: 2020-04-06

## 2020-04-06 RX ORDER — ATORVASTATIN CALCIUM 40 MG/1
TABLET, FILM COATED ORAL
Qty: 90 TABLET | Refills: 3 | Status: SHIPPED | OUTPATIENT
Start: 2020-04-06 | End: 2022-02-25 | Stop reason: SDUPTHER

## 2020-04-23 DIAGNOSIS — Z95.2 S/P MVR (MITRAL VALVE REPLACEMENT): ICD-10-CM

## 2020-04-23 DIAGNOSIS — I10 ESSENTIAL HYPERTENSION: ICD-10-CM

## 2020-04-23 RX ORDER — METOPROLOL SUCCINATE 50 MG/1
50 TABLET, EXTENDED RELEASE ORAL 2 TIMES DAILY
Qty: 60 TABLET | Refills: 5 | Status: SHIPPED | OUTPATIENT
Start: 2020-04-23 | End: 2020-10-01 | Stop reason: SDUPTHER

## 2020-04-30 ENCOUNTER — TELEMEDICINE (OUTPATIENT)
Dept: NEPHROLOGY | Facility: CLINIC | Age: 40
End: 2020-04-30
Payer: COMMERCIAL

## 2020-04-30 DIAGNOSIS — IMO0002 SOLITARY LEFT KIDNEY: ICD-10-CM

## 2020-04-30 DIAGNOSIS — N18.2 CKD (CHRONIC KIDNEY DISEASE) STAGE 2, GFR 60-89 ML/MIN: Primary | ICD-10-CM

## 2020-04-30 DIAGNOSIS — N18.2 BENIGN HYPERTENSION WITH CHRONIC KIDNEY DISEASE, STAGE II: ICD-10-CM

## 2020-04-30 DIAGNOSIS — I12.9 BENIGN HYPERTENSION WITH CHRONIC KIDNEY DISEASE, STAGE II: ICD-10-CM

## 2020-04-30 DIAGNOSIS — E87.6 HYPOKALEMIA: ICD-10-CM

## 2020-04-30 PROCEDURE — 99214 OFFICE O/P EST MOD 30 MIN: CPT | Performed by: INTERNAL MEDICINE

## 2020-04-30 RX ORDER — LOSARTAN POTASSIUM 25 MG/1
25 TABLET ORAL DAILY
Qty: 90 TABLET | Refills: 1 | Status: SHIPPED | OUTPATIENT
Start: 2020-04-30 | End: 2020-07-02 | Stop reason: SDUPTHER

## 2020-05-06 ENCOUNTER — TELEPHONE (OUTPATIENT)
Dept: INTERNAL MEDICINE CLINIC | Facility: CLINIC | Age: 40
End: 2020-05-06

## 2020-05-06 DIAGNOSIS — R10.13 DYSPEPSIA: Primary | ICD-10-CM

## 2020-05-06 DIAGNOSIS — R51.9 NONINTRACTABLE HEADACHE, UNSPECIFIED CHRONICITY PATTERN, UNSPECIFIED HEADACHE TYPE: ICD-10-CM

## 2020-05-07 DIAGNOSIS — R10.13 DYSPEPSIA: ICD-10-CM

## 2020-05-07 RX ORDER — ACETAMINOPHEN 500 MG
500 TABLET ORAL EVERY 6 HOURS PRN
Qty: 60 TABLET | Refills: 0 | Status: SHIPPED | OUTPATIENT
Start: 2020-05-07 | End: 2021-03-08 | Stop reason: SDUPTHER

## 2020-05-07 RX ORDER — FAMOTIDINE 20 MG/1
20 TABLET, FILM COATED ORAL 2 TIMES DAILY
Qty: 60 TABLET | Refills: 1 | Status: SHIPPED | OUTPATIENT
Start: 2020-05-07 | End: 2020-05-07

## 2020-05-07 RX ORDER — FAMOTIDINE 20 MG/1
TABLET, FILM COATED ORAL
Qty: 180 TABLET | Refills: 0 | Status: SHIPPED | OUTPATIENT
Start: 2020-05-07 | End: 2020-07-02 | Stop reason: ALTCHOICE

## 2020-05-12 ENCOUNTER — TELEPHONE (OUTPATIENT)
Dept: NEPHROLOGY | Facility: CLINIC | Age: 40
End: 2020-05-12

## 2020-05-12 LAB
BUN SERPL-MCNC: 16 MG/DL (ref 7–25)
BUN/CREAT SERPL: NORMAL (CALC) (ref 6–22)
CALCIUM SERPL-MCNC: 8.9 MG/DL (ref 8.6–10.3)
CHLORIDE SERPL-SCNC: 107 MMOL/L (ref 98–110)
CO2 SERPL-SCNC: 24 MMOL/L (ref 20–32)
CREAT SERPL-MCNC: 1.16 MG/DL (ref 0.6–1.35)
GLUCOSE SERPL-MCNC: 93 MG/DL (ref 65–99)
POTASSIUM SERPL-SCNC: 4.2 MMOL/L (ref 3.5–5.3)
SL AMB EGFR AFRICAN AMERICAN: 91 ML/MIN/1.73M2
SL AMB EGFR NON AFRICAN AMERICAN: 78 ML/MIN/1.73M2
SODIUM SERPL-SCNC: 142 MMOL/L (ref 135–146)

## 2020-06-01 ENCOUNTER — HOSPITAL ENCOUNTER (OUTPATIENT)
Dept: NON INVASIVE DIAGNOSTICS | Facility: CLINIC | Age: 40
Discharge: HOME | End: 2020-06-01
Payer: COMMERCIAL

## 2020-06-01 DIAGNOSIS — I33.0 BACTERIAL ENDOCARDITIS, UNSPECIFIED CHRONICITY: ICD-10-CM

## 2020-06-01 DIAGNOSIS — Z95.2 S/P MVR (MITRAL VALVE REPLACEMENT): ICD-10-CM

## 2020-06-01 PROCEDURE — 93306 TTE W/DOPPLER COMPLETE: CPT

## 2020-06-01 PROCEDURE — 93306 TTE W/DOPPLER COMPLETE: CPT | Performed by: INTERNAL MEDICINE

## 2020-06-02 ENCOUNTER — TELEPHONE (OUTPATIENT)
Dept: CARDIOLOGY CLINIC | Facility: CLINIC | Age: 40
End: 2020-06-02

## 2020-07-02 ENCOUNTER — OFFICE VISIT (OUTPATIENT)
Dept: INTERNAL MEDICINE CLINIC | Facility: CLINIC | Age: 40
End: 2020-07-02
Payer: COMMERCIAL

## 2020-07-02 VITALS
BODY MASS INDEX: 42.73 KG/M2 | RESPIRATION RATE: 18 BRPM | HEIGHT: 62 IN | HEART RATE: 82 BPM | TEMPERATURE: 96.4 F | DIASTOLIC BLOOD PRESSURE: 80 MMHG | SYSTOLIC BLOOD PRESSURE: 114 MMHG | WEIGHT: 232.2 LBS | OXYGEN SATURATION: 98 %

## 2020-07-02 DIAGNOSIS — R21 RASH: ICD-10-CM

## 2020-07-02 DIAGNOSIS — N18.2 CKD (CHRONIC KIDNEY DISEASE) STAGE 2, GFR 60-89 ML/MIN: ICD-10-CM

## 2020-07-02 DIAGNOSIS — N18.2 BENIGN HYPERTENSION WITH CHRONIC KIDNEY DISEASE, STAGE II: ICD-10-CM

## 2020-07-02 DIAGNOSIS — E66.01 CLASS 3 SEVERE OBESITY DUE TO EXCESS CALORIES WITH SERIOUS COMORBIDITY AND BODY MASS INDEX (BMI) OF 40.0 TO 44.9 IN ADULT (HCC): ICD-10-CM

## 2020-07-02 DIAGNOSIS — I12.9 BENIGN HYPERTENSION WITH CHRONIC KIDNEY DISEASE, STAGE II: ICD-10-CM

## 2020-07-02 DIAGNOSIS — F41.9 ANXIETY: ICD-10-CM

## 2020-07-02 DIAGNOSIS — K21.9 GASTROESOPHAGEAL REFLUX DISEASE, ESOPHAGITIS PRESENCE NOT SPECIFIED: Primary | ICD-10-CM

## 2020-07-02 DIAGNOSIS — E78.2 MIXED HYPERLIPIDEMIA: ICD-10-CM

## 2020-07-02 DIAGNOSIS — Z95.2 S/P MVR (MITRAL VALVE REPLACEMENT): ICD-10-CM

## 2020-07-02 PROBLEM — G44.209 TENSION HEADACHE: Status: RESOLVED | Noted: 2020-03-20 | Resolved: 2020-07-02

## 2020-07-02 PROBLEM — E66.813 CLASS 3 SEVERE OBESITY DUE TO EXCESS CALORIES WITH SERIOUS COMORBIDITY AND BODY MASS INDEX (BMI) OF 40.0 TO 44.9 IN ADULT (HCC): Status: ACTIVE | Noted: 2020-07-02

## 2020-07-02 PROCEDURE — 3008F BODY MASS INDEX DOCD: CPT | Performed by: INTERNAL MEDICINE

## 2020-07-02 PROCEDURE — 3079F DIAST BP 80-89 MM HG: CPT | Performed by: INTERNAL MEDICINE

## 2020-07-02 PROCEDURE — 1036F TOBACCO NON-USER: CPT | Performed by: INTERNAL MEDICINE

## 2020-07-02 PROCEDURE — 99214 OFFICE O/P EST MOD 30 MIN: CPT | Performed by: INTERNAL MEDICINE

## 2020-07-02 PROCEDURE — 3074F SYST BP LT 130 MM HG: CPT | Performed by: INTERNAL MEDICINE

## 2020-07-02 RX ORDER — CLOTRIMAZOLE AND BETAMETHASONE DIPROPIONATE 10; .64 MG/G; MG/G
CREAM TOPICAL 2 TIMES DAILY PRN
Qty: 45 G | Refills: 2 | Status: SHIPPED | OUTPATIENT
Start: 2020-07-02 | End: 2021-08-27 | Stop reason: ALTCHOICE

## 2020-07-02 RX ORDER — LOSARTAN POTASSIUM 25 MG/1
25 TABLET ORAL DAILY
Qty: 90 TABLET | Refills: 1 | Status: SHIPPED | OUTPATIENT
Start: 2020-07-02 | End: 2021-05-19

## 2020-07-02 RX ORDER — OMEPRAZOLE 20 MG/1
20 CAPSULE, DELAYED RELEASE ORAL EVERY MORNING
Qty: 90 CAPSULE | Refills: 1 | Status: SHIPPED | OUTPATIENT
Start: 2020-07-02 | End: 2021-08-27 | Stop reason: ALTCHOICE

## 2020-07-02 NOTE — PROGRESS NOTES
Assessment/Plan:    GERD (gastroesophageal reflux disease)  Change famotidine to omeprazole  Already on low acid diet  Mixed hyperlipidemia  On statin  Tension headache  Resolved  Visual symptoms  No new symptoms, saw neurology  Thought to be due to septic emboli  S/P MVR (mitral valve replacement)  Reviewed recent echo  On daily ASA  Benign hypertension with chronic kidney disease, stage II  Renal function stable  BP controlled on losartan and metoprolol  Class 3 severe obesity due to excess calories with serious comorbidity and body mass index (BMI) of 40 0 to 44 9 in adult ([facility])  Gained 15 lbs since last visit  Discussed regular exercise, healthy diet  Anxiety  He stopped taking Lexapro since no change in symptoms  He complains mostly of anger, no outbursts  Declined counseling, recommend online CBT  Diagnoses and all orders for this visit:    Gastroesophageal reflux disease, esophagitis presence not specified  -     omeprazole (PriLOSEC) 20 mg delayed release capsule; Take 1 capsule (20 mg total) by mouth every morning    Benign hypertension with chronic kidney disease, stage II  -     losartan (COZAAR) 25 mg tablet; Take 1 tablet (25 mg total) by mouth daily  -     CBC and differential; Future    Rash  Comments: Instructed to put powder in socks during work days, may change socks mid day  Apply cream q h s  Orders:  -     clotrimazole-betamethasone (Sera Keto) 1-0 05 % cream; Apply topically 2 (two) times a day as needed (rash)    S/P MVR (mitral valve replacement)    Mixed hyperlipidemia  -     Lipid panel; Future  -     Hepatic function panel; Future    Anxiety    CKD (chronic kidney disease) stage 2, GFR 60-89 ml/min    Class 3 severe obesity due to excess calories with serious comorbidity and body mass index (BMI) of 40 0 to 44 9 in adult [facility])      Follow up in 4 months or as needed  Subjective:      Patient ID: Pedro Feliz is a 36 y o  male      Yuki Mead is feeling alright  He reports that he gained a lot of weight during the pandemic, has lost some since he started work about a month ago  He is working at [employer]  He initially experience low back pain but this has subsided  He denies any joint pain or leg swelling  He continues to use the cream for his feet, reports there are areas of dry spots  Denies any redness or itching  He stopped Lexapro several months ago  He felt it did not really help his symptoms  He mainly feels angry inside, feels he gets worked up with the smallest things  He denies feeling depressed, does not feel he is anxious  He reports that this does not affect his work or daily activities  The following portions of the patient's history were reviewed and updated as appropriate: allergies, current medications, past medical history, past social history and problem list     Review of Systems   Constitutional: Negative for appetite change and fatigue  HENT: Negative for congestion and hearing loss  Eyes: Negative  Negative for visual disturbance  Respiratory: Negative for cough, chest tightness and shortness of breath  Cardiovascular: Negative for chest pain, palpitations and leg swelling  Gastrointestinal: Negative for abdominal pain and constipation  Genitourinary: Negative for dysuria and frequency  Musculoskeletal: Negative for arthralgias  Skin: Negative for rash and wound  Neurological: Negative for dizziness and headaches  Psychiatric/Behavioral: Negative for confusion, dysphoric mood and sleep disturbance  The patient is not nervous/anxious  Objective:      /80   Pulse 82   Temp (!) 96 4 °F (35 8 °C) (Tympanic)   Resp 18   Ht 5' 2" (1 575 m)   Wt 105 kg (232 lb 3 2 oz)   SpO2 98%   BMI 42 47 kg/m²          Physical Exam   Constitutional: He is oriented to person, place, and time  He appears well-developed and well-nourished  HENT:   Head: Normocephalic and atraumatic  Mouth/Throat: Mucous membranes are normal    Eyes: Pupils are equal, round, and reactive to light  Conjunctivae are normal    Neck: Neck supple  Cardiovascular: Normal rate, regular rhythm and normal heart sounds  Pulmonary/Chest: Effort normal  He has no wheezes  He has no rhonchi  Abdominal: Soft  Bowel sounds are normal    Musculoskeletal: He exhibits no edema  Neurological: He is alert and oriented to person, place, and time  Skin: Skin is warm  No rash noted  Psychiatric: He has a normal mood and affect  His behavior is normal  His mood appears not anxious  His affect is not angry  He does not exhibit a depressed mood  Nursing note and vitals reviewed  Labs & imaging results reviewed with patient

## 2020-07-02 NOTE — ASSESSMENT & PLAN NOTE
He stopped taking Lexapro since no change in symptoms  He complains mostly of anger, no outbursts  Declined counseling, recommend online CBT

## 2020-07-02 NOTE — PATIENT INSTRUCTIONS
Put powder in your socks to absorb the sweat  Use the cream at bedtime  Recommend to change socks during work  Diet for Stomach Ulcers and Gastritis   WHAT YOU NEED TO KNOW:   A diet for stomach ulcers and gastritis is a meal plan that limits foods that irritate your stomach  Certain foods may worsen symptoms such as stomach pain, bloating, heartburn, or indigestion  Foods to limit or avoid:  You may need to avoid acidic, spicy, or high-fat foods  Not all foods affect everyone the same way  You will need to learn which foods worsen your symptoms and limit those foods  The following are some foods that may worsen ulcer or gastritis symptoms:  · Beverages:      ¨ Whole milk and chocolate milk    ¨ Hot cocoa and cola    ¨ Any beverage with caffeine    ¨ Regular and decaffeinated coffee    ¨ Peppermint and spearmint tea    ¨ Green and black tea, with or without caffeine    ¨ Orange and grapefruit juices    ¨ Drinks that contain alcohol    · Spices and seasonings:      ¨ Black and red pepper    ¨ Chili powder    ¨ Mustard seed and nutmeg    · Other foods:      ¨ Dairy foods made from whole milk or cream    ¨ Chocolate    ¨ Spicy or strongly flavored cheeses, such as jalapeno or black pepper    ¨ Highly seasoned, high-fat meats, such as sausage, salami, hays, ham, and cold cuts    ¨ Hot chiles and peppers    ¨ Tomato products, such as tomato paste, tomato sauce, or tomato juice  Foods to include:  Eat a variety of healthy foods from all the food groups  Eat fruits, vegetables, whole grains, and fat-free or low-fat dairy foods  Whole grains include whole-wheat breads, cereals, pasta, and brown rice  Choose lean meats, poultry (chicken and turkey), fish, beans, eggs, and nuts  A healthy meal plan is low in unhealthy fats, salt, and added sugar  Healthy fats include olive oil and canola oil  Ask your dietitian for more information about a healthy diet  Other helpful guidelines:   · Do not eat right before bedtime  Stop eating at least 2 hours before bedtime  · Eat small, frequent meals  Your stomach may tolerate small, frequent meals better than large meals  © 2017 [employer] Information is for End User's use only and may not be sold, redistributed or otherwise used for commercial purposes  All illustrations and images included in CareNotes® are the copyrighted property of A D [employer] M [employer]  or Parth Kilpatrick  The above information is an  only  It is not intended as medical advice for individual conditions or treatments  Talk to your doctor, nurse or pharmacist before following any medical regimen to see if it is safe and effective for you

## 2020-07-15 DIAGNOSIS — K21.9 GASTROESOPHAGEAL REFLUX DISEASE, ESOPHAGITIS PRESENCE NOT SPECIFIED: ICD-10-CM

## 2020-07-15 RX ORDER — OMEPRAZOLE 20 MG/1
20 CAPSULE, DELAYED RELEASE ORAL EVERY MORNING
Qty: 90 CAPSULE | Refills: 0 | Status: CANCELLED | OUTPATIENT
Start: 2020-07-15

## 2020-09-15 ENCOUNTER — TELEPHONE (OUTPATIENT)
Dept: NEPHROLOGY | Facility: CLINIC | Age: 40
End: 2020-09-15

## 2020-09-15 NOTE — TELEPHONE ENCOUNTER
I called and left message for patient to call back to schedule November follow up appt with Dr Sheron Moore

## 2020-09-24 DIAGNOSIS — I66.9 CEREBRAL SEPTIC EMBOLI (HCC): ICD-10-CM

## 2020-09-24 DIAGNOSIS — I76 CEREBRAL SEPTIC EMBOLI (HCC): ICD-10-CM

## 2020-09-24 RX ORDER — ASPIRIN 325 MG
TABLET, DELAYED RELEASE (ENTERIC COATED) ORAL
Qty: 90 TABLET | Refills: 1 | Status: SHIPPED | OUTPATIENT
Start: 2020-09-24 | End: 2021-03-10 | Stop reason: SDUPTHER

## 2020-10-01 DIAGNOSIS — I10 ESSENTIAL HYPERTENSION: ICD-10-CM

## 2020-10-01 DIAGNOSIS — Z95.2 S/P MVR (MITRAL VALVE REPLACEMENT): ICD-10-CM

## 2020-10-01 RX ORDER — METOPROLOL SUCCINATE 50 MG/1
50 TABLET, EXTENDED RELEASE ORAL 2 TIMES DAILY
Qty: 60 TABLET | Refills: 5 | Status: SHIPPED | OUTPATIENT
Start: 2020-10-01 | End: 2020-12-29 | Stop reason: SDUPTHER

## 2020-11-12 ENCOUNTER — OFFICE VISIT (OUTPATIENT)
Dept: NEPHROLOGY | Facility: CLINIC | Age: 40
End: 2020-11-12
Payer: COMMERCIAL

## 2020-11-12 VITALS
DIASTOLIC BLOOD PRESSURE: 82 MMHG | BODY MASS INDEX: 43.06 KG/M2 | SYSTOLIC BLOOD PRESSURE: 118 MMHG | TEMPERATURE: 98 F | HEIGHT: 62 IN | WEIGHT: 234 LBS

## 2020-11-12 DIAGNOSIS — N18.2 CKD (CHRONIC KIDNEY DISEASE) STAGE 2, GFR 60-89 ML/MIN: Primary | ICD-10-CM

## 2020-11-12 DIAGNOSIS — N18.2 BENIGN HYPERTENSION WITH CHRONIC KIDNEY DISEASE, STAGE II: ICD-10-CM

## 2020-11-12 DIAGNOSIS — IMO0002 SOLITARY LEFT KIDNEY: ICD-10-CM

## 2020-11-12 DIAGNOSIS — I12.9 BENIGN HYPERTENSION WITH CHRONIC KIDNEY DISEASE, STAGE II: ICD-10-CM

## 2020-11-12 PROCEDURE — 3074F SYST BP LT 130 MM HG: CPT | Performed by: INTERNAL MEDICINE

## 2020-11-12 PROCEDURE — 1036F TOBACCO NON-USER: CPT | Performed by: INTERNAL MEDICINE

## 2020-11-12 PROCEDURE — 99213 OFFICE O/P EST LOW 20 MIN: CPT | Performed by: INTERNAL MEDICINE

## 2020-11-12 PROCEDURE — 3079F DIAST BP 80-89 MM HG: CPT | Performed by: INTERNAL MEDICINE

## 2020-11-12 PROCEDURE — 3008F BODY MASS INDEX DOCD: CPT | Performed by: INTERNAL MEDICINE

## 2020-12-27 ENCOUNTER — APPOINTMENT (EMERGENCY)
Dept: CT IMAGING | Facility: HOSPITAL | Age: 40
End: 2020-12-27
Payer: COMMERCIAL

## 2020-12-27 ENCOUNTER — HOSPITAL ENCOUNTER (EMERGENCY)
Facility: HOSPITAL | Age: 40
Discharge: HOME | End: 2020-12-27
Attending: EMERGENCY MEDICINE | Admitting: EMERGENCY MEDICINE
Payer: COMMERCIAL

## 2020-12-27 VITALS
OXYGEN SATURATION: 96 % | TEMPERATURE: 98.2 F | DIASTOLIC BLOOD PRESSURE: 112 MMHG | SYSTOLIC BLOOD PRESSURE: 178 MMHG | HEART RATE: 74 BPM | RESPIRATION RATE: 16 BRPM

## 2020-12-27 DIAGNOSIS — R42 DIZZINESS: Primary | ICD-10-CM

## 2020-12-27 LAB
ALBUMIN SERPL BCP-MCNC: 4 G/DL (ref 3.5–5)
AMPHETAMINES SERPL QL SCN: NEGATIVE
ANION GAP SERPL CALCULATED.3IONS-SCNC: 9 MMOL/L (ref 4–13)
ATRIAL RATE: 92 BPM
BARBITURATES UR QL: NEGATIVE
BASOPHILS # BLD AUTO: 0.06 THOUSANDS/ΜL (ref 0–0.1)
BASOPHILS NFR BLD AUTO: 1 % (ref 0–1)
BENZODIAZ UR QL: NEGATIVE
BILIRUB UR QL STRIP: NEGATIVE
BUN SERPL-MCNC: 18 MG/DL (ref 5–25)
CALCIUM SERPL-MCNC: 9.2 MG/DL (ref 8.3–10.1)
CHLORIDE SERPL-SCNC: 105 MMOL/L (ref 100–108)
CLARITY UR: CLEAR
CO2 SERPL-SCNC: 29 MMOL/L (ref 21–32)
COCAINE UR QL: NEGATIVE
COLOR UR: YELLOW
CREAT SERPL-MCNC: 1.4 MG/DL (ref 0.6–1.3)
CREAT UR-MCNC: 107 MG/DL
EOSINOPHIL # BLD AUTO: 0.1 THOUSAND/ΜL (ref 0–0.61)
EOSINOPHIL NFR BLD AUTO: 1 % (ref 0–6)
ERYTHROCYTE [DISTWIDTH] IN BLOOD BY AUTOMATED COUNT: 13.2 % (ref 11.6–15.1)
GFR SERPL CREATININE-BSD FRML MDRD: 62 ML/MIN/1.73SQ M
GLUCOSE SERPL-MCNC: 94 MG/DL (ref 65–140)
GLUCOSE UR STRIP-MCNC: NEGATIVE MG/DL
HCT VFR BLD AUTO: 49.7 % (ref 36.5–49.3)
HGB BLD-MCNC: 16.5 G/DL (ref 12–17)
HGB UR QL STRIP.AUTO: NEGATIVE
IMM GRANULOCYTES # BLD AUTO: 0.03 THOUSAND/UL (ref 0–0.2)
IMM GRANULOCYTES NFR BLD AUTO: 0 % (ref 0–2)
KETONES UR STRIP-MCNC: NEGATIVE MG/DL
LEUKOCYTE ESTERASE UR QL STRIP: NEGATIVE
LYMPHOCYTES # BLD AUTO: 1.35 THOUSANDS/ΜL (ref 0.6–4.47)
LYMPHOCYTES NFR BLD AUTO: 18 % (ref 14–44)
MAGNESIUM SERPL-MCNC: 2 MG/DL (ref 1.6–2.6)
MCH RBC QN AUTO: 29.2 PG (ref 26.8–34.3)
MCHC RBC AUTO-ENTMCNC: 33.2 G/DL (ref 31.4–37.4)
MCV RBC AUTO: 88 FL (ref 82–98)
METHADONE UR QL: NEGATIVE
MONOCYTES # BLD AUTO: 0.48 THOUSAND/ΜL (ref 0.17–1.22)
MONOCYTES NFR BLD AUTO: 6 % (ref 4–12)
NEUTROPHILS # BLD AUTO: 5.66 THOUSANDS/ΜL (ref 1.85–7.62)
NEUTS SEG NFR BLD AUTO: 74 % (ref 43–75)
NITRITE UR QL STRIP: NEGATIVE
NRBC BLD AUTO-RTO: 0 /100 WBCS
OPIATES UR QL SCN: NEGATIVE
OXYCODONE+OXYMORPHONE UR QL SCN: NEGATIVE
P AXIS: 67 DEGREES
PCP UR QL: NEGATIVE
PH UR STRIP.AUTO: 5 [PH]
PHOSPHATE SERPL-MCNC: 3.8 MG/DL (ref 2.7–4.5)
PLATELET # BLD AUTO: 229 THOUSANDS/UL (ref 149–390)
PMV BLD AUTO: 10 FL (ref 8.9–12.7)
POTASSIUM SERPL-SCNC: 4.1 MMOL/L (ref 3.5–5.3)
PR INTERVAL: 132 MS
PROT UR STRIP-MCNC: NEGATIVE MG/DL
PROT UR-MCNC: <6 MG/DL
PROT/CREAT UR: <0.06 MG/G{CREAT} (ref 0–0.1)
QRS AXIS: -27 DEGREES
QRSD INTERVAL: 90 MS
QT INTERVAL: 338 MS
QTC INTERVAL: 417 MS
RBC # BLD AUTO: 5.66 MILLION/UL (ref 3.88–5.62)
SODIUM SERPL-SCNC: 143 MMOL/L (ref 136–145)
SP GR UR STRIP.AUTO: 1.02 (ref 1–1.03)
T WAVE AXIS: 79 DEGREES
THC UR QL: NEGATIVE
TSH SERPL DL<=0.05 MIU/L-ACNC: 1.09 UIU/ML (ref 0.36–3.74)
UROBILINOGEN UR QL STRIP.AUTO: 0.2 E.U./DL
VENTRICULAR RATE: 92 BPM
WBC # BLD AUTO: 7.68 THOUSAND/UL (ref 4.31–10.16)

## 2020-12-27 PROCEDURE — 93005 ELECTROCARDIOGRAM TRACING: CPT

## 2020-12-27 PROCEDURE — 83735 ASSAY OF MAGNESIUM: CPT | Performed by: EMERGENCY MEDICINE

## 2020-12-27 PROCEDURE — 81003 URINALYSIS AUTO W/O SCOPE: CPT | Performed by: EMERGENCY MEDICINE

## 2020-12-27 PROCEDURE — G1004 CDSM NDSC: HCPCS

## 2020-12-27 PROCEDURE — 99284 EMERGENCY DEPT VISIT MOD MDM: CPT

## 2020-12-27 PROCEDURE — 99285 EMERGENCY DEPT VISIT HI MDM: CPT | Performed by: EMERGENCY MEDICINE

## 2020-12-27 PROCEDURE — 85025 COMPLETE CBC W/AUTO DIFF WBC: CPT | Performed by: FAMILY MEDICINE

## 2020-12-27 PROCEDURE — 93010 ELECTROCARDIOGRAM REPORT: CPT | Performed by: INTERNAL MEDICINE

## 2020-12-27 PROCEDURE — 82570 ASSAY OF URINE CREATININE: CPT | Performed by: EMERGENCY MEDICINE

## 2020-12-27 PROCEDURE — 70450 CT HEAD/BRAIN W/O DYE: CPT

## 2020-12-27 PROCEDURE — 84443 ASSAY THYROID STIM HORMONE: CPT | Performed by: FAMILY MEDICINE

## 2020-12-27 PROCEDURE — 84156 ASSAY OF PROTEIN URINE: CPT | Performed by: EMERGENCY MEDICINE

## 2020-12-27 PROCEDURE — 80307 DRUG TEST PRSMV CHEM ANLYZR: CPT | Performed by: FAMILY MEDICINE

## 2020-12-27 PROCEDURE — 80069 RENAL FUNCTION PANEL: CPT | Performed by: EMERGENCY MEDICINE

## 2020-12-27 PROCEDURE — 36415 COLL VENOUS BLD VENIPUNCTURE: CPT | Performed by: EMERGENCY MEDICINE

## 2020-12-29 DIAGNOSIS — Z95.2 S/P MVR (MITRAL VALVE REPLACEMENT): ICD-10-CM

## 2020-12-29 DIAGNOSIS — I10 ESSENTIAL HYPERTENSION: ICD-10-CM

## 2020-12-29 RX ORDER — METOPROLOL SUCCINATE 50 MG/1
50 TABLET, EXTENDED RELEASE ORAL 2 TIMES DAILY
Qty: 180 TABLET | Refills: 3 | Status: SHIPPED | OUTPATIENT
Start: 2020-12-29 | End: 2021-01-25 | Stop reason: SDUPTHER

## 2020-12-31 ENCOUNTER — HOSPITAL ENCOUNTER (OUTPATIENT)
Dept: RADIOLOGY | Facility: HOSPITAL | Age: 40
Discharge: HOME | End: 2020-12-31
Payer: COMMERCIAL

## 2020-12-31 ENCOUNTER — OFFICE VISIT (OUTPATIENT)
Dept: INTERNAL MEDICINE CLINIC | Facility: CLINIC | Age: 40
End: 2020-12-31
Payer: COMMERCIAL

## 2020-12-31 VITALS
SYSTOLIC BLOOD PRESSURE: 130 MMHG | OXYGEN SATURATION: 98 % | TEMPERATURE: 97.2 F | HEART RATE: 93 BPM | WEIGHT: 236 LBS | DIASTOLIC BLOOD PRESSURE: 76 MMHG | HEIGHT: 62 IN | RESPIRATION RATE: 18 BRPM | BODY MASS INDEX: 43.43 KG/M2

## 2020-12-31 DIAGNOSIS — M54.50 CHRONIC BILATERAL LOW BACK PAIN WITHOUT SCIATICA: ICD-10-CM

## 2020-12-31 DIAGNOSIS — N18.2 CKD (CHRONIC KIDNEY DISEASE) STAGE 2, GFR 60-89 ML/MIN: ICD-10-CM

## 2020-12-31 DIAGNOSIS — R42 DIZZINESS: Primary | ICD-10-CM

## 2020-12-31 DIAGNOSIS — E78.2 MIXED HYPERLIPIDEMIA: ICD-10-CM

## 2020-12-31 DIAGNOSIS — N18.2 BENIGN HYPERTENSION WITH CHRONIC KIDNEY DISEASE, STAGE II: ICD-10-CM

## 2020-12-31 DIAGNOSIS — G89.29 CHRONIC BILATERAL LOW BACK PAIN WITHOUT SCIATICA: ICD-10-CM

## 2020-12-31 DIAGNOSIS — I12.9 BENIGN HYPERTENSION WITH CHRONIC KIDNEY DISEASE, STAGE II: ICD-10-CM

## 2020-12-31 DIAGNOSIS — E66.01 CLASS 3 SEVERE OBESITY DUE TO EXCESS CALORIES WITH SERIOUS COMORBIDITY AND BODY MASS INDEX (BMI) OF 40.0 TO 44.9 IN ADULT (HCC): ICD-10-CM

## 2020-12-31 PROCEDURE — 3008F BODY MASS INDEX DOCD: CPT | Performed by: INTERNAL MEDICINE

## 2020-12-31 PROCEDURE — 99214 OFFICE O/P EST MOD 30 MIN: CPT | Performed by: INTERNAL MEDICINE

## 2020-12-31 PROCEDURE — 72110 X-RAY EXAM L-2 SPINE 4/>VWS: CPT

## 2020-12-31 RX ORDER — TIZANIDINE 2 MG/1
2 TABLET ORAL EVERY 8 HOURS PRN
Qty: 60 TABLET | Refills: 0 | Status: SHIPPED | OUTPATIENT
Start: 2020-12-31 | End: 2021-08-27 | Stop reason: ALTCHOICE

## 2021-01-06 ENCOUNTER — TELEPHONE (OUTPATIENT)
Dept: INTERNAL MEDICINE CLINIC | Facility: CLINIC | Age: 41
End: 2021-01-06

## 2021-01-06 DIAGNOSIS — M54.50 CHRONIC BILATERAL LOW BACK PAIN WITHOUT SCIATICA: Primary | ICD-10-CM

## 2021-01-06 DIAGNOSIS — G89.29 CHRONIC BILATERAL LOW BACK PAIN WITHOUT SCIATICA: Primary | ICD-10-CM

## 2021-01-06 NOTE — TELEPHONE ENCOUNTER
Back x-ray showed mild arthritis in the low back area  Please do your back exercises regularly, take the muscle relaxant as needed  We can start physical therapy, this may help your pain

## 2021-01-22 ENCOUNTER — TELEPHONE (OUTPATIENT)
Dept: OTHER | Facility: OTHER | Age: 41
End: 2021-01-22

## 2021-01-22 NOTE — TELEPHONE ENCOUNTER
COVID Test Request - Asymptomatic- no symptoms - his niece who lives with him  tested positive for COVID

## 2021-01-25 ENCOUNTER — TELEPHONE (OUTPATIENT)
Dept: INTERNAL MEDICINE CLINIC | Facility: CLINIC | Age: 41
End: 2021-01-25

## 2021-01-25 ENCOUNTER — TELEMEDICINE (OUTPATIENT)
Dept: INTERNAL MEDICINE CLINIC | Facility: CLINIC | Age: 41
End: 2021-01-25
Payer: COMMERCIAL

## 2021-01-25 DIAGNOSIS — I10 ESSENTIAL HYPERTENSION: ICD-10-CM

## 2021-01-25 DIAGNOSIS — Z20.822 EXPOSURE TO COVID-19 VIRUS: Primary | ICD-10-CM

## 2021-01-25 DIAGNOSIS — Z95.2 S/P MVR (MITRAL VALVE REPLACEMENT): ICD-10-CM

## 2021-01-25 PROCEDURE — 99213 OFFICE O/P EST LOW 20 MIN: CPT | Performed by: INTERNAL MEDICINE

## 2021-01-25 RX ORDER — METOPROLOL SUCCINATE 50 MG/1
50 TABLET, EXTENDED RELEASE ORAL 2 TIMES DAILY
Qty: 180 TABLET | Refills: 3 | Status: SHIPPED | OUTPATIENT
Start: 2021-01-25 | End: 2021-06-01

## 2021-01-25 NOTE — PROGRESS NOTES
COVID-19 Virtual Visit     Assessment/Plan:    Problem List Items Addressed This Visit     None      Visit Diagnoses     Exposure to COVID-19 virus    -  Primary    Relevant Orders    Novel Coronavirus (Covid-19),PCR [facility] - Collected at [facility] or [facility]         Disposition:     I recommended COVID-19 PCR testing on or after day 5 since last exposure and if negative can end quarantine after 7 days  Patient was instructed to watch for symptoms until 14 days after exposure  If patient were to develop symptoms, they should immediately self isolate and call our office for further guidance  Recommend to be tested no sooner than Thursday, 1/28  Continue quarantine  Monitor for symptoms  I have spent 7 minutes directly with the patient  Greater than 50% of this time was spent in counseling/coordination of care regarding: instructions for management, patient and family education and impressions  Encounter provider Antonette Morgan MD    Provider located at [address] [place] [id number]    Recent Visits  No visits were found meeting these conditions  Showing recent visits within past 7 days and meeting all other requirements     Today's Visits  Date Type Provider Dept   01/25/21 Telephone [facility]   01/25/21 1201 [address] Internal Med   Showing today's visits and meeting all other requirements     Future Appointments  No visits were found meeting these conditions  Showing future appointments within next 150 days and meeting all other requirements      This virtual check-in was done via [employer] and patient was informed that this is a secure, HIPAA-compliant platform  He agrees to proceed  Patient agrees to participate in a virtual check in via telephone or video visit instead of presenting to the office to address urgent/immediate medical needs  Patient is aware this is a billable service  After connecting through [facility], the patient was identified by name and date of birth  Ronelle Mcardle was informed that this was a telemedicine visit and that the exam was being conducted confidentially over secure lines  My office door was closed  No one else was in the room  Ronelle Mcardle acknowledged consent and understanding of privacy and security of the telemedicine visit  I informed the patient that I have reviewed his record in Epic and presented the opportunity for him to ask any questions regarding the visit today  The patient agreed to participate  Subjective:   Ronelle Mcardle is a 36 y o  male who is concerned about COVID-19  Patient's symptoms include headache  Patient denies fever, chills, fatigue, malaise, congestion, cough, shortness of breath, abdominal pain, nausea, vomiting, diarrhea and myalgias  Date of exposure: 1/23/2021    Exposure:   Contact with a person who is under investigation (PUI) for or who is positive for COVID-19 within the last 14 days?: Yes    Hospitalized recently for fever and/or lower respiratory symptoms?: No      Currently a healthcare worker that is involved in direct patient care?: No      Works in a special setting where the risk of COVID-19 transmission may be high? (this may include long-term care, correctional and custodial facilities; homeless shelters; assisted-living facilities and group homes ): No      Resident in a special setting where the risk of COVID-19 transmission may be high? (this may include long-term care, correctional and custodial facilities; homeless shelters; assisted-living facilities and group homes ): No      He reports his sister and niece tested (+) for [place] recently  He thinks he was last exposed to his sister about 2 days ago, no contact with the niece  His mother recently tested negative, other members of the family are being tested    He denies any symptoms except for headaches, which he feels is worse recently  No results found for: Joanne Rivas, [address] [facility], [address] [facility], [place] [address]  Past Medical History:   Diagnosis Date    Absent kidney, congenital     Anxiety 10/1/2019    Hepatitis C     History of endocarditis     mitral valve    History of intravenous drug abuse ([facility] [address] ) 10/01/2019    heroin    Nonrheumatic mitral valve regurgitation 10/01/2019    from endocarditis     Past Surgical History:   Procedure Laterality Date    CARDIAC CATHETERIZATION      CHEST WALL TUMOR EXCISION  2013    Anterior chest wall cyst removed    IR THORACENTESIS  10/2/2019    NH ECHO TRANSESOPHAG MONTR CARDIAC PUMP FUNCTJ N/A 10/9/2019    Procedure: TRANSESOPHAGEAL ECHOCARDIOGRAM (BILLY);   Surgeon: Alen Raymond MD;  [facility];  Service: Cardiac Surgery    NH MUSCLE-SKIN [address] Bilateral 10/9/2019    Procedure: BILATERAL PECTORALIS MAJOR FLAP;  Surgeon: Carlos Neves MD;  [facility];  Service: Plastics    NH NEG PRESS WOUND [address], < 50 CM N/A 10/9/2019    Procedure: APPLICATION VAC DRESSING;  Surgeon: Carlos Neves MD;  [facility];  Service: Plastics    NH REPLACEMENT OF MITRAL VALVE N/A 10/9/2019    Procedure: MITRAL VALVE REPLACEMENT WITH 27MM MORROW MAGNA MITRAL EASE PERICARDIAL BIOPROSTHESIS;  Surgeon: Alen Raymond MD;  [facility];  Service: Cardiac Surgery     Current Outpatient Medications   Medication Sig Dispense Refill    acetaminophen (TYLENOL) 500 mg tablet Take 1 tablet (500 mg total) by mouth every 6 (six) hours as needed for moderate pain 60 tablet 0    aspirin (ECOTRIN) 325 mg EC tablet TAKE 1 TABLET BY MOUTH DAILY 90 tablet 1    atorvastatin (LIPITOR) 40 mg tablet TAKE 1 TABLET(40 MG) BY MOUTH DAILY 90 tablet 3    clotrimazole-betamethasone (LOTRISONE) 1-0 05 % cream Apply topically 2 (two) times a day as needed (rash) 45 g 2    losartan (COZAAR) 25 mg tablet Take 1 tablet (25 mg total) by mouth daily 90 tablet 1    metoprolol succinate (TOPROL-XL) 50 mg 24 hr tablet Take 1 tablet (50 mg total) by mouth 2 (two) times a day 180 tablet 3    omeprazole (PriLOSEC) 20 mg delayed release capsule Take 1 capsule (20 mg total) by mouth every morning 90 capsule 1    tiZANidine (ZANAFLEX) 2 mg tablet Take 1 tablet (2 mg total) by mouth every 8 (eight) hours as needed for muscle spasms 60 tablet 0     No current facility-administered medications for this visit  No Known Allergies    Review of Systems   Constitutional: Negative for chills, fatigue and fever  HENT: Negative for congestion  Respiratory: Negative for cough and shortness of breath  Gastrointestinal: Negative for abdominal pain, diarrhea, nausea and vomiting  Musculoskeletal: Positive for back pain  Negative for myalgias  Neurological: Positive for headaches  Objective: There were no vitals filed for this visit  Physical Exam  Constitutional:       General: He is not in acute distress  Appearance: Normal appearance  He is not ill-appearing  HENT:      Head: Normocephalic and atraumatic  Eyes:      Pupils: Pupils are equal, round, and reactive to light  Pulmonary:      Effort: Pulmonary effort is normal  No respiratory distress  Neurological:      General: No focal deficit present  Mental Status: He is alert and oriented to person, place, and time  Psychiatric:         Mood and Affect: Mood normal          Behavior: Behavior normal        VIRTUAL VISIT DISCLAIMER    Orly Artor acknowledges that he has consented to an online visit or consultation  He understands that the online visit is based solely on information provided by him, and that, in the absence of a face-to-face physical evaluation by the physician, the diagnosis he receives is both limited and provisional in terms of accuracy and completeness   This is not intended to replace a full medical face-to-face evaluation by the physician  Ramsey Lopez understands and accepts these terms

## 2021-01-25 NOTE — TELEPHONE ENCOUNTER
Pt  has a court date for this Thursday  They need a letter faxed to them statin that he has to quarantine because he is oin to be tested for Covid  Fax # [phone]

## 2021-01-28 DIAGNOSIS — Z20.822 EXPOSURE TO COVID-19 VIRUS: ICD-10-CM

## 2021-01-28 PROCEDURE — U0003 INFECTIOUS AGENT DETECTION BY NUCLEIC ACID (DNA OR RNA); SEVERE ACUTE RESPIRATORY SYNDROME CORONAVIRUS 2 (SARS-COV-2) (CORONAVIRUS DISEASE [COVID-19]), AMPLIFIED PROBE TECHNIQUE, MAKING USE OF HIGH THROUGHPUT TECHNOLOGIES AS DESCRIBED BY CMS-2020-01-R: HCPCS | Performed by: INTERNAL MEDICINE

## 2021-01-28 PROCEDURE — U0005 INFEC AGEN DETEC AMPLI PROBE: HCPCS | Performed by: INTERNAL MEDICINE

## 2021-01-29 ENCOUNTER — TELEPHONE (OUTPATIENT)
Dept: INTERNAL MEDICINE CLINIC | Facility: CLINIC | Age: 41
End: 2021-01-29

## 2021-01-29 ENCOUNTER — TELEPHONE (OUTPATIENT)
Dept: FAMILY MEDICINE CLINIC | Facility: CLINIC | Age: 41
End: 2021-01-29

## 2021-01-29 LAB — SARS-COV-2 RNA RESP QL NAA+PROBE: NEGATIVE

## 2021-01-29 NOTE — TELEPHONE ENCOUNTER
You tested negative for COVID  If you continue to feel well and have no symptoms, you may return to work on Sunday 1/31  Continue your quarantine until then

## 2021-01-29 NOTE — LETTER
January 29, 2021    Patient:  Darrell Bangura  YOB: 1980      To whom it may concern:    Darrell Bangura has tested negative for COVID-19 (Coronavirus)  He may return to work on 1/31/21      Sincerely,        Jean-Paul Potter MD Statement Selected

## 2021-01-29 NOTE — TELEPHONE ENCOUNTER
Patient called stating he needs a note stating he is negative for COVID in order to return to work  Results in as of yesterday  He states he needs it before Sunday to go back to work  Requesting to come  today

## 2021-03-08 DIAGNOSIS — R51.9 NONINTRACTABLE HEADACHE, UNSPECIFIED CHRONICITY PATTERN, UNSPECIFIED HEADACHE TYPE: ICD-10-CM

## 2021-03-08 RX ORDER — ACETAMINOPHEN 500 MG
500 TABLET ORAL EVERY 6 HOURS PRN
Qty: 90 TABLET | Refills: 0 | Status: SHIPPED | OUTPATIENT
Start: 2021-03-08 | End: 2021-06-02 | Stop reason: SDUPTHER

## 2021-03-10 DIAGNOSIS — I66.9 CEREBRAL SEPTIC EMBOLI (HCC): ICD-10-CM

## 2021-03-10 DIAGNOSIS — I76 CEREBRAL SEPTIC EMBOLI (HCC): ICD-10-CM

## 2021-03-10 RX ORDER — ASPIRIN 325 MG
325 TABLET, DELAYED RELEASE (ENTERIC COATED) ORAL DAILY
Qty: 90 TABLET | Refills: 1 | Status: SHIPPED | OUTPATIENT
Start: 2021-03-10

## 2021-05-19 DIAGNOSIS — N18.2 BENIGN HYPERTENSION WITH CHRONIC KIDNEY DISEASE, STAGE II: ICD-10-CM

## 2021-05-19 DIAGNOSIS — I12.9 BENIGN HYPERTENSION WITH CHRONIC KIDNEY DISEASE, STAGE II: ICD-10-CM

## 2021-05-19 RX ORDER — LOSARTAN POTASSIUM 25 MG/1
TABLET ORAL
Qty: 90 TABLET | Refills: 1 | Status: SHIPPED | OUTPATIENT
Start: 2021-05-19 | End: 2021-11-22

## 2021-06-01 DIAGNOSIS — I12.9 BENIGN HYPERTENSION WITH CHRONIC KIDNEY DISEASE, STAGE II: Primary | ICD-10-CM

## 2021-06-01 DIAGNOSIS — N18.2 BENIGN HYPERTENSION WITH CHRONIC KIDNEY DISEASE, STAGE II: Primary | ICD-10-CM

## 2021-06-01 RX ORDER — METOPROLOL SUCCINATE 100 MG/1
100 TABLET, EXTENDED RELEASE ORAL DAILY
Qty: 30 TABLET | Refills: 5 | Status: SHIPPED | OUTPATIENT
Start: 2021-06-01 | End: 2021-11-01 | Stop reason: SDUPTHER

## 2021-06-01 RX ORDER — METOPROLOL SUCCINATE 100 MG/1
100 TABLET, EXTENDED RELEASE ORAL DAILY
COMMUNITY
End: 2021-06-01 | Stop reason: SDUPTHER

## 2021-06-02 DIAGNOSIS — R51.9 NONINTRACTABLE HEADACHE, UNSPECIFIED CHRONICITY PATTERN, UNSPECIFIED HEADACHE TYPE: ICD-10-CM

## 2021-06-02 RX ORDER — ACETAMINOPHEN 500 MG
500 TABLET ORAL EVERY 6 HOURS PRN
Qty: 90 TABLET | Refills: 0 | Status: SHIPPED | OUTPATIENT
Start: 2021-06-02 | End: 2021-08-27 | Stop reason: ALTCHOICE

## 2021-08-27 ENCOUNTER — OFFICE VISIT (OUTPATIENT)
Dept: INTERNAL MEDICINE CLINIC | Facility: CLINIC | Age: 41
End: 2021-08-27
Payer: COMMERCIAL

## 2021-08-27 VITALS
TEMPERATURE: 96.9 F | HEART RATE: 103 BPM | DIASTOLIC BLOOD PRESSURE: 84 MMHG | BODY MASS INDEX: 42.47 KG/M2 | HEIGHT: 62 IN | WEIGHT: 230.8 LBS | SYSTOLIC BLOOD PRESSURE: 122 MMHG | OXYGEN SATURATION: 97 %

## 2021-08-27 DIAGNOSIS — E66.01 CLASS 3 SEVERE OBESITY DUE TO EXCESS CALORIES WITH SERIOUS COMORBIDITY AND BODY MASS INDEX (BMI) OF 40.0 TO 44.9 IN ADULT (HCC): ICD-10-CM

## 2021-08-27 DIAGNOSIS — I12.9 BENIGN HYPERTENSION WITH CHRONIC KIDNEY DISEASE, STAGE II: ICD-10-CM

## 2021-08-27 DIAGNOSIS — G89.29 CHRONIC BILATERAL LOW BACK PAIN WITHOUT SCIATICA: ICD-10-CM

## 2021-08-27 DIAGNOSIS — M54.50 CHRONIC BILATERAL LOW BACK PAIN WITHOUT SCIATICA: ICD-10-CM

## 2021-08-27 DIAGNOSIS — N18.2 BENIGN HYPERTENSION WITH CHRONIC KIDNEY DISEASE, STAGE II: ICD-10-CM

## 2021-08-27 DIAGNOSIS — K21.9 GASTROESOPHAGEAL REFLUX DISEASE, UNSPECIFIED WHETHER ESOPHAGITIS PRESENT: ICD-10-CM

## 2021-08-27 DIAGNOSIS — Z95.2 S/P MVR (MITRAL VALVE REPLACEMENT): ICD-10-CM

## 2021-08-27 DIAGNOSIS — Z00.00 HEALTH MAINTENANCE EXAMINATION: Primary | ICD-10-CM

## 2021-08-27 DIAGNOSIS — E78.2 MIXED HYPERLIPIDEMIA: ICD-10-CM

## 2021-08-27 DIAGNOSIS — Z00.00 LABORATORY EXAMINATION ORDERED AS PART OF A ROUTINE GENERAL MEDICAL EXAMINATION: ICD-10-CM

## 2021-08-27 PROBLEM — H57.9 VISUAL SYMPTOMS: Status: RESOLVED | Noted: 2020-01-22 | Resolved: 2021-08-27

## 2021-08-27 PROCEDURE — 1036F TOBACCO NON-USER: CPT | Performed by: INTERNAL MEDICINE

## 2021-08-27 PROCEDURE — 3725F SCREEN DEPRESSION PERFORMED: CPT | Performed by: INTERNAL MEDICINE

## 2021-08-27 PROCEDURE — 99396 PREV VISIT EST AGE 40-64: CPT | Performed by: INTERNAL MEDICINE

## 2021-08-27 NOTE — PROGRESS NOTES
Assessment/Plan:    Benign hypertension with chronic kidney disease, stage II  Labs due  BP controlled on losartan and metoprolol  Schedule follow up with nephrology  Class 3 severe obesity due to excess calories with serious comorbidity and body mass index (BMI) of 40 0 to 44 9 in [facility])  Lost 6 lbs since last year  Discussed portion control  GERD (gastroesophageal reflux disease)  No recent symptoms, stopped PPI  CKD (chronic kidney disease) stage 2, GFR 60-89 ml/min  Labs due, no NSAIDs  Maintain adequate fluid intake  Chronic bilateral low back pain without sciatica  Improved since job change  Discussed proper body mechanics  Mixed hyperlipidemia  Lipids due, stopped statin  Diagnoses and all orders for this visit:    Health maintenance examination  Comments:  Recommend COVID vaccine, discussed his risk, possible side effects from vaccine etc     Benign hypertension with chronic kidney disease, stage II  -     CBC and differential  -     Comprehensive metabolic panel  -     Vitamin D 25 hydroxy    Class 3 severe obesity due to excess calories with serious comorbidity and body mass index (BMI) of 40 0 to 44 9 in [facility])    Mixed hyperlipidemia  -     Lipid panel  -     TSH, 3rd generation with Free T4 reflex    S/P MVR (mitral valve replacement)    Laboratory examination ordered as part of a routine general medical examination  -     CBC and differential  -     Comprehensive metabolic panel  -     Lipid panel  -     TSH, 3rd generation with Free T4 reflex  -     Vitamin D 25 hydroxy    Gastroesophageal reflux disease, unspecified whether esophagitis present    Chronic bilateral low back pain without sciatica      Follow up in 6 months or as needed  Subjective:      Patient ID: Sarah Benz is a 39 y o  male here for a physical      He feels well, no new complaints      He reports low back pain has improved since a change of job, now  instead of picking up groceries all the time  He has not needed the muscle relaxant  He denies any chest pain, shortness of breath, palpitations  He still gets dizzy occasionally, knows he does not drink enough water throughout the day  Denies any GI symptoms  He is work is very physical, has no regular a robotic exercise  He is trying not to eat as much, tries not to go for second servings  He is interested in the COVID vaccine, feels he is high risk due to his solitary kidney and past history  The following portions of the patient's history were reviewed and updated as appropriate: allergies, current medications, past medical history, past social history and problem list     Review of Systems   Constitutional: Negative for activity change, appetite change and fatigue  HENT: Negative for congestion  Eyes: Negative  Negative for visual disturbance  Respiratory: Negative for cough, chest tightness and shortness of breath  Cardiovascular: Negative for chest pain, palpitations and leg swelling  Gastrointestinal: Negative for abdominal pain and constipation  Genitourinary: Negative for dysuria and frequency  Musculoskeletal: Negative for arthralgias and back pain  Skin: Negative for rash  Neurological: Positive for dizziness and light-headedness  Negative for numbness and headaches  Hematological: Negative for adenopathy  Does not bruise/bleed easily  Psychiatric/Behavioral: Negative for sleep disturbance  The patient is not nervous/anxious  Objective:      /84   Pulse 103   Temp (!) 96 9 °F (36 1 °C)   Ht 5' 2" (1 575 m)   Wt 105 kg (230 lb 12 8 oz)   SpO2 97%   BMI 42 21 kg/m²          Physical Exam  Vitals and nursing note reviewed  Constitutional:       Appearance: He is well-developed  HENT:      Head: Normocephalic and atraumatic  Eyes:      Conjunctiva/sclera: Conjunctivae normal       Pupils: Pupils are equal, round, and reactive to light     Cardiovascular: Rate and Rhythm: Normal rate and regular rhythm  Heart sounds: Normal heart sounds  Pulmonary:      Effort: Pulmonary effort is normal       Breath sounds: No wheezing or rhonchi  Abdominal:      General: Bowel sounds are normal       Palpations: Abdomen is soft  Musculoskeletal:         General: No swelling  Cervical back: Neck supple  Right lower leg: No edema  Left lower leg: No edema  Skin:     General: Skin is warm  Findings: No rash  Neurological:      General: No focal deficit present  Mental Status: He is alert and oriented to person, place, and time  Psychiatric:         Mood and Affect: Mood normal          Behavior: Behavior normal            BMI Counseling: Body mass index is 42 21 kg/m²  The BMI is above normal  Nutrition recommendations include reducing portion sizes, decreasing overall calorie intake, reducing fast food intake and moderation in carbohydrate intake  Exercise recommendations include moderate aerobic physical activity for 150 minutes/week  Labs & imaging results reviewed with patient

## 2021-09-17 ENCOUNTER — APPOINTMENT (OUTPATIENT)
Dept: LAB | Facility: HOSPITAL | Age: 41
End: 2021-09-17
Payer: COMMERCIAL

## 2021-09-17 LAB
ALBUMIN SERPL BCP-MCNC: 4.1 G/DL (ref 3.4–4.8)
ALP SERPL-CCNC: 80.1 U/L (ref 10–129)
ALT SERPL W P-5'-P-CCNC: 26 U/L (ref 5–63)
ANION GAP SERPL CALCULATED.3IONS-SCNC: 9 MMOL/L (ref 4–13)
AST SERPL W P-5'-P-CCNC: 18 U/L (ref 15–41)
BASOPHILS # BLD AUTO: 0.02 THOUSANDS/ΜL (ref 0–0.1)
BASOPHILS NFR BLD AUTO: 0 % (ref 0–1)
BILIRUB SERPL-MCNC: 0.55 MG/DL (ref 0.3–1.2)
BUN SERPL-MCNC: 17 MG/DL (ref 6–20)
CALCIUM SERPL-MCNC: 8.8 MG/DL (ref 8.4–10.2)
CHLORIDE SERPL-SCNC: 108 MMOL/L (ref 96–108)
CHOLEST SERPL-MCNC: 161 MG/DL
CO2 SERPL-SCNC: 23 MMOL/L (ref 22–33)
CREAT SERPL-MCNC: 1.16 MG/DL (ref 0.5–1.2)
EOSINOPHIL # BLD AUTO: 0.08 THOUSAND/ΜL (ref 0–0.61)
EOSINOPHIL NFR BLD AUTO: 1 % (ref 0–6)
ERYTHROCYTE [DISTWIDTH] IN BLOOD BY AUTOMATED COUNT: 13.5 % (ref 11.6–15.1)
GFR SERPL CREATININE-BSD FRML MDRD: 78 ML/MIN/1.73SQ M
GLUCOSE P FAST SERPL-MCNC: 110 MG/DL (ref 70–105)
HCT VFR BLD AUTO: 46.7 % (ref 36.5–49.3)
HDLC SERPL-MCNC: 43 MG/DL
HGB BLD-MCNC: 16.2 G/DL (ref 12–17)
IMM GRANULOCYTES # BLD AUTO: 0.01 THOUSAND/UL (ref 0–0.2)
IMM GRANULOCYTES NFR BLD AUTO: 0 % (ref 0–2)
LDLC SERPL CALC-MCNC: 103 MG/DL (ref 0–100)
LYMPHOCYTES # BLD AUTO: 1.04 THOUSANDS/ΜL (ref 0.6–4.47)
LYMPHOCYTES NFR BLD AUTO: 17 % (ref 14–44)
MCH RBC QN AUTO: 29.6 PG (ref 26.8–34.3)
MCHC RBC AUTO-ENTMCNC: 34.7 G/DL (ref 31.4–37.4)
MCV RBC AUTO: 85 FL (ref 82–98)
MONOCYTES # BLD AUTO: 0.38 THOUSAND/ΜL (ref 0.17–1.22)
MONOCYTES NFR BLD AUTO: 6 % (ref 4–12)
NEUTROPHILS # BLD AUTO: 4.44 THOUSANDS/ΜL (ref 1.85–7.62)
NEUTS SEG NFR BLD AUTO: 76 % (ref 43–75)
NONHDLC SERPL-MCNC: 118 MG/DL
PLATELET # BLD AUTO: 200 THOUSANDS/UL (ref 149–390)
PMV BLD AUTO: 10.3 FL (ref 8.9–12.7)
POTASSIUM SERPL-SCNC: 4.1 MMOL/L (ref 3.5–5)
PROT SERPL-MCNC: 7.3 G/DL (ref 6.4–8.3)
RBC # BLD AUTO: 5.47 MILLION/UL (ref 3.88–5.62)
SODIUM SERPL-SCNC: 140 MMOL/L (ref 133–145)
TRIGL SERPL-MCNC: 72.7 MG/DL
TSH SERPL DL<=0.05 MIU/L-ACNC: 0.86 UIU/ML (ref 0.34–5.6)
WBC # BLD AUTO: 5.97 THOUSAND/UL (ref 4.31–10.16)

## 2021-09-17 PROCEDURE — 85025 COMPLETE CBC W/AUTO DIFF WBC: CPT | Performed by: INTERNAL MEDICINE

## 2021-09-17 PROCEDURE — 80061 LIPID PANEL: CPT | Performed by: INTERNAL MEDICINE

## 2021-09-17 PROCEDURE — 82306 VITAMIN D 25 HYDROXY: CPT | Performed by: INTERNAL MEDICINE

## 2021-09-17 PROCEDURE — 84443 ASSAY THYROID STIM HORMONE: CPT | Performed by: INTERNAL MEDICINE

## 2021-09-17 PROCEDURE — 36415 COLL VENOUS BLD VENIPUNCTURE: CPT | Performed by: INTERNAL MEDICINE

## 2021-09-17 PROCEDURE — 80053 COMPREHEN METABOLIC PANEL: CPT | Performed by: INTERNAL MEDICINE

## 2021-09-18 LAB — 25(OH)D3 SERPL-MCNC: 18.4 NG/ML (ref 30–100)

## 2021-09-20 ENCOUNTER — TELEPHONE (OUTPATIENT)
Dept: INTERNAL MEDICINE CLINIC | Facility: CLINIC | Age: 41
End: 2021-09-20

## 2021-09-20 NOTE — TELEPHONE ENCOUNTER
Lab results:    Cholesterol improved  Your vitamin D is a bit low, start taking D3 2000 units daily    The rest of your labs were normal

## 2021-09-28 ENCOUNTER — TELEPHONE (OUTPATIENT)
Dept: NEPHROLOGY | Facility: CLINIC | Age: 41
End: 2021-09-28

## 2021-11-01 DIAGNOSIS — I12.9 BENIGN HYPERTENSION WITH CHRONIC KIDNEY DISEASE, STAGE II: ICD-10-CM

## 2021-11-01 DIAGNOSIS — N18.2 BENIGN HYPERTENSION WITH CHRONIC KIDNEY DISEASE, STAGE II: ICD-10-CM

## 2021-11-01 RX ORDER — METOPROLOL SUCCINATE 100 MG/1
100 TABLET, EXTENDED RELEASE ORAL DAILY
Qty: 30 TABLET | Refills: 5 | Status: SHIPPED | OUTPATIENT
Start: 2021-11-01 | End: 2022-01-06 | Stop reason: SDUPTHER

## 2021-11-21 DIAGNOSIS — I12.9 BENIGN HYPERTENSION WITH CHRONIC KIDNEY DISEASE, STAGE II: ICD-10-CM

## 2021-11-21 DIAGNOSIS — N18.2 BENIGN HYPERTENSION WITH CHRONIC KIDNEY DISEASE, STAGE II: ICD-10-CM

## 2021-11-22 RX ORDER — LOSARTAN POTASSIUM 25 MG/1
TABLET ORAL
Qty: 90 TABLET | Refills: 1 | Status: SHIPPED | OUTPATIENT
Start: 2021-11-22 | End: 2022-02-25 | Stop reason: SDUPTHER

## 2022-01-06 ENCOUNTER — OFFICE VISIT (OUTPATIENT)
Dept: NEPHROLOGY | Facility: CLINIC | Age: 42
End: 2022-01-06
Payer: COMMERCIAL

## 2022-01-06 VITALS
BODY MASS INDEX: 42.33 KG/M2 | SYSTOLIC BLOOD PRESSURE: 130 MMHG | WEIGHT: 230 LBS | DIASTOLIC BLOOD PRESSURE: 82 MMHG | HEIGHT: 62 IN

## 2022-01-06 DIAGNOSIS — I12.9 BENIGN HYPERTENSION WITH CHRONIC KIDNEY DISEASE, STAGE II: ICD-10-CM

## 2022-01-06 DIAGNOSIS — N18.2 CKD (CHRONIC KIDNEY DISEASE) STAGE 2, GFR 60-89 ML/MIN: Primary | ICD-10-CM

## 2022-01-06 DIAGNOSIS — N18.2 BENIGN HYPERTENSION WITH CHRONIC KIDNEY DISEASE, STAGE II: ICD-10-CM

## 2022-01-06 DIAGNOSIS — E55.9 VITAMIN D DEFICIENCY: ICD-10-CM

## 2022-01-06 PROCEDURE — 99213 OFFICE O/P EST LOW 20 MIN: CPT | Performed by: INTERNAL MEDICINE

## 2022-01-06 PROCEDURE — 1036F TOBACCO NON-USER: CPT | Performed by: INTERNAL MEDICINE

## 2022-01-06 PROCEDURE — 3008F BODY MASS INDEX DOCD: CPT | Performed by: INTERNAL MEDICINE

## 2022-01-06 RX ORDER — METOPROLOL SUCCINATE 50 MG/1
50 TABLET, EXTENDED RELEASE ORAL DAILY
Qty: 90 TABLET | Refills: 1 | Status: SHIPPED | OUTPATIENT
Start: 2022-01-06 | End: 2022-02-25 | Stop reason: SDUPTHER

## 2022-01-06 NOTE — PATIENT INSTRUCTIONS
Please take Vitamin D3 2000 units once a day  Please go for lab work soon - if everything is stable, you do not need follow up with us anymore

## 2022-01-06 NOTE — LETTER
January 6, 2022     Edel Plaza MD  [address]  [id number] [place] [address] Road [facility]    Patient: Lisandro Murray   YOB: 1980   Date of Visit: 1/6/2022       Dear Dr Clement Sesay: Thank you for referring Lenton Bence to me for evaluation  Below are my notes for this consultation  If you have questions, please do not hesitate to call me  I look forward to following your patient along with you  Sincerely,        Con Velazco MD        CC: No Recipients  Con Velazco MD  1/6/2022 11:13 AM  Sign when Signing Visit  NEPHROLOGY OFFICE PROGRESS NOTE   Lisandro Murray 39 y o  male MRN: [id number]  DATE: 01/06/22  Reason for visit: Continued evaluation of JULIENNE    ASSESSMENT & PLAN:  1  Chronic kidney disease, stage II  · Baseline creatinine is 1 1 to 1 3    · Etiology is residual effects from ATN vs GN from endocarditis in October 2019 + solitary kidney  · Stable renal function - SCr 1 16 from Sep 2021  · Overall, his renal function is stable and he has no proteinuria  The absence of proteinuria is encouraging  · I asked him to go for repeat labs now - if his labs remain stable, then he can see us on a PRN basis going forward  I would simply recommend checking yearly labs and urine studies  2  Hypertension  · BP is at goal    · Current regimen: Losartan 25 mg daily, Metoprolol succ 50 mg OD  · No changes  3  Vitamin D deficiency  · Start Vitamin D3 2000 units daily  Patient Instructions   Please take Vitamin D3 2000 units once a day  Please go for lab work soon - if everything is stable, you do not need follow up with us anymore  SUBJECTIVE / INTERVAL HISTORY:  Rosamaria Bagley was last seen in the office in Nov 2020  Since that time, he has been doing fairly well  There have been no recent hospitalizations or ER visits  He has been staying away from drugs  No new acute issues      PMH/PSH: HTN, JULIENNE, CKD, heroin use, pseudomonas bacteremia with endocarditis s/p MV replacement, Hep C, solitary kidney    Previous work up:   10/3/19 CT abd, pelvis: Solitary L kidney  ALLERGIES: No Known Allergies    REVIEW OF SYSTEMS:  Review of Systems   Constitutional: Negative for appetite change, chills, fatigue and fever  Respiratory: Negative for cough and shortness of breath  Cardiovascular: Negative for chest pain and leg swelling  Gastrointestinal: Negative for abdominal pain, diarrhea, nausea and vomiting  Genitourinary: Negative for dysuria and hematuria  Musculoskeletal: Negative for arthralgias and back pain  Neurological: Negative for dizziness and light-headedness  OBJECTIVE:  /82   Ht 5' 2" (1 575 m)   Wt 104 kg (230 lb)   BMI 42 07 kg/m²   Current Weight:   Body mass index is 42 07 kg/m²  Physical Exam  Constitutional:       General: He is not in acute distress  Appearance: Normal appearance  He is well-developed  He is obese  He is not ill-appearing or diaphoretic  HENT:      Head: Normocephalic and atraumatic  Eyes:      General: No scleral icterus  Conjunctiva/sclera: Conjunctivae normal    Neck:      Vascular: No JVD  Cardiovascular:      Rate and Rhythm: Normal rate and regular rhythm  Heart sounds: Normal heart sounds  Pulmonary:      Effort: Pulmonary effort is normal  No respiratory distress  Breath sounds: Normal breath sounds  Abdominal:      General: Bowel sounds are normal       Palpations: Abdomen is soft  Musculoskeletal:      Cervical back: Neck supple  Right lower leg: No edema  Left lower leg: No edema  Skin:     General: Skin is warm and dry  Neurological:      Mental Status: He is alert and oriented to person, place, and time     Psychiatric:         Behavior: Behavior normal        Medications:  Current Outpatient Medications:     aspirin (ECOTRIN) 325 mg EC tablet, Take 1 tablet (325 mg total) by mouth daily, Disp: 90 tablet, Rfl: 1    losartan (COZAAR) 25 mg tablet, TAKE 1 TABLET(25 MG) BY MOUTH DAILY, Disp: 90 tablet, Rfl: 1    metoprolol succinate (TOPROL-XL) 100 mg 24 hr tablet, Take 1 tablet (100 mg total) by mouth daily (Patient taking differently: Take 50 mg by mouth daily  ), Disp: 30 tablet, Rfl: 5    atorvastatin (LIPITOR) 40 mg tablet, TAKE 1 TABLET(40 MG) BY MOUTH DAILY (Patient not taking: Reported on 8/27/2021), Disp: 90 tablet, Rfl: 3    Laboratory Results:  Results for orders placed or performed in visit on 08/27/21   CBC and differential   Result Value Ref Range    WBC 5 97 4 31 - 10 16 Thousand/uL    RBC 5 47 3 88 - 5 62 Million/uL    Hemoglobin 16 2 12 0 - 17 0 g/dL    Hematocrit 46 7 36 5 - 49 3 %    MCV 85 82 - 98 fL    MCH 29 6 26 8 - 34 3 pg    MCHC 34 7 31 4 - 37 4 g/dL    RDW 13 5 11 6 - 15 1 %    MPV 10 3 8 9 - 12 7 fL    Platelets 398 959 - 878 Thousands/uL    Neutrophils Relative 76 (H) 43 - 75 %    Immat GRANS % 0 0 - 2 %    Lymphocytes Relative 17 14 - 44 %    Monocytes Relative 6 4 - 12 %    Eosinophils Relative 1 0 - 6 %    Basophils Relative 0 0 - 1 %    Neutrophils Absolute 4 44 1 85 - 7 62 Thousands/µL    Immature Grans Absolute 0 01 0 00 - 0 20 Thousand/uL    Lymphocytes Absolute 1 04 0 60 - 4 47 Thousands/µL    Monocytes Absolute 0 38 0 17 - 1 22 Thousand/µL    Eosinophils Absolute 0 08 0 00 - 0 61 Thousand/µL    Basophils Absolute 0 02 0 00 - 0 10 Thousands/µL   Comprehensive metabolic panel   Result Value Ref Range    Sodium 140 133 - 145 mmol/L    Potassium 4 1 3 5 - 5 0 mmol/L    Chloride 108 96 - 108 mmol/L    CO2 23 22 - 33 mmol/L    ANION GAP 9 4 - 13 mmol/L    BUN 17 6 - 20 mg/dL    Creatinine 1 16 0 50 - 1 20 mg/dL    Glucose, Fasting 110 (H) 70 - 105 mg/dL    Calcium 8 8 8 4 - 10 2 mg/dL    AST 18 15 - 41 U/L    ALT 26 5 - 63 U/L    Alkaline Phosphatase 80 1 10 - 129 U/L    Total Protein 7 3 6 4 - 8 3 g/dL    Albumin 4 1 3 4 - 4 8 g/dL    Total Bilirubin 0 55 0 30 - 1 20 mg/dL    eGFR 78 ml/min/1 73sq m   Lipid panel   Result Value Ref Range    Cholesterol 161 <=200 mg/dL    Triglycerides 72 7 <=150 mg/dL    HDL, Direct 43 >=40 mg/dL    LDL Calculated 103 (H) 0 - 100 mg/dL    Non-HDL-Chol (CHOL-HDL) 118 mg/dl   TSH, 3rd generation with Free T4 reflex   Result Value Ref Range    TSH 3RD GENERATON 0 865 0 340 - 5 600 uIU/mL   Vitamin D 25 hydroxy   Result Value Ref Range    Vit D, 25-Hydroxy 18 4 (L) 30 0 - 100 0 ng/mL

## 2022-01-06 NOTE — PROGRESS NOTES
NEPHROLOGY OFFICE PROGRESS NOTE   Loren Peña 39 y o  male MRN: [id number]  DATE: 01/06/22  Reason for visit: Continued evaluation of JULIENNE    ASSESSMENT & PLAN:  1  Chronic kidney disease, stage II  · Baseline creatinine is 1 1 to 1 3    · Etiology is residual effects from ATN vs GN from endocarditis in October 2019 + solitary kidney  · Stable renal function - SCr 1 16 from Sep 2021  · Overall, his renal function is stable and he has no proteinuria  The absence of proteinuria is encouraging  · I asked him to go for repeat labs now - if his labs remain stable, then he can see us on a PRN basis going forward  I would simply recommend checking yearly labs and urine studies  2  Hypertension  · BP is at goal    · Current regimen: Losartan 25 mg daily, Metoprolol succ 50 mg OD  · No changes  3  Vitamin D deficiency  · Start Vitamin D3 2000 units daily  Patient Instructions   Please take Vitamin D3 2000 units once a day  Please go for lab work soon - if everything is stable, you do not need follow up with us anymore  SUBJECTIVE / INTERVAL HISTORY:  Sean Agarwal was last seen in the office in Nov 2020  Since that time, he has been doing fairly well  There have been no recent hospitalizations or ER visits  He has been staying away from drugs  No new acute issues  PMH/PSH: HTN, JULIENNE, CKD, heroin use, pseudomonas bacteremia with endocarditis s/p MV replacement, Hep C, solitary kidney    Previous work up:   10/3/19 CT abd, pelvis: Solitary L kidney  ALLERGIES: No Known Allergies    REVIEW OF SYSTEMS:  Review of Systems   Constitutional: Negative for appetite change, chills, fatigue and fever  Respiratory: Negative for cough and shortness of breath  Cardiovascular: Negative for chest pain and leg swelling  Gastrointestinal: Negative for abdominal pain, diarrhea, nausea and vomiting  Genitourinary: Negative for dysuria and hematuria     Musculoskeletal: Negative for arthralgias and back pain    Neurological: Negative for dizziness and light-headedness  OBJECTIVE:  /82   Ht 5' 2" (1 575 m)   Wt 104 kg (230 lb)   BMI 42 07 kg/m²   Current Weight:   Body mass index is 42 07 kg/m²  Physical Exam  Constitutional:       General: He is not in acute distress  Appearance: Normal appearance  He is well-developed  He is obese  He is not ill-appearing or diaphoretic  HENT:      Head: Normocephalic and atraumatic  Eyes:      General: No scleral icterus  Conjunctiva/sclera: Conjunctivae normal    Neck:      Vascular: No JVD  Cardiovascular:      Rate and Rhythm: Normal rate and regular rhythm  Heart sounds: Normal heart sounds  Pulmonary:      Effort: Pulmonary effort is normal  No respiratory distress  Breath sounds: Normal breath sounds  Abdominal:      General: Bowel sounds are normal       Palpations: Abdomen is soft  Musculoskeletal:      Cervical back: Neck supple  Right lower leg: No edema  Left lower leg: No edema  Skin:     General: Skin is warm and dry  Neurological:      Mental Status: He is alert and oriented to person, place, and time     Psychiatric:         Behavior: Behavior normal        Medications:  Current Outpatient Medications:     aspirin (ECOTRIN) 325 mg EC tablet, Take 1 tablet (325 mg total) by mouth daily, Disp: 90 tablet, Rfl: 1    losartan (COZAAR) 25 mg tablet, TAKE 1 TABLET(25 MG) BY MOUTH DAILY, Disp: 90 tablet, Rfl: 1    metoprolol succinate (TOPROL-XL) 100 mg 24 hr tablet, Take 1 tablet (100 mg total) by mouth daily (Patient taking differently: Take 50 mg by mouth daily  ), Disp: 30 tablet, Rfl: 5    atorvastatin (LIPITOR) 40 mg tablet, TAKE 1 TABLET(40 MG) BY MOUTH DAILY (Patient not taking: Reported on 8/27/2021), Disp: 90 tablet, Rfl: 3    Laboratory Results:  Results for orders placed or performed in visit on 08/27/21   CBC and differential   Result Value Ref Range    WBC 5 97 4 31 - 10 16 Thousand/uL    RBC 5 47 3 88 - 5 62 Million/uL    Hemoglobin 16 2 12 0 - 17 0 g/dL    Hematocrit 46 7 36 5 - 49 3 %    MCV 85 82 - 98 fL    MCH 29 6 26 8 - 34 3 pg    MCHC 34 7 31 4 - 37 4 g/dL    RDW 13 5 11 6 - 15 1 %    MPV 10 3 8 9 - 12 7 fL    Platelets 721 382 - 449 Thousands/uL    Neutrophils Relative 76 (H) 43 - 75 %    Immat GRANS % 0 0 - 2 %    Lymphocytes Relative 17 14 - 44 %    Monocytes Relative 6 4 - 12 %    Eosinophils Relative 1 0 - 6 %    Basophils Relative 0 0 - 1 %    Neutrophils Absolute 4 44 1 85 - 7 62 Thousands/µL    Immature Grans Absolute 0 01 0 00 - 0 20 Thousand/uL    Lymphocytes Absolute 1 04 0 60 - 4 47 Thousands/µL    Monocytes Absolute 0 38 0 17 - 1 22 Thousand/µL    Eosinophils Absolute 0 08 0 00 - 0 61 Thousand/µL    Basophils Absolute 0 02 0 00 - 0 10 Thousands/µL   Comprehensive metabolic panel   Result Value Ref Range    Sodium 140 133 - 145 mmol/L    Potassium 4 1 3 5 - 5 0 mmol/L    Chloride 108 96 - 108 mmol/L    CO2 23 22 - 33 mmol/L    ANION GAP 9 4 - 13 mmol/L    BUN 17 6 - 20 mg/dL    Creatinine 1 16 0 50 - 1 20 mg/dL    Glucose, Fasting 110 (H) 70 - 105 mg/dL    Calcium 8 8 8 4 - 10 2 mg/dL    AST 18 15 - 41 U/L    ALT 26 5 - 63 U/L    Alkaline Phosphatase 80 1 10 - 129 U/L    Total Protein 7 3 6 4 - 8 3 g/dL    Albumin 4 1 3 4 - 4 8 g/dL    Total Bilirubin 0 55 0 30 - 1 20 mg/dL    eGFR 78 ml/min/1 73sq m   Lipid panel   Result Value Ref Range    Cholesterol 161 <=200 mg/dL    Triglycerides 72 7 <=150 mg/dL    HDL, Direct 43 >=40 mg/dL    LDL Calculated 103 (H) 0 - 100 mg/dL    Non-HDL-Chol (CHOL-HDL) 118 mg/dl   TSH, 3rd generation with Free T4 reflex   Result Value Ref Range    TSH 3RD GENERATON 0 865 0 340 - 5 600 uIU/mL   Vitamin D 25 hydroxy   Result Value Ref Range    Vit D, 25-Hydroxy 18 4 (L) 30 0 - 100 0 ng/mL

## 2022-02-11 ENCOUNTER — TELEPHONE (OUTPATIENT)
Dept: NEPHROLOGY | Facility: CLINIC | Age: 42
End: 2022-02-11

## 2022-02-11 ENCOUNTER — APPOINTMENT (OUTPATIENT)
Dept: LAB | Facility: CLINIC | Age: 42
End: 2022-02-11
Payer: COMMERCIAL

## 2022-02-11 DIAGNOSIS — N18.2 CKD (CHRONIC KIDNEY DISEASE) STAGE 2, GFR 60-89 ML/MIN: ICD-10-CM

## 2022-02-11 LAB
ALBUMIN SERPL BCP-MCNC: 3.9 G/DL (ref 3.5–5)
ANION GAP SERPL CALCULATED.3IONS-SCNC: 9 MMOL/L (ref 4–13)
BUN SERPL-MCNC: 20 MG/DL (ref 5–25)
CALCIUM SERPL-MCNC: 9.2 MG/DL (ref 8.3–10.1)
CHLORIDE SERPL-SCNC: 104 MMOL/L (ref 100–108)
CO2 SERPL-SCNC: 28 MMOL/L (ref 21–32)
CREAT SERPL-MCNC: 1.3 MG/DL (ref 0.6–1.3)
GFR SERPL CREATININE-BSD FRML MDRD: 67 ML/MIN/1.73SQ M
GLUCOSE SERPL-MCNC: 84 MG/DL (ref 65–140)
PHOSPHATE SERPL-MCNC: 3.9 MG/DL (ref 2.7–4.5)
POTASSIUM SERPL-SCNC: 4.2 MMOL/L (ref 3.5–5.3)
SODIUM SERPL-SCNC: 141 MMOL/L (ref 136–145)

## 2022-02-11 PROCEDURE — 36415 COLL VENOUS BLD VENIPUNCTURE: CPT

## 2022-02-11 PROCEDURE — 80069 RENAL FUNCTION PANEL: CPT

## 2022-02-11 NOTE — TELEPHONE ENCOUNTER
Pt advised that labs of 2/11/22 are stable  Can followup with PCP, no need to follow in our office per Mer[place] Noe       ----- Message from Nakita Johnson MD sent at 2/11/2022 12:57 PM EST -----  Please inform patient that kidney function is stable based on latest lab tests (2/11/22)  No need for follow up with us  He can see his PCP and be referred back if there are any issues

## 2022-02-22 ENCOUNTER — TELEMEDICINE (OUTPATIENT)
Dept: INTERNAL MEDICINE CLINIC | Facility: CLINIC | Age: 42
End: 2022-02-22
Payer: COMMERCIAL

## 2022-02-22 ENCOUNTER — TELEPHONE (OUTPATIENT)
Dept: INTERNAL MEDICINE CLINIC | Facility: CLINIC | Age: 42
End: 2022-02-22

## 2022-02-22 DIAGNOSIS — Z03.818 ENCNTR FOR OBS FOR SUSP EXPSR TO OTH BIOLG AGENTS RULED OUT: ICD-10-CM

## 2022-02-22 DIAGNOSIS — B34.9 VIRAL INFECTION, UNSPECIFIED: Primary | ICD-10-CM

## 2022-02-22 PROCEDURE — U0003 INFECTIOUS AGENT DETECTION BY NUCLEIC ACID (DNA OR RNA); SEVERE ACUTE RESPIRATORY SYNDROME CORONAVIRUS 2 (SARS-COV-2) (CORONAVIRUS DISEASE [COVID-19]), AMPLIFIED PROBE TECHNIQUE, MAKING USE OF HIGH THROUGHPUT TECHNOLOGIES AS DESCRIBED BY CMS-2020-01-R: HCPCS | Performed by: INTERNAL MEDICINE

## 2022-02-22 PROCEDURE — U0005 INFEC AGEN DETEC AMPLI PROBE: HCPCS | Performed by: INTERNAL MEDICINE

## 2022-02-22 PROCEDURE — 99213 OFFICE O/P EST LOW 20 MIN: CPT | Performed by: INTERNAL MEDICINE

## 2022-02-22 RX ORDER — CHOLECALCIFEROL (VITAMIN D3) 125 MCG
2000 CAPSULE ORAL DAILY
COMMUNITY

## 2022-02-22 NOTE — PROGRESS NOTES
COVID-19 Outpatient Progress Note    Assessment/Plan:    Problem List Items Addressed This Visit     None      Visit Diagnoses     Viral infection, unspecified    -  Primary    Relevant Orders    COVID Only - Office Collect         Disposition:     Recommended patient to come to the office to test for COVID-19  He will come to the office to do COVID PCR test   Instructed to quarantine until results available  Maintain adequate fluid intake, may take over the counter medications  Briefly discussed anti virals if he tests (+)  I have spent 5 minutes directly with the patient  Greater than 50% of this time was spent in counseling/coordination of care regarding: instructions for management, patient and family education and impressions  Encounter provider Marlyn Lux MD    Provider located at [facility]  [address] [address]    Recent Visits  No visits were found meeting these conditions  Showing recent visits within past 7 days and meeting all other requirements  Today's Visits  Date Type Provider Dept   02/22/22 [address], MD [facility]   02/22/22 Telephone MD Yadiel Hansen [facility]   Showing today's visits and meeting all other requirements  Future Appointments  No visits were found meeting these conditions  Showing future appointments within next 150 days and meeting all other requirements     This virtual check-in was done via [address] and patient was informed that this is a secure, HIPAA-compliant platform  He agrees to proceed  Patient agrees to participate in a virtual check in via telephone or video visit instead of presenting to the office to address urgent/immediate medical needs  Patient is aware this is a billable service  After connecting through [facility], the patient was identified by name and date of birth   Meme Hirsch Christelle Lupe was informed that this was a telemedicine visit and that the exam was being conducted confidentially over secure lines  My office door was closed  No one else was in the room  Henok Ellison acknowledged consent and understanding of privacy and security of the telemedicine visit  I informed the patient that I have reviewed his record in Epic and presented the opportunity for him to ask any questions regarding the visit today  The patient agreed to participate  Verification of patient location:  Patient is located in the following state in which I hold an active license: [place]    Subjective:   Henok Ellison is a 43 y o  male who is concerned about COVID-19  Patient's symptoms include fatigue, cough and myalgias  Patient denies fever, chills, congestion, sore throat, shortness of breath, chest tightness, vomiting, diarrhea and headaches  - Date of symptom onset: 2/19/2022      COVID-19 vaccination status: Not vaccinated    Exposure:   Contact with a person who is under investigation (PUI) for or who is positive for COVID-19 within the last 14 days?: No    Symptoms started 3 days ago with cough and phlegm, felt a bit weak  He does not think he had a fever, has not checked  He complains of increase muscle and joint pains  He reports voice is not normal, has been feeling tired  He did go to work 3 days ago but called out yesterday  No knows sick contacts      Lab Results   Component Value Date    SARSCOV2 Negative 01/28/2021     Past Medical History:   Diagnosis Date    Absent kidney, congenital     Anxiety 10/1/2019    Hepatitis C     History of endocarditis     mitral valve    History of intravenous drug abuse ([facility] [address] ) 10/01/2019    heroin    Nonrheumatic mitral valve regurgitation 10/01/2019    from endocarditis     Past Surgical History:   Procedure Laterality Date    CARDIAC CATHETERIZATION      CHEST WALL TUMOR EXCISION  2013    Anterior chest wall cyst removed    IR THORACENTESIS 10/2/2019    OH ECHO TRANSESOPHAG MONTR CARDIAC PUMP FUNCTJ N/A 10/9/2019    Procedure: TRANSESOPHAGEAL ECHOCARDIOGRAM (BILLY); Surgeon: Junior Jurado MD;  [facility];  Service: Cardiac Surgery    OH MUSCLE-SKIN FLAP,TRUNK Bilateral 10/9/2019    Procedure: BILATERAL PECTORALIS MAJOR FLAP;  Surgeon: James Horton MD;  [facility];  Service: Plastics    OH NEG PRESS WOUND [address], < 50 CM N/A 10/9/2019    Procedure: APPLICATION VAC DRESSING;  Surgeon: James Horton MD;  [facility];  Service: Plastics    OH REPLACEMENT OF MITRAL VALVE N/A 10/9/2019    Procedure: MITRAL VALVE REPLACEMENT WITH 27MM MORROW MAGNA MITRAL EASE PERICARDIAL BIOPROSTHESIS;  Surgeon: Junior Jurado MD;  [facility];  Service: Cardiac Surgery     Current Outpatient Medications   Medication Sig Dispense Refill    aspirin (ECOTRIN) 325 mg EC tablet Take 1 tablet (325 mg total) by mouth daily 90 tablet 1    atorvastatin (LIPITOR) 40 mg tablet TAKE 1 TABLET(40 MG) BY MOUTH DAILY 90 tablet 3    Cholecalciferol (Vitamin D3) 50 MCG (2000 UT) TABS Take 2,000 Units by mouth daily      losartan (COZAAR) 25 mg tablet TAKE 1 TABLET(25 MG) BY MOUTH DAILY 90 tablet 1    metoprolol succinate (TOPROL-XL) 50 mg 24 hr tablet Take 1 tablet (50 mg total) by mouth daily 90 tablet 1     No current facility-administered medications for this visit  No Known Allergies    Review of Systems   Constitutional: Positive for fatigue  Negative for chills and fever  HENT: Positive for voice change  Negative for congestion, sore throat and trouble swallowing  Respiratory: Positive for cough  Negative for chest tightness, shortness of breath and wheezing  Cardiovascular: Negative for chest pain  Gastrointestinal: Negative for diarrhea and vomiting  Musculoskeletal: Positive for arthralgias and myalgias  Neurological: Negative for dizziness and headaches  Objective:     There were no vitals filed for this visit  Physical Exam  Constitutional:       General: He is not in acute distress  Appearance: Normal appearance  He is not ill-appearing  HENT:      Head: Normocephalic and atraumatic  Nose: Nose normal       Mouth/Throat:      Mouth: Mucous membranes are moist    Eyes:      Pupils: Pupils are equal, round, and reactive to light  Pulmonary:      Effort: Pulmonary effort is normal  No respiratory distress  Neurological:      General: No focal deficit present  Mental Status: He is alert  Psychiatric:         Mood and Affect: Mood normal          VIRTUAL VISIT DISCLAIMER    Vishnu Charter verbally agrees to participate in [employer]  Pt is aware that [employer] could be limited without vital signs or the ability to perform a full hands-on physical exam  Orly Barbosa understands he or the provider may request at any time to terminate the video visit and request the patient to seek care or treatment in person

## 2022-02-22 NOTE — TELEPHONE ENCOUNTER
Pt has chest congestion, phlegm, cough, headache, and he's a little dizzy  Symptoms started on Saturday  He is not vaccinated  No known exposure  He didn't go to work today, and would like a note

## 2022-02-22 NOTE — TELEPHONE ENCOUNTER
Did you get tested for COVID? If not, recommend to be tested, can schedule virtual today and come in to be tested  I will complete work note once results available

## 2022-02-22 NOTE — LETTER
February 22, 2022     Patient: Moi Sanchez   YOB: 1980   Date of Visit: 2/22/2022       To Whom it May Concern:    Divya Rojas is under my professional care  He was seen in my office on 2/22/2022  Please excuse him from work from 2/21 to 2/22/22  If you have any questions or concerns, please don't hesitate to call           Sincerely,          Srini Goldsmith MD        CC: No Recipients

## 2022-02-23 LAB — SARS-COV-2 RNA RESP QL NAA+PROBE: NEGATIVE

## 2022-02-23 NOTE — TELEPHONE ENCOUNTER
You tested negative for COVID  How is your cough? Any fevers? If you need a stronger cough medication, let me know  You can return to work today if you are feeling better  You have a regular f/u appt with me on Friday, see if he can come earlier (8 am or 1 pm)

## 2022-02-24 NOTE — TELEPHONE ENCOUNTER
Pt notified   States cough is getting better     Wants to keep appt for tomorrow so moved up to 11:30

## 2022-02-25 ENCOUNTER — APPOINTMENT (OUTPATIENT)
Dept: LAB | Facility: CLINIC | Age: 42
End: 2022-02-25
Payer: COMMERCIAL

## 2022-02-25 ENCOUNTER — OFFICE VISIT (OUTPATIENT)
Dept: INTERNAL MEDICINE CLINIC | Facility: CLINIC | Age: 42
End: 2022-02-25
Payer: COMMERCIAL

## 2022-02-25 VITALS
TEMPERATURE: 97.2 F | WEIGHT: 233 LBS | OXYGEN SATURATION: 97 % | SYSTOLIC BLOOD PRESSURE: 130 MMHG | DIASTOLIC BLOOD PRESSURE: 90 MMHG | BODY MASS INDEX: 42.88 KG/M2 | HEIGHT: 62 IN | HEART RATE: 81 BPM

## 2022-02-25 DIAGNOSIS — E78.2 MIXED HYPERLIPIDEMIA: ICD-10-CM

## 2022-02-25 DIAGNOSIS — R73.01 ABNORMAL FASTING GLUCOSE: ICD-10-CM

## 2022-02-25 DIAGNOSIS — K21.9 GASTROESOPHAGEAL REFLUX DISEASE, UNSPECIFIED WHETHER ESOPHAGITIS PRESENT: ICD-10-CM

## 2022-02-25 DIAGNOSIS — Z79.899 ENCOUNTER FOR LONG-TERM CURRENT USE OF MEDICATION: ICD-10-CM

## 2022-02-25 DIAGNOSIS — B34.9 VIRAL INFECTION, UNSPECIFIED: Primary | ICD-10-CM

## 2022-02-25 DIAGNOSIS — Z71.9 HEALTH COUNSELING: ICD-10-CM

## 2022-02-25 DIAGNOSIS — R42 DIZZINESS: ICD-10-CM

## 2022-02-25 DIAGNOSIS — E66.01 CLASS 3 SEVERE OBESITY DUE TO EXCESS CALORIES WITH SERIOUS COMORBIDITY AND BODY MASS INDEX (BMI) OF 40.0 TO 44.9 IN ADULT (HCC): ICD-10-CM

## 2022-02-25 DIAGNOSIS — N18.2 CKD (CHRONIC KIDNEY DISEASE) STAGE 2, GFR 60-89 ML/MIN: ICD-10-CM

## 2022-02-25 DIAGNOSIS — Z00.00 LABORATORY EXAMINATION ORDERED AS PART OF A ROUTINE GENERAL MEDICAL EXAMINATION: ICD-10-CM

## 2022-02-25 DIAGNOSIS — N18.2 BENIGN HYPERTENSION WITH CHRONIC KIDNEY DISEASE, STAGE II: ICD-10-CM

## 2022-02-25 DIAGNOSIS — Z23 NEED FOR INFLUENZA VACCINATION: ICD-10-CM

## 2022-02-25 DIAGNOSIS — L30.9 DERMATITIS: ICD-10-CM

## 2022-02-25 DIAGNOSIS — I12.9 BENIGN HYPERTENSION WITH CHRONIC KIDNEY DISEASE, STAGE II: ICD-10-CM

## 2022-02-25 DIAGNOSIS — E55.9 VITAMIN D DEFICIENCY: ICD-10-CM

## 2022-02-25 LAB
BACTERIA UR QL AUTO: ABNORMAL /HPF
BILIRUB UR QL STRIP: NEGATIVE
CLARITY UR: CLEAR
COLOR UR: YELLOW
CREAT UR-MCNC: 315 MG/DL
GLUCOSE UR STRIP-MCNC: NEGATIVE MG/DL
HGB UR QL STRIP.AUTO: NEGATIVE
KETONES UR STRIP-MCNC: NEGATIVE MG/DL
LEUKOCYTE ESTERASE UR QL STRIP: NEGATIVE
MUCOUS THREADS UR QL AUTO: ABNORMAL
NITRITE UR QL STRIP: NEGATIVE
NON-SQ EPI CELLS URNS QL MICRO: ABNORMAL /HPF
PH UR STRIP.AUTO: 6.5 [PH]
PROT UR STRIP-MCNC: NEGATIVE MG/DL
PROT UR-MCNC: 23 MG/DL
PROT/CREAT UR: 0.07 MG/G{CREAT} (ref 0–0.1)
RBC #/AREA URNS AUTO: ABNORMAL /HPF
SP GR UR STRIP.AUTO: 1.02 (ref 1–1.03)
UROBILINOGEN UR QL STRIP.AUTO: 1 E.U./DL
WBC #/AREA URNS AUTO: ABNORMAL /HPF

## 2022-02-25 PROCEDURE — 81001 URINALYSIS AUTO W/SCOPE: CPT

## 2022-02-25 PROCEDURE — 90686 IIV4 VACC NO PRSV 0.5 ML IM: CPT | Performed by: INTERNAL MEDICINE

## 2022-02-25 PROCEDURE — 3008F BODY MASS INDEX DOCD: CPT | Performed by: INTERNAL MEDICINE

## 2022-02-25 PROCEDURE — 90471 IMMUNIZATION ADMIN: CPT | Performed by: INTERNAL MEDICINE

## 2022-02-25 PROCEDURE — 1036F TOBACCO NON-USER: CPT | Performed by: INTERNAL MEDICINE

## 2022-02-25 PROCEDURE — 99214 OFFICE O/P EST MOD 30 MIN: CPT | Performed by: INTERNAL MEDICINE

## 2022-02-25 PROCEDURE — 82570 ASSAY OF URINE CREATININE: CPT

## 2022-02-25 PROCEDURE — 84156 ASSAY OF PROTEIN URINE: CPT

## 2022-02-25 RX ORDER — METOPROLOL SUCCINATE 50 MG/1
50 TABLET, EXTENDED RELEASE ORAL DAILY
Qty: 90 TABLET | Refills: 1 | Status: SHIPPED | OUTPATIENT
Start: 2022-02-25

## 2022-02-25 RX ORDER — LOSARTAN POTASSIUM 25 MG/1
25 TABLET ORAL DAILY
Qty: 90 TABLET | Refills: 1 | Status: SHIPPED | OUTPATIENT
Start: 2022-02-25

## 2022-02-25 RX ORDER — ATORVASTATIN CALCIUM 40 MG/1
40 TABLET, FILM COATED ORAL DAILY
Qty: 90 TABLET | Refills: 1 | Status: SHIPPED | OUTPATIENT
Start: 2022-02-25

## 2022-02-25 NOTE — PROGRESS NOTES
Assessment/Plan:    Benign hypertension with chronic kidney disease, stage II  Lab Results   Component Value Date    EGFR 67 02/11/2022    EGFR 78 09/17/2021    EGFR 62 12/27/2020    CREATININE 1 30 02/11/2022    CREATININE 1 16 09/17/2021    CREATININE 1 40 (H) 12/27/2020     BP stable, on losartan and metoprolol  Will to urine studies to do  Will see nephrology prn  Class 3 severe obesity due to excess calories with serious comorbidity and body mass index (BMI) of 40 0 to 44 9 in adult (HCC)  No weight change  Limit juice  Limit fast foods, discussed healthy alternatives  Mixed hyperlipidemia  On statin  Vitamin D deficiency  Not taking D3  Solitary left kidney  As above  Chronic bilateral low back pain without sciatica  No recent issues  GERD (gastroesophageal reflux disease)  Minimal symptoms  Diagnoses and all orders for this visit:    Viral infection, unspecified  Comments:  COVID negative  Start steroid [facility] sample given), may take Mucinex prn  Dizziness  Comments:  Differential Dx: dehydration, BPPV  Recommend to increase daily fluid intake  Benign hypertension with chronic kidney disease, stage II  -     metoprolol succinate (TOPROL-XL) 50 mg 24 hr tablet; Take 1 tablet (50 mg total) by mouth daily  -     losartan (COZAAR) 25 mg tablet; Take 1 tablet (25 mg total) by mouth daily    Mixed hyperlipidemia  -     atorvastatin (LIPITOR) 40 mg tablet; Take 1 tablet (40 mg total) by mouth daily  -     Cancel: Lipid panel  -     Cancel: TSH, 3rd generation with Free T4 reflex  -     Lipid panel; Future  -     TSH, 3rd generation with Free T4 reflex; Future    CKD (chronic kidney disease) stage 2, GFR 60-89 ml/min  -     Cancel: CBC and differential  -     Cancel: Comprehensive metabolic panel  -     CBC and differential; Future  -     Comprehensive metabolic panel;  Future    Class 3 severe obesity due to excess calories with serious comorbidity and body mass index (BMI) of 40 0 to 44 9 in adult [facility])    Need for influenza vaccination  -     influenza vaccine, quadrivalent, 0 5 mL, preservative-free, for adult and pediatric patients 6 mos+ (AFLURIA, FLUARIX, FLULAVAL, FLUZONE)    Vitamin D deficiency  -     Cancel: Vitamin D 25 hydroxy  -     Vitamin D 25 hydroxy; Future    Abnormal fasting glucose  -     Cancel: Hemoglobin A1C  -     Hemoglobin A1C; Future    Encounter for long-term current use of medication  -     Cancel: Vitamin B12  -     Vitamin B12; Future    Laboratory examination ordered as part of a routine general medical examination  -     Cancel: CBC and differential  -     Cancel: Comprehensive metabolic panel  -     Cancel: Hemoglobin A1C  -     Cancel: Lipid panel  -     Cancel: TSH, 3rd generation with Free T4 reflex  -     Cancel: Vitamin B12  -     Cancel: Vitamin D 25 hydroxy  -     CBC and differential; Future  -     Comprehensive metabolic panel; Future  -     Hemoglobin A1C; Future  -     Lipid panel; Future  -     TSH, 3rd generation with Free T4 reflex; Future  -     Vitamin B12; Future  -     Vitamin D 25 hydroxy; Future    Dermatitis    Gastroesophageal reflux disease, unspecified whether esophagitis present    Health counseling  Comments:  Flu vaccine today  Recommend COVID vaccine  Follow up in 6 months or as needed  Subjective:      Patient ID: Miguel Frye is a 43 y o  male here for a follow up  He is feeling better but still coughing during the day or at night  He did not start any over the counter cough medication  Cough occasionally with phlegm, has some wheezing sometimes  He reports some dizziness episode which occurred at work last week  It also occurs sometimes at home, no specific movement or activity  Denies any loss of balance, nausea, vomiting or ringing in the ear  He complains of itching in both his nipples since a few days ago  He reports a mild rash, has not used any creams      He gained a few lbs since his last visit  He eats fast food daily  The following portions of the patient's history were reviewed and updated as appropriate: allergies, current medications, past medical history, past social history and problem list     Review of Systems   Constitutional: Negative for activity change, appetite change and fatigue  HENT: Negative for congestion, hearing loss and postnasal drip  Eyes: Negative  Negative for visual disturbance  Respiratory: Positive for cough  Negative for chest tightness and shortness of breath  Cardiovascular: Negative for chest pain, palpitations and leg swelling  Gastrointestinal: Negative for abdominal pain and constipation  Genitourinary: Negative for dysuria, frequency and urgency  Musculoskeletal: Negative for arthralgias  Skin: Positive for rash  Negative for wound  Neurological: Positive for light-headedness  Negative for dizziness, numbness and headaches  Psychiatric/Behavioral: Negative for confusion and sleep disturbance  The patient is not nervous/anxious  Objective:      /90   Pulse 81   Temp (!) 97 2 °F (36 2 °C)   Ht 5' 2" (1 575 m)   Wt 106 kg (233 lb)   SpO2 97%   BMI 42 62 kg/m²          Physical Exam  Vitals and nursing note reviewed  Constitutional:       Appearance: He is well-developed  HENT:      Head: Normocephalic and atraumatic  Eyes:      Conjunctiva/sclera: Conjunctivae normal       Pupils: Pupils are equal, round, and reactive to light  Cardiovascular:      Rate and Rhythm: Normal rate and regular rhythm  Heart sounds: Normal heart sounds  Pulmonary:      Effort: Pulmonary effort is normal  No respiratory distress  Breath sounds: No decreased air movement  No wheezing or rhonchi  Abdominal:      General: Bowel sounds are normal       Palpations: Abdomen is soft  Musculoskeletal:         General: No swelling  Cervical back: Neck supple  Right lower leg: No edema        Left lower leg: No edema    Skin:     General: Skin is warm and dry  Findings: Rash present  No erythema  Neurological:      General: No focal deficit present  Mental Status: He is alert and oriented to person, place, and time  Psychiatric:         Mood and Affect: Mood and affect normal          Behavior: Behavior normal            Lab results reviewed with patient

## 2022-02-25 NOTE — ASSESSMENT & PLAN NOTE
Lab Results   Component Value Date    EGFR 67 02/11/2022    EGFR 78 09/17/2021    EGFR 62 12/27/2020    CREATININE 1 30 02/11/2022    CREATININE 1 16 09/17/2021    CREATININE 1 40 (H) 12/27/2020     BP stable, on losartan and metoprolol  Will to urine studies to do  Will see nephrology prn

## 2022-02-25 NOTE — PATIENT INSTRUCTIONS
Drink more water daily, limit iced tea  Drink at least 50 to 60 oz of water daily  Start Mucinex (guaifenesin) twice a day, as needed  Use Dulera inhaler for at least once a week  Apply hydrocortisone cream twice a day, as needed on rash  Apply thinly around nipple

## 2022-02-28 ENCOUNTER — TELEPHONE (OUTPATIENT)
Dept: NEPHROLOGY | Facility: CLINIC | Age: 42
End: 2022-02-28

## 2022-02-28 NOTE — TELEPHONE ENCOUNTER
Message left on patient's VM that urine tests are normal per  Dr Nette Seo     ----- Message from Shameka Cox MD sent at 2/25/2022  5:47 PM EST -----  Please inform patient that his urine tests are normal

## 2022-06-24 ENCOUNTER — TELEPHONE (OUTPATIENT)
Dept: INTERNAL MEDICINE CLINIC | Facility: CLINIC | Age: 42
End: 2022-06-24

## 2022-06-24 NOTE — TELEPHONE ENCOUNTER
Please check your BP if possible  If with ongoing chest pains, recommend to be evaluated at the ER because of your medical history

## 2022-06-24 NOTE — TELEPHONE ENCOUNTER
Pt experienced dizziness on Wednesday at work four times with some sharp pain in his upper chest  Sharp chest pain has continued a few times every day since Wednesday  He is taking his BP meds  He has not checked his BP  No nausea  No shortness of breath

## 2022-06-27 NOTE — TELEPHONE ENCOUNTER
Spoke w/ pt  And adv'd  He hasn't checked his bp in a couple days-he works nights and has been sleeping

## 2022-08-26 ENCOUNTER — OFFICE VISIT (OUTPATIENT)
Dept: INTERNAL MEDICINE CLINIC | Facility: CLINIC | Age: 42
End: 2022-08-26
Payer: COMMERCIAL

## 2022-08-26 VITALS
SYSTOLIC BLOOD PRESSURE: 142 MMHG | TEMPERATURE: 97.3 F | OXYGEN SATURATION: 97 % | RESPIRATION RATE: 18 BRPM | WEIGHT: 221 LBS | HEART RATE: 83 BPM | BODY MASS INDEX: 40.67 KG/M2 | DIASTOLIC BLOOD PRESSURE: 90 MMHG | HEIGHT: 62 IN

## 2022-08-26 DIAGNOSIS — R42 DIZZINESS: Primary | ICD-10-CM

## 2022-08-26 DIAGNOSIS — M54.50 CHRONIC BILATERAL LOW BACK PAIN WITHOUT SCIATICA: ICD-10-CM

## 2022-08-26 DIAGNOSIS — I76 CEREBRAL SEPTIC EMBOLI (HCC): ICD-10-CM

## 2022-08-26 DIAGNOSIS — E66.01 CLASS 3 SEVERE OBESITY DUE TO EXCESS CALORIES WITH SERIOUS COMORBIDITY AND BODY MASS INDEX (BMI) OF 40.0 TO 44.9 IN ADULT (HCC): ICD-10-CM

## 2022-08-26 DIAGNOSIS — E55.9 VITAMIN D DEFICIENCY: ICD-10-CM

## 2022-08-26 DIAGNOSIS — E78.2 MIXED HYPERLIPIDEMIA: ICD-10-CM

## 2022-08-26 DIAGNOSIS — Z86.79 HISTORY OF ENDOCARDITIS: ICD-10-CM

## 2022-08-26 DIAGNOSIS — I12.9 BENIGN HYPERTENSION WITH CHRONIC KIDNEY DISEASE, STAGE II: ICD-10-CM

## 2022-08-26 DIAGNOSIS — G89.29 CHRONIC BILATERAL LOW BACK PAIN WITHOUT SCIATICA: ICD-10-CM

## 2022-08-26 DIAGNOSIS — I66.9 CEREBRAL SEPTIC EMBOLI (HCC): ICD-10-CM

## 2022-08-26 DIAGNOSIS — Z71.9 HEALTH COUNSELING: ICD-10-CM

## 2022-08-26 DIAGNOSIS — N18.2 BENIGN HYPERTENSION WITH CHRONIC KIDNEY DISEASE, STAGE II: ICD-10-CM

## 2022-08-26 PROBLEM — E87.6 HYPOKALEMIA: Status: RESOLVED | Noted: 2019-10-23 | Resolved: 2022-08-26

## 2022-08-26 PROCEDURE — 99214 OFFICE O/P EST MOD 30 MIN: CPT | Performed by: INTERNAL MEDICINE

## 2022-08-26 RX ORDER — LOSARTAN POTASSIUM 25 MG/1
25 TABLET ORAL DAILY
Qty: 90 TABLET | Refills: 1 | Status: SHIPPED | OUTPATIENT
Start: 2022-08-26

## 2022-08-26 RX ORDER — ATORVASTATIN CALCIUM 40 MG/1
40 TABLET, FILM COATED ORAL DAILY
Qty: 90 TABLET | Refills: 1 | Status: SHIPPED | OUTPATIENT
Start: 2022-08-26

## 2022-08-26 RX ORDER — CHOLECALCIFEROL (VITAMIN D3) 125 MCG
2000 CAPSULE ORAL DAILY
Qty: 90 TABLET | Refills: 1 | Status: SHIPPED | OUTPATIENT
Start: 2022-08-26

## 2022-08-26 RX ORDER — METOPROLOL SUCCINATE 50 MG/1
50 TABLET, EXTENDED RELEASE ORAL DAILY
Qty: 90 TABLET | Refills: 1 | Status: SHIPPED | OUTPATIENT
Start: 2022-08-26

## 2022-08-26 RX ORDER — ASPIRIN 325 MG
325 TABLET, DELAYED RELEASE (ENTERIC COATED) ORAL DAILY
Qty: 90 TABLET | Refills: 1 | Status: SHIPPED | OUTPATIENT
Start: 2022-08-26

## 2022-08-26 NOTE — PROGRESS NOTES
Assessment/Plan:    Benign hypertension with chronic kidney disease, stage II  Lab Results   Component Value Date    EGFR 67 02/11/2022    EGFR 78 09/17/2021    EGFR 62 12/27/2020    CREATININE 1 30 02/11/2022    CREATININE 1 16 09/17/2021    CREATININE 1 40 (H) 12/27/2020     Repeat labs, kidney function has been stable  BP controlled, on losartan and metoprolol  Class 3 severe obesity due to excess calories with serious comorbidity and body mass index (BMI) of 40 0 to 44 9 in [facility])  Lost 12 lbs since last visit  Cerebral septic emboli (HCC)  Recommend to continue  mg daily  Mixed hyperlipidemia  Lipids due, on statin  GERD (gastroesophageal reflux disease)  No recent symptoms  Chronic bilateral low back pain without sciatica  Stable  Diagnoses and all orders for this visit:    Dizziness  Comments:  Normal orthostatics  Hx not consistent with BPPV, he reports adeqaute fluid intake  Check echo, carotid ultrasound  Orders:  -     Echo complete w/ contrast if indicated; Future  -     VAS carotid complete study; Future    Benign hypertension with chronic kidney disease, stage II  -     losartan (COZAAR) 25 mg tablet; Take 1 tablet (25 mg total) by mouth daily  -     metoprolol succinate (TOPROL-XL) 50 mg 24 hr tablet; Take 1 tablet (50 mg total) by mouth daily    Class 3 severe obesity due to excess calories with serious comorbidity and body mass index (BMI) of 40 0 to 44 9 in [facility])    Mixed hyperlipidemia  -     atorvastatin (LIPITOR) 40 mg tablet; Take 1 tablet (40 mg total) by mouth daily    Cerebral septic emboli (HCC)  -     aspirin (ECOTRIN) 325 mg EC tablet; Take 1 tablet (325 mg total) by mouth daily  -     Echo complete w/ contrast if indicated; Future  -     VAS carotid complete study;  Future    Vitamin D deficiency  -     Cholecalciferol (Vitamin D3) 50 MCG (2000 UT) TABS; Take 1 tablet (2,000 Units total) by mouth daily    Chronic bilateral low back pain without sciatica    History of endocarditis  -     Echo complete w/ contrast if indicated; Future  -     VAS carotid complete study; Future    Health counseling  Comments:  Recommend COVID vaccine  Follow up in 6 months or as needed  Subjective:      Patient ID: Los Luong is a 43 y o  male here for a follow up  He still complains of occasional dizziness  It can occur while he is at work, with activity or when just sitting down  He feels he is about to pass out, has some blurring of vision but has not blacked out  He reports symptoms would last for about 10 seconds at a time only  It can happen multiple times in a day  He denies any chest pain, shortness of breath, palpitations or other symptoms  He also complains of some tightness on the right side of his chest, by the red area  It would usually occur during or after work  He denies any chest pressure or tightness, no shortness of breath  He has not had headaches, usually takes over the counter medications for this  His Dad was asking why he is taking full dose aspirin  The following portions of the patient's history were reviewed and updated as appropriate: allergies, current medications, past medical history, past social history and problem list     Review of Systems   Constitutional: Negative for appetite change and fatigue  HENT: Negative for congestion, hearing loss and postnasal drip  Eyes: Negative  Respiratory: Negative for cough, chest tightness and shortness of breath  Cardiovascular: Negative for chest pain, palpitations and leg swelling  Gastrointestinal: Negative for abdominal pain and constipation  Genitourinary: Negative for dysuria, frequency and urgency  Musculoskeletal: Negative for arthralgias  Skin: Negative for rash and wound  Neurological: Negative for dizziness, numbness and headaches  Hematological: Negative for adenopathy  Does not bruise/bleed easily     Psychiatric/Behavioral: Negative for sleep disturbance  The patient is not nervous/anxious  Objective:      /90   Pulse 83   Temp (!) 97 3 °F (36 3 °C)   Resp 18   Ht 5' 2" (1 575 m)   Wt 100 kg (221 lb)   SpO2 97%   BMI 40 42 kg/m²          Physical Exam  Vitals and nursing note reviewed  Constitutional:       Appearance: He is well-developed  HENT:      Head: Normocephalic and atraumatic  Right Ear: Tympanic membrane, ear canal and external ear normal       Left Ear: Tympanic membrane, ear canal and external ear normal    Eyes:      Conjunctiva/sclera: Conjunctivae normal       Pupils: Pupils are equal, round, and reactive to light  Cardiovascular:      Rate and Rhythm: Normal rate and regular rhythm  Heart sounds: Normal heart sounds  Pulmonary:      Effort: Pulmonary effort is normal       Breath sounds: No wheezing or rhonchi  Chest:      Chest wall: No tenderness  Abdominal:      General: Bowel sounds are normal       Palpations: Abdomen is soft  Musculoskeletal:         General: No swelling  Cervical back: Neck supple  Right lower leg: No edema  Left lower leg: No edema  Skin:     General: Skin is warm  Findings: No rash  Neurological:      General: No focal deficit present  Mental Status: He is alert and oriented to person, place, and time        Comments: (-) nystagmus   Psychiatric:         Mood and Affect: Mood and affect normal          Behavior: Behavior normal

## 2022-08-26 NOTE — PATIENT INSTRUCTIONS
You may take acetaminophen  (Tylenol) 500 mg, 1 to 2 tablets 3 times a day, as needed for pain  No more than 6 tablets a day  If it is 650 mg tablets, no more than 4 a day

## 2022-08-26 NOTE — ASSESSMENT & PLAN NOTE
Lab Results   Component Value Date    EGFR 67 02/11/2022    EGFR 78 09/17/2021    EGFR 62 12/27/2020    CREATININE 1 30 02/11/2022    CREATININE 1 16 09/17/2021    CREATININE 1 40 (H) 12/27/2020     Repeat labs, kidney function has been stable  BP controlled, on losartan and metoprolol

## 2022-09-02 ENCOUNTER — APPOINTMENT (OUTPATIENT)
Dept: LAB | Facility: CLINIC | Age: 42
End: 2022-09-02
Payer: COMMERCIAL

## 2022-09-02 DIAGNOSIS — E78.2 MIXED HYPERLIPIDEMIA: ICD-10-CM

## 2022-09-02 DIAGNOSIS — N18.2 CKD (CHRONIC KIDNEY DISEASE) STAGE 2, GFR 60-89 ML/MIN: ICD-10-CM

## 2022-09-02 DIAGNOSIS — Z00.00 LABORATORY EXAMINATION ORDERED AS PART OF A ROUTINE GENERAL MEDICAL EXAMINATION: ICD-10-CM

## 2022-09-02 DIAGNOSIS — R73.01 ABNORMAL FASTING GLUCOSE: ICD-10-CM

## 2022-09-02 DIAGNOSIS — Z79.899 ENCOUNTER FOR LONG-TERM CURRENT USE OF MEDICATION: ICD-10-CM

## 2022-09-02 DIAGNOSIS — E55.9 VITAMIN D DEFICIENCY: ICD-10-CM

## 2022-09-02 LAB
25(OH)D3 SERPL-MCNC: 30.7 NG/ML (ref 30–100)
ALBUMIN SERPL BCP-MCNC: 4.1 G/DL (ref 3.5–5)
ALP SERPL-CCNC: 88 U/L (ref 34–104)
ALT SERPL W P-5'-P-CCNC: 28 U/L (ref 7–52)
ANION GAP SERPL CALCULATED.3IONS-SCNC: 6 MMOL/L (ref 4–13)
AST SERPL W P-5'-P-CCNC: 18 U/L (ref 13–39)
BASOPHILS # BLD AUTO: 0.05 THOUSANDS/ΜL (ref 0–0.1)
BASOPHILS NFR BLD AUTO: 1 % (ref 0–1)
BILIRUB SERPL-MCNC: 1.06 MG/DL (ref 0.2–1)
BUN SERPL-MCNC: 15 MG/DL (ref 5–25)
CALCIUM SERPL-MCNC: 9.3 MG/DL (ref 8.4–10.2)
CHLORIDE SERPL-SCNC: 106 MMOL/L (ref 96–108)
CHOLEST SERPL-MCNC: 95 MG/DL
CO2 SERPL-SCNC: 28 MMOL/L (ref 21–32)
CREAT SERPL-MCNC: 1.09 MG/DL (ref 0.6–1.3)
EOSINOPHIL # BLD AUTO: 0.05 THOUSAND/ΜL (ref 0–0.61)
EOSINOPHIL NFR BLD AUTO: 1 % (ref 0–6)
ERYTHROCYTE [DISTWIDTH] IN BLOOD BY AUTOMATED COUNT: 13.4 % (ref 11.6–15.1)
GFR SERPL CREATININE-BSD FRML MDRD: 83 ML/MIN/1.73SQ M
GLUCOSE P FAST SERPL-MCNC: 104 MG/DL (ref 65–99)
HCT VFR BLD AUTO: 50.5 % (ref 36.5–49.3)
HDLC SERPL-MCNC: 41 MG/DL
HGB BLD-MCNC: 17.2 G/DL (ref 12–17)
IMM GRANULOCYTES # BLD AUTO: 0.02 THOUSAND/UL (ref 0–0.2)
IMM GRANULOCYTES NFR BLD AUTO: 0 % (ref 0–2)
LDLC SERPL CALC-MCNC: 43 MG/DL (ref 0–100)
LYMPHOCYTES # BLD AUTO: 1.12 THOUSANDS/ΜL (ref 0.6–4.47)
LYMPHOCYTES NFR BLD AUTO: 15 % (ref 14–44)
MCH RBC QN AUTO: 29.9 PG (ref 26.8–34.3)
MCHC RBC AUTO-ENTMCNC: 34.1 G/DL (ref 31.4–37.4)
MCV RBC AUTO: 88 FL (ref 82–98)
MONOCYTES # BLD AUTO: 0.47 THOUSAND/ΜL (ref 0.17–1.22)
MONOCYTES NFR BLD AUTO: 6 % (ref 4–12)
NEUTROPHILS # BLD AUTO: 5.6 THOUSANDS/ΜL (ref 1.85–7.62)
NEUTS SEG NFR BLD AUTO: 77 % (ref 43–75)
NONHDLC SERPL-MCNC: 54 MG/DL
NRBC BLD AUTO-RTO: 0 /100 WBCS
PLATELET # BLD AUTO: 199 THOUSANDS/UL (ref 149–390)
PMV BLD AUTO: 10 FL (ref 8.9–12.7)
POTASSIUM SERPL-SCNC: 3.8 MMOL/L (ref 3.5–5.3)
PROT SERPL-MCNC: 7.3 G/DL (ref 6.4–8.4)
RBC # BLD AUTO: 5.76 MILLION/UL (ref 3.88–5.62)
SODIUM SERPL-SCNC: 140 MMOL/L (ref 135–147)
TRIGL SERPL-MCNC: 54 MG/DL
TSH SERPL DL<=0.05 MIU/L-ACNC: 0.7 UIU/ML (ref 0.45–4.5)
VIT B12 SERPL-MCNC: 397 PG/ML (ref 100–900)
WBC # BLD AUTO: 7.31 THOUSAND/UL (ref 4.31–10.16)

## 2022-09-02 PROCEDURE — 80061 LIPID PANEL: CPT

## 2022-09-02 PROCEDURE — 84443 ASSAY THYROID STIM HORMONE: CPT

## 2022-09-02 PROCEDURE — 85025 COMPLETE CBC W/AUTO DIFF WBC: CPT

## 2022-09-02 PROCEDURE — 83036 HEMOGLOBIN GLYCOSYLATED A1C: CPT

## 2022-09-02 PROCEDURE — 82306 VITAMIN D 25 HYDROXY: CPT

## 2022-09-02 PROCEDURE — 80053 COMPREHEN METABOLIC PANEL: CPT

## 2022-09-02 PROCEDURE — 36415 COLL VENOUS BLD VENIPUNCTURE: CPT

## 2022-09-02 PROCEDURE — 82607 VITAMIN B-12: CPT

## 2022-09-03 LAB
EST. AVERAGE GLUCOSE BLD GHB EST-MCNC: 114 MG/DL
HBA1C MFR BLD: 5.6 %

## 2022-09-05 ENCOUNTER — TELEPHONE (OUTPATIENT)
Dept: INTERNAL MEDICINE CLINIC | Facility: CLINIC | Age: 42
End: 2022-09-05

## 2022-09-05 NOTE — TELEPHONE ENCOUNTER
Lab results:    Cholesterol is good, continue atorvastatin  Hemoglobin a bit high, make sure you drink enough water daily, 60 oz a day  Sugars normal, no diabetes  Continue taking your vitamin D3 2000 units daily  You should also be taking vitamin B12 500 mcg daily, a bit low      The rest of your labs were within normal

## 2022-09-06 ENCOUNTER — PATIENT MESSAGE (OUTPATIENT)
Dept: INTERNAL MEDICINE CLINIC | Facility: CLINIC | Age: 42
End: 2022-09-06

## 2022-09-30 ENCOUNTER — HOSPITAL ENCOUNTER (OUTPATIENT)
Dept: NON INVASIVE DIAGNOSTICS | Facility: CLINIC | Age: 42
Discharge: HOME | End: 2022-09-30
Payer: COMMERCIAL

## 2022-09-30 VITALS
BODY MASS INDEX: 40.67 KG/M2 | HEIGHT: 62 IN | DIASTOLIC BLOOD PRESSURE: 90 MMHG | SYSTOLIC BLOOD PRESSURE: 142 MMHG | HEART RATE: 83 BPM | WEIGHT: 221 LBS

## 2022-09-30 DIAGNOSIS — R42 DIZZINESS: ICD-10-CM

## 2022-09-30 DIAGNOSIS — I76 CEREBRAL SEPTIC EMBOLI (HCC): ICD-10-CM

## 2022-09-30 DIAGNOSIS — I66.9 CEREBRAL SEPTIC EMBOLI (HCC): ICD-10-CM

## 2022-09-30 DIAGNOSIS — Z86.79 HISTORY OF ENDOCARDITIS: ICD-10-CM

## 2022-09-30 LAB
AORTIC ROOT: 2.9 CM
APICAL FOUR CHAMBER EJECTION FRACTION: 61 %
ASCENDING AORTA: 2.9 CM
AV PEAK GRADIENT: 7 MMHG
DOP CALC MV VTI: 45.33 CM
E WAVE DECELERATION TIME: 310 MS
FRACTIONAL SHORTENING: 37 % (ref 28–44)
INTERVENTRICULAR SEPTUM IN DIASTOLE (PARASTERNAL SHORT AXIS VIEW): 0.9 CM
INTERVENTRICULAR SEPTUM: 0.9 CM (ref 0.6–1.1)
LAAS-AP4: 16 CM2
LEFT ATRIUM SIZE: 3.4 CM
LEFT INTERNAL DIMENSION IN SYSTOLE: 2.4 CM (ref 2.1–4)
LEFT VENTRICULAR INTERNAL DIMENSION IN DIASTOLE: 3.8 CM (ref 3.5–6)
LEFT VENTRICULAR POSTERIOR WALL IN END DIASTOLE: 0.9 CM
LEFT VENTRICULAR STROKE VOLUME: 42 ML
LVSV (TEICH): 42 ML
MV E'TISSUE VEL-SEP: 8 CM/S
MV MEAN GRADIENT: 4 MMHG
MV PEAK A VEL: 1.05 M/S
MV PEAK E VEL: 151 CM/S
MV PEAK GRADIENT: 10 MMHG
MV STENOSIS PRESSURE HALF TIME: 90 MS
MV VALVE AREA P 1/2 METHOD: 2.44 CM2
RIGHT ATRIUM AREA SYSTOLE A4C: 14.6 CM2
RIGHT VENTRICLE ID DIMENSION: 4.3 CM
SL CV LV EF: 55
SL CV PED ECHO LEFT VENTRICLE DIASTOLIC VOLUME (MOD BIPLANE) 2D: 62 ML
SL CV PED ECHO LEFT VENTRICLE SYSTOLIC VOLUME (MOD BIPLANE) 2D: 21 ML
TR MAX PG: 23 MMHG
TR PEAK VELOCITY: 2.4 M/S
TRICUSPID VALVE PEAK REGURGITATION VELOCITY: 2.41 M/S

## 2022-09-30 PROCEDURE — 93880 EXTRACRANIAL BILAT STUDY: CPT

## 2022-09-30 PROCEDURE — 93306 TTE W/DOPPLER COMPLETE: CPT | Performed by: INTERNAL MEDICINE

## 2022-09-30 PROCEDURE — 93306 TTE W/DOPPLER COMPLETE: CPT

## 2022-10-01 PROCEDURE — 93880 EXTRACRANIAL BILAT STUDY: CPT | Performed by: SURGERY

## 2022-10-02 ENCOUNTER — TELEPHONE (OUTPATIENT)
Dept: INTERNAL MEDICINE CLINIC | Facility: CLINIC | Age: 42
End: 2022-10-02

## 2022-10-02 NOTE — TELEPHONE ENCOUNTER
Echo (ultrasound of the heart) and ultrasound of the neck was normal     Are you still getting dizzy? Are you drinking enough water daily?

## 2022-10-03 NOTE — TELEPHONE ENCOUNTER
Pt notified  He has not had dizziness or headaches for the past week  He believes he is drinking enough water

## 2023-02-05 ENCOUNTER — NURSE TRIAGE (OUTPATIENT)
Dept: OTHER | Facility: OTHER | Age: 43
End: 2023-02-05

## 2023-02-05 NOTE — TELEPHONE ENCOUNTER
Regarding: sore throat, runny nose and headache  ----- Message from [facility] sent at 2/5/2023 11:35 AM EST -----  "I have a sore throat, runny nose and headache    I am not sure what I should be taking "

## 2023-02-05 NOTE — TELEPHONE ENCOUNTER
Reason for Disposition  • Common cold with no complications    Answer Assessment - Initial Assessment Questions  1  ONSET: "When did the nasal discharge start?"       2 days ago  2  AMOUNT: "How much discharge is there?"       small  3  COUGH: "Do you have a cough?" If yes, ask: "Describe the color of your sputum" (clear, white, yellow, green)      Dry   4  RESPIRATORY DISTRESS: "Describe your breathing "       Normal   5  FEVER: "Do you have a fever?" If Yes, ask: "What is your temperature, how was it measured, and when did it start?"      no  6  SEVERITY: "Overall, how bad are you feeling right now?" (e g , doesn't interfere with normal activities, staying home from school/work, staying in bed)       "I feel uncomfortable"  7  OTHER SYMPTOMS: "Do you have any other symptoms?" (e g , sore throat, earache, wheezing, vomiting)      Sneezing   8   PREGNANCY: "Is there any chance you are pregnant?" "When was your last menstrual period?"      No    Protocols used: COMMON COLD-ADULT-

## 2023-03-03 ENCOUNTER — OFFICE VISIT (OUTPATIENT)
Dept: INTERNAL MEDICINE CLINIC | Facility: CLINIC | Age: 43
End: 2023-03-03

## 2023-03-03 VITALS
OXYGEN SATURATION: 97 % | BODY MASS INDEX: 43.79 KG/M2 | HEIGHT: 62 IN | TEMPERATURE: 97.6 F | SYSTOLIC BLOOD PRESSURE: 120 MMHG | WEIGHT: 238 LBS | DIASTOLIC BLOOD PRESSURE: 90 MMHG | HEART RATE: 80 BPM

## 2023-03-03 DIAGNOSIS — N18.2 CKD (CHRONIC KIDNEY DISEASE) STAGE 2, GFR 60-89 ML/MIN: ICD-10-CM

## 2023-03-03 DIAGNOSIS — Z00.00 HEALTH MAINTENANCE EXAMINATION: Primary | ICD-10-CM

## 2023-03-03 DIAGNOSIS — M54.2 NECK PAIN: ICD-10-CM

## 2023-03-03 DIAGNOSIS — E55.9 VITAMIN D DEFICIENCY: ICD-10-CM

## 2023-03-03 DIAGNOSIS — I12.9 BENIGN HYPERTENSION WITH CHRONIC KIDNEY DISEASE, STAGE II: ICD-10-CM

## 2023-03-03 DIAGNOSIS — E53.8 VITAMIN B12 DEFICIENCY: ICD-10-CM

## 2023-03-03 DIAGNOSIS — G44.209 TENSION HEADACHE: ICD-10-CM

## 2023-03-03 DIAGNOSIS — R73.01 ABNORMAL FASTING GLUCOSE: ICD-10-CM

## 2023-03-03 DIAGNOSIS — N18.2 BENIGN HYPERTENSION WITH CHRONIC KIDNEY DISEASE, STAGE II: ICD-10-CM

## 2023-03-03 DIAGNOSIS — R21 RASH: ICD-10-CM

## 2023-03-03 DIAGNOSIS — G89.29 CHRONIC BILATERAL LOW BACK PAIN WITHOUT SCIATICA: ICD-10-CM

## 2023-03-03 DIAGNOSIS — I66.9 CEREBRAL SEPTIC EMBOLI (HCC): ICD-10-CM

## 2023-03-03 DIAGNOSIS — M54.50 CHRONIC BILATERAL LOW BACK PAIN WITHOUT SCIATICA: ICD-10-CM

## 2023-03-03 DIAGNOSIS — E78.2 MIXED HYPERLIPIDEMIA: ICD-10-CM

## 2023-03-03 DIAGNOSIS — Z00.00 LABORATORY EXAMINATION ORDERED AS PART OF A ROUTINE GENERAL MEDICAL EXAMINATION: ICD-10-CM

## 2023-03-03 DIAGNOSIS — I76 CEREBRAL SEPTIC EMBOLI (HCC): ICD-10-CM

## 2023-03-03 DIAGNOSIS — E66.01 CLASS 3 SEVERE OBESITY DUE TO EXCESS CALORIES WITH SERIOUS COMORBIDITY AND BODY MASS INDEX (BMI) OF 40.0 TO 44.9 IN ADULT (HCC): ICD-10-CM

## 2023-03-03 DIAGNOSIS — R42 DIZZINESS: ICD-10-CM

## 2023-03-03 RX ORDER — CARISOPRODOL 250 MG/1
250 TABLET ORAL 2 TIMES DAILY PRN
Qty: 30 TABLET | Refills: 0 | Status: SHIPPED | OUTPATIENT
Start: 2023-03-03

## 2023-03-03 RX ORDER — ASPIRIN 325 MG
325 TABLET, DELAYED RELEASE (ENTERIC COATED) ORAL DAILY
Qty: 90 TABLET | Refills: 1 | Status: SHIPPED | OUTPATIENT
Start: 2023-03-03

## 2023-03-03 RX ORDER — CLOTRIMAZOLE AND BETAMETHASONE DIPROPIONATE 10; .64 MG/G; MG/G
CREAM TOPICAL 2 TIMES DAILY PRN
Qty: 45 G | Refills: 2 | Status: SHIPPED | OUTPATIENT
Start: 2023-03-03

## 2023-03-03 RX ORDER — CHOLECALCIFEROL (VITAMIN D3) 125 MCG
2000 CAPSULE ORAL DAILY
Qty: 90 TABLET | Refills: 1 | Status: SHIPPED | OUTPATIENT
Start: 2023-03-03

## 2023-03-03 RX ORDER — CHOLECALCIFEROL (VITAMIN D3) 125 MCG
500 CAPSULE ORAL DAILY
Qty: 90 TABLET | Refills: 1 | Status: SHIPPED | OUTPATIENT
Start: 2023-03-03

## 2023-03-03 RX ORDER — ATORVASTATIN CALCIUM 40 MG/1
40 TABLET, FILM COATED ORAL DAILY
Qty: 90 TABLET | Refills: 1 | Status: SHIPPED | OUTPATIENT
Start: 2023-03-03

## 2023-03-03 RX ORDER — LOSARTAN POTASSIUM 25 MG/1
25 TABLET ORAL DAILY
Qty: 90 TABLET | Refills: 1 | Status: SHIPPED | OUTPATIENT
Start: 2023-03-03

## 2023-03-03 RX ORDER — METOPROLOL SUCCINATE 50 MG/1
50 TABLET, EXTENDED RELEASE ORAL DAILY
Qty: 90 TABLET | Refills: 1 | Status: SHIPPED | OUTPATIENT
Start: 2023-03-03

## 2023-03-03 NOTE — PATIENT INSTRUCTIONS
Apply warm moist heat for 10 to 15 minutes, followed by back stretching exercises  Neck Exercises   WHAT YOU NEED TO KNOW:   What do I need to know about neck exercises? Neck exercises help reduce neck pain, and improve neck movement and strength  Neck exercises also help prevent long-term neck problems  What do I need to know about neck exercise safety? Move slowly, gently, and smoothly  Avoid fast or jerky motions  Stand and sit the way your healthcare provider shows you  Good posture may reduce your neck pain  Check your posture often, even when you are not doing your neck exercises  Follow the exercise program recommended by your healthcare provider  He or she will tell you which exercises are best for your condition  He or she will also tell you how many repetitions to do and how often you should do the exercises  How do I perform neck exercises safely? Exercise position:  You may sit or stand while you do neck exercises  Face forward  Your shoulders should be straight and relaxed, with a good posture  Head tilts, forward and back:  Gently bow your head and try to touch your chin to your chest  Your healthcare provider may tell you to push on the back of your neck to help bow your head  Raise your chin back to the starting position  Tilt your head back as far as possible so you are looking up at the ceiling  Your healthcare provider may tell you to lift your chin to help tilt your head back  Return your head to the starting position  Head tilts, side to side:  Tilt your head, bringing your ear toward your shoulder  Then tilt your head toward the other shoulder  Head turns:  Turn your head to look over your shoulder  Tilt your chin down and try to touch it to your shoulder  Do not raise your shoulder to your chin  Face forward again  Do the same on the other side  Head rolls:  Slowly bring your chin toward your chest  Next, roll your head to the right   Your ear should be positioned over your shoulder  Hold this position for 5 seconds  Roll your head back toward your chest and to the left into the same position  Hold for 5 seconds  Gently roll your head back and around in a clockwise [place] 3 times  Next, move your head in the reverse direction (counterclockwise) in a [place] 3 times  Do not shrug your shoulders upwards while you do this exercise  When should I call my doctor? Your pain does not get better, or gets worse  You have questions or concerns about your condition, care, or exercise program     CARE AGREEMENT:   You have the right to help plan your care  Learn about your health condition and how it may be treated  Discuss treatment options with your healthcare providers to decide what care you want to receive  You always have the right to refuse treatment  The above information is an  only  It is not intended as medical advice for individual conditions or treatments  Talk to your doctor, nurse or pharmacist before following any medical regimen to see if it is safe and effective for you  © Copyright [employer] 2022 Information is for End User's use only and may not be sold, redistributed or otherwise used for commercial purposes

## 2023-03-03 NOTE — ASSESSMENT & PLAN NOTE
Lab Results   Component Value Date    EGFR 83 09/02/2022    EGFR 67 02/11/2022    EGFR 78 09/17/2021    CREATININE 1 09 09/02/2022    CREATININE 1 30 02/11/2022    CREATININE 1 16 09/17/2021     Stable renal function  DBP slightly elevated today  On losartan and metoprolol

## 2023-03-03 NOTE — PROGRESS NOTES
Assessment/Plan:    Benign hypertension with chronic kidney disease, stage II  Lab Results   Component Value Date    EGFR 83 09/02/2022    EGFR 67 02/11/2022    EGFR 78 09/17/2021    CREATININE 1 09 09/02/2022    CREATININE 1 30 02/11/2022    CREATININE 1 16 09/17/2021     Stable renal function  DBP slightly elevated today  On losartan and metoprolol  Cerebral septic emboli (HCC)  On daily ASA  Mixed hyperlipidemia  Lipids at goal, on atorvastatin 40 mg  Chronic bilateral low back pain without sciatica  Stable  Class 3 severe obesity due to excess calories with serious comorbidity and body mass index (BMI) of 40 0 to 44 9 in adult (HCC)  Weight gain of 17 lbs since last visit  He is looking for a new apartment with a kitchenette so he can prepare foods  Reviewed healthy choices  Recommend to stop drinking soda  Tension headache  More frequent symptoms recently  Discussed posture, proper body mechanics at work  Change muscle relaxant  Apply warm moist heat after work followed by stretching exercises  Vitamin B12 deficiency  Start daily supplement  Diagnoses and all orders for this visit:    Health maintenance examination    Dizziness  Comments:  Resolved  Benign hypertension with chronic kidney disease, stage II  -     metoprolol succinate (TOPROL-XL) 50 mg 24 hr tablet; Take 1 tablet (50 mg total) by mouth daily  -     losartan (COZAAR) 25 mg tablet; Take 1 tablet (25 mg total) by mouth daily  -     CBC and differential; Future    Mixed hyperlipidemia  -     atorvastatin (LIPITOR) 40 mg tablet; Take 1 tablet (40 mg total) by mouth daily  -     Comprehensive metabolic panel; Future  -     Lipid panel; Future  -     TSH, 3rd generation with Free T4 reflex;  Future    Rash  Comments:  Refilled cream   Orders:  -     clotrimazole-betamethasone (LOTRISONE) 1-0 05 % cream; Apply topically 2 (two) times a day as needed (rash)    Tension headache  -     XR spine cervical complete 4 or 5 vw non injury; Future  -     carisoprodol (SOMA) 250 MG; Take 1 tablet (250 mg total) by mouth 2 (two) times a day as needed for muscle spasms    Vitamin D deficiency  -     Vitamin D 25 hydroxy; Future  -     Cholecalciferol (Vitamin D3) 50 MCG (2000 UT) TABS; Take 1 tablet (2,000 Units total) by mouth daily    CKD (chronic kidney disease) stage 2, GFR 60-89 ml/min    Cerebral septic emboli (HCC)  -     aspirin (ECOTRIN) 325 mg EC tablet; Take 1 tablet (325 mg total) by mouth daily    Class 3 severe obesity due to excess calories with serious comorbidity and body mass index (BMI) of 40 0 to 44 9 in [facility])    Chronic bilateral low back pain without sciatica    Neck pain  -     XR spine cervical complete 4 or 5 vw non injury; Future  -     carisoprodol (SOMA) 250 MG; Take 1 tablet (250 mg total) by mouth 2 (two) times a day as needed for muscle spasms    Laboratory examination ordered as part of a routine general medical examination  -     CBC and differential; Future  -     Comprehensive metabolic panel; Future  -     Hemoglobin A1C; Future  -     Lipid panel; Future  -     TSH, 3rd generation with Free T4 reflex; Future  -     Vitamin B12; Future  -     Vitamin D 25 hydroxy; Future    Abnormal fasting glucose  -     Hemoglobin A1C; Future    Vitamin B12 deficiency  -     Vitamin B12; Future  -     vitamin B-12 (VITAMIN B-12) 500 mcg tablet; Take 1 tablet (500 mcg total) by mouth daily    Follow up in 6 months or as needed  Subjective:      Patient ID: Rudi Girard is a 37 y o  male here for a follow up  He reports feeling sick for about a month, most of last month  He had no fevers, had nasal congestion, cough and headaches  He did not test for COVID  He continues to go to work daily  No GI symptoms  He reports everyone at work was sick  He has since recovered  He has gained weight  He is planning to look for a new apartment that has a small kitchen so he can cook   He usually eats in [employer] or other fast food places  He drinks a lot of soda  He complains of more frequent neck and upper back pain, more frequent headaches  No vision symptoms, extremity numbness or weakness  He recall muscle relaxant did not work in the past  No recent injury  The following portions of the patient's history were reviewed and updated as appropriate: allergies, current medications, past medical history, past social history and problem list     Review of Systems   Constitutional: Negative for activity change, appetite change and fatigue  HENT: Negative for congestion  Eyes: Negative  Negative for visual disturbance  Respiratory: Negative for cough, chest tightness, shortness of breath and wheezing  Cardiovascular: Negative for chest pain, palpitations and leg swelling  Gastrointestinal: Negative for abdominal pain and constipation  Genitourinary: Negative for dysuria, frequency and urgency  Musculoskeletal: Positive for neck pain and neck stiffness  Negative for arthralgias  Skin: Negative for rash and wound  Neurological: Positive for headaches  Negative for dizziness and numbness  Hematological: Does not bruise/bleed easily  Psychiatric/Behavioral: Negative for sleep disturbance  The patient is not nervous/anxious  Objective:      /90   Pulse 80   Temp 97 6 °F (36 4 °C)   Ht 5' 2" (1 575 m)   Wt 108 kg (238 lb)   SpO2 97%   BMI 43 53 kg/m²          Physical Exam  Vitals and nursing note reviewed  Constitutional:       Appearance: He is well-developed  HENT:      Head: Normocephalic and atraumatic  Eyes:      Conjunctiva/sclera: Conjunctivae normal       Pupils: Pupils are equal, round, and reactive to light  Cardiovascular:      Rate and Rhythm: Normal rate and regular rhythm  Heart sounds: Normal heart sounds  Pulmonary:      Effort: Pulmonary effort is normal       Breath sounds: No wheezing or rhonchi     Abdominal:      General: Bowel sounds are normal       Palpations: Abdomen is soft  Musculoskeletal:         General: No swelling  Cervical back: Neck supple  Spasms present  No tenderness or bony tenderness  No pain with movement  Right lower leg: No edema  Left lower leg: No edema  Skin:     General: Skin is warm  Findings: No rash  Neurological:      General: No focal deficit present  Mental Status: He is alert and oriented to person, place, and time  Psychiatric:         Mood and Affect: Mood and affect normal          Behavior: Behavior normal            Labs & imaging results reviewed with patient  BMI Counseling: Body mass index is 43 53 kg/m²  The BMI is above normal  Nutrition recommendations include 3-5 servings of fruits/vegetables daily, reducing fast food intake and decreasing soda and/or juice intake  Exercise recommendations include strength training exercises

## 2023-03-03 NOTE — ASSESSMENT & PLAN NOTE
More frequent symptoms recently  Discussed posture, proper body mechanics at work  Change muscle relaxant  Apply warm moist heat after work followed by stretching exercises

## 2023-03-03 NOTE — ASSESSMENT & PLAN NOTE
Weight gain of 17 lbs since last visit  He is looking for a new apartment with a kitchenette so he can prepare foods  Reviewed healthy choices  Recommend to stop drinking soda

## 2023-08-11 ENCOUNTER — TELEPHONE (OUTPATIENT)
Dept: INTERNAL MEDICINE CLINIC | Facility: CLINIC | Age: 43
End: 2023-08-11

## 2023-08-11 DIAGNOSIS — B85.0 PEDICULOSIS CAPITIS: Primary | ICD-10-CM

## 2023-08-11 NOTE — TELEPHONE ENCOUNTER
Are you sure you have head lice? Saw the nits? Medication sent to the pharmacy, follow directions. May need to do it twice, 7 days apart.

## 2023-09-14 ENCOUNTER — APPOINTMENT (OUTPATIENT)
Dept: LAB | Facility: CLINIC | Age: 43
End: 2023-09-14
Payer: COMMERCIAL

## 2023-09-14 ENCOUNTER — OFFICE VISIT (OUTPATIENT)
Dept: INTERNAL MEDICINE CLINIC | Facility: CLINIC | Age: 43
End: 2023-09-14
Payer: COMMERCIAL

## 2023-09-14 VITALS
BODY MASS INDEX: 44.35 KG/M2 | HEART RATE: 78 BPM | SYSTOLIC BLOOD PRESSURE: 122 MMHG | DIASTOLIC BLOOD PRESSURE: 84 MMHG | WEIGHT: 241 LBS | OXYGEN SATURATION: 97 % | TEMPERATURE: 96.3 F | HEIGHT: 62 IN

## 2023-09-14 DIAGNOSIS — I76 CEREBRAL SEPTIC EMBOLI: ICD-10-CM

## 2023-09-14 DIAGNOSIS — N18.2 BENIGN HYPERTENSION WITH CHRONIC KIDNEY DISEASE, STAGE II: ICD-10-CM

## 2023-09-14 DIAGNOSIS — E78.2 MIXED HYPERLIPIDEMIA: ICD-10-CM

## 2023-09-14 DIAGNOSIS — Z00.00 LABORATORY EXAMINATION ORDERED AS PART OF A ROUTINE GENERAL MEDICAL EXAMINATION: ICD-10-CM

## 2023-09-14 DIAGNOSIS — G89.29 CHRONIC BILATERAL LOW BACK PAIN WITHOUT SCIATICA: ICD-10-CM

## 2023-09-14 DIAGNOSIS — R73.01 ABNORMAL FASTING GLUCOSE: ICD-10-CM

## 2023-09-14 DIAGNOSIS — N18.2 CKD (CHRONIC KIDNEY DISEASE) STAGE 2, GFR 60-89 ML/MIN: ICD-10-CM

## 2023-09-14 DIAGNOSIS — N18.2 BENIGN HYPERTENSION WITH CHRONIC KIDNEY DISEASE, STAGE II: Primary | ICD-10-CM

## 2023-09-14 DIAGNOSIS — I12.9 BENIGN HYPERTENSION WITH CHRONIC KIDNEY DISEASE, STAGE II: ICD-10-CM

## 2023-09-14 DIAGNOSIS — I66.9 CEREBRAL SEPTIC EMBOLI: ICD-10-CM

## 2023-09-14 DIAGNOSIS — R05.1 ACUTE COUGH: ICD-10-CM

## 2023-09-14 DIAGNOSIS — E55.9 VITAMIN D DEFICIENCY: ICD-10-CM

## 2023-09-14 DIAGNOSIS — I12.9 BENIGN HYPERTENSION WITH CHRONIC KIDNEY DISEASE, STAGE II: Primary | ICD-10-CM

## 2023-09-14 DIAGNOSIS — M54.50 CHRONIC BILATERAL LOW BACK PAIN WITHOUT SCIATICA: ICD-10-CM

## 2023-09-14 DIAGNOSIS — E53.8 VITAMIN B12 DEFICIENCY: ICD-10-CM

## 2023-09-14 LAB
25(OH)D3 SERPL-MCNC: 25.2 NG/ML (ref 30–100)
ALBUMIN SERPL BCP-MCNC: 4.3 G/DL (ref 3.5–5)
ALP SERPL-CCNC: 91 U/L (ref 34–104)
ALT SERPL W P-5'-P-CCNC: 39 U/L (ref 7–52)
ANION GAP SERPL CALCULATED.3IONS-SCNC: 6 MMOL/L
AST SERPL W P-5'-P-CCNC: 21 U/L (ref 13–39)
BASOPHILS # BLD AUTO: 0.1 THOUSANDS/ÂΜL (ref 0–0.1)
BASOPHILS NFR BLD AUTO: 1 % (ref 0–1)
BILIRUB SERPL-MCNC: 0.86 MG/DL (ref 0.2–1)
BUN SERPL-MCNC: 18 MG/DL (ref 5–25)
CALCIUM SERPL-MCNC: 9.6 MG/DL (ref 8.4–10.2)
CHLORIDE SERPL-SCNC: 105 MMOL/L (ref 96–108)
CHOLEST SERPL-MCNC: 108 MG/DL
CO2 SERPL-SCNC: 30 MMOL/L (ref 21–32)
CREAT SERPL-MCNC: 1.15 MG/DL (ref 0.6–1.3)
EOSINOPHIL # BLD AUTO: 0.33 THOUSAND/ÂΜL (ref 0–0.61)
EOSINOPHIL NFR BLD AUTO: 4 % (ref 0–6)
ERYTHROCYTE [DISTWIDTH] IN BLOOD BY AUTOMATED COUNT: 13.2 % (ref 11.6–15.1)
EST. AVERAGE GLUCOSE BLD GHB EST-MCNC: 120 MG/DL
GFR SERPL CREATININE-BSD FRML MDRD: 77 ML/MIN/1.73SQ M
GLUCOSE P FAST SERPL-MCNC: 93 MG/DL (ref 65–99)
HBA1C MFR BLD: 5.8 %
HCT VFR BLD AUTO: 49.4 % (ref 36.5–49.3)
HDLC SERPL-MCNC: 43 MG/DL
HGB BLD-MCNC: 16.9 G/DL (ref 12–17)
IMM GRANULOCYTES # BLD AUTO: 0.03 THOUSAND/UL (ref 0–0.2)
IMM GRANULOCYTES NFR BLD AUTO: 0 % (ref 0–2)
LDLC SERPL CALC-MCNC: 50 MG/DL (ref 0–100)
LYMPHOCYTES # BLD AUTO: 1.35 THOUSANDS/ÂΜL (ref 0.6–4.47)
LYMPHOCYTES NFR BLD AUTO: 17 % (ref 14–44)
MCH RBC QN AUTO: 30.6 PG (ref 26.8–34.3)
MCHC RBC AUTO-ENTMCNC: 34.2 G/DL (ref 31.4–37.4)
MCV RBC AUTO: 89 FL (ref 82–98)
MONOCYTES # BLD AUTO: 0.57 THOUSAND/ÂΜL (ref 0.17–1.22)
MONOCYTES NFR BLD AUTO: 7 % (ref 4–12)
NEUTROPHILS # BLD AUTO: 5.43 THOUSANDS/ÂΜL (ref 1.85–7.62)
NEUTS SEG NFR BLD AUTO: 71 % (ref 43–75)
NONHDLC SERPL-MCNC: 65 MG/DL
NRBC BLD AUTO-RTO: 0 /100 WBCS
PLATELET # BLD AUTO: 222 THOUSANDS/UL (ref 149–390)
PMV BLD AUTO: 10 FL (ref 8.9–12.7)
POTASSIUM SERPL-SCNC: 3.8 MMOL/L (ref 3.5–5.3)
PROT SERPL-MCNC: 7.8 G/DL (ref 6.4–8.4)
RBC # BLD AUTO: 5.53 MILLION/UL (ref 3.88–5.62)
SODIUM SERPL-SCNC: 141 MMOL/L (ref 135–147)
TRIGL SERPL-MCNC: 76 MG/DL
TSH SERPL DL<=0.05 MIU/L-ACNC: 1.85 UIU/ML (ref 0.45–4.5)
VIT B12 SERPL-MCNC: 969 PG/ML (ref 180–914)
WBC # BLD AUTO: 7.81 THOUSAND/UL (ref 4.31–10.16)

## 2023-09-14 PROCEDURE — 80061 LIPID PANEL: CPT

## 2023-09-14 PROCEDURE — 84443 ASSAY THYROID STIM HORMONE: CPT

## 2023-09-14 PROCEDURE — 80053 COMPREHEN METABOLIC PANEL: CPT

## 2023-09-14 PROCEDURE — 36415 COLL VENOUS BLD VENIPUNCTURE: CPT

## 2023-09-14 PROCEDURE — 99214 OFFICE O/P EST MOD 30 MIN: CPT | Performed by: INTERNAL MEDICINE

## 2023-09-14 PROCEDURE — 85025 COMPLETE CBC W/AUTO DIFF WBC: CPT

## 2023-09-14 PROCEDURE — 82306 VITAMIN D 25 HYDROXY: CPT

## 2023-09-14 PROCEDURE — 82607 VITAMIN B-12: CPT

## 2023-09-14 PROCEDURE — 83036 HEMOGLOBIN GLYCOSYLATED A1C: CPT

## 2023-09-14 NOTE — PROGRESS NOTES
Assessment/Plan:    Benign hypertension with chronic kidney disease, stage II  Lab Results   Component Value Date    EGFR 83 09/02/2022    EGFR 67 02/11/2022    EGFR 78 09/17/2021    CREATININE 1.09 09/02/2022    CREATININE 1.30 02/11/2022    CREATININE 1.16 09/17/2021     Labs due. BP stable. On losartan 25 mg and metoprolol 50 mg daily. Cerebral septic emboli (HCC)  Continue daily ASA. Class 3 severe obesity due to excess calories with serious comorbidity and body mass index (BMI) of 40.0 to 44.9 in adult (HCC)  Weight stable. Reviewed healthy choices. Mixed hyperlipidemia  Lipids due, on atorvastatin. Chronic bilateral low back pain without sciatica  No recent issues. Tension headache  No recent issues. Vitamin B12 deficiency  Taking B12. Vitamin D deficiency  Taking D3. Diagnoses and all orders for this visit:    Benign hypertension with chronic kidney disease, stage II    Cerebral septic emboli (HCC)    CKD (chronic kidney disease) stage 2, GFR 60-89 ml/min    Chronic bilateral low back pain without sciatica    Mixed hyperlipidemia    Acute cough  Comments:  Given Asmanex sample to use for a week. May take guaifenesin prn. Vitamin B12 deficiency    Vitamin D deficiency      Follow up in 6 months or as needed. Subjective:      Patient ID: Josem Shone is a 37 y.o. male here for a follow up. He complains of a cough since about a week or so ago. He reports occasional film which is white. He reports occasional wheezing. Cough would occur during the day, it has not disrupted his sleep. He reports no fevers chills. No known sick contacts. No GI symptoms. He has not tried any over-the-counter medications, not sure what to take. He is otherwise doing well. He reports no recent headache, neck or back pain. He was recently promoted and is working third shift. He has long work hours, usually eats 1 meal a day only. He stays hydrated while working.   He continues to eat fast food since it is the most convenient. The following portions of the patient's history were reviewed and updated as appropriate: allergies, current medications, past medical history, past social history and problem list.    Review of Systems   Constitutional: Negative for appetite change, chills, fatigue and fever. HENT: Negative for congestion, postnasal drip and sore throat. Eyes: Negative. Respiratory: Positive for cough and wheezing. Negative for chest tightness and shortness of breath. Cardiovascular: Negative for chest pain, palpitations and leg swelling. Gastrointestinal: Negative for abdominal pain and constipation. Genitourinary: Negative for dysuria and frequency. Musculoskeletal: Negative for arthralgias. Skin: Negative for rash and wound. Neurological: Negative for dizziness and headaches. Psychiatric/Behavioral: Negative for sleep disturbance. The patient is not nervous/anxious. Objective:      /84   Pulse 78   Temp (!) 96.3 °F (35.7 °C)   Ht 5' 2" (1.575 m)   Wt 109 kg (241 lb)   SpO2 97%   BMI 44.08 kg/m²          Physical Exam  Vitals and nursing note reviewed. Constitutional:       Appearance: He is well-developed. HENT:      Head: Normocephalic and atraumatic. Right Ear: Tympanic membrane, ear canal and external ear normal.      Left Ear: Tympanic membrane, ear canal and external ear normal.      Nose: No congestion. Mouth/Throat:      Mouth: Mucous membranes are moist.   Eyes:      Conjunctiva/sclera: Conjunctivae normal.      Pupils: Pupils are equal, round, and reactive to light. Cardiovascular:      Rate and Rhythm: Normal rate and regular rhythm. Heart sounds: Normal heart sounds. Pulmonary:      Effort: Pulmonary effort is normal. No respiratory distress. Breath sounds: Examination of the right-middle field reveals wheezing. Examination of the left-middle field reveals wheezing. Wheezing present. No rhonchi. Abdominal:      General: Bowel sounds are normal.      Palpations: Abdomen is soft. Musculoskeletal:         General: No swelling. Cervical back: Neck supple. Right lower leg: No edema. Left lower leg: No edema. Skin:     General: Skin is warm. Findings: No rash. Neurological:      General: No focal deficit present. Mental Status: He is alert and oriented to person, place, and time. Psychiatric:         Mood and Affect: Mood and affect normal.         Behavior: Behavior normal.           Lab results reviewed with patient.

## 2023-09-14 NOTE — ASSESSMENT & PLAN NOTE
Lab Results   Component Value Date    EGFR 83 09/02/2022    EGFR 67 02/11/2022    EGFR 78 09/17/2021    CREATININE 1.09 09/02/2022    CREATININE 1.30 02/11/2022    CREATININE 1.16 09/17/2021     Labs due. BP stable. On losartan 25 mg and metoprolol 50 mg daily.

## 2023-09-20 ENCOUNTER — TELEPHONE (OUTPATIENT)
Age: 43
End: 2023-09-20

## 2023-09-20 NOTE — RESULT ENCOUNTER NOTE
Please call him around 1-2 pm.    Your sugars are in prediabetic range. You have to limit the fast foods, junk foods. Please continue taking your vitamin D3 daily. You can take the vitamin B12 3 days a week only since levels are better. The rest of your labs were normal.    [employer] message already sent.

## 2023-09-20 NOTE — TELEPHONE ENCOUNTER
Patient returning call about labs. Please see message below from Dr. Paula Tucker result note:  Aura Giron MD   9/20/2023  8:44 AM EDT Back to Top      Please call him around 1-2 pm.     Your sugars are in prediabetic range. You have to limit the fast foods, junk foods. Please continue taking your vitamin D3 daily. You can take the vitamin B12 3 days a week only since levels are better. The rest of your labs were normal.     [employer] message already sent.      Patient is available to speak at 1-2pm today 9/20

## 2023-09-21 NOTE — TELEPHONE ENCOUNTER
Left message to return call to confirm pt read the [employer] message and does he have any questions.

## 2023-09-22 DIAGNOSIS — I12.9 BENIGN HYPERTENSION WITH CHRONIC KIDNEY DISEASE, STAGE II: ICD-10-CM

## 2023-09-22 DIAGNOSIS — N18.2 BENIGN HYPERTENSION WITH CHRONIC KIDNEY DISEASE, STAGE II: ICD-10-CM

## 2023-09-22 DIAGNOSIS — E78.2 MIXED HYPERLIPIDEMIA: ICD-10-CM

## 2023-09-22 RX ORDER — LOSARTAN POTASSIUM 25 MG/1
25 TABLET ORAL DAILY
Qty: 90 TABLET | Refills: 1 | Status: SHIPPED | OUTPATIENT
Start: 2023-09-22

## 2023-09-22 RX ORDER — ATORVASTATIN CALCIUM 40 MG/1
40 TABLET, FILM COATED ORAL DAILY
Qty: 90 TABLET | Refills: 1 | Status: SHIPPED | OUTPATIENT
Start: 2023-09-22

## 2023-09-22 RX ORDER — METOPROLOL SUCCINATE 50 MG/1
50 TABLET, EXTENDED RELEASE ORAL DAILY
Qty: 90 TABLET | Refills: 1 | Status: SHIPPED | OUTPATIENT
Start: 2023-09-22

## 2023-09-24 ENCOUNTER — HOSPITAL ENCOUNTER (EMERGENCY)
Facility: HOSPITAL | Age: 43
Discharge: HOME | End: 2023-09-24
Attending: EMERGENCY MEDICINE | Admitting: EMERGENCY MEDICINE
Payer: COMMERCIAL

## 2023-09-24 ENCOUNTER — APPOINTMENT (EMERGENCY)
Dept: RADIOLOGY | Facility: HOSPITAL | Age: 43
End: 2023-09-24
Payer: COMMERCIAL

## 2023-09-24 VITALS
RESPIRATION RATE: 18 BRPM | DIASTOLIC BLOOD PRESSURE: 95 MMHG | TEMPERATURE: 97.9 F | SYSTOLIC BLOOD PRESSURE: 177 MMHG | HEART RATE: 89 BPM | OXYGEN SATURATION: 95 %

## 2023-09-24 DIAGNOSIS — J40 BRONCHITIS: Primary | ICD-10-CM

## 2023-09-24 DIAGNOSIS — J06.9 VIRAL UPPER RESPIRATORY TRACT INFECTION: ICD-10-CM

## 2023-09-24 LAB
FLUAV RNA RESP QL NAA+PROBE: NEGATIVE
FLUBV RNA RESP QL NAA+PROBE: NEGATIVE
RSV RNA RESP QL NAA+PROBE: NEGATIVE
SARS-COV-2 RNA RESP QL NAA+PROBE: NEGATIVE

## 2023-09-24 PROCEDURE — 0241U HB NFCT DS VIR RESP RNA 4 TRGT: CPT

## 2023-09-24 PROCEDURE — 99284 EMERGENCY DEPT VISIT MOD MDM: CPT

## 2023-09-24 PROCEDURE — 71046 X-RAY EXAM CHEST 2 VIEWS: CPT

## 2023-09-24 PROCEDURE — 94640 AIRWAY INHALATION TREATMENT: CPT

## 2023-09-24 PROCEDURE — 93005 ELECTROCARDIOGRAM TRACING: CPT

## 2023-09-24 RX ORDER — IPRATROPIUM BROMIDE AND ALBUTEROL SULFATE 2.5; .5 MG/3ML; MG/3ML
3 SOLUTION RESPIRATORY (INHALATION) ONCE
Status: COMPLETED | OUTPATIENT
Start: 2023-09-24 | End: 2023-09-24

## 2023-09-24 RX ORDER — ALBUTEROL SULFATE 2.5 MG/3ML
2.5 SOLUTION RESPIRATORY (INHALATION) EVERY 6 HOURS PRN
Qty: 60 ML | Refills: 0 | Status: SHIPPED | OUTPATIENT
Start: 2023-09-24 | End: 2023-09-29

## 2023-09-24 RX ORDER — GUAIFENESIN 600 MG/1
1200 TABLET, EXTENDED RELEASE ORAL 2 TIMES DAILY
Qty: 20 TABLET | Refills: 0 | Status: SHIPPED | OUTPATIENT
Start: 2023-09-24 | End: 2023-09-29

## 2023-09-24 RX ORDER — GUAIFENESIN 600 MG/1
1200 TABLET, EXTENDED RELEASE ORAL 2 TIMES DAILY
Qty: 20 TABLET | Refills: 0 | Status: SHIPPED | OUTPATIENT
Start: 2023-09-24 | End: 2023-09-24 | Stop reason: SDUPTHER

## 2023-09-24 RX ORDER — ACETAMINOPHEN 325 MG/1
975 TABLET ORAL ONCE
Status: COMPLETED | OUTPATIENT
Start: 2023-09-24 | End: 2023-09-24

## 2023-09-24 RX ORDER — IBUPROFEN 600 MG/1
600 TABLET ORAL ONCE
Status: COMPLETED | OUTPATIENT
Start: 2023-09-24 | End: 2023-09-24

## 2023-09-24 RX ORDER — GUAIFENESIN 600 MG/1
600 TABLET, EXTENDED RELEASE ORAL ONCE
Status: COMPLETED | OUTPATIENT
Start: 2023-09-24 | End: 2023-09-24

## 2023-09-24 RX ADMIN — ACETAMINOPHEN 975 MG: 325 TABLET, FILM COATED ORAL at 20:33

## 2023-09-24 RX ADMIN — IPRATROPIUM BROMIDE AND ALBUTEROL SULFATE 3 ML: 2.5; .5 SOLUTION RESPIRATORY (INHALATION) at 20:33

## 2023-09-24 RX ADMIN — GUAIFENESIN 600 MG: 600 TABLET ORAL at 21:50

## 2023-09-24 RX ADMIN — IBUPROFEN 600 MG: 600 TABLET ORAL at 20:33

## 2023-09-24 RX ADMIN — IPRATROPIUM BROMIDE AND ALBUTEROL SULFATE 3 ML: 2.5; .5 SOLUTION RESPIRATORY (INHALATION) at 21:51

## 2023-09-24 NOTE — Clinical Note
Ron Berry was seen and treated in our emergency department on 9/24/2023. Diagnosis:     Josefina Diggs  is off the rest of the shift today, may return to work on return date. He may return on this date: 09/27/2023         If you have any questions or concerns, please don't hesitate to call.       Lonnie Tavarez    ______________________________           _______________          _______________  Hospital Representative                              Date                                Time

## 2023-09-24 NOTE — Clinical Note
Linda Chan was seen and treated in our emergency department on 9/24/2023. Diagnosis:     Forrest Mcguire  is off the rest of the shift today, may return to work on return date. He may return on this date: 09/27/2023         If you have any questions or concerns, please don't hesitate to call.       Emile Serna    ______________________________           _______________          _______________  Hospital Representative                              Date                                Time

## 2023-09-25 LAB
ATRIAL RATE: 88 BPM
ATRIAL RATE: 92 BPM
P AXIS: 61 DEGREES
P AXIS: 63 DEGREES
PR INTERVAL: 126 MS
PR INTERVAL: 130 MS
QRS AXIS: 34 DEGREES
QRS AXIS: 76 DEGREES
QRSD INTERVAL: 90 MS
QRSD INTERVAL: 90 MS
QT INTERVAL: 358 MS
QT INTERVAL: 362 MS
QTC INTERVAL: 433 MS
QTC INTERVAL: 447 MS
T WAVE AXIS: 82 DEGREES
T WAVE AXIS: 83 DEGREES
VENTRICULAR RATE: 88 BPM
VENTRICULAR RATE: 92 BPM

## 2023-09-25 PROCEDURE — 93010 ELECTROCARDIOGRAM REPORT: CPT | Performed by: INTERNAL MEDICINE

## 2023-09-25 NOTE — ED PROVIDER NOTES
History  Chief Complaint   Patient presents with   • Cold Like Symptoms     Pt reports cold/cough x3 weeks, fatigue, mucus in chest     Patient is a 17-year-old male with history of HTN, previous IVDA and hepatitis C, presenting for evaluation of 3 weeks of chest congestion and cough. Patient notes over the last 3 weeks he has had a persistent cough with thick yellow sputum. He has seen his primary care provider approximately 1.5 weeks ago and was given an inhaler in the office as well as instructed to use cough medications. He notes his cough has persisted despite these treatments for which he presents as he was post return to work but his boss wanted him to be evaluated. He endorses an associated sore throat, chest congestion, and feeling winded during coughing fits. He denies associated fevers or chills. He notes intermittent mild headaches which respond/improve with ibuprofen. He currently denies chest pain, shortness of breath, abdominal pain, N/V/D, urinary complaints, leg swelling, and weight gain. He notes a history of IVDA but denies any current use. History provided by:  Patient   used: No        Prior to Admission Medications   Prescriptions Last Dose Informant Patient Reported? Taking?    Cholecalciferol (Vitamin D3) 50 MCG (2000 UT) TABS   No No   Sig: Take 1 tablet (2,000 Units total) by mouth daily   aspirin (ECOTRIN) 325 mg EC tablet   No No   Sig: Take 1 tablet (325 mg total) by mouth daily   atorvastatin (LIPITOR) 40 mg tablet   No No   Sig: take 1 tablet by mouth once daily   carisoprodol (SOMA) 250 MG   No No   Sig: Take 1 tablet (250 mg total) by mouth 2 (two) times a day as needed for muscle spasms   clotrimazole-betamethasone (LOTRISONE) 1-0.05 % cream   No No   Sig: Apply topically 2 (two) times a day as needed (rash)   losartan (COZAAR) 25 mg tablet   No No   Sig: take 1 tablet by mouth once daily   metoprolol succinate (TOPROL-XL) 50 mg 24 hr tablet   No No Sig: take 1 tablet by mouth once daily   vitamin B-12 (VITAMIN B-12) 500 mcg tablet   No No   Sig: Take 1 tablet (500 mcg total) by mouth daily      Facility-Administered Medications: None       Past Medical History:   Diagnosis Date   • Absent kidney, congenital    • Anxiety 10/01/2019   • Headache(784.0) August 2 2021   • Hepatitis C    • History of endocarditis     mitral valve   • History of intravenous drug abuse ([address]) 10/01/2019    heroin   • Hypertension     Not sure   • Nonrheumatic mitral valve regurgitation 10/01/2019    from endocarditis       Past Surgical History:   Procedure Laterality Date   • CARDIAC CATHETERIZATION     • CHEST WALL TUMOR EXCISION  2013    Anterior chest wall cyst removed   • IR THORACENTESIS  10/2/2019   • NE ECHO TRANSESOPHAG MONTR CARDIAC PUMP FUNCTJ N/A 10/9/2019    Procedure: TRANSESOPHAGEAL ECHOCARDIOGRAM (BILLY); Surgeon: Mahamed Quiles MD;  Location: [facility];  Service: Cardiac Surgery   • NE MUSC MYOCUTANEOUS/FASCIOCUTANEOUS FLAP TRUNK Bilateral 10/9/2019    Procedure: BILATERAL PECTORALIS MAJOR FLAP;  Surgeon: Adalid Arellano MD;  Location: [facility];  Service: Plastics   • NE NEGATIVE PRESSURE WOUND THERAPY DME </= 50 SQ CM N/A 10/9/2019    Procedure: APPLICATION VAC DRESSING;  Surgeon: Adalid Arellano MD;  [facility];  Service: Plastics   • NE REPLACEMENT MITRAL VALVE W/CARDIOPULMONARY BYP N/A 10/9/2019    Procedure: MITRAL VALVE REPLACEMENT WITH 27MM MORROW MAGNA MITRAL EASE PERICARDIAL BIOPROSTHESIS;  Surgeon: Mahmaed Quiles MD;  [facility];  Service: Cardiac Surgery       Family History   Problem Relation Age of Onset   • Heart disease Maternal Grandmother      I have reviewed and agree with the history as documented.     E-Cigarette/Vaping     E-Cigarette/Vaping Substances     Social History     Tobacco Use   • Smoking status: Never   • Smokeless tobacco: Never   Substance Use Topics   • Alcohol use: Never   • Drug use: Not Currently       Review of Systems   Constitutional: Negative for chills and fever. HENT: Positive for congestion (Nasal and chest), sneezing and sore throat. Negative for ear pain, rhinorrhea, trouble swallowing and voice change. Eyes: Negative for photophobia, pain and visual disturbance. Respiratory: Positive for cough, shortness of breath (During coughing fits gets "winded") and wheezing. Negative for chest tightness ("Chest congestion") and stridor. Cardiovascular: Negative for chest pain, palpitations and leg swelling. Gastrointestinal: Negative for abdominal pain, diarrhea, nausea and vomiting. Musculoskeletal: Negative for back pain, gait problem, neck pain and neck stiffness. Skin: Negative for rash and wound. Allergic/Immunologic: Negative for immunocompromised state. Neurological: Positive for headaches. Negative for dizziness, seizures, syncope, weakness, light-headedness and numbness. All other systems reviewed and are negative. Physical Exam  Physical Exam  Vitals and nursing note reviewed. Constitutional:       General: He is not in acute distress. Appearance: Normal appearance. He is well-developed. He is morbidly obese. He is not ill-appearing, toxic-appearing or diaphoretic. HENT:      Head: Normocephalic and atraumatic. Jaw: There is normal jaw occlusion. No trismus. Nose: Nose normal. No congestion or rhinorrhea. Right Turbinates: Enlarged. Left Turbinates: Enlarged. Mouth/Throat:      Lips: Pink. Mouth: Mucous membranes are moist.      Tongue: No lesions. Palate: No lesions. Pharynx: Oropharynx is clear. Uvula midline. Posterior oropharyngeal erythema present. No pharyngeal swelling, oropharyngeal exudate or uvula swelling. Tonsils: No tonsillar exudate or tonsillar abscesses. 0 on the right. 0 on the left. Eyes:      General: Lids are normal. Vision grossly intact. Gaze aligned appropriately. Extraocular Movements: Extraocular movements intact. Conjunctiva/sclera: Conjunctivae normal.      Pupils: Pupils are equal, round, and reactive to light. Neck:      Trachea: Phonation normal. No abnormal tracheal secretions. Cardiovascular:      Rate and Rhythm: Normal rate and regular rhythm. Pulses: Normal pulses. Radial pulses are 2+ on the right side and 2+ on the left side. Dorsalis pedis pulses are 2+ on the right side and 2+ on the left side. Posterior tibial pulses are 2+ on the right side and 2+ on the left side. Heart sounds: Normal heart sounds, S1 normal and S2 normal. No murmur heard. Pulmonary:      Effort: Pulmonary effort is normal. No tachypnea, accessory muscle usage, prolonged expiration, respiratory distress or retractions. Breath sounds: Normal air entry. No stridor, decreased air movement or transmitted upper airway sounds. Examination of the right-upper field reveals wheezing. Examination of the left-upper field reveals wheezing. Examination of the right-middle field reveals wheezing. Examination of the left-middle field reveals wheezing. Examination of the right-lower field reveals decreased breath sounds and wheezing. Examination of the left-lower field reveals decreased breath sounds and wheezing. Decreased breath sounds and wheezing (Expiratory wheezes throughout) present. No rhonchi or rales. Abdominal:      Palpations: Abdomen is soft. Tenderness: There is no abdominal tenderness. Comments: Rounded    Musculoskeletal:         General: Normal range of motion. Cervical back: Full passive range of motion without pain, normal range of motion and neck supple. Right lower leg: No edema. Left lower leg: No edema. Skin:     General: Skin is warm and dry. Capillary Refill: Capillary refill takes less than 2 seconds. Findings: No rash or wound. Neurological:      General: No focal deficit present. Mental Status: He is alert and oriented to person, place, and time. Mental status is at baseline. GCS: GCS eye subscore is 4. GCS verbal subscore is 5. GCS motor subscore is 6. Vital Signs  ED Triage Vitals [09/24/23 1843]   Temperature Pulse Respirations Blood Pressure SpO2   97.9 °F (36.6 °C) 86 20 (!) 224/102 98 %      Temp Source Heart Rate Source Patient Position - Orthostatic VS BP Location FiO2 (%)   Oral Monitor Sitting Left arm --      Pain Score       No Pain           Vitals:    09/24/23 1843 09/24/23 1845 09/24/23 2000   BP: (!) 224/102 (!) 197/97 (!) 177/95   Pulse: 86 89 89   Patient Position - Orthostatic VS: Sitting  Sitting         Visual Acuity      ED Medications  Medications   ipratropium-albuterol (DUO-NEB) 0.5-2.5 mg/3 mL inhalation solution 3 mL (3 mL Nebulization Given 9/24/23 2033)   acetaminophen (TYLENOL) tablet 975 mg (975 mg Oral Given 9/24/23 2033)   ibuprofen (MOTRIN) tablet 600 mg (600 mg Oral Given 9/24/23 2033)   ipratropium-albuterol (DUO-NEB) 0.5-2.5 mg/3 mL inhalation solution 3 mL (3 mL Nebulization Given 9/24/23 2151)   guaiFENesin (MUCINEX) 12 hr tablet 600 mg (600 mg Oral Given 9/24/23 2150)       Diagnostic Studies  Results Reviewed     Procedure Component Value Units Date/Time    FLU/RSV/COVID - if FLU/RSV clinically relevant [[id number]]  (Normal) Collected: 09/24/23 2057    Lab Status: Final result Specimen: Nares from Nose Updated: 09/24/23 2141     SARS-CoV-2 Negative     INFLUENZA A PCR Negative     INFLUENZA B PCR Negative     RSV PCR Negative    Narrative:      FOR PEDIATRIC PATIENTS - copy/paste COVID Guidelines URL to browser: [email or web address]    SARS-CoV-2 assay is a Nucleic Acid Amplification assay intended for the  qualitative detection of nucleic acid from SARS-CoV-2 in nasopharyngeal  swabs. Results are for the presumptive identification of SARS-CoV-2 RNA.     Positive results are indicative of infection with SARS-CoV-2, the virus  causing COVID-19, but do not rule out bacterial infection or co-infection  with other viruses. Laboratories within the [facility] and its  territories are required to report all positive results to the appropriate  public health authorities. Negative results do not preclude SARS-CoV-2  infection and should not be used as the sole basis for treatment or other  patient management decisions. Negative results must be combined with  clinical observations, patient history, and epidemiological information. This test has not been FDA cleared or approved. This test has been authorized by FDA under an Emergency Use Authorization  (EUA). This test is only authorized for the duration of time the  declaration that circumstances exist justifying the authorization of the  emergency use of an in vitro diagnostic tests for detection of SARS-CoV-2  virus and/or diagnosis of COVID-19 infection under section 564(b)(1) of  the Act, 21 U. S.C. 521FKE-9(R)(4), unless the authorization is terminated  or revoked sooner. The test has been validated but independent review by FDA  and CLIA is pending. Test performed using [employer] GeneXpert: This RT-PCR assay targets N2,  a region unique to SARS-CoV-2. A conserved region in the E-gene was chosen  for pan-Sarbecovirus detection which includes SARS-CoV-2. According to CMS-2020-01-R, this platform meets the definition of high-throughput technology. XR chest 2 views   ED Interpretation by Gela Romero (09/24 2109)   No acute cardiopulmonary disease identified by me. Procedures  ECG 12 Lead Documentation Only    Date/Time: 9/24/2023 9:05 PM    Performed by: Gela Romero  Authorized by:  JULIÁN Romero    Indications / Diagnosis:  Cough, wheezing, SOB  ECG reviewed by me, the ED Provider: yes    Patient location:  ED  Previous ECG:     Previous ECG:  Unavailable    Comparison to cardiac monitor: Yes Interpretation:     Interpretation: normal    Rate:     ECG rate:  92    ECG rate assessment: normal    Rhythm:     Rhythm: sinus rhythm    Ectopy:     Ectopy: none    QRS:     QRS axis:  Normal    QRS intervals:  Normal  Conduction:     Conduction: normal    ST segments:     ST segments:  Normal  T waves:     T waves: normal    Comments:      Sinus rhythm, normal axis, normal intervals, no acute ischemic changes read by me             ED Course  ED Course as of 09/24/23 2153   [facility] Sep 24, 2023   2006 Triage vital signs with elevated BP of 224/102, on reassessment, downtrending to 197/97, will reassess   2006 All other vital signs within normal limits and stable   2105 Patient updated on EKG and chest x-ray ED wet read results. No evidence of pneumonia or pleural effusion on chest x-ray. He has not yet started his breathing treatment, will return to reassess lung sounds after completion of treatment. 2135 On reevaluation, patient resting comfortably. He notes slight improvement in his wheezing. Reassessment of his lungs is with improved aeration throughout, scant wheezes to bilateral lower lobes. He denies cigarette use or vape use. Plan made for discharge to home with ongoing supportive care for suspected bronchitis. Will provide nebulizer machine as he has improvement in his symptoms with the nebulizer compared to the inhaler provided from the office setting. We will have him continue Mucinex for productive cough. Patient in agreement with plan has no further questions at this time. He requests work note which is provided. Viral swab results pending and pt aware we will call with any positive results. I also discussed the importance of monitoring blood pressure at home with close follow-up with PCP in the next 2 to 3 days. Patient with elevated BP readings here, however denies severe headache, vision changes, chest pain, leg swelling.   Physical exam with improved after intervention, no need to medicate his asymptomatic hypertension at this time. Medical Decision Making  DDx including but not limited to: Viral illness, URI, bronchitis, pneumonia, pleural effusion        Bronchitis: acute illness or injury  Viral upper respiratory tract infection: acute illness or injury  Amount and/or Complexity of Data Reviewed  Radiology: ordered and independent interpretation performed. Risk  OTC drugs. Prescription drug management. Disposition  Final diagnoses:   Bronchitis   Viral upper respiratory tract infection     Time reflects when diagnosis was documented in both MDM as applicable and the Disposition within this note     Time User Action Codes Description Comment    9/24/2023  9:38 PM Patti Bañuelos Add [J40] Bronchitis     9/24/2023  9:38 PM Ailyn Francisco Add [J06.9] Viral upper respiratory tract infection       ED Disposition     ED Disposition   Discharge    Condition   Stable    Date/Time   Sun Sep 24, 2023  9:38 PM    Comment   Evans Heimlich discharge to home/self care.                Follow-up Information     Follow up With Specialties Details Why Contact Info Additional Information    Nadine Ge MD Internal Medicine Schedule an appointment as soon as possible for a visit in 2 days  [address] [facility]  [address] [facility] [address] [facility] Emergency Department Emergency Medicine Go to  If symptoms worsen [address]  [address] [facility] Emergency Department, [address] [place] [address], [facility]          Patient's Medications   Discharge Prescriptions    ALBUTEROL (2.5 MG/3 ML) 0.083 % NEBULIZER SOLUTION    Take 3 mL (2.5 mg total) by nebulization every 6 (six) hours as needed for wheezing or shortness of breath for up to 5 days       Start Date: 9/24/2023 End Date: 9/29/2023       Order Dose: 2.5 mg Quantity: 60 mL    Refills: 0    GUAIFENESIN (MUCINEX) 600 MG 12 HR TABLET    Take 2 tablets (1,200 mg total) by mouth 2 (two) times a day for 5 days       Start Date: 9/24/2023 End Date: 9/29/2023       Order Dose: 1,200 mg       Quantity: 20 tablet    Refills: 0       No discharge procedures on file.     PDMP Review       Value Time User    PDMP Reviewed  Yes 3/3/2023  1:37 PM Homer Maciel MD          ED Provider  Electronically Signed by           Nolvia Wells, [address] [place] [address]  09/24/23 5790

## 2023-09-25 NOTE — DISCHARGE INSTRUCTIONS
Schedule an appointment with your primary care provider in the next 2 to 3 days for reevaluation of your symptoms. Use the prescribed albuterol nebulizer solution every 6 hours as needed for wheezing or shortness of breath. Use the prescribed guaifenesin (Mucinex) twice daily for the next 5 days to help expectorate (bring up) your phlegm. Return to the ER if you develop persistent fever (greater than 100.4 Fahrenheit), difficulty breathing, severe headache, vomiting, vision changes, neck stiffness, weakness, confusion, or lethargy.

## 2023-09-25 NOTE — ED NOTES
Patient transported to [address] [place] [address] [address] Marky Rudolph RN  09/24/23 2057

## 2023-09-26 ENCOUNTER — TELEPHONE (OUTPATIENT)
Age: 43
End: 2023-09-26

## 2023-09-26 NOTE — TELEPHONE ENCOUNTER
Spoke to pharmacist regarding patients medications Mucinex and albuterol nebulizer solution. Medication is ready for . Patient aware.

## 2023-09-27 ENCOUNTER — NURSE TRIAGE (OUTPATIENT)
Age: 43
End: 2023-09-27

## 2023-09-27 NOTE — TELEPHONE ENCOUNTER
Regarding: Medication script not received at pharmacy  ----- Message from Jackie AtHoc sent at 9/26/2023  4:34 PM EDT -----  I was released from the ED on 9/24/23 and my medication is still not at pharmacy guaiFENesin (MUCINEX) 600 mg 12 hr tablet  and albuterol (2.5 mg/3 mL) 0.083 % nebulizer solution.  Pharmacy saying they did not get it yet

## 2023-10-02 ENCOUNTER — TELEPHONE (OUTPATIENT)
Dept: INTERNAL MEDICINE CLINIC | Facility: CLINIC | Age: 43
End: 2023-10-02

## 2023-10-02 NOTE — TELEPHONE ENCOUNTER
Please call patient and ask how he is feeling, seen at the ER for bronchitis. Ask if still with wheezing, if so, I may need to start a steroid inhaler.

## 2024-03-15 ENCOUNTER — OFFICE VISIT (OUTPATIENT)
Dept: INTERNAL MEDICINE CLINIC | Facility: CLINIC | Age: 44
End: 2024-03-15
Payer: COMMERCIAL

## 2024-03-15 VITALS
HEART RATE: 88 BPM | DIASTOLIC BLOOD PRESSURE: 94 MMHG | HEIGHT: 62 IN | RESPIRATION RATE: 16 BRPM | TEMPERATURE: 97.8 F | SYSTOLIC BLOOD PRESSURE: 170 MMHG | WEIGHT: 232.6 LBS | BODY MASS INDEX: 42.8 KG/M2 | OXYGEN SATURATION: 97 %

## 2024-03-15 DIAGNOSIS — Z00.00 HEALTH MAINTENANCE EXAMINATION: ICD-10-CM

## 2024-03-15 DIAGNOSIS — N18.2 CKD (CHRONIC KIDNEY DISEASE) STAGE 2, GFR 60-89 ML/MIN: ICD-10-CM

## 2024-03-15 DIAGNOSIS — E78.2 MIXED HYPERLIPIDEMIA: Primary | ICD-10-CM

## 2024-03-15 DIAGNOSIS — I66.9 CEREBRAL SEPTIC EMBOLI: ICD-10-CM

## 2024-03-15 DIAGNOSIS — N18.2 BENIGN HYPERTENSION WITH CHRONIC KIDNEY DISEASE, STAGE II: ICD-10-CM

## 2024-03-15 DIAGNOSIS — I76 CEREBRAL SEPTIC EMBOLI: ICD-10-CM

## 2024-03-15 DIAGNOSIS — I12.9 BENIGN HYPERTENSION WITH CHRONIC KIDNEY DISEASE, STAGE II: ICD-10-CM

## 2024-03-15 DIAGNOSIS — E53.8 VITAMIN B12 DEFICIENCY: ICD-10-CM

## 2024-03-15 DIAGNOSIS — E55.9 VITAMIN D DEFICIENCY: ICD-10-CM

## 2024-03-15 DIAGNOSIS — R73.03 PREDIABETES: ICD-10-CM

## 2024-03-15 PROCEDURE — 99214 OFFICE O/P EST MOD 30 MIN: CPT | Performed by: INTERNAL MEDICINE

## 2024-03-15 PROCEDURE — 99396 PREV VISIT EST AGE 40-64: CPT | Performed by: INTERNAL MEDICINE

## 2024-03-15 RX ORDER — CHOLECALCIFEROL (VITAMIN D3) 125 MCG
2000 CAPSULE ORAL DAILY
Qty: 90 TABLET | Refills: 1 | Status: SHIPPED | OUTPATIENT
Start: 2024-03-15

## 2024-03-15 RX ORDER — LOSARTAN POTASSIUM 25 MG/1
25 TABLET ORAL DAILY
Qty: 90 TABLET | Refills: 1 | Status: SHIPPED | OUTPATIENT
Start: 2024-03-15

## 2024-03-15 RX ORDER — ATORVASTATIN CALCIUM 40 MG/1
40 TABLET, FILM COATED ORAL DAILY
Qty: 90 TABLET | Refills: 1 | Status: SHIPPED | OUTPATIENT
Start: 2024-03-15

## 2024-03-15 RX ORDER — CHOLECALCIFEROL (VITAMIN D3) 125 MCG
CAPSULE ORAL
Qty: 90 TABLET | Refills: 1 | Status: SHIPPED | OUTPATIENT
Start: 2024-03-15

## 2024-03-15 RX ORDER — METOPROLOL SUCCINATE 50 MG/1
50 TABLET, EXTENDED RELEASE ORAL DAILY
Qty: 90 TABLET | Refills: 1 | Status: SHIPPED | OUTPATIENT
Start: 2024-03-15

## 2024-03-15 NOTE — PROGRESS NOTES
Assessment/Plan:    Benign hypertension with chronic kidney disease, stage II  Lab Results   Component Value Date    EGFR 77 09/14/2023    EGFR 83 09/02/2022    EGFR 67 02/11/2022    CREATININE 1.15 09/14/2023    CREATININE 1.09 09/02/2022    CREATININE 1.30 02/11/2022     Stable.    Mixed hyperlipidemia  At goal, on atorvastatin.    Vitamin D deficiency  Taking D3.    Vitamin B12 deficiency  Taking B12.    Cerebral septic emboli (HCC)  On daily ASA.    Class 3 severe obesity due to excess calories with serious comorbidity and body mass index (BMI) of 40.0 to 44.9 in adult (HCC)  Lost 10 lbs since last visit, stopped drinking soda.  Discussed importance of regular meals. 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    Prediabetes  A1c 5.8%.    Anxiety  No issues.    Chronic bilateral low back pain without sciatica  No recent issues.     Diagnoses and all orders for this visit:    Mixed hyperlipidemia  -     Comprehensive metabolic panel; Future  -     Lipid panel; Future  -     TSH, 3rd generation with Free T4 reflex; Future  -     atorvastatin (LIPITOR) 40 mg tablet; Take 1 tablet (40 mg total) by mouth daily    Vitamin B12 deficiency  -     CBC and differential; Future  -     Vitamin B12; Future  -     vitamin B-12 (VITAMIN B-12) 500 mcg tablet; Take one tablet PO 3 days a week.    Vitamin D deficiency  -     Vitamin D 25 hydroxy; Future  -     Cholecalciferol (Vitamin D3) 50 MCG (2000 UT) TABS; Take 1 tablet (2,000 Units total) by mouth daily    CKD (chronic kidney disease) stage 2, GFR 60-89 ml/min    Cerebral septic emboli   -     aspirin (ECOTRIN) 325 mg EC tablet; Take 1 tablet (325 mg total) by mouth daily    Prediabetes  -     Hemoglobin A1C; Future    Health maintenance examination  Comments:  Declines vaccinations.    Benign hypertension with chronic kidney disease, stage II  -     losartan (COZAAR) 25 mg tablet; Take 1 tablet (25 mg total) by mouth daily  -     metoprolol succinate (TOPROL-XL)  50 mg 24 hr tablet; Take 1 tablet (50 mg total) by mouth daily      Follow up in 7 months or as needed.      Subjective:      Patient ID: Orly Lawson is a 44 y.o. male here for a follow up.    He has been feeling well, has no complaints.  He stopped drinking soda few months ago, has lost some weight.  He works night shift.  He drinks [place] iced tea occasionally.  He only eats 1 meal every day, eats at around 730 before going to work.  He does not eat the rest of the day, does not eat snacks.  He stopped eating fries, does not eat a lot of junk food.  He still gets his regular food from fast foods.  No regular exercise.    He reports no chest pain, shortness of breath, headaches or dizziness.  He reports no recent low back pain.    He was at the ER a few months ago, has recovered from the cold. He is upset that he got a large bill from it.      The following portions of the patient's history were reviewed and updated as appropriate: allergies, current medications, past medical history, past social history, and problem list.    Review of Systems   Constitutional:  Negative for activity change, appetite change and fatigue.   HENT:  Negative for congestion, hearing loss and postnasal drip.    Eyes: Negative.  Negative for visual disturbance.   Respiratory:  Negative for cough, chest tightness and shortness of breath.    Cardiovascular:  Negative for chest pain, palpitations and leg swelling.   Gastrointestinal:  Negative for abdominal pain and constipation.   Genitourinary:  Negative for dysuria, frequency and urgency.   Musculoskeletal:  Negative for arthralgias, back pain, myalgias and neck pain.   Skin:  Negative for rash and wound.   Neurological:  Negative for dizziness, numbness and headaches.   Hematological:  Does not bruise/bleed easily.   Psychiatric/Behavioral:  Negative for decreased concentration, dysphoric mood and sleep disturbance. The patient is not nervous/anxious.       "    Objective:      /94 (BP Location: Left arm)   Pulse 88   Temp 97.8 °F (36.6 °C) (Tympanic)   Resp 16   Ht 5' 2\" (1.575 m)   Wt 106 kg (232 lb 9.6 oz)   SpO2 97%   BMI 42.54 kg/m²          Physical Exam  Vitals and nursing note reviewed.   Constitutional:       Appearance: He is well-developed.   HENT:      Head: Normocephalic and atraumatic.      Right Ear: External ear normal.      Left Ear: External ear normal.      Mouth/Throat:      Mouth: Mucous membranes are moist.   Eyes:      Conjunctiva/sclera: Conjunctivae normal.      Pupils: Pupils are equal, round, and reactive to light.   Cardiovascular:      Rate and Rhythm: Normal rate and regular rhythm.      Heart sounds: Normal heart sounds.   Pulmonary:      Effort: Pulmonary effort is normal.      Breath sounds: No wheezing or rhonchi.   Abdominal:      General: Bowel sounds are normal.      Palpations: Abdomen is soft.   Musculoskeletal:         General: No swelling.      Cervical back: Neck supple.      Right lower leg: No edema.      Left lower leg: No edema.   Skin:     General: Skin is warm.      Findings: No rash.   Neurological:      General: No focal deficit present.      Mental Status: He is alert and oriented to person, place, and time.   Psychiatric:         Mood and Affect: Mood and affect normal.         Behavior: Behavior normal.           Lab results reviewed with patient.    "

## 2024-03-15 NOTE — ASSESSMENT & PLAN NOTE
Lab Results   Component Value Date    EGFR 77 09/14/2023    EGFR 83 09/02/2022    EGFR 67 02/11/2022    CREATININE 1.15 09/14/2023    CREATININE 1.09 09/02/2022    CREATININE 1.30 02/11/2022     Stable.

## 2024-03-15 NOTE — ASSESSMENT & PLAN NOTE
Lost 10 lbs since last visit, stopped drinking soda.  Discussed importance of regular meals. 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

## 2024-03-29 NOTE — PROGRESS NOTES
Assessment/Plan:   Milvia Larsen is a 44year old male who is 2 weeks status post pectoralis flap with Dr Dylon Awan  Please see HPI  He is healing well  Right chest drain was removed  Follow up in 1 week to assess the left side drain  Diagnoses and all orders for this visit:    Severe mitral regurgitation          Subjective:     Patient ID: Nick Gonzalez is a 44 y o  male  HPI   He feels well and is anxious to have his drains removed  Review of Systems   Skin:        As per HPI  Objective:     Physical Exam   Skin:   Incision is clean, dry and intact  Right chest drain removed  Left drain is serosanguinous  136

## 2024-04-15 ENCOUNTER — TELEPHONE (OUTPATIENT)
Age: 44
End: 2024-04-15

## 2024-04-15 NOTE — TELEPHONE ENCOUNTER
Patient called requesting refill for atorvastatin, losartan and metoprolol. Patient made aware medication was refilled on 03/15/24 for 90 with 1 refills to [facility] pharmacy. Patient instructed to contact the pharmacy to obtain refills of medication. Patient verbalized understanding.

## 2024-05-21 DIAGNOSIS — I12.9 BENIGN HYPERTENSION WITH CHRONIC KIDNEY DISEASE, STAGE II: ICD-10-CM

## 2024-05-21 DIAGNOSIS — N18.2 BENIGN HYPERTENSION WITH CHRONIC KIDNEY DISEASE, STAGE II: ICD-10-CM

## 2024-05-21 DIAGNOSIS — E78.2 MIXED HYPERLIPIDEMIA: ICD-10-CM

## 2024-05-21 RX ORDER — METOPROLOL SUCCINATE 50 MG/1
50 TABLET, EXTENDED RELEASE ORAL DAILY
Qty: 90 TABLET | Refills: 1 | Status: SHIPPED | OUTPATIENT
Start: 2024-05-21

## 2024-05-21 RX ORDER — ATORVASTATIN CALCIUM 40 MG/1
40 TABLET, FILM COATED ORAL DAILY
Qty: 90 TABLET | Refills: 1 | Status: SHIPPED | OUTPATIENT
Start: 2024-05-21

## 2024-05-21 RX ORDER — LOSARTAN POTASSIUM 25 MG/1
25 TABLET ORAL DAILY
Qty: 90 TABLET | Refills: 1 | Status: SHIPPED | OUTPATIENT
Start: 2024-05-21

## 2024-10-18 ENCOUNTER — APPOINTMENT (OUTPATIENT)
Dept: LAB | Facility: CLINIC | Age: 44
End: 2024-10-18
Payer: COMMERCIAL

## 2024-10-18 DIAGNOSIS — E55.9 VITAMIN D DEFICIENCY: ICD-10-CM

## 2024-10-18 DIAGNOSIS — R73.03 PREDIABETES: ICD-10-CM

## 2024-10-18 DIAGNOSIS — N18.2 BENIGN HYPERTENSION WITH CHRONIC KIDNEY DISEASE, STAGE II: ICD-10-CM

## 2024-10-18 DIAGNOSIS — I12.9 BENIGN HYPERTENSION WITH CHRONIC KIDNEY DISEASE, STAGE II: ICD-10-CM

## 2024-10-18 DIAGNOSIS — E53.8 VITAMIN B12 DEFICIENCY: ICD-10-CM

## 2024-10-18 DIAGNOSIS — E78.2 MIXED HYPERLIPIDEMIA: ICD-10-CM

## 2024-10-18 LAB
25(OH)D3 SERPL-MCNC: 24.1 NG/ML (ref 30–100)
ALBUMIN SERPL BCG-MCNC: 4.2 G/DL (ref 3.5–5)
ALP SERPL-CCNC: 92 U/L (ref 34–104)
ALT SERPL W P-5'-P-CCNC: 32 U/L (ref 7–52)
ANION GAP SERPL CALCULATED.3IONS-SCNC: 6 MMOL/L (ref 4–13)
AST SERPL W P-5'-P-CCNC: 16 U/L (ref 13–39)
BASOPHILS # BLD AUTO: 0.06 THOUSANDS/ΜL (ref 0–0.1)
BASOPHILS NFR BLD AUTO: 1 % (ref 0–1)
BILIRUB SERPL-MCNC: 0.9 MG/DL (ref 0.2–1)
BUN SERPL-MCNC: 18 MG/DL (ref 5–25)
CALCIUM SERPL-MCNC: 9.2 MG/DL (ref 8.4–10.2)
CHLORIDE SERPL-SCNC: 107 MMOL/L (ref 96–108)
CHOLEST SERPL-MCNC: 106 MG/DL
CO2 SERPL-SCNC: 27 MMOL/L (ref 21–32)
CREAT SERPL-MCNC: 1.13 MG/DL (ref 0.6–1.3)
EOSINOPHIL # BLD AUTO: 0.1 THOUSAND/ΜL (ref 0–0.61)
EOSINOPHIL NFR BLD AUTO: 1 % (ref 0–6)
ERYTHROCYTE [DISTWIDTH] IN BLOOD BY AUTOMATED COUNT: 13.2 % (ref 11.6–15.1)
EST. AVERAGE GLUCOSE BLD GHB EST-MCNC: 114 MG/DL
GFR SERPL CREATININE-BSD FRML MDRD: 78 ML/MIN/1.73SQ M
GLUCOSE P FAST SERPL-MCNC: 109 MG/DL (ref 65–99)
HBA1C MFR BLD: 5.6 %
HCT VFR BLD AUTO: 50.6 % (ref 36.5–49.3)
HDLC SERPL-MCNC: 45 MG/DL
HGB BLD-MCNC: 17.4 G/DL (ref 12–17)
IMM GRANULOCYTES # BLD AUTO: 0.02 THOUSAND/UL (ref 0–0.2)
IMM GRANULOCYTES NFR BLD AUTO: 0 % (ref 0–2)
LDLC SERPL CALC-MCNC: 50 MG/DL (ref 0–100)
LYMPHOCYTES # BLD AUTO: 1.32 THOUSANDS/ΜL (ref 0.6–4.47)
LYMPHOCYTES NFR BLD AUTO: 19 % (ref 14–44)
MCH RBC QN AUTO: 29.8 PG (ref 26.8–34.3)
MCHC RBC AUTO-ENTMCNC: 34.4 G/DL (ref 31.4–37.4)
MCV RBC AUTO: 87 FL (ref 82–98)
MONOCYTES # BLD AUTO: 0.48 THOUSAND/ΜL (ref 0.17–1.22)
MONOCYTES NFR BLD AUTO: 7 % (ref 4–12)
NEUTROPHILS # BLD AUTO: 4.92 THOUSANDS/ΜL (ref 1.85–7.62)
NEUTS SEG NFR BLD AUTO: 72 % (ref 43–75)
NONHDLC SERPL-MCNC: 61 MG/DL
NRBC BLD AUTO-RTO: 0 /100 WBCS
PLATELET # BLD AUTO: 204 THOUSANDS/UL (ref 149–390)
PMV BLD AUTO: 9.6 FL (ref 8.9–12.7)
POTASSIUM SERPL-SCNC: 3.8 MMOL/L (ref 3.5–5.3)
PROT SERPL-MCNC: 7.8 G/DL (ref 6.4–8.4)
RBC # BLD AUTO: 5.83 MILLION/UL (ref 3.88–5.62)
SODIUM SERPL-SCNC: 140 MMOL/L (ref 135–147)
TRIGL SERPL-MCNC: 55 MG/DL
TSH SERPL DL<=0.05 MIU/L-ACNC: 1.24 UIU/ML (ref 0.45–4.5)
VIT B12 SERPL-MCNC: 644 PG/ML (ref 180–914)
WBC # BLD AUTO: 6.9 THOUSAND/UL (ref 4.31–10.16)

## 2024-10-18 PROCEDURE — 84443 ASSAY THYROID STIM HORMONE: CPT

## 2024-10-18 PROCEDURE — 80053 COMPREHEN METABOLIC PANEL: CPT

## 2024-10-18 PROCEDURE — 85025 COMPLETE CBC W/AUTO DIFF WBC: CPT

## 2024-10-18 PROCEDURE — 82306 VITAMIN D 25 HYDROXY: CPT

## 2024-10-18 PROCEDURE — 80061 LIPID PANEL: CPT

## 2024-10-18 PROCEDURE — 83036 HEMOGLOBIN GLYCOSYLATED A1C: CPT

## 2024-10-18 PROCEDURE — 36415 COLL VENOUS BLD VENIPUNCTURE: CPT

## 2024-10-18 PROCEDURE — 82607 VITAMIN B-12: CPT

## 2024-10-18 RX ORDER — ATORVASTATIN CALCIUM 40 MG/1
40 TABLET, FILM COATED ORAL DAILY
Qty: 90 TABLET | Refills: 1 | Status: SHIPPED | OUTPATIENT
Start: 2024-10-18

## 2024-10-18 RX ORDER — METOPROLOL SUCCINATE 50 MG/1
50 TABLET, EXTENDED RELEASE ORAL DAILY
Qty: 90 TABLET | Refills: 1 | Status: SHIPPED | OUTPATIENT
Start: 2024-10-18

## 2024-10-18 RX ORDER — LOSARTAN POTASSIUM 25 MG/1
25 TABLET ORAL DAILY
Qty: 90 TABLET | Refills: 1 | Status: SHIPPED | OUTPATIENT
Start: 2024-10-18

## 2024-12-10 NOTE — LETTER
[facility] INTERNAL [facility]  [address] [address]  Phone#  [phone]  Fax#  [phone]      January 25, 2021     Patient: Ronelle Mcardle  YOB: 1980  Date of Last Encounter: 1/6/2021    To whom it may concern:    Ronelle Mcardle was in contact with a COVID-19-positive patient  It is recommended that he be tested no earlier than Thursday, 1/28/21  At this time he is required to quarantine until results are available  He may return to work on 1/31/21 as long as he tested negative for COVID-19 and remains asymptomatic  Sincerely,    KEVIN Valdivia MD Detail Level: Zone Initiate Treatment: terbinafine HCl 250 mg tablet \\nQuantity: 30.0 Tablet\\nSig: Take one tab po qd

## 2024-12-13 ENCOUNTER — OFFICE VISIT (OUTPATIENT)
Dept: INTERNAL MEDICINE CLINIC | Facility: CLINIC | Age: 44
End: 2024-12-13
Payer: COMMERCIAL

## 2024-12-13 VITALS
WEIGHT: 237.8 LBS | HEIGHT: 62 IN | OXYGEN SATURATION: 97 % | TEMPERATURE: 97 F | SYSTOLIC BLOOD PRESSURE: 128 MMHG | BODY MASS INDEX: 43.76 KG/M2 | HEART RATE: 93 BPM | DIASTOLIC BLOOD PRESSURE: 88 MMHG

## 2024-12-13 DIAGNOSIS — N18.2 BENIGN HYPERTENSION WITH CHRONIC KIDNEY DISEASE, STAGE II: Primary | ICD-10-CM

## 2024-12-13 DIAGNOSIS — E55.9 VITAMIN D DEFICIENCY: ICD-10-CM

## 2024-12-13 DIAGNOSIS — E66.813 CLASS 3 SEVERE OBESITY DUE TO EXCESS CALORIES WITH SERIOUS COMORBIDITY AND BODY MASS INDEX (BMI) OF 40.0 TO 44.9 IN ADULT (HCC): ICD-10-CM

## 2024-12-13 DIAGNOSIS — E66.01 CLASS 3 SEVERE OBESITY DUE TO EXCESS CALORIES WITH SERIOUS COMORBIDITY AND BODY MASS INDEX (BMI) OF 40.0 TO 44.9 IN ADULT (HCC): ICD-10-CM

## 2024-12-13 DIAGNOSIS — I66.9 CEREBRAL SEPTIC EMBOLI (HCC): ICD-10-CM

## 2024-12-13 DIAGNOSIS — E78.2 MIXED HYPERLIPIDEMIA: ICD-10-CM

## 2024-12-13 DIAGNOSIS — I12.9 BENIGN HYPERTENSION WITH CHRONIC KIDNEY DISEASE, STAGE II: Primary | ICD-10-CM

## 2024-12-13 DIAGNOSIS — K08.89 PAIN, DENTAL: ICD-10-CM

## 2024-12-13 DIAGNOSIS — E53.8 VITAMIN B12 DEFICIENCY: ICD-10-CM

## 2024-12-13 DIAGNOSIS — I76 CEREBRAL SEPTIC EMBOLI (HCC): ICD-10-CM

## 2024-12-13 PROCEDURE — 99214 OFFICE O/P EST MOD 30 MIN: CPT | Performed by: INTERNAL MEDICINE

## 2024-12-13 RX ORDER — TIRZEPATIDE 2.5 MG/.5ML
2.5 INJECTION, SOLUTION SUBCUTANEOUS WEEKLY
Qty: 2 ML | Refills: 0 | Status: SHIPPED | OUTPATIENT
Start: 2024-12-13 | End: 2025-01-10

## 2024-12-13 NOTE — ASSESSMENT & PLAN NOTE
Weight gain 5 lbs since last visit. Reviwed diet, healthy choices.  Trial of Zepbound.    Orders:  •  tirzepatide (Zepbound) 2.5 mg/0.5 mL auto-injector; Inject 0.5 mL (2.5 mg total) under the skin once a week for 28 days

## 2024-12-13 NOTE — PROGRESS NOTES
Name: Orly Lawson      : 1980      MRN: [id number]  Encounter Provider: Ana Maria Cooper MD  Encounter Date: 2024   Encounter department: [facility] INTERNAL MEDICINE  :  Assessment & Plan  Benign hypertension with chronic kidney disease, stage II  BP controlled. On losartan 25 mg and metoprolol 50 mg daily.       Cerebral septic emboli (HCC)  Taking daily ASA.       Class 3 severe obesity due to excess calories with serious comorbidity and body mass index (BMI) of 40.0 to 44.9 in adult (HCC)  Weight gain 5 lbs since last visit. Reviwed diet, healthy choices.  Trial of Zepbound.    Orders:  •  tirzepatide (Zepbound) 2.5 mg/0.5 mL auto-injector; Inject 0.5 mL (2.5 mg total) under the skin once a week for 28 days    Mixed hyperlipidemia  On atorvastatin 40 mg.       Vitamin D deficiency  Taking D3.       Vitamin B12 deficiency  Taking B12.       Pain, dental  Sees dentist.       Declined earlier follow up due to co-pay.  Follow up in 1 year or as needed.         History of Present Illness     He feels well, no new complaints.  He continues to work 3rd shift. He is having a lot of dental issues, needs to have a tooth pulled.  He knows what he is supposed to eat, usually buys easy foods like burgers. He has no time to exercise due to work.  He is frustrated about his weight, thinks he had gained again since his last visit.      Review of Systems   Constitutional:  Negative for appetite change and fatigue.   HENT:  Negative for congestion, hearing loss and postnasal drip.    Eyes: Negative.    Respiratory:  Negative for cough, chest tightness and shortness of breath.    Cardiovascular:  Negative for chest pain, palpitations and leg swelling.   Gastrointestinal:  Negative for abdominal pain and constipation.   Genitourinary:  Negative for dysuria, frequency and urgency.   Musculoskeletal:  Negative for arthralgias and back pain.   Skin:  Negative for rash and wound.   Neurological:   "Negative for dizziness, numbness and headaches.   Hematological:  Negative for adenopathy. Does not bruise/bleed easily.   Psychiatric/Behavioral:  Negative for sleep disturbance. The patient is not nervous/anxious.        Objective   /88 (BP Location: Left arm, Patient Position: Sitting, Cuff Size: Large)   Pulse 93   Temp (!) 97 °F (36.1 °C) (Temporal)   Ht 5' 2\" (1.575 m)   Wt 108 kg (237 lb 12.8 oz)   SpO2 97%   BMI 43.49 kg/m²      Physical Exam  Vitals and nursing note reviewed.   Constitutional:       General: He is not in acute distress.     Appearance: He is well-developed.   HENT:      Head: Normocephalic and atraumatic.      Right Ear: External ear normal.      Left Ear: External ear normal.      Mouth/Throat:      Mouth: Mucous membranes are moist.   Eyes:      Conjunctiva/sclera: Conjunctivae normal.   Cardiovascular:      Rate and Rhythm: Normal rate and regular rhythm.      Heart sounds: No murmur heard.  Pulmonary:      Effort: Pulmonary effort is normal. No respiratory distress.      Breath sounds: Normal breath sounds.   Abdominal:      Palpations: Abdomen is soft.      Tenderness: There is no abdominal tenderness.   Musculoskeletal:         General: No swelling.      Cervical back: Neck supple.   Skin:     General: Skin is warm and dry.   Neurological:      General: No focal deficit present.      Mental Status: He is alert and oriented to person, place, and time.   Psychiatric:         Mood and Affect: Mood normal.         Behavior: Behavior normal.           Lab results reviewed with patient.    "

## 2024-12-20 ENCOUNTER — TELEPHONE (OUTPATIENT)
Age: 44
End: 2024-12-20

## 2024-12-20 NOTE — TELEPHONE ENCOUNTER
Pt called in and wanted to let his doctor know he  his Zepbound medication. He would like to start taking it on Monday. He has a few questions for you before starting. He is requesting a call from you. He is unsure how to start the medication, where to administer it, and how often.    Please advise, thank you

## 2024-12-29 NOTE — TELEPHONE ENCOUNTER
Lab results: Your cholesterol is a bit high  Limit junk food  Start exercising regularly  The rest of your labs are normal     Please schedule regular f/u appt in June or July  29-Dec-2024 01:17

## 2025-01-17 ENCOUNTER — TELEPHONE (OUTPATIENT)
Age: 45
End: 2025-01-17

## 2025-01-17 DIAGNOSIS — E66.813 CLASS 3 SEVERE OBESITY DUE TO EXCESS CALORIES WITH SERIOUS COMORBIDITY AND BODY MASS INDEX (BMI) OF 40.0 TO 44.9 IN ADULT (HCC): Primary | ICD-10-CM

## 2025-01-17 DIAGNOSIS — E66.01 CLASS 3 SEVERE OBESITY DUE TO EXCESS CALORIES WITH SERIOUS COMORBIDITY AND BODY MASS INDEX (BMI) OF 40.0 TO 44.9 IN ADULT (HCC): Primary | ICD-10-CM

## 2025-01-17 RX ORDER — TIRZEPATIDE 5 MG/.5ML
5 INJECTION, SOLUTION SUBCUTANEOUS WEEKLY
Qty: 2 ML | Refills: 0 | Status: SHIPPED | OUTPATIENT
Start: 2025-01-17

## 2025-01-17 NOTE — TELEPHONE ENCOUNTER
Please call patient.  Next dose of 5 mg sent to the pharmacy.  Please explain, we will continue to increase the dose monthly and re-evaluate weight or if with any side effects.

## 2025-01-17 NOTE — TELEPHONE ENCOUNTER
Patient called in to report he took last injection of Zepbound 2.5 mg today. Denies any side effects.   Last weight was a week ago; fully dressed with footwear and reports no weight loss; 236 lbs. Patient wants to know if medication should curb appetite and when he can anticipate some weight loss. Advised to call between 2nd and 3rd injection to assure product dose supply and if he has any questions or concerns. Requesting refill for medication be sent to [facility]. Please follow up with patient for provider response.

## 2025-01-17 NOTE — TELEPHONE ENCOUNTER
I spoke with Orly and told him the Rx for 5 mg was sent to his pharmacy.  I asked him to report ay problems with the increase in dose.  He understood the instructions.

## 2025-01-21 ENCOUNTER — TELEPHONE (OUTPATIENT)
Age: 45
End: 2025-01-21

## 2025-01-21 NOTE — TELEPHONE ENCOUNTER
PA for zepbound  2.5 mg/0.5 ml  APPROVED     Date(s) approved  January 21, 2025 to July 21, 2025     Case #[id number]     Patient advised by          [][email or web address] Message  [x]Phone call   [x]LMOM  []L/M to call office as no active Communication consent on file  []Unable to leave detailed message as VM not approved on Communication consent       Pharmacy advised by    [x]Fax  []Phone call    Approval letter scanned into [employer] Yes

## 2025-01-21 NOTE — TELEPHONE ENCOUNTER
PA for zepbound 5 mg/0.5 ml SUBMITTED to [employer]     via    []CMM-KEY:   [x]Surescripts-Case ID # [id number]   []Availity-Auth ID # NDC #   []Faxed to plan   []Other website   []Phone call Case ID #     [x]PA sent as URGENT    All office notes, labs and other pertaining documents and studies sent. Clinical questions answered. Awaiting determination from insurance company.     Turnaround time for your insurance to make a decision on your Prior Authorization can take 7-21 business days.

## 2025-01-27 NOTE — TELEPHONE ENCOUNTER
Pt stated that he was approved for the weight loss medication. Please contact pt and advise the status as he did not receive the med yet. Thank you for your kind assistance.

## 2025-01-30 NOTE — TELEPHONE ENCOUNTER
Patient called in stating that his Zepbound has been approved but the pharmacy did not refill it due to needing something from the doctor.     I called the pharmacy to find out what else is needed in order to refill Zepbound and was told that they do not have Zepbound available and do not know when it will be shipped again due to a high demand .    Patient was made aware and advise to reach out to his nearby pharmacies for availability. Patient verbalized understanding.

## 2025-02-25 DIAGNOSIS — E66.01 CLASS 3 SEVERE OBESITY DUE TO EXCESS CALORIES WITH SERIOUS COMORBIDITY AND BODY MASS INDEX (BMI) OF 40.0 TO 44.9 IN ADULT (HCC): ICD-10-CM

## 2025-02-25 DIAGNOSIS — E66.813 CLASS 3 SEVERE OBESITY DUE TO EXCESS CALORIES WITH SERIOUS COMORBIDITY AND BODY MASS INDEX (BMI) OF 40.0 TO 44.9 IN ADULT (HCC): ICD-10-CM

## 2025-02-25 RX ORDER — TIRZEPATIDE 5 MG/.5ML
5 INJECTION, SOLUTION SUBCUTANEOUS WEEKLY
Qty: 2 ML | Refills: 0 | Status: CANCELLED | OUTPATIENT
Start: 2025-02-25

## 2025-02-25 NOTE — TELEPHONE ENCOUNTER
Pt requested a refill tirzepatide (Zepbound) 5 mg/0.5 mL auto-injector. Please send to:       [id number] - [place], [place] - [address]         Thank you for your help.

## 2025-02-26 RX ORDER — TIRZEPATIDE 7.5 MG/.5ML
7.5 INJECTION, SOLUTION SUBCUTANEOUS WEEKLY
Qty: 2 ML | Refills: 0 | Status: SHIPPED | OUTPATIENT
Start: 2025-02-26

## 2025-03-13 ENCOUNTER — TELEPHONE (OUTPATIENT)
Dept: INTERNAL MEDICINE CLINIC | Facility: CLINIC | Age: 45
End: 2025-03-13

## 2025-03-13 NOTE — TELEPHONE ENCOUNTER
Please call patient.  Ask how he is doing on the Zepbound. Any side effects? Nausea, constipation?  What is his current weight now?

## 2025-03-13 NOTE — TELEPHONE ENCOUNTER
Mandeep is doing well He has no side effects at all.  He has not weighed himself yet.  When he does, he will call with his weight.

## 2025-03-14 ENCOUNTER — CLINICAL SUPPORT (OUTPATIENT)
Dept: INTERNAL MEDICINE CLINIC | Facility: CLINIC | Age: 45
End: 2025-03-14

## 2025-03-14 VITALS — DIASTOLIC BLOOD PRESSURE: 110 MMHG | SYSTOLIC BLOOD PRESSURE: 152 MMHG

## 2025-03-14 DIAGNOSIS — N18.2 BENIGN HYPERTENSION WITH CHRONIC KIDNEY DISEASE, STAGE II: ICD-10-CM

## 2025-03-14 DIAGNOSIS — I12.9 BENIGN HYPERTENSION WITH CHRONIC KIDNEY DISEASE, STAGE II: ICD-10-CM

## 2025-03-14 DIAGNOSIS — Z01.30 BP CHECK: Primary | ICD-10-CM

## 2025-03-14 PROCEDURE — PBNCHG PB NO CHARGE PLACEHOLDER

## 2025-03-14 RX ORDER — LOSARTAN POTASSIUM 50 MG/1
50 TABLET ORAL DAILY
Qty: 90 TABLET | Refills: 0 | Status: SHIPPED | OUTPATIENT
Start: 2025-03-14 | End: 2025-03-28

## 2025-03-14 NOTE — PROGRESS NOTES
Patient here for BP Check.    Check at home :   Patient does not check BP at home.     Today:     Dinamap- 148/103 Left Arm; Large cuff   Manual- 152/110 Left Arm; Large Cuff     Medications:    Losartan 25 mg/ 1 day   Metoprolol succinate 50 mg/ 1 day     Comments:

## 2025-03-28 ENCOUNTER — CLINICAL SUPPORT (OUTPATIENT)
Dept: INTERNAL MEDICINE CLINIC | Facility: CLINIC | Age: 45
End: 2025-03-28

## 2025-03-28 VITALS — DIASTOLIC BLOOD PRESSURE: 110 MMHG | SYSTOLIC BLOOD PRESSURE: 142 MMHG

## 2025-03-28 DIAGNOSIS — N18.2 BENIGN HYPERTENSION WITH CHRONIC KIDNEY DISEASE, STAGE II: ICD-10-CM

## 2025-03-28 DIAGNOSIS — I12.9 BENIGN HYPERTENSION WITH CHRONIC KIDNEY DISEASE, STAGE II: ICD-10-CM

## 2025-03-28 PROCEDURE — PBNCHG PB NO CHARGE PLACEHOLDER

## 2025-03-28 RX ORDER — LOSARTAN POTASSIUM 25 MG/1
TABLET ORAL
COMMUNITY
Start: 2025-03-22 | End: 2025-03-28

## 2025-03-28 RX ORDER — LOSARTAN POTASSIUM 100 MG/1
100 TABLET ORAL DAILY
Qty: 90 TABLET | Refills: 0 | Status: SHIPPED | OUTPATIENT
Start: 2025-03-28

## 2025-03-28 NOTE — PROGRESS NOTES
Patient here for BP Check.     Check at home :   Patient does not check BP at home.      Today:      142/110 Right Arm; Large cuff     Medications:     Losartan 50 mg/ 1 day   Metoprolol succinate 50 mg/ 1 day      Comments:    Per provider, increase Losartan to 100 mg - send new prescription in. Continue taking Metoprolol Succinate 50 mg 1/day. F/U in 2 weeks for BP check.

## 2025-04-01 ENCOUNTER — TELEPHONE (OUTPATIENT)
Age: 45
End: 2025-04-01

## 2025-04-01 DIAGNOSIS — E66.01 CLASS 3 SEVERE OBESITY DUE TO EXCESS CALORIES WITH SERIOUS COMORBIDITY AND BODY MASS INDEX (BMI) OF 40.0 TO 44.9 IN ADULT (HCC): Primary | ICD-10-CM

## 2025-04-01 DIAGNOSIS — E66.813 CLASS 3 SEVERE OBESITY DUE TO EXCESS CALORIES WITH SERIOUS COMORBIDITY AND BODY MASS INDEX (BMI) OF 40.0 TO 44.9 IN ADULT (HCC): Primary | ICD-10-CM

## 2025-04-01 RX ORDER — TIRZEPATIDE 10 MG/.5ML
10 INJECTION, SOLUTION SUBCUTANEOUS WEEKLY
Qty: 2 ML | Refills: 0 | Status: SHIPPED | OUTPATIENT
Start: 2025-04-01

## 2025-04-01 NOTE — TELEPHONE ENCOUNTER
Patient called states would like next dose of Zepbound to be sent to [facility] on [address] in [place] is currently on 7.5 mg

## 2025-04-11 ENCOUNTER — TELEPHONE (OUTPATIENT)
Dept: INTERNAL MEDICINE CLINIC | Facility: CLINIC | Age: 45
End: 2025-04-11

## 2025-04-21 DIAGNOSIS — I12.9 BENIGN HYPERTENSION WITH CHRONIC KIDNEY DISEASE, STAGE II: ICD-10-CM

## 2025-04-21 DIAGNOSIS — E78.2 MIXED HYPERLIPIDEMIA: ICD-10-CM

## 2025-04-21 DIAGNOSIS — N18.2 BENIGN HYPERTENSION WITH CHRONIC KIDNEY DISEASE, STAGE II: ICD-10-CM

## 2025-04-21 RX ORDER — METOPROLOL SUCCINATE 50 MG/1
50 TABLET, EXTENDED RELEASE ORAL DAILY
Qty: 90 TABLET | Refills: 1 | Status: SHIPPED | OUTPATIENT
Start: 2025-04-21

## 2025-04-21 RX ORDER — ATORVASTATIN CALCIUM 40 MG/1
40 TABLET, FILM COATED ORAL DAILY
Qty: 90 TABLET | Refills: 1 | Status: SHIPPED | OUTPATIENT
Start: 2025-04-21

## 2025-04-21 RX ORDER — LOSARTAN POTASSIUM 100 MG/1
100 TABLET ORAL DAILY
Qty: 90 TABLET | Refills: 1 | Status: SHIPPED | OUTPATIENT
Start: 2025-04-21 | End: 2025-05-02 | Stop reason: ALTCHOICE

## 2025-05-02 ENCOUNTER — CLINICAL SUPPORT (OUTPATIENT)
Dept: INTERNAL MEDICINE CLINIC | Facility: CLINIC | Age: 45
End: 2025-05-02
Payer: COMMERCIAL

## 2025-05-02 VITALS — SYSTOLIC BLOOD PRESSURE: 146 MMHG | DIASTOLIC BLOOD PRESSURE: 96 MMHG

## 2025-05-02 DIAGNOSIS — N18.2 BENIGN HYPERTENSION WITH CHRONIC KIDNEY DISEASE, STAGE II: Primary | ICD-10-CM

## 2025-05-02 DIAGNOSIS — B36.9 FUNGAL DERMATITIS: ICD-10-CM

## 2025-05-02 DIAGNOSIS — I12.9 BENIGN HYPERTENSION WITH CHRONIC KIDNEY DISEASE, STAGE II: Primary | ICD-10-CM

## 2025-05-02 PROCEDURE — 99211 OFF/OP EST MAY X REQ PHY/QHP: CPT

## 2025-05-02 RX ORDER — NYSTATIN 100000 [USP'U]/G
POWDER TOPICAL 2 TIMES DAILY
Qty: 60 G | Refills: 1 | Status: SHIPPED | OUTPATIENT
Start: 2025-05-02

## 2025-05-02 RX ORDER — NYSTATIN 100000 U/G
OINTMENT TOPICAL 2 TIMES DAILY
Qty: 30 G | Refills: 1 | Status: SHIPPED | OUTPATIENT
Start: 2025-05-02

## 2025-05-02 RX ORDER — VALSARTAN 80 MG/1
80 TABLET ORAL DAILY
Qty: 90 TABLET | Refills: 0 | Status: SHIPPED | OUTPATIENT
Start: 2025-05-02

## 2025-05-23 ENCOUNTER — CLINICAL SUPPORT (OUTPATIENT)
Dept: INTERNAL MEDICINE CLINIC | Facility: CLINIC | Age: 45
End: 2025-05-23

## 2025-05-23 VITALS — SYSTOLIC BLOOD PRESSURE: 140 MMHG | DIASTOLIC BLOOD PRESSURE: 90 MMHG

## 2025-05-23 DIAGNOSIS — Z01.30 BP CHECK: Primary | ICD-10-CM

## 2025-05-23 NOTE — PROGRESS NOTES
Patient here for BP Check.    Check at home : Does not check at home     Today: 140/90 Left Arm; Large cuff    Medications:    Metoprolol Succinate 50 mg / 1 day   Valsartan 80 MG / 1 day     Comments:     No changes made. Continue medications and f/u at next scheduled appointment.

## 2025-07-25 DIAGNOSIS — N18.2 BENIGN HYPERTENSION WITH CHRONIC KIDNEY DISEASE, STAGE II: ICD-10-CM

## 2025-07-25 DIAGNOSIS — B36.9 FUNGAL DERMATITIS: ICD-10-CM

## 2025-07-25 DIAGNOSIS — I12.9 BENIGN HYPERTENSION WITH CHRONIC KIDNEY DISEASE, STAGE II: ICD-10-CM

## 2025-07-25 DIAGNOSIS — E78.2 MIXED HYPERLIPIDEMIA: ICD-10-CM

## 2025-07-28 RX ORDER — ATORVASTATIN CALCIUM 40 MG/1
40 TABLET, FILM COATED ORAL DAILY
Qty: 90 TABLET | Refills: 1 | Status: SHIPPED | OUTPATIENT
Start: 2025-07-28

## 2025-07-28 RX ORDER — VALSARTAN 80 MG/1
80 TABLET ORAL DAILY
Qty: 90 TABLET | Refills: 1 | Status: SHIPPED | OUTPATIENT
Start: 2025-07-28

## 2025-07-28 RX ORDER — METOPROLOL SUCCINATE 50 MG/1
50 TABLET, EXTENDED RELEASE ORAL DAILY
Qty: 90 TABLET | Refills: 1 | Status: SHIPPED | OUTPATIENT
Start: 2025-07-28

## 2025-07-29 RX ORDER — NYSTATIN 100000 [USP'U]/G
POWDER TOPICAL 2 TIMES DAILY
Qty: 60 G | Refills: 2 | Status: SHIPPED | OUTPATIENT
Start: 2025-07-29

## 2025-07-29 RX ORDER — NYSTATIN 100000 U/G
OINTMENT TOPICAL 2 TIMES DAILY
Qty: 30 G | Refills: 2 | Status: SHIPPED | OUTPATIENT
Start: 2025-07-29

## (undated) DEVICE — STERILE MUSCLE FLAP PACK: Brand: CARDINAL HEALTH

## (undated) DEVICE — EVERGRIP INSERT SET 86MM: Brand: FOGARTY EVERGRIP

## (undated) DEVICE — BULB SYRINGE,IRRIGATION WITH PROTECTIVE CAP: Brand: DOVER

## (undated) DEVICE — MAYO STAND COVER: Brand: CONVERTORS

## (undated) DEVICE — HEMOCLIP CARTRIDGE MED

## (undated) DEVICE — SUT PROLENE 3-0 SH 36 IN 8522H

## (undated) DEVICE — RECIP.STERNUM SAW BLADE 34/7.5/0.7MM: Brand: AESCULAP

## (undated) DEVICE — SUT ETHIBOND 2-0 V-7/V-7 30 IN PXX77

## (undated) DEVICE — MEDI-VAC NON-CONDUCTIVE SUCTION TUBING 6MM X 1.8M (6FT.) L: Brand: CARDINAL HEALTH

## (undated) DEVICE — NEEDLE 25G X 1 1/2

## (undated) DEVICE — TRAY FOLEY 16FR URIMETER SURESTEP

## (undated) DEVICE — INTENDED FOR TISSUE SEPARATION, AND OTHER PROCEDURES THAT REQUIRE A SHARP SURGICAL BLADE TO PUNCTURE OR CUT.: Brand: BARD-PARKER ® CARBON RIB-BACK BLADES

## (undated) DEVICE — GLOVE SRG BIOGEL ECLIPSE 8

## (undated) DEVICE — SPONGE LAP 18 X 18 IN

## (undated) DEVICE — CYSTO TUBING SINGLE IRRIGATION

## (undated) DEVICE — SPONGE STICK WITH PVP-I: Brand: KENDALL

## (undated) DEVICE — SUT VICRYL 2 TP-1 27 IN J849G

## (undated) DEVICE — BETHLEHEM UNIVERSAL OUTPATIENT: Brand: CARDINAL HEALTH

## (undated) DEVICE — THERMOFLECT BLANKET, L, 25EA                               TS THERMOFLECT BLANKET, 48" X 84", SILVER, 5/BG, 5 BG/CS NW: Brand: THERMOFLECT

## (undated) DEVICE — SILVER-COATED ANTIBACTERIAL BARRIER DRESSING: Brand: ACTICOAT SURGIC 10X25CM 5PK US

## (undated) DEVICE — ANTIBACTERIAL UNDYED BRAIDED (POLYGLACTIN 910), SYNTHETIC ABSORBABLE SUTURE: Brand: COATED VICRYL

## (undated) DEVICE — LIGHT HANDLE COVER SLEEVE DISP BLUE STELLAR

## (undated) DEVICE — GLOVE INDICATOR PI UNDERGLOVE SZ 8 BLUE

## (undated) DEVICE — PVC URETHRAL CATHETER: Brand: DOVER

## (undated) DEVICE — SUT SILK 2-0 SH CR/8 18 IN C012D

## (undated) DEVICE — 32 FR STRAIGHT – SOFT PVC CATHETER: Brand: PVC THORACIC CATHETERS

## (undated) DEVICE — PACK VALVE PBDS

## (undated) DEVICE — SUT PDS II 1 CTX 36 IN Z371T

## (undated) DEVICE — PENCIL ELECTROSURG E-Z CLEAN -0035H

## (undated) DEVICE — PLUMEPEN PRO 10FT

## (undated) DEVICE — SUT ETHILON 3-0 FS-1 18 IN 663G

## (undated) DEVICE — PLEDGET CARDIO PTFE 9.5 X 4.8 SOFT LF (6EA/PK)

## (undated) DEVICE — SUT PDS II 2-0 CT-1 27 IN Z259H

## (undated) DEVICE — 32 FR RIGHT ANGLE – SOFT PVC CATHETER: Brand: PVC THORACIC CATHETERS

## (undated) DEVICE — GLOVE SRG BIOGEL ECLIPSE 8.5

## (undated) DEVICE — SUT SILK 0 CT-1 30 IN 424H

## (undated) DEVICE — SUT MONOCRYL PLUS 3-0 PS-2 27 IN MCP427H

## (undated) DEVICE — GAUZE SPONGES,16 PLY: Brand: CURITY

## (undated) DEVICE — ELECTRODE BLADE MOD E-Z CLEAN  2.75IN 7CM -0012AM

## (undated) DEVICE — DERMATOME BLADES: Brand: DERMATOME

## (undated) DEVICE — INSTRUMENT POUCH: Brand: CONVERTORS

## (undated) DEVICE — SUT SILK 2 60 IN SA8H

## (undated) DEVICE — SUT ETHIBOND 2-0 SH/SH 36 IN X523H

## (undated) DEVICE — BLANKET HYPOTHERMIA ADULT GAYMAR

## (undated) DEVICE — DRESSING ALLEVYN LIFE HEEL 25 X 25.2CM

## (undated) DEVICE — VIOLET BRAIDED (POLYGLACTIN 910), SYNTHETIC ABSORBABLE SUTURE: Brand: COATED VICRYL

## (undated) DEVICE — DRESSING BIOPATCH ANTIMICROBIAL 1 IN DISC

## (undated) DEVICE — SYRINGE 10ML LL

## (undated) DEVICE — CATH STRAIGHT RED RUBBER 20 FR

## (undated) DEVICE — SUCTION CATH 18 FR

## (undated) DEVICE — SPONGE LAP 18 X 18 IN STRL RFD

## (undated) DEVICE — GLOVE INDICATOR PI UNDERGLOVE SZ 8.5 BLUE

## (undated) DEVICE — JACKSON-PRATT 100CC BULB RESERVOIR: Brand: CARDINAL HEALTH

## (undated) DEVICE — OASIS DRAIN, SINGLE, INLINE & ATS COMPATIBLE: Brand: OASIS

## (undated) DEVICE — 40601 PROLONGED POSITIONING SYSTEM: Brand: 40601 PROLONGED POSITIONING SYSTEM

## (undated) DEVICE — MEDI-VAC YANK SUCT HNDL W/TPRD BULBOUS TIP: Brand: CARDINAL HEALTH

## (undated) DEVICE — 3000CC GUARDIAN II: Brand: GUARDIAN

## (undated) DEVICE — SUT VICRYL PLUS 1 CTB-1 36 IN VCPB947H

## (undated) DEVICE — CHLORAPREP HI-LITE 26ML ORANGE

## (undated) DEVICE — SUT PROLENE 4-0 BB 36 IN 8581H

## (undated) DEVICE — CATHETER PLUG WITH CAP: Brand: DOVER

## (undated) DEVICE — STERNAL WIRE

## (undated) DEVICE — BONE WAX WHITE: Brand: BONE WAX WHITE

## (undated) DEVICE — PAD GROUNDING ADULT

## (undated) DEVICE — SUT ETHIBOND 2-0 SH-1/SH-1 30 IN X763H

## (undated) DEVICE — DRESSING ALLEVYN LIFE SACRAL 6.75 X 6.5 IN

## (undated) DEVICE — ELECTRODE BLADE MOD  E-Z CLEAN 6.5IN -0014M

## (undated) DEVICE — SUT PROLENE 5-0 C-1/C-1 36 IN 8321H

## (undated) DEVICE — TRAY FOLEY 16FR SURESTEP TEMP SENS URIMETER STAT LOK

## (undated) DEVICE — DRAIN JACKSON PRATT 15FR: Brand: CARDINAL HEALTH

## (undated) DEVICE — SCD SEQUENTIAL COMPRESSION COMFORT SLEEVE MEDIUM KNEE LENGTH: Brand: KENDALL SCD

## (undated) DEVICE — SUT PDS PLUS 1 CTB 36 IN PDPB359T

## (undated) DEVICE — PREVENA INCISION MANAGEMENT SYSTEM- PEEL & PLACE DRESSING: Brand: PREVENA™ PEEL & PLACE™